# Patient Record
Sex: MALE | Race: WHITE | NOT HISPANIC OR LATINO | Employment: OTHER | URBAN - METROPOLITAN AREA
[De-identification: names, ages, dates, MRNs, and addresses within clinical notes are randomized per-mention and may not be internally consistent; named-entity substitution may affect disease eponyms.]

---

## 2018-07-31 DIAGNOSIS — I49.8 OTHER CARDIAC ARRHYTHMIA: Primary | ICD-10-CM

## 2018-07-31 RX ORDER — AMIODARONE HYDROCHLORIDE 200 MG/1
TABLET ORAL
Qty: 180 TABLET | Refills: 0 | Status: SHIPPED | OUTPATIENT
Start: 2018-07-31 | End: 2018-09-05

## 2018-08-14 DIAGNOSIS — I10 ESSENTIAL HYPERTENSION, BENIGN: Primary | ICD-10-CM

## 2018-08-14 RX ORDER — FUROSEMIDE 20 MG/1
20 TABLET ORAL EVERY 24 HOURS
COMMUNITY
Start: 2017-08-03 | End: 2018-08-14 | Stop reason: SDUPTHER

## 2018-08-14 RX ORDER — FUROSEMIDE 20 MG/1
20 TABLET ORAL EVERY 24 HOURS
Qty: 90 TABLET | Refills: 1 | Status: SHIPPED | OUTPATIENT
Start: 2018-08-14 | End: 2018-09-05 | Stop reason: SDUPTHER

## 2018-09-05 DIAGNOSIS — I48.20 CHRONIC ATRIAL FIBRILLATION (HCC): ICD-10-CM

## 2018-09-05 DIAGNOSIS — K21.9 GASTROESOPHAGEAL REFLUX DISEASE WITHOUT ESOPHAGITIS: ICD-10-CM

## 2018-09-05 DIAGNOSIS — I10 ESSENTIAL HYPERTENSION, BENIGN: ICD-10-CM

## 2018-09-05 DIAGNOSIS — E55.9 AVITAMINOSIS D: ICD-10-CM

## 2018-09-05 DIAGNOSIS — M1A.9XX0 CHRONIC GOUT WITHOUT TOPHUS, UNSPECIFIED CAUSE, UNSPECIFIED SITE: Primary | ICD-10-CM

## 2018-09-05 DIAGNOSIS — E78.49 OTHER HYPERLIPIDEMIA: ICD-10-CM

## 2018-09-05 RX ORDER — OMEPRAZOLE 20 MG/1
20 CAPSULE, DELAYED RELEASE ORAL 2 TIMES DAILY
COMMUNITY
Start: 2017-08-03 | End: 2018-09-05 | Stop reason: SDUPTHER

## 2018-09-05 RX ORDER — AMIODARONE HYDROCHLORIDE 200 MG/1
200 TABLET ORAL 2 TIMES DAILY
Qty: 180 TABLET | Refills: 1 | Status: SHIPPED | OUTPATIENT
Start: 2018-09-05 | End: 2019-01-17 | Stop reason: SDUPTHER

## 2018-09-05 RX ORDER — ATORVASTATIN CALCIUM 20 MG/1
20 TABLET, FILM COATED ORAL
Qty: 90 TABLET | Refills: 1 | Status: SHIPPED | OUTPATIENT
Start: 2018-09-05 | End: 2019-01-17 | Stop reason: SDUPTHER

## 2018-09-05 RX ORDER — WARFARIN SODIUM 5 MG/1
5 TABLET ORAL DAILY
Qty: 90 TABLET | Refills: 1 | Status: SHIPPED | OUTPATIENT
Start: 2018-09-05 | End: 2019-04-22 | Stop reason: SDUPTHER

## 2018-09-05 RX ORDER — METOPROLOL SUCCINATE 50 MG/1
50 TABLET, EXTENDED RELEASE ORAL DAILY
COMMUNITY
Start: 2017-08-03 | End: 2018-09-05 | Stop reason: SDUPTHER

## 2018-09-05 RX ORDER — ALLOPURINOL 100 MG/1
100 TABLET ORAL DAILY
COMMUNITY
Start: 2017-12-18 | End: 2018-09-05 | Stop reason: SDUPTHER

## 2018-09-05 RX ORDER — ATORVASTATIN CALCIUM 20 MG/1
20 TABLET, FILM COATED ORAL
COMMUNITY
Start: 2017-08-03 | End: 2018-09-05 | Stop reason: SDUPTHER

## 2018-09-05 RX ORDER — WARFARIN SODIUM 5 MG/1
5 TABLET ORAL DAILY
COMMUNITY
Start: 2017-08-03 | End: 2018-09-05 | Stop reason: SDUPTHER

## 2018-09-05 RX ORDER — ALLOPURINOL 100 MG/1
100 TABLET ORAL DAILY
Qty: 90 TABLET | Refills: 1 | Status: SHIPPED | OUTPATIENT
Start: 2018-09-05 | End: 2019-01-17 | Stop reason: SDUPTHER

## 2018-09-05 RX ORDER — OMEPRAZOLE 20 MG/1
20 CAPSULE, DELAYED RELEASE ORAL 2 TIMES DAILY
Qty: 180 CAPSULE | Refills: 1 | Status: SHIPPED | OUTPATIENT
Start: 2018-09-05 | End: 2018-09-10 | Stop reason: SDUPTHER

## 2018-09-05 RX ORDER — LOSARTAN POTASSIUM 50 MG/1
50 TABLET ORAL DAILY
COMMUNITY
Start: 2017-12-18 | End: 2018-09-05 | Stop reason: SDUPTHER

## 2018-09-05 RX ORDER — FUROSEMIDE 20 MG/1
20 TABLET ORAL EVERY 24 HOURS
Qty: 90 TABLET | Refills: 1 | Status: SHIPPED | OUTPATIENT
Start: 2018-09-05 | End: 2019-01-17 | Stop reason: SDUPTHER

## 2018-09-05 RX ORDER — AMIODARONE HYDROCHLORIDE 200 MG/1
200 TABLET ORAL 2 TIMES DAILY
COMMUNITY
Start: 2017-08-03 | End: 2018-09-05 | Stop reason: SDUPTHER

## 2018-09-05 RX ORDER — METOPROLOL SUCCINATE 50 MG/1
50 TABLET, EXTENDED RELEASE ORAL DAILY
Qty: 90 TABLET | Refills: 1 | Status: SHIPPED | OUTPATIENT
Start: 2018-09-05 | End: 2018-09-10 | Stop reason: SDUPTHER

## 2018-09-05 RX ORDER — ERGOCALCIFEROL 1.25 MG/1
1 CAPSULE ORAL WEEKLY
COMMUNITY
Start: 2018-03-27 | End: 2018-09-05 | Stop reason: SDUPTHER

## 2018-09-05 RX ORDER — ERGOCALCIFEROL 1.25 MG/1
50000 CAPSULE ORAL WEEKLY
Qty: 13 CAPSULE | Refills: 1 | Status: SHIPPED | OUTPATIENT
Start: 2018-09-05 | End: 2019-01-17 | Stop reason: SDUPTHER

## 2018-09-05 RX ORDER — LOSARTAN POTASSIUM 50 MG/1
50 TABLET ORAL DAILY
Qty: 90 TABLET | Refills: 1 | Status: SHIPPED | OUTPATIENT
Start: 2018-09-05 | End: 2019-01-17 | Stop reason: SDUPTHER

## 2018-09-10 DIAGNOSIS — K21.9 GASTROESOPHAGEAL REFLUX DISEASE WITHOUT ESOPHAGITIS: ICD-10-CM

## 2018-09-10 DIAGNOSIS — I10 ESSENTIAL HYPERTENSION, BENIGN: ICD-10-CM

## 2018-09-10 RX ORDER — METOPROLOL SUCCINATE 50 MG/1
TABLET, EXTENDED RELEASE ORAL
Qty: 90 TABLET | Refills: 0 | Status: SHIPPED | OUTPATIENT
Start: 2018-09-10 | End: 2019-01-17 | Stop reason: SDUPTHER

## 2018-09-10 RX ORDER — OMEPRAZOLE 20 MG/1
CAPSULE, DELAYED RELEASE ORAL
Qty: 180 CAPSULE | Refills: 0 | Status: SHIPPED | OUTPATIENT
Start: 2018-09-10 | End: 2019-01-17 | Stop reason: SDUPTHER

## 2018-09-19 ENCOUNTER — OFFICE VISIT (OUTPATIENT)
Dept: FAMILY MEDICINE CLINIC | Facility: CLINIC | Age: 68
End: 2018-09-19
Payer: MEDICARE

## 2018-09-19 VITALS
SYSTOLIC BLOOD PRESSURE: 140 MMHG | OXYGEN SATURATION: 97 % | HEIGHT: 69 IN | DIASTOLIC BLOOD PRESSURE: 82 MMHG | WEIGHT: 228.9 LBS | HEART RATE: 48 BPM | TEMPERATURE: 97.9 F | BODY MASS INDEX: 33.9 KG/M2 | RESPIRATION RATE: 14 BRPM

## 2018-09-19 DIAGNOSIS — K59.04 CHRONIC IDIOPATHIC CONSTIPATION: Primary | ICD-10-CM

## 2018-09-19 DIAGNOSIS — M54.50 ACUTE LOW BACK PAIN WITHOUT SCIATICA, UNSPECIFIED BACK PAIN LATERALITY: ICD-10-CM

## 2018-09-19 PROCEDURE — 96372 THER/PROPH/DIAG INJ SC/IM: CPT | Performed by: INTERNAL MEDICINE

## 2018-09-19 PROCEDURE — 99213 OFFICE O/P EST LOW 20 MIN: CPT | Performed by: INTERNAL MEDICINE

## 2018-09-19 RX ORDER — PRAVASTATIN SODIUM 20 MG
20 TABLET ORAL DAILY
COMMUNITY
End: 2019-01-17 | Stop reason: SDUPTHER

## 2018-09-19 RX ORDER — METHOCARBAMOL 500 MG/1
500 TABLET, FILM COATED ORAL 3 TIMES DAILY
Qty: 30 TABLET | Refills: 0 | Status: SHIPPED | OUTPATIENT
Start: 2018-09-19 | End: 2019-08-01

## 2018-09-19 RX ORDER — KETOROLAC TROMETHAMINE 30 MG/ML
30 INJECTION, SOLUTION INTRAMUSCULAR; INTRAVENOUS ONCE
Status: COMPLETED | OUTPATIENT
Start: 2018-09-19 | End: 2018-09-19

## 2018-09-19 RX ADMIN — KETOROLAC TROMETHAMINE 30 MG: 30 INJECTION, SOLUTION INTRAMUSCULAR; INTRAVENOUS at 14:11

## 2018-09-19 NOTE — PROGRESS NOTES
Assessment/Plan:         Diagnoses and all orders for this visit:    Chronic idiopathic constipation: Try  -     Plecanatide (TRULANCE) 3 MG TABS; Take 1 tablet by mouth daily    FU w gI  Acute low back pain without sciatica, unspecified back pain laterality: Moist Heat :  -     ketorolac (TORADOL) 60 mg/2 mL IM injection 30 mg; Inject 1 mL (30 mg total) into a muscle once   -     methocarbamol (ROBAXIN) 500 mg tablet; Take 1 tablet (500 mg total) by mouth 3 (three) times a day With food   Tylenol 650 mg Tid PRN  pennsaid lotion TID   samples Given  RTC in 2 weeks  Other orders  -     pravastatin (PRAVACHOL) 20 mg tablet; Take 20 mg by mouth daily        Subjective:      Patient ID: Doris Paz is a 80 y o  male  80 Y O man with H/O HTN,Obesity,A Fib  Shameka Amaluke Shameka Salcedo Is here for Regular check up and Increasing low back pain since 2 days ago    Mod to severe, No trauma, also Chronic Constipation    No recent blood work    Med list reviewed w pt in Detail    No other issues         Back Pain   Pertinent negatives include no abdominal pain, chest pain, fever, headaches or weakness  The following portions of the patient's history were reviewed and updated as appropriate: allergies, current medications, past family history, past medical history, past social history, past surgical history and problem list     Review of Systems   Constitutional: Negative for chills, fatigue and fever  HENT: Negative for congestion, facial swelling, sore throat, trouble swallowing and voice change  Eyes: Negative for pain, discharge and visual disturbance  Respiratory: Negative for cough, shortness of breath and wheezing  Cardiovascular: Negative for chest pain, palpitations and leg swelling  Gastrointestinal: Positive for constipation  Negative for abdominal pain, blood in stool, diarrhea and nausea  Endocrine: Negative for polydipsia, polyphagia and polyuria  Genitourinary: Negative for difficulty urinating, hematuria and urgency  Musculoskeletal: Positive for back pain  Negative for arthralgias and myalgias  Skin: Negative for rash  Neurological: Negative for dizziness, tremors, weakness and headaches  Hematological: Negative for adenopathy  Does not bruise/bleed easily  Psychiatric/Behavioral: Negative for dysphoric mood, sleep disturbance and suicidal ideas  Objective:      /82 (BP Location: Left arm, Patient Position: Sitting, Cuff Size: Standard)   Pulse (!) 48   Temp 97 9 °F (36 6 °C) (Oral)   Resp 14   Ht 5' 9" (1 753 m)   Wt 104 kg (228 lb 14 4 oz)   SpO2 97%   BMI 33 80 kg/m²          Physical Exam   Constitutional: He is oriented to person, place, and time  He appears well-nourished  No distress  HENT:   Head: Normocephalic  Mouth/Throat: Oropharynx is clear and moist  No oropharyngeal exudate  Eyes: Conjunctivae are normal  Pupils are equal, round, and reactive to light  No scleral icterus  Neck: Neck supple  No thyromegaly present  Cardiovascular: Normal rate, regular rhythm and normal heart sounds  No murmur heard  Pulmonary/Chest: Effort normal and breath sounds normal  No respiratory distress  He has no wheezes  He has no rales  Abdominal: Soft  Bowel sounds are normal  He exhibits no distension  There is no tenderness  There is no rebound and no guarding  Obese   Musculoskeletal: He exhibits tenderness  He exhibits no edema  Tenderness and Muscle spasm Lower back in the center   Lymphadenopathy:     He has no cervical adenopathy  Neurological: He is alert and oriented to person, place, and time  No cranial nerve deficit  Coordination normal    Skin: No rash noted  No erythema  No pallor  Psychiatric: He has a normal mood and affect

## 2018-11-06 ENCOUNTER — OFFICE VISIT (OUTPATIENT)
Dept: FAMILY MEDICINE CLINIC | Facility: CLINIC | Age: 68
End: 2018-11-06
Payer: MEDICARE

## 2018-11-06 VITALS
HEART RATE: 62 BPM | RESPIRATION RATE: 14 BRPM | WEIGHT: 227.6 LBS | OXYGEN SATURATION: 98 % | SYSTOLIC BLOOD PRESSURE: 134 MMHG | HEIGHT: 69 IN | BODY MASS INDEX: 33.71 KG/M2 | DIASTOLIC BLOOD PRESSURE: 76 MMHG | TEMPERATURE: 97.8 F

## 2018-11-06 DIAGNOSIS — I10 ESSENTIAL HYPERTENSION: ICD-10-CM

## 2018-11-06 DIAGNOSIS — E78.49 OTHER HYPERLIPIDEMIA: Primary | ICD-10-CM

## 2018-11-06 DIAGNOSIS — K59.04 CHRONIC IDIOPATHIC CONSTIPATION: ICD-10-CM

## 2018-11-06 PROCEDURE — 99214 OFFICE O/P EST MOD 30 MIN: CPT | Performed by: INTERNAL MEDICINE

## 2018-11-06 NOTE — PROGRESS NOTES
Assessment/Plan:         Diagnoses and all orders for this visit:    Other hyperlipidemia: life Style mod  Continue same Meds  RTC in 3mos w Blood work,   slip is given to Pt  Chronic idiopathic constipation: improving Nicely  Renew :  -     Plecanatide (TRULANCE) 3 MG TABS; Take 1 tablet by mouth daily    Essential hypertension: stable  Continue same meds  RTC in 3mos w Blood work        Subjective:      Patient ID: Lizeth Umana is a 80 y o  male  80 Y O man with h/O HTN,Constipation,obesity,  Is here for Regular check up,  No new Symptoms,  No new blood work,  Med List reviewed w pt in detail    The following portions of the patient's history were reviewed and updated as appropriate: allergies, current medications, past family history, past medical history, past social history, past surgical history and problem list     Review of Systems   Constitutional: Negative for chills, fatigue and fever  HENT: Negative for congestion, facial swelling, sore throat, trouble swallowing and voice change  Eyes: Negative for pain, discharge and visual disturbance  Respiratory: Negative for cough, shortness of breath and wheezing  Cardiovascular: Negative for chest pain, palpitations and leg swelling  Gastrointestinal: Negative for abdominal pain, blood in stool, constipation, diarrhea and nausea  Endocrine: Negative for polydipsia, polyphagia and polyuria  Genitourinary: Negative for difficulty urinating, hematuria and urgency  Musculoskeletal: Negative for arthralgias and myalgias  Skin: Negative for rash  Neurological: Negative for dizziness, tremors, weakness and headaches  Hematological: Negative for adenopathy  Does not bruise/bleed easily  Psychiatric/Behavioral: Negative for dysphoric mood, sleep disturbance and suicidal ideas           Objective:      /76 (BP Location: Left arm, Patient Position: Sitting, Cuff Size: Standard)   Pulse 62   Temp 97 8 °F (36 6 °C) (Oral) Resp 14    5' 9" (1 753 m)   Wt 103 kg (227 lb 9 6 oz)   SpO2 98%   BMI 33 61 kg/m²          Physical Exam   Constitutional: He is oriented to person, place, and time  He appears well-nourished  No distress  HENT:   Head: Normocephalic  Mouth/Throat: Oropharynx is clear and moist  No oropharyngeal exudate  Eyes: Pupils are equal, round, and reactive to light  Conjunctivae are normal  No scleral icterus  Neck: Neck supple  No thyromegaly present  Cardiovascular: Normal rate, regular rhythm and normal heart sounds  No murmur heard  Pulmonary/Chest: Effort normal and breath sounds normal  No respiratory distress  He has no wheezes  He has no rales  Abdominal: Soft  Bowel sounds are normal  He exhibits no distension  There is no tenderness  There is no rebound and no guarding  Obese   Musculoskeletal: He exhibits no edema or tenderness  Lymphadenopathy:     He has no cervical adenopathy  Neurological: He is alert and oriented to person, place, and time  No cranial nerve deficit  Coordination normal    Skin: No rash noted  No erythema  No pallor  Psychiatric: He has a normal mood and affect

## 2019-01-17 DIAGNOSIS — I10 ESSENTIAL HYPERTENSION, BENIGN: ICD-10-CM

## 2019-01-17 DIAGNOSIS — E78.49 OTHER HYPERLIPIDEMIA: ICD-10-CM

## 2019-01-17 DIAGNOSIS — E55.9 AVITAMINOSIS D: ICD-10-CM

## 2019-01-17 DIAGNOSIS — K21.9 GASTROESOPHAGEAL REFLUX DISEASE WITHOUT ESOPHAGITIS: ICD-10-CM

## 2019-01-17 DIAGNOSIS — M1A.9XX0 CHRONIC GOUT WITHOUT TOPHUS, UNSPECIFIED CAUSE, UNSPECIFIED SITE: ICD-10-CM

## 2019-01-17 RX ORDER — AMIODARONE HYDROCHLORIDE 200 MG/1
200 TABLET ORAL 2 TIMES DAILY
Qty: 180 TABLET | Refills: 1 | Status: SHIPPED | OUTPATIENT
Start: 2019-01-17 | End: 2019-04-22 | Stop reason: SDUPTHER

## 2019-01-17 RX ORDER — FUROSEMIDE 20 MG/1
20 TABLET ORAL DAILY
Qty: 90 TABLET | Refills: 1 | Status: SHIPPED | OUTPATIENT
Start: 2019-01-17 | End: 2019-04-22 | Stop reason: SDUPTHER

## 2019-01-17 RX ORDER — PRAVASTATIN SODIUM 20 MG
20 TABLET ORAL DAILY
Qty: 90 TABLET | Refills: 1 | Status: SHIPPED | OUTPATIENT
Start: 2019-01-17 | End: 2019-04-22 | Stop reason: SDUPTHER

## 2019-01-17 RX ORDER — ATORVASTATIN CALCIUM 20 MG/1
20 TABLET, FILM COATED ORAL
Qty: 90 TABLET | Refills: 1 | Status: SHIPPED | OUTPATIENT
Start: 2019-01-17 | End: 2020-01-20

## 2019-01-17 RX ORDER — METOPROLOL SUCCINATE 50 MG/1
50 TABLET, EXTENDED RELEASE ORAL DAILY
Qty: 90 TABLET | Refills: 1 | Status: SHIPPED | OUTPATIENT
Start: 2019-01-17 | End: 2019-04-22 | Stop reason: SDUPTHER

## 2019-01-17 RX ORDER — LOSARTAN POTASSIUM 50 MG/1
50 TABLET ORAL DAILY
Qty: 90 TABLET | Refills: 1 | Status: SHIPPED | OUTPATIENT
Start: 2019-01-17 | End: 2019-04-01 | Stop reason: SDUPTHER

## 2019-01-17 RX ORDER — OMEPRAZOLE 20 MG/1
20 CAPSULE, DELAYED RELEASE ORAL 2 TIMES DAILY
Qty: 180 CAPSULE | Refills: 1 | Status: SHIPPED | OUTPATIENT
Start: 2019-01-17 | End: 2019-04-22 | Stop reason: SDUPTHER

## 2019-01-17 RX ORDER — ERGOCALCIFEROL 1.25 MG/1
50000 CAPSULE ORAL WEEKLY
Qty: 13 CAPSULE | Refills: 1 | Status: SHIPPED | OUTPATIENT
Start: 2019-01-17 | End: 2021-03-29 | Stop reason: SDUPTHER

## 2019-01-17 RX ORDER — ALLOPURINOL 100 MG/1
100 TABLET ORAL DAILY
Qty: 90 TABLET | Refills: 1 | Status: SHIPPED | OUTPATIENT
Start: 2019-01-17 | End: 2019-04-22 | Stop reason: SDUPTHER

## 2019-01-18 ENCOUNTER — OFFICE VISIT (OUTPATIENT)
Dept: FAMILY MEDICINE CLINIC | Facility: CLINIC | Age: 69
End: 2019-01-18
Payer: MEDICARE

## 2019-01-18 VITALS
RESPIRATION RATE: 14 BRPM | HEIGHT: 69 IN | OXYGEN SATURATION: 97 % | HEART RATE: 56 BPM | TEMPERATURE: 98.3 F | BODY MASS INDEX: 34.51 KG/M2 | WEIGHT: 233 LBS | SYSTOLIC BLOOD PRESSURE: 134 MMHG | DIASTOLIC BLOOD PRESSURE: 90 MMHG

## 2019-01-18 DIAGNOSIS — E11.8 TYPE 2 DIABETES MELLITUS WITH COMPLICATION, WITHOUT LONG-TERM CURRENT USE OF INSULIN (HCC): ICD-10-CM

## 2019-01-18 DIAGNOSIS — K59.04 CHRONIC IDIOPATHIC CONSTIPATION: ICD-10-CM

## 2019-01-18 DIAGNOSIS — H60.312 ACUTE DIFFUSE OTITIS EXTERNA OF LEFT EAR: Primary | ICD-10-CM

## 2019-01-18 DIAGNOSIS — E66.9 OBESITY (BMI 30.0-34.9): ICD-10-CM

## 2019-01-18 DIAGNOSIS — I48.0 PAROXYSMAL ATRIAL FIBRILLATION (HCC): ICD-10-CM

## 2019-01-18 DIAGNOSIS — N18.1 CRF (CHRONIC RENAL FAILURE), STAGE 1: ICD-10-CM

## 2019-01-18 DIAGNOSIS — M25.562 ACUTE PAIN OF LEFT KNEE: ICD-10-CM

## 2019-01-18 DIAGNOSIS — E78.5 HYPERLIPIDEMIA ASSOCIATED WITH TYPE 2 DIABETES MELLITUS (HCC): ICD-10-CM

## 2019-01-18 DIAGNOSIS — E11.69 HYPERLIPIDEMIA ASSOCIATED WITH TYPE 2 DIABETES MELLITUS (HCC): ICD-10-CM

## 2019-01-18 DIAGNOSIS — E66.9 OBESITY (BMI 30-39.9): ICD-10-CM

## 2019-01-18 PROCEDURE — 99214 OFFICE O/P EST MOD 30 MIN: CPT | Performed by: INTERNAL MEDICINE

## 2019-01-18 RX ORDER — TOBRAMYCIN AND DEXAMETHASONE 3; 1 MG/ML; MG/ML
SUSPENSION/ DROPS OPHTHALMIC
Qty: 5 ML | Refills: 1 | Status: SHIPPED | OUTPATIENT
Start: 2019-01-18 | End: 2019-02-06 | Stop reason: ALTCHOICE

## 2019-01-18 NOTE — PATIENT INSTRUCTIONS
Low Fat Diet   AMBULATORY CARE:   A low-fat diet  is an eating plan that is low in total fat, unhealthy fat, and cholesterol  You may need to follow a low-fat diet if you have trouble digesting or absorbing fat  You may also need to follow this diet if you have high cholesterol  You can also lower your cholesterol by increasing the amount of fiber in your diet  Soluble fiber is a type of fiber that helps to decrease cholesterol levels  Different types of fat in food:   · Limit unhealthy fats  A diet that is high in cholesterol, saturated fat, and trans fat may cause unhealthy cholesterol levels  Unhealthy cholesterol levels increase your risk of heart disease  ¨ Cholesterol:  Limit intake of cholesterol to less than 200 mg per day  Cholesterol is found in meat, eggs, and dairy  ¨ Saturated fat:  Limit saturated fat to less than 7% of your total daily calories  Ask your dietitian how many calories you need each day  Saturated fat is found in butter, cheese, ice cream, whole milk, and palm oil  Saturated fat is also found in meat, such as beef, pork, chicken skin, and processed meats  Processed meats include sausage, hot dogs, and bologna  ¨ Trans fat:  Avoid trans fat as much as possible  Trans fat is used in fried and baked foods  Foods that say trans fat free on the label may still have up to 0 5 grams of trans fat per serving  · Include healthy fats  Replace foods that are high in saturated and trans fat with foods high in healthy fats  This may help to decrease high cholesterol levels  ¨ Monounsaturated fats: These are found in avocados, nuts, and vegetable oils, such as olive, canola, and sunflower oil  ¨ Polyunsaturated fats: These can be found in vegetable oils, such as soybean or corn oil  Omega-3 fats can help to decrease the risk of heart disease  Omega-3 fats are found in fish, such as salmon, herring, trout, and tuna   Omega-3 fats can also be found in plant foods, such as walnuts, flaxseed, soybeans, and canola oil    Foods to limit or avoid:   · Grains:      ¨ Snacks that are made with partially hydrogenated oils, such as chips, regular crackers, and butter-flavored popcorn    ¨ High-fat baked goods, such as biscuits, croissants, doughnuts, pies, cookies, and pastries    · Dairy:      ¨ Whole milk, 2% milk, and yogurt and ice cream made with whole milk    ¨ Half and half creamer, heavy cream, and whipping cream    ¨ Cheese, cream cheese, and sour cream    · Meats and proteins:      ¨ High-fat cuts of meat (T-bone steak, regular hamburger, and ribs)    ¨ Fried meat, poultry (turkey and chicken), and fish    ¨ Poultry (chicken and turkey) with skin    ¨ Cold cuts (salami or bologna), hot dogs, blount, and sausage    ¨ Whole eggs and egg yolks    · Vegetables and fruits with added fat:      ¨ Fried vegetables or vegetables in butter or high-fat sauces, such as cream or cheese sauces    ¨ Fried fruit or fruit served with butter or cream    · Fats:      ¨ Butter, stick margarine, and shortening    ¨ Coconut, palm oil, and palm kernel oil  Foods to include:   · Grains:      ¨ Whole-grain breads, cereals, pasta, and brown rice    ¨ Low-fat crackers and pretzels    · Vegetables and fruits:      ¨ Fresh, frozen, or canned vegetables (no salt or low-sodium)    ¨ Fresh, frozen, dried, or canned fruit (canned in light syrup or fruit juice)    ¨ Avocado    · Low-fat dairy products:      ¨ Nonfat (skim) or 1% milk    ¨ Nonfat or low-fat cheese, yogurt, and cottage cheese    · Meats and proteins:      ¨ Chicken or turkey with no skin    ¨ Baked or broiled fish    ¨ Lean beef and pork (loin, round, extra lean hamburger)    ¨ Beans and peas, unsalted nuts, soy products    ¨ Egg whites and substitutes    ¨ Seeds and nuts    · Fats:      ¨ Unsaturated oil, such as canola, olive, peanut, soybean, or sunflower oil    ¨ Soft or liquid margarine and vegetable oil spread    ¨ Low-fat salad dressing  Other ways to decrease fat:   · Read food labels before you buy foods  Choose foods that have less than 30% of calories from fat  Choose low-fat or fat-free dairy products  Remember that fat free does not mean calorie free  These foods still contain calories, and too many calories can lead to weight gain  · Trim fat from meat and avoid fried food  Trim all visible fat from meat before you cook it  Remove the skin from poultry  Do not colon meat, fish, or poultry  Bake, roast, boil, or broil these foods instead  Avoid fried foods  Eat a baked potato instead of [place] fries  Steam vegetables instead of sautéing them in butter  · Add less fat to foods  Use imitation blount bits on salads and baked potatoes instead of regular blount bits  Use fat-free or low-fat salad dressings instead of regular dressings  Use low-fat or nonfat butter-flavored topping instead of regular butter or margarine on popcorn and other foods  Ways to decrease fat in recipes:  Replace high-fat ingredients with low-fat or nonfat ones  This may cause baked goods to be drier than usual  You may need to use nonfat cooking spray on pans to prevent food from sticking  You also may need to change the amount of other ingredients, such as water, in the recipe  Try the following:  · Use low-fat or light margarine instead of regular margarine or shortening  · Use lean ground turkey breast or chicken, or lean ground beef (less than 5% fat) instead of hamburger  · Add 1 teaspoon of canola oil to 8 ounces of skim milk instead of using cream or half and half  · Use grated zucchini, carrots, or apples in breads instead of coconut  · Use blenderized, low-fat cottage cheese, plain tofu, or low-fat ricotta cheese instead of cream cheese  · Use 1 egg white and 1 teaspoon of canola oil, or use ¼ cup (2 ounces) of fat-free egg substitute instead of a whole egg       · Replace half of the oil that is called for in a recipe with applesauce when you bake  Use 3 tablespoons of cocoa powder and 1 tablespoon of canola oil instead of a square of baking chocolate  How to increase fiber:  Eat enough high-fiber foods to get 20 to 30 grams of fiber every day  Slowly increase your fiber intake to avoid stomach cramps, gas, and other problems  · Eat 3 ounces of whole-grain foods each day  An ounce is about 1 slice of bread  Eat whole-grain breads, such as whole-wheat bread  Whole wheat, whole-wheat flour, or other whole grains should be listed as the first ingredient on the food label  Replace white flour with whole-grain flour or use half of each in recipes  Whole-grain flour is heavier than white flour, so you may have to add more yeast or baking powder  · Eat a high-fiber cereal for breakfast   Oatmeal is a good source of soluble fiber  Look for cereals that have bran or fiber in the name  Choose whole-grain products, such as brown rice, barley, and whole-wheat pasta  · Eat more beans, peas, and lentils  For example, add beans to soups or salads  Eat at least 5 cups of fruits and vegetables each day  Eat fruits and vegetables with the peel because the peel is high in fiber  © 2017 [address] [employer] Information is for End User's use only and may not be sold, redistributed or otherwise used for commercial purposes  All illustrations and images included in CareNotes® are the copyrighted property of [employer]  or [employer]  The above information is an  only  It is not intended as medical advice for individual conditions or treatments  Talk to your doctor, nurse or pharmacist before following any medical regimen to see if it is safe and effective for you  Heart Healthy Diet   AMBULATORY CARE:   A heart healthy diet  is an eating plan low in total fat, unhealthy fats, and sodium (salt)  A heart healthy diet helps decrease your risk for heart disease and stroke   Limit the amount of fat you eat to 25% to 35% of your total daily calories  Limit sodium to less than 2,300 mg each day  Healthy fats:  Healthy fats can help improve cholesterol levels  The risk for heart disease is decreased when cholesterol levels are normal  Choose healthy fats, such as the following:  · Unsaturated fat  is found in foods such as soybean, canola, olive, corn, and safflower oils  It is also found in soft tub margarine that is made with liquid vegetable oil  · Omega-3 fat  is found in certain fish, such as salmon, tuna, and trout, and in walnuts and flaxseed  Unhealthy fats:  Unhealthy fats can cause unhealthy cholesterol levels in your blood and increase your risk of heart disease  Limit unhealthy fats, such as the following:  · Cholesterol  is found in animal foods, such as eggs and lobster, and in dairy products made from whole milk  Limit cholesterol to less than 300 milligrams (mg) each day  You may need to limit cholesterol to 200 mg each day if you have heart disease  · Saturated fat  is found in meats, such as blount and hamburger  It is also found in chicken or turkey skin, whole milk, and butter  Limit saturated fat to less than 7% of your total daily calories  Limit saturated fat to less than 6% if you have heart disease or are at increased risk for it  · Trans fat  is found in packaged foods, such as potato chips and cookies  It is also in hard margarine, some fried foods, and shortening  Avoid trans fats as much as possible    Heart healthy foods and drinks to include:  Ask your dietitian or healthcare provider how many servings to have from each of the following food groups:  · Grains:      ¨ Whole-wheat breads, cereals, and pastas, and brown rice    ¨ Low-fat, low-sodium crackers and chips    · Vegetables:      ¨ Broccoli, green beans, green peas, and spinach    ¨ Collards, kale, and lima beans    ¨ Carrots, sweet potatoes, tomatoes, and peppers    ¨ Canned vegetables with no salt added    · Fruits:      ¨ Bananas, peaches, pears, and pineapple    ¨ Grapes, raisins, and dates    ¨ Oranges, tangerines, grapefruit, orange juice, and grapefruit juice    ¨ Apricots, mangoes, melons, and papaya    ¨ Raspberries and strawberries    ¨ Canned fruit with no added sugar    · Low-fat dairy products:      ¨ Nonfat (skim) milk, 1% milk, and low-fat almond, cashew, or soy milks fortified with calcium    ¨ Low-fat cheese, regular or frozen yogurt, and cottage cheese    · Meats and proteins , such as lean cuts of beef and pork (loin, leg, round), skinless chicken and turkey, legumes, soy products, egg whites, and nuts  Foods and drinks to limit or avoid:  Ask your dietitian or healthcare provider about these and other foods that are high in unhealthy fat, sodium, and sugar:  · Snack or packaged foods , such as frozen dinners, cookies, macaroni and cheese, and cereals with more than 300 mg of sodium per serving    · Canned or dry mixes  for cakes, soups, sauces, or gravies    · Vegetables with added sodium , such as instant potatoes, vegetables with added sauces, or regular canned vegetables    · Other foods high in sodium , such as ketchup, barbecue sauce, salad dressing, pickles, olives, soy sauce, and miso    · High-fat dairy foods  such as whole or 2% milk, cream cheese, or sour cream, and cheeses     · High-fat protein foods  such as high-fat cuts of beef (T-bone steaks, ribs), chicken or turkey with skin, and organ meats, such as liver    · Cured or smoked meats , such as hot dogs, blount, and sausage    · Unhealthy fats and oils , such as butter, stick margarine, shortening, and cooking oils such as coconut or palm oil    · Food and drinks high in sugar , such as soft drinks (soda), sports drinks, sweetened tea, candy, cake, cookies, pies, and doughnuts  Other diet guidelines to follow:   · Eat more foods containing omega-3 fats  Eat fish high in omega-3 fats at least 2 times a week  · Limit alcohol    Too much alcohol can damage your heart and raise your blood pressure  Women should limit alcohol to 1 drink a day  Men should limit alcohol to 2 drinks a day  A drink of alcohol is 12 ounces of beer, 5 ounces of wine, or 1½ ounces of liquor  · Choose low-sodium foods  High-sodium foods can lead to high blood pressure  Add little or no salt to food you prepare  Use herbs and spices in place of salt  · Eat more fiber  to help lower cholesterol levels  Eat at least 5 servings of fruits and vegetables each day  Eat 3 ounces of whole-grain foods each day  Legumes (beans) are also a good source of fiber  Lifestyle guidelines:   · Do not smoke  Nicotine and other chemicals in cigarettes and cigars can cause lung and heart damage  Ask your healthcare provider for information if you currently smoke and need help to quit  E-cigarettes or smokeless tobacco still contain nicotine  Talk to your healthcare provider before you use these products  · Exercise regularly  to help you maintain a healthy weight and improve your blood pressure and cholesterol levels  Ask your healthcare provider about the best exercise plan for you  Do not start an exercise program without asking your healthcare provider  Follow up with your healthcare provider as directed:  Write down your questions so you remember to ask them during your visits  © 2017 [facility] [facility] Information is for End User's use only and may not be sold, redistributed or otherwise used for commercial purposes  All illustrations and images included in CareNotes® are the copyrighted property of [employer]  or [employer]  The above information is an  only  It is not intended as medical advice for individual conditions or treatments  Talk to your doctor, nurse or pharmacist before following any medical regimen to see if it is safe and effective for you  Calorie Counting Diet   WHAT YOU NEED TO KNOW:   What is a calorie counting diet?   It is a meal plan based on counting calories each day to reach a healthy body weight  You will need to eat fewer calories if you are trying to lose weight  Weight loss may decrease your risk for certain health problems or improve your health if you have health problems  Some of these health problems include heart disease, high blood pressure, and diabetes  What foods should I avoid? Your dietitian will tell you if you need to avoid certain foods based on your body weight and health condition  You may need to avoid high-fat foods if you are at risk for or have heart disease  You may need to eat fewer foods from the breads and starches food group if you have diabetes  How many calories are in foods? The following is a list of foods and drinks with the approximate number of calories in each  Check the food label to find the exact number of calories  A dietitian can tell you how many calories you should have from each food group each day    · Carbohydrate:      ¨ ½ of a 3-inch bagel, 1 slice of bread, or ½ of a hamburger bun or hot dog bun (80)    ¨ 1 (8-inch) flour tortilla or ½ cup of cooked rice (100)    ¨ 1 (6-inch) corn tortilla (80)    ¨ 1 (6-inch) pancake or 1 cup of bran flakes cereal (110)    ¨ ½ cup of cooked cereal (80)    ¨ ½ cup of cooked pasta (85)    ¨ 1 ounce of pretzels (100)    ¨ 3 cups of air-popped popcorn without butter or oil (80)    · Dairy:      ¨ 1 cup of skim or 1% milk (90)    ¨ 1 cup of 2% milk (120)    ¨ 1 cup of whole milk (160)    ¨ 1 cup of 2% chocolate milk (220)    ¨ 1 ounce of low-fat cheese with 3 grams of fat per ounce (70)    ¨ 1 ounce of cheddar cheese (114)    ¨ ½ cup of 1% fat cottage cheese (80)    ¨ 1 cup of plain or sugar-free, fat-free yogurt (90)    · Protein foods:      ¨ 3 ounces of fish (not breaded or fried) (95)    ¨ 3 ounces of breaded, fried fish (195)    ¨ ¾ cup of tuna canned in water (105)    ¨ 3 ounces of chicken breast without skin (105)    ¨ 1 fried chicken breast with skin (350)    ¨ ¼ cup of fat free egg substitute (40)    ¨ 1 large egg (75)    ¨ 3 ounces of lean beef or pork (165)    ¨ 3 ounces of fried pork chop or ham (185)    ¨ ½ cup of cooked dried beans, such as kidney, [place], lentils, or navy (115)    ¨ 3 ounces of bologna or lunch meat (225)    ¨ 2 links of breakfast sausage (140)    · Vegetables:      ¨ ½ cup of sliced mushrooms (10)    ¨ 1 cup of salad greens, such as lettuce, spinach, or scott (15)    ¨ ½ cup of steamed asparagus (20)    ¨ ½ cup of cooked summer squash, zucchini squash, or green or wax beans (25)    ¨ 1 cup of broccoli or cauliflower florets, or 1 medium tomato (25)    ¨ 1 large raw carrot or ½ cup of cooked carrots (40)    ¨ ? of a medium cucumber or 1 stalk of celery (5)    ¨ 1 small baked potato (160)    ¨ 1 cup of breaded, fried vegetables (230)    · Fruit:      ¨ 1 (6-inch) banana (55)     ¨ ½ of a 4-inch grapefruit (55)    ¨ 15 grapes (60)    ¨ 1 medium orange or apple (70)    ¨ 1 large peach (65)    ¨ 1 cup of fresh pineapple chunks (75)    ¨ 1 cup of melon cubes (50)    ¨ 1¼ cups of whole strawberries (45)    ¨ ½ cup of fruit canned in juice (55)    ¨ ½ cup of fruit canned in heavy syrup (110)    ¨ ?  cup of raisins (130)    ¨ ½ cup of unsweetened fruit juice (60)    ¨ ½ cup of grape, cranberry, or prune juice (90)    · Fat:      ¨ 10 peanuts or 2 teaspoons of peanut butter (55)    ¨ 2 tablespoons of avocado or 1 tablespoon of regular salad dressing (45)    ¨ 2 slices of blount (90)    ¨ 1 teaspoon of oil, such as safflower, canola, corn, or olive oil (45)    ¨ 2 teaspoons of low-fat margarine, or 1 tablespoon of low-fat mayonnaise (50)    ¨ 1 teaspoon of regular margarine (40)    ¨ 1 tablespoon of regular mayonnaise (135)    ¨ 1 tablespoon of cream cheese or 2 tablespoons of low-fat cream cheese (45)    ¨ 2 tablespoons of vegetable shortening (215)    · Dessert and sweets:      ¨ 8 animal crackers or 5 vanilla wafers (80)    ¨ 1 frozen fruit juice bar (80)    ¨ ½ cup of ice milk or low-fat frozen yogurt (90)    ¨ ½ cup of sherbet or sorbet (125)    ¨ ½ cup of sugar-free pudding or custard (60)    ¨ ½ cup of ice cream (140)    ¨ ½ cup of pudding or custard (175)    ¨ 1 (2-inch) square chocolate brownie (185)    · Combination foods:      ¨ Bean burrito made with an 8-inch tortilla, without cheese (275)    ¨ Chicken breast sandwich with lettuce and tomato (325)    ¨ 1 cup of chicken noodle soup (60)    ¨ 1 beef taco (175)    ¨ Regular hamburger with lettuce and tomato (310)    ¨ Regular cheeseburger with lettuce and tomato (410)     ¨ ¼ of a 12-inch cheese pizza (280)    ¨ Fried fish sandwich with lettuce and tomato (425)    ¨ Hot dog and bun (275)    ¨ 1½ cups of macaroni and cheese (310)    ¨ Taco salad with a fried tortilla shell (870)    · Low-calorie foods:      ¨ 1 tablespoon of ketchup or 1 tablespoon of fat free sour cream (15)    ¨ 1 teaspoon of mustard (5)    ¨ ¼ cup of salsa (20)    ¨ 1 large dill pickle (15)    ¨ 1 tablespoon of fat free salad dressing (10)    ¨ 2 teaspoons of low-sugar, light jam or jelly, or 1 tablespoon of sugar-free syrup (15)    ¨ 1 sugar-free popsicle (15)    ¨ 1 cup of club soda, seltzer water, or diet soda (0)  CARE AGREEMENT:   You have the right to help plan your care  Discuss treatment options with your caregivers to decide what care you want to receive  You always have the right to refuse treatment  The above information is an  only  It is not intended as medical advice for individual conditions or treatments  Talk to your doctor, nurse or pharmacist before following any medical regimen to see if it is safe and effective for you  © 2017 [address] [employer] Information is for End User's use only and may not be sold, redistributed or otherwise used for commercial purposes   All illustrations and images included in CareNotes® are the copyrighted property of [employer]  or [employer]

## 2019-01-18 NOTE — PROGRESS NOTES
Assessment/Plan:         Diagnoses and all orders for this visit:    Acute diffuse otitis externa of left ear:   -     tobramycin-dexamethasone (TOBRADEX) ophthalmic suspension; Apply 5 Drops in left Ear Twice Daily     RTC in 1-2 weeks    Chronic idiopathic constipation: stable on ;  -     Plecanatide (TRULANCE) 3 MG TABS; Take 1 tablet by mouth daily    Paroxysmal atrial fibrillation ([facility] [address] ): stable  Fuw  cardiology    Obesity (BMI 30-39  9): life style Mod    Type 2 diabetes mellitus with complication, without long-term current use of insulin ([facility] [address] ): life Style mod  Continue same meds  RTC in 3mos w Blood work  -     Ambulatory referral to Ophthalmology; Future    Hyperlipidemia associated with type 2 diabetes mellitus ([facility] [address] ): Continue same  RTC in 3mos w Blood work    CRF (chronic renal failure), stage 1: FU w nephrology    Acute pain of left knee  -     XR knee 3 vw left non injury; Future      Patient's shoes and socks removed  Right Foot/Ankle   Right Foot Inspection  Skin Exam: skin intact, dry skin, callus, erythema and callus no ulcer                          Toe Exam: swellingno tenderness  Sensory   Vibration: diminished  Proprioception: diminished   Monofilament testing: diminished  Vascular    The right DP pulse is 1+  The right PT pulse is 1+  Left Foot/Ankle  Left Foot Inspection  Skin Exam: skin intact, dry skin and callusno ulcer                         Toe Exam: swelling and erythemano tenderness                   Sensory   Vibration: diminished  Proprioception: diminished  Monofilament: diminished  Vascular    The left DP pulse is 1+  The left PT pulse is 1+  Assign Risk Category:  No deformity present; Loss of protective sensation; Weak pulses       Risk: 1    Subjective:      Patient ID: Mark Simms is a 80 y o  male  80 Y O man with H/O DM,CAD,  Is here for Regular check up and Pain with bleeding from left ear , he scratched his ear hard last night     Recent blood work and med List reviewed w pt in detail           The following portions of the patient's history were reviewed and updated as appropriate: allergies, current medications, past family history, past medical history, past social history, past surgical history and problem list     Review of Systems   Constitutional: Negative for chills, fatigue and fever  HENT: Positive for ear discharge and ear pain  Negative for congestion, facial swelling, sore throat, trouble swallowing and voice change  Eyes: Negative for pain, discharge and visual disturbance  Respiratory: Negative for cough, shortness of breath and wheezing  Cardiovascular: Negative for chest pain, palpitations and leg swelling  Gastrointestinal: Negative for abdominal pain, blood in stool, constipation, diarrhea and nausea  Endocrine: Negative for polydipsia, polyphagia and polyuria  Genitourinary: Negative for difficulty urinating, hematuria and urgency  Musculoskeletal: Negative for arthralgias and myalgias  Skin: Negative for rash  Neurological: Negative for dizziness, tremors, weakness and headaches  Hematological: Negative for adenopathy  Does not bruise/bleed easily  Psychiatric/Behavioral: Negative for dysphoric mood, sleep disturbance and suicidal ideas  Objective:      /90 (BP Location: Left arm, Patient Position: Sitting, Cuff Size: Standard)   Pulse 56   Temp 98 3 °F (36 8 °C) (Oral)   Resp 14   Ht 5' 9" (1 753 m)   Wt 106 kg (233 lb)   SpO2 97%   BMI 34 41 kg/m²          Physical Exam   Constitutional: He is oriented to person, place, and time  He appears well-nourished  No distress  HENT:   Head: Normocephalic  Mouth/Throat: Oropharynx is clear and moist  No oropharyngeal exudate  Eyes: Pupils are equal, round, and reactive to light  Conjunctivae are normal  No scleral icterus  Neck: Neck supple  No thyromegaly present  Cardiovascular: Normal rate and regular rhythm  Pulses are weak pulses  Murmur heard    Pulses: Dorsalis pedis pulses are 1+ on the right side, and 1+ on the left side  Posterior tibial pulses are 1+ on the right side, and 1+ on the left side  Pulmonary/Chest: Effort normal and breath sounds normal  No respiratory distress  He has no wheezes  He has no rales  Abdominal: Soft  Bowel sounds are normal  He exhibits no distension  There is no tenderness  There is no rebound and no guarding  obese   Musculoskeletal: He exhibits tenderness  He exhibits no edema  Feet:   Right Foot:   Skin Integrity: Positive for erythema, callus and dry skin  Negative for ulcer  Left Foot:   Skin Integrity: Positive for callus and dry skin  Negative for ulcer  Lymphadenopathy:     He has no cervical adenopathy  Neurological: He is alert and oriented to person, place, and time  No cranial nerve deficit  Skin: No rash noted  No erythema  Psychiatric: He has a normal mood and affect  BMI Counseling: Body mass index is 34 41 kg/m²  Discussed the patient's BMI with him  The BMI is above average  BMI counseling and education was provided to the patient  Nutrition recommendations include reducing portion sizes

## 2019-02-06 ENCOUNTER — OFFICE VISIT (OUTPATIENT)
Dept: FAMILY MEDICINE CLINIC | Facility: CLINIC | Age: 69
End: 2019-02-06
Payer: MEDICARE

## 2019-02-06 VITALS
TEMPERATURE: 98.2 F | RESPIRATION RATE: 12 BRPM | DIASTOLIC BLOOD PRESSURE: 86 MMHG | OXYGEN SATURATION: 98 % | WEIGHT: 230 LBS | HEIGHT: 69 IN | BODY MASS INDEX: 34.07 KG/M2 | SYSTOLIC BLOOD PRESSURE: 136 MMHG | HEART RATE: 62 BPM

## 2019-02-06 DIAGNOSIS — E11.69 HYPERLIPIDEMIA ASSOCIATED WITH TYPE 2 DIABETES MELLITUS (HCC): ICD-10-CM

## 2019-02-06 DIAGNOSIS — E11.8 TYPE 2 DIABETES MELLITUS WITH COMPLICATION, WITHOUT LONG-TERM CURRENT USE OF INSULIN (HCC): Primary | ICD-10-CM

## 2019-02-06 DIAGNOSIS — Z23 NEED FOR PNEUMOCOCCAL VACCINATION: ICD-10-CM

## 2019-02-06 DIAGNOSIS — N18.1 CRF (CHRONIC RENAL FAILURE), STAGE 1: ICD-10-CM

## 2019-02-06 DIAGNOSIS — E66.9 OBESITY (BMI 30.0-34.9): ICD-10-CM

## 2019-02-06 DIAGNOSIS — E66.9 OBESITY (BMI 30-39.9): ICD-10-CM

## 2019-02-06 DIAGNOSIS — E78.5 HYPERLIPIDEMIA ASSOCIATED WITH TYPE 2 DIABETES MELLITUS (HCC): ICD-10-CM

## 2019-02-06 PROCEDURE — G0009 ADMIN PNEUMOCOCCAL VACCINE: HCPCS

## 2019-02-06 PROCEDURE — 99214 OFFICE O/P EST MOD 30 MIN: CPT | Performed by: INTERNAL MEDICINE

## 2019-02-06 PROCEDURE — 96372 THER/PROPH/DIAG INJ SC/IM: CPT | Performed by: INTERNAL MEDICINE

## 2019-02-06 PROCEDURE — 90670 PCV13 VACCINE IM: CPT

## 2019-02-06 NOTE — PATIENT INSTRUCTIONS
Low Fat Diet   AMBULATORY CARE:   A low-fat diet  is an eating plan that is low in total fat, unhealthy fat, and cholesterol  You may need to follow a low-fat diet if you have trouble digesting or absorbing fat  You may also need to follow this diet if you have high cholesterol  You can also lower your cholesterol by increasing the amount of fiber in your diet  Soluble fiber is a type of fiber that helps to decrease cholesterol levels  Different types of fat in food:   · Limit unhealthy fats  A diet that is high in cholesterol, saturated fat, and trans fat may cause unhealthy cholesterol levels  Unhealthy cholesterol levels increase your risk of heart disease  ¨ Cholesterol:  Limit intake of cholesterol to less than 200 mg per day  Cholesterol is found in meat, eggs, and dairy  ¨ Saturated fat:  Limit saturated fat to less than 7% of your total daily calories  Ask your dietitian how many calories you need each day  Saturated fat is found in butter, cheese, ice cream, whole milk, and palm oil  Saturated fat is also found in meat, such as beef, pork, chicken skin, and processed meats  Processed meats include sausage, hot dogs, and bologna  ¨ Trans fat:  Avoid trans fat as much as possible  Trans fat is used in fried and baked foods  Foods that say trans fat free on the label may still have up to 0 5 grams of trans fat per serving  · Include healthy fats  Replace foods that are high in saturated and trans fat with foods high in healthy fats  This may help to decrease high cholesterol levels  ¨ Monounsaturated fats: These are found in avocados, nuts, and vegetable oils, such as olive, canola, and sunflower oil  ¨ Polyunsaturated fats: These can be found in vegetable oils, such as soybean or corn oil  Omega-3 fats can help to decrease the risk of heart disease  Omega-3 fats are found in fish, such as salmon, herring, trout, and tuna   Omega-3 fats can also be found in plant foods, such as walnuts, flaxseed, soybeans, and canola oil    Foods to limit or avoid:   · Grains:      ¨ Snacks that are made with partially hydrogenated oils, such as chips, regular crackers, and butter-flavored popcorn    ¨ High-fat baked goods, such as biscuits, croissants, doughnuts, pies, cookies, and pastries    · Dairy:      ¨ Whole milk, 2% milk, and yogurt and ice cream made with whole milk    ¨ Half and half creamer, heavy cream, and whipping cream    ¨ Cheese, cream cheese, and sour cream    · Meats and proteins:      ¨ High-fat cuts of meat (T-bone steak, regular hamburger, and ribs)    ¨ Fried meat, poultry (turkey and chicken), and fish    ¨ Poultry (chicken and turkey) with skin    ¨ Cold cuts (salami or bologna), hot dogs, blount, and sausage    ¨ Whole eggs and egg yolks    · Vegetables and fruits with added fat:      ¨ Fried vegetables or vegetables in butter or high-fat sauces, such as cream or cheese sauces    ¨ Fried fruit or fruit served with butter or cream    · Fats:      ¨ Butter, stick margarine, and shortening    ¨ Coconut, palm oil, and palm kernel oil  Foods to include:   · Grains:      ¨ Whole-grain breads, cereals, pasta, and brown rice    ¨ Low-fat crackers and pretzels    · Vegetables and fruits:      ¨ Fresh, frozen, or canned vegetables (no salt or low-sodium)    ¨ Fresh, frozen, dried, or canned fruit (canned in light syrup or fruit juice)    ¨ Avocado    · Low-fat dairy products:      ¨ Nonfat (skim) or 1% milk    ¨ Nonfat or low-fat cheese, yogurt, and cottage cheese    · Meats and proteins:      ¨ Chicken or turkey with no skin    ¨ Baked or broiled fish    ¨ Lean beef and pork (loin, round, extra lean hamburger)    ¨ Beans and peas, unsalted nuts, soy products    ¨ Egg whites and substitutes    ¨ Seeds and nuts    · Fats:      ¨ Unsaturated oil, such as canola, olive, peanut, soybean, or sunflower oil    ¨ Soft or liquid margarine and vegetable oil spread    ¨ Low-fat salad dressing  Other ways to decrease fat:   · Read food labels before you buy foods  Choose foods that have less than 30% of calories from fat  Choose low-fat or fat-free dairy products  Remember that fat free does not mean calorie free  These foods still contain calories, and too many calories can lead to weight gain  · Trim fat from meat and avoid fried food  Trim all visible fat from meat before you cook it  Remove the skin from poultry  Do not colon meat, fish, or poultry  Bake, roast, boil, or broil these foods instead  Avoid fried foods  Eat a baked potato instead of [place] fries  Steam vegetables instead of sautéing them in butter  · Add less fat to foods  Use imitation blount bits on salads and baked potatoes instead of regular blount bits  Use fat-free or low-fat salad dressings instead of regular dressings  Use low-fat or nonfat butter-flavored topping instead of regular butter or margarine on popcorn and other foods  Ways to decrease fat in recipes:  Replace high-fat ingredients with low-fat or nonfat ones  This may cause baked goods to be drier than usual  You may need to use nonfat cooking spray on pans to prevent food from sticking  You also may need to change the amount of other ingredients, such as water, in the recipe  Try the following:  · Use low-fat or light margarine instead of regular margarine or shortening  · Use lean ground turkey breast or chicken, or lean ground beef (less than 5% fat) instead of hamburger  · Add 1 teaspoon of canola oil to 8 ounces of skim milk instead of using cream or half and half  · Use grated zucchini, carrots, or apples in breads instead of coconut  · Use blenderized, low-fat cottage cheese, plain tofu, or low-fat ricotta cheese instead of cream cheese  · Use 1 egg white and 1 teaspoon of canola oil, or use ¼ cup (2 ounces) of fat-free egg substitute instead of a whole egg       · Replace half of the oil that is called for in a recipe with applesauce when you bake  Use 3 tablespoons of cocoa powder and 1 tablespoon of canola oil instead of a square of baking chocolate  How to increase fiber:  Eat enough high-fiber foods to get 20 to 30 grams of fiber every day  Slowly increase your fiber intake to avoid stomach cramps, gas, and other problems  · Eat 3 ounces of whole-grain foods each day  An ounce is about 1 slice of bread  Eat whole-grain breads, such as whole-wheat bread  Whole wheat, whole-wheat flour, or other whole grains should be listed as the first ingredient on the food label  Replace white flour with whole-grain flour or use half of each in recipes  Whole-grain flour is heavier than white flour, so you may have to add more yeast or baking powder  · Eat a high-fiber cereal for breakfast   Oatmeal is a good source of soluble fiber  Look for cereals that have bran or fiber in the name  Choose whole-grain products, such as brown rice, barley, and whole-wheat pasta  · Eat more beans, peas, and lentils  For example, add beans to soups or salads  Eat at least 5 cups of fruits and vegetables each day  Eat fruits and vegetables with the peel because the peel is high in fiber  © 2017 [address] [employer] Information is for End User's use only and may not be sold, redistributed or otherwise used for commercial purposes  All illustrations and images included in CareNotes® are the copyrighted property of A D A M [employer]  or Robe Roman  The above information is an  only  It is not intended as medical advice for individual conditions or treatments  Talk to your doctor, nurse or pharmacist before following any medical regimen to see if it is safe and effective for you  Heart Healthy Diet   AMBULATORY CARE:   A heart healthy diet  is an eating plan low in total fat, unhealthy fats, and sodium (salt)  A heart healthy diet helps decrease your risk for heart disease and stroke   Limit the amount of fat you eat to 25% to 35% of your total daily calories  Limit sodium to less than 2,300 mg each day  Healthy fats:  Healthy fats can help improve cholesterol levels  The risk for heart disease is decreased when cholesterol levels are normal  Choose healthy fats, such as the following:  · Unsaturated fat  is found in foods such as soybean, canola, olive, corn, and safflower oils  It is also found in soft tub margarine that is made with liquid vegetable oil  · Omega-3 fat  is found in certain fish, such as salmon, tuna, and trout, and in walnuts and flaxseed  Unhealthy fats:  Unhealthy fats can cause unhealthy cholesterol levels in your blood and increase your risk of heart disease  Limit unhealthy fats, such as the following:  · Cholesterol  is found in animal foods, such as eggs and lobster, and in dairy products made from whole milk  Limit cholesterol to less than 300 milligrams (mg) each day  You may need to limit cholesterol to 200 mg each day if you have heart disease  · Saturated fat  is found in meats, such as blount and hamburger  It is also found in chicken or turkey skin, whole milk, and butter  Limit saturated fat to less than 7% of your total daily calories  Limit saturated fat to less than 6% if you have heart disease or are at increased risk for it  · Trans fat  is found in packaged foods, such as potato chips and cookies  It is also in hard margarine, some fried foods, and shortening  Avoid trans fats as much as possible    Heart healthy foods and drinks to include:  Ask your dietitian or healthcare provider how many servings to have from each of the following food groups:  · Grains:      ¨ Whole-wheat breads, cereals, and pastas, and brown rice    ¨ Low-fat, low-sodium crackers and chips    · Vegetables:      ¨ Broccoli, green beans, green peas, and spinach    ¨ Collards, kale, and lima beans    ¨ Carrots, sweet potatoes, tomatoes, and peppers    ¨ Canned vegetables with no salt added    · Fruits:      ¨ Bananas, peaches, pears, and pineapple    ¨ Grapes, raisins, and dates    ¨ Oranges, tangerines, grapefruit, orange juice, and grapefruit juice    ¨ Apricots, mangoes, melons, and papaya    ¨ Raspberries and strawberries    ¨ Canned fruit with no added sugar    · Low-fat dairy products:      ¨ Nonfat (skim) milk, 1% milk, and low-fat almond, cashew, or soy milks fortified with calcium    ¨ Low-fat cheese, regular or frozen yogurt, and cottage cheese    · Meats and proteins , such as lean cuts of beef and pork (loin, leg, round), skinless chicken and turkey, legumes, soy products, egg whites, and nuts  Foods and drinks to limit or avoid:  Ask your dietitian or healthcare provider about these and other foods that are high in unhealthy fat, sodium, and sugar:  · Snack or packaged foods , such as frozen dinners, cookies, macaroni and cheese, and cereals with more than 300 mg of sodium per serving    · Canned or dry mixes  for cakes, soups, sauces, or gravies    · Vegetables with added sodium , such as instant potatoes, vegetables with added sauces, or regular canned vegetables    · Other foods high in sodium , such as ketchup, barbecue sauce, salad dressing, pickles, olives, soy sauce, and miso    · High-fat dairy foods  such as whole or 2% milk, cream cheese, or sour cream, and cheeses     · High-fat protein foods  such as high-fat cuts of beef (T-bone steaks, ribs), chicken or turkey with skin, and organ meats, such as liver    · Cured or smoked meats , such as hot dogs, blount, and sausage    · Unhealthy fats and oils , such as butter, stick margarine, shortening, and cooking oils such as coconut or palm oil    · Food and drinks high in sugar , such as soft drinks (soda), sports drinks, sweetened tea, candy, cake, cookies, pies, and doughnuts  Other diet guidelines to follow:   · Eat more foods containing omega-3 fats  Eat fish high in omega-3 fats at least 2 times a week  · Limit alcohol    Too much alcohol can damage your heart and raise your blood pressure  Women should limit alcohol to 1 drink a day  Men should limit alcohol to 2 drinks a day  A drink of alcohol is 12 ounces of beer, 5 ounces of wine, or 1½ ounces of liquor  · Choose low-sodium foods  High-sodium foods can lead to high blood pressure  Add little or no salt to food you prepare  Use herbs and spices in place of salt  · Eat more fiber  to help lower cholesterol levels  Eat at least 5 servings of fruits and vegetables each day  Eat 3 ounces of whole-grain foods each day  Legumes (beans) are also a good source of fiber  Lifestyle guidelines:   · Do not smoke  Nicotine and other chemicals in cigarettes and cigars can cause lung and heart damage  Ask your healthcare provider for information if you currently smoke and need help to quit  E-cigarettes or smokeless tobacco still contain nicotine  Talk to your healthcare provider before you use these products  · Exercise regularly  to help you maintain a healthy weight and improve your blood pressure and cholesterol levels  Ask your healthcare provider about the best exercise plan for you  Do not start an exercise program without asking your healthcare provider  Follow up with your healthcare provider as directed:  Write down your questions so you remember to ask them during your visits  © 2017 [facility] Information is for End User's use only and may not be sold, redistributed or otherwise used for commercial purposes  All illustrations and images included in CareNotes® are the copyrighted property of [employer]  or Robe Roman  The above information is an  only  It is not intended as medical advice for individual conditions or treatments  Talk to your doctor, nurse or pharmacist before following any medical regimen to see if it is safe and effective for you  Calorie Counting Diet   WHAT YOU NEED TO KNOW:   What is a calorie counting diet?   It is a meal plan based on counting calories each day to reach a healthy body weight  You will need to eat fewer calories if you are trying to lose weight  Weight loss may decrease your risk for certain health problems or improve your health if you have health problems  Some of these health problems include heart disease, high blood pressure, and diabetes  What foods should I avoid? Your dietitian will tell you if you need to avoid certain foods based on your body weight and health condition  You may need to avoid high-fat foods if you are at risk for or have heart disease  You may need to eat fewer foods from the breads and starches food group if you have diabetes  How many calories are in foods? The following is a list of foods and drinks with the approximate number of calories in each  Check the food label to find the exact number of calories  A dietitian can tell you how many calories you should have from each food group each day    · Carbohydrate:      ¨ ½ of a 3-inch bagel, 1 slice of bread, or ½ of a hamburger bun or hot dog bun (80)    ¨ 1 (8-inch) flour tortilla or ½ cup of cooked rice (100)    ¨ 1 (6-inch) corn tortilla (80)    ¨ 1 (6-inch) pancake or 1 cup of bran flakes cereal (110)    ¨ ½ cup of cooked cereal (80)    ¨ ½ cup of cooked pasta (85)    ¨ 1 ounce of pretzels (100)    ¨ 3 cups of air-popped popcorn without butter or oil (80)    · Dairy:      ¨ 1 cup of skim or 1% milk (90)    ¨ 1 cup of 2% milk (120)    ¨ 1 cup of whole milk (160)    ¨ 1 cup of 2% chocolate milk (220)    ¨ 1 ounce of low-fat cheese with 3 grams of fat per ounce (70)    ¨ 1 ounce of cheddar cheese (114)    ¨ ½ cup of 1% fat cottage cheese (80)    ¨ 1 cup of plain or sugar-free, fat-free yogurt (90)    · Protein foods:      ¨ 3 ounces of fish (not breaded or fried) (95)    ¨ 3 ounces of breaded, fried fish (195)    ¨ ¾ cup of tuna canned in water (105)    ¨ 3 ounces of chicken breast without skin (105)    ¨ 1 fried chicken breast with skin (350)    ¨ ¼ cup of fat free egg substitute (40)    ¨ 1 large egg (75)    ¨ 3 ounces of lean beef or pork (165)    ¨ 3 ounces of fried pork chop or ham (185)    ¨ ½ cup of cooked dried beans, such as kidney, [place], lentils, or navy (115)    ¨ 3 ounces of bologna or lunch meat (225)    ¨ 2 links of breakfast sausage (140)    · Vegetables:      ¨ ½ cup of sliced mushrooms (10)    ¨ 1 cup of salad greens, such as lettuce, spinach, or scott (15)    ¨ ½ cup of steamed asparagus (20)    ¨ ½ cup of cooked summer squash, zucchini squash, or green or wax beans (25)    ¨ 1 cup of broccoli or cauliflower florets, or 1 medium tomato (25)    ¨ 1 large raw carrot or ½ cup of cooked carrots (40)    ¨ ? of a medium cucumber or 1 stalk of celery (5)    ¨ 1 small baked potato (160)    ¨ 1 cup of breaded, fried vegetables (230)    · Fruit:      ¨ 1 (6-inch) banana (55)     ¨ ½ of a 4-inch grapefruit (55)    ¨ 15 grapes (60)    ¨ 1 medium orange or apple (70)    ¨ 1 large peach (65)    ¨ 1 cup of fresh pineapple chunks (75)    ¨ 1 cup of melon cubes (50)    ¨ 1¼ cups of whole strawberries (45)    ¨ ½ cup of fruit canned in juice (55)    ¨ ½ cup of fruit canned in heavy syrup (110)    ¨ ?  cup of raisins (130)    ¨ ½ cup of unsweetened fruit juice (60)    ¨ ½ cup of grape, cranberry, or prune juice (90)    · Fat:      ¨ 10 peanuts or 2 teaspoons of peanut butter (55)    ¨ 2 tablespoons of avocado or 1 tablespoon of regular salad dressing (45)    ¨ 2 slices of blount (90)    ¨ 1 teaspoon of oil, such as safflower, canola, corn, or olive oil (45)    ¨ 2 teaspoons of low-fat margarine, or 1 tablespoon of low-fat mayonnaise (50)    ¨ 1 teaspoon of regular margarine (40)    ¨ 1 tablespoon of regular mayonnaise (135)    ¨ 1 tablespoon of cream cheese or 2 tablespoons of low-fat cream cheese (45)    ¨ 2 tablespoons of vegetable shortening (215)    · Dessert and sweets:      ¨ 8 animal crackers or 5 vanilla wafers (80)    ¨ 1 frozen fruit juice bar (80)    ¨ ½ cup of ice milk or low-fat frozen yogurt (90)    ¨ ½ cup of sherbet or sorbet (125)    ¨ ½ cup of sugar-free pudding or custard (60)    ¨ ½ cup of ice cream (140)    ¨ ½ cup of pudding or custard (175)    ¨ 1 (2-inch) square chocolate brownie (185)    · Combination foods:      ¨ Bean burrito made with an 8-inch tortilla, without cheese (275)    ¨ Chicken breast sandwich with lettuce and tomato (325)    ¨ 1 cup of chicken noodle soup (60)    ¨ 1 beef taco (175)    ¨ Regular hamburger with lettuce and tomato (310)    ¨ Regular cheeseburger with lettuce and tomato (410)     ¨ ¼ of a 12-inch cheese pizza (280)    ¨ Fried fish sandwich with lettuce and tomato (425)    ¨ Hot dog and bun (275)    ¨ 1½ cups of macaroni and cheese (310)    ¨ Taco salad with a fried tortilla shell (870)    · Low-calorie foods:      ¨ 1 tablespoon of ketchup or 1 tablespoon of fat free sour cream (15)    ¨ 1 teaspoon of mustard (5)    ¨ ¼ cup of salsa (20)    ¨ 1 large dill pickle (15)    ¨ 1 tablespoon of fat free salad dressing (10)    ¨ 2 teaspoons of low-sugar, light jam or jelly, or 1 tablespoon of sugar-free syrup (15)    ¨ 1 sugar-free popsicle (15)    ¨ 1 cup of club soda, seltzer water, or diet soda (0)  CARE AGREEMENT:   You have the right to help plan your care  Discuss treatment options with your caregivers to decide what care you want to receive  You always have the right to refuse treatment  The above information is an  only  It is not intended as medical advice for individual conditions or treatments  Talk to your doctor, nurse or pharmacist before following any medical regimen to see if it is safe and effective for you  © 2017 [address] [employer] Information is for End User's use only and may not be sold, redistributed or otherwise used for commercial purposes   All illustrations and images included in CareNotes® are the copyrighted property of [employer]  or [employer]

## 2019-02-06 NOTE — PROGRESS NOTES
Assessment/Plan:         Diagnoses and all orders for this visit:    Type 2 diabetes mellitus with complication, without long-term current use of insulin ([facility] 75 ): life style mod  RTC in os w Davida Dentons 238     -     PNEUMOCOCCAL CONJUGATE VACCINE 13-VALENT GREATER THAN 6 MONTHS    CRF (chronic renal failure), stage 1; Stable  RTC in os wBloodw ork    Hyperlipidemia associated with type 2 diabetes mellitus [facility] ): life Style mod  Continue same  RTC in os wBlood work  -     PNEUMOCOCCAL CONJUGATE VACCINE 13-VALENT GREATER THAN 6 MONTHS    Obesity (BMI 30-39  9): life style Mod    Need for pneumococcal vaccination      Patient's shoes and socks removed  Right Foot/Ankle   Right Foot Inspection  Skin Exam: skin intact and dry skin no ulcer                          Toe Exam: no swelling, no tenderness and erythema  Sensory   Vibration: diminished  Proprioception: diminished   Monofilament testing: diminished  Vascular    The right DP pulse is 1+  The right PT pulse is 1+  Left Foot/Ankle  Left Foot Inspection  Skin Exam: skin intact and dry skinno ulcer                         Toe Exam: no swelling and no tenderness                   Sensory   Vibration: diminished  Proprioception: diminished  Monofilament: diminished  Vascular    The left DP pulse is 1+  The left PT pulse is 1+  Assign Risk Category:  No deformity present; No loss of protective sensation; Weak pulses       Risk: 1        80 Y O man is here for Regular check up, No recent Blood work, Med list reviewed w pt in detail  No new Symptoms    The following portions of the patient's history were reviewed and updated as appropriate: allergies, current medications, past family history, past medical history, past social history, past surgical history and problem list     Review of Systems   Constitutional: Negative for chills, fatigue and fever  HENT: Negative for congestion, facial swelling, sore throat, trouble swallowing and voice change      Eyes: Negative for pain, discharge and visual disturbance  Respiratory: Negative for cough, shortness of breath and wheezing  Cardiovascular: Negative for chest pain, palpitations and leg swelling  Gastrointestinal: Negative for abdominal pain, blood in stool, constipation, diarrhea and nausea  Endocrine: Negative for polydipsia, polyphagia and polyuria  Genitourinary: Negative for difficulty urinating, hematuria and urgency  Musculoskeletal: Negative for arthralgias and myalgias  Skin: Negative for rash  Neurological: Negative for dizziness, tremors, weakness and headaches  Hematological: Negative for adenopathy  Does not bruise/bleed easily  Psychiatric/Behavioral: Negative for dysphoric mood, sleep disturbance and suicidal ideas  Objective:      /86 (BP Location: Left arm, Patient Position: Sitting, Cuff Size: Standard)   Pulse 62   Temp 98 2 °F (36 8 °C) (Tympanic)   Resp 12   Ht 5' 9" (1 753 m)   Wt 104 kg (230 lb)   SpO2 98%   BMI 33 97 kg/m²          Physical Exam   Constitutional: He is oriented to person, place, and time  He appears well-nourished  No distress  HENT:   Head: Normocephalic  Mouth/Throat: Oropharynx is clear and moist  No oropharyngeal exudate  Eyes: Pupils are equal, round, and reactive to light  Conjunctivae are normal  No scleral icterus  Neck: Neck supple  No thyromegaly present  Cardiovascular: Normal rate and regular rhythm  Pulses are weak pulses  Murmur heard  Pulses:       Dorsalis pedis pulses are 1+ on the right side, and 1+ on the left side  Posterior tibial pulses are 1+ on the right side, and 1+ on the left side  Pulmonary/Chest: Effort normal and breath sounds normal  No respiratory distress  He has no wheezes  He has no rales  Abdominal: Soft  Bowel sounds are normal  He exhibits no distension  There is no tenderness  There is no rebound and no guarding  obese   Musculoskeletal: He exhibits no edema or tenderness  Feet:   Right Foot:   Skin Integrity: Positive for dry skin  Negative for ulcer  Left Foot:   Skin Integrity: Positive for dry skin  Negative for ulcer  Lymphadenopathy:     He has no cervical adenopathy  Neurological: He is alert and oriented to person, place, and time  No cranial nerve deficit  Skin: No rash noted  No erythema  Psychiatric: He has a normal mood and affect  BMI Counseling: Body mass index is 33 97 kg/m²  Discussed the patient's BMI with him  The BMI is above average  BMI counseling and education was provided to the patient  Nutrition recommendations include reducing portion sizes and decreasing overall calorie intake

## 2019-03-19 ENCOUNTER — CLINICAL SUPPORT (OUTPATIENT)
Dept: FAMILY MEDICINE CLINIC | Facility: CLINIC | Age: 69
End: 2019-03-19
Payer: MEDICARE

## 2019-03-19 DIAGNOSIS — L23.7 POISON IVY: Primary | ICD-10-CM

## 2019-03-19 PROCEDURE — 99212 OFFICE O/P EST SF 10 MIN: CPT

## 2019-03-19 PROCEDURE — 96372 THER/PROPH/DIAG INJ SC/IM: CPT

## 2019-03-19 RX ORDER — METHYLPREDNISOLONE SODIUM SUCCINATE 125 MG/2ML
125 INJECTION, POWDER, LYOPHILIZED, FOR SOLUTION INTRAMUSCULAR; INTRAVENOUS ONCE
Status: COMPLETED | OUTPATIENT
Start: 2019-03-19 | End: 2019-03-19

## 2019-03-19 RX ORDER — PREDNISONE 10 MG/1
TABLET ORAL
Qty: 20 TABLET | Refills: 0 | Status: SHIPPED | OUTPATIENT
Start: 2019-03-19 | End: 2019-04-09 | Stop reason: ALTCHOICE

## 2019-03-19 RX ORDER — LORATADINE 10 MG/1
10 TABLET ORAL DAILY
Qty: 30 TABLET | Refills: 1 | Status: SHIPPED | OUTPATIENT
Start: 2019-03-19 | End: 2020-03-20 | Stop reason: SDUPTHER

## 2019-03-19 RX ADMIN — METHYLPREDNISOLONE SODIUM SUCCINATE 125 MG: 125 INJECTION, POWDER, LYOPHILIZED, FOR SOLUTION INTRAMUSCULAR; INTRAVENOUS at 11:59

## 2019-03-31 DIAGNOSIS — M1A.9XX0 CHRONIC GOUT WITHOUT TOPHUS, UNSPECIFIED CAUSE, UNSPECIFIED SITE: ICD-10-CM

## 2019-04-01 DIAGNOSIS — I10 ESSENTIAL HYPERTENSION, BENIGN: ICD-10-CM

## 2019-04-01 RX ORDER — LOSARTAN POTASSIUM 50 MG/1
50 TABLET ORAL DAILY
Qty: 90 TABLET | Refills: 3 | Status: SHIPPED | OUTPATIENT
Start: 2019-04-01 | End: 2019-04-22 | Stop reason: SDUPTHER

## 2019-04-01 RX ORDER — ALLOPURINOL 100 MG/1
TABLET ORAL
Qty: 90 TABLET | Refills: 0 | Status: SHIPPED | OUTPATIENT
Start: 2019-04-01 | End: 2019-08-01

## 2019-04-09 ENCOUNTER — OFFICE VISIT (OUTPATIENT)
Dept: FAMILY MEDICINE CLINIC | Facility: CLINIC | Age: 69
End: 2019-04-09

## 2019-04-09 VITALS
SYSTOLIC BLOOD PRESSURE: 140 MMHG | DIASTOLIC BLOOD PRESSURE: 72 MMHG | BODY MASS INDEX: 34.07 KG/M2 | HEIGHT: 69 IN | WEIGHT: 230 LBS | TEMPERATURE: 97.8 F | OXYGEN SATURATION: 98 % | RESPIRATION RATE: 14 BRPM | HEART RATE: 82 BPM

## 2019-04-09 DIAGNOSIS — E66.9 OBESITY (BMI 30-39.9): ICD-10-CM

## 2019-04-09 DIAGNOSIS — R21 RASH: ICD-10-CM

## 2019-04-09 DIAGNOSIS — Z00.01 ENCOUNTER FOR GENERAL ADULT MEDICAL EXAMINATION WITH ABNORMAL FINDINGS: Primary | ICD-10-CM

## 2019-04-09 DIAGNOSIS — I10 ESSENTIAL HYPERTENSION: ICD-10-CM

## 2019-04-09 DIAGNOSIS — I70.213 ATHEROSCLEROSIS OF NATIVE ARTERY OF BOTH LOWER EXTREMITIES WITH INTERMITTENT CLAUDICATION (HCC): ICD-10-CM

## 2019-04-09 PROCEDURE — 99214 OFFICE O/P EST MOD 30 MIN: CPT | Performed by: INTERNAL MEDICINE

## 2019-04-09 PROCEDURE — G0439 PPPS, SUBSEQ VISIT: HCPCS | Performed by: INTERNAL MEDICINE

## 2019-04-09 PROCEDURE — 96372 THER/PROPH/DIAG INJ SC/IM: CPT | Performed by: INTERNAL MEDICINE

## 2019-04-09 RX ORDER — VALACYCLOVIR HYDROCHLORIDE 1 G/1
1000 TABLET, FILM COATED ORAL 2 TIMES DAILY
Qty: 20 TABLET | Refills: 0 | Status: SHIPPED | OUTPATIENT
Start: 2019-04-09 | End: 2019-04-22 | Stop reason: ALTCHOICE

## 2019-04-09 RX ORDER — DOCOSANOL 100 MG/G
CREAM TOPICAL
Qty: 10 G | Refills: 1 | Status: SHIPPED | OUTPATIENT
Start: 2019-04-09 | End: 2020-05-04

## 2019-04-09 RX ORDER — METHYLPREDNISOLONE SODIUM SUCCINATE 125 MG/2ML
125 INJECTION, POWDER, LYOPHILIZED, FOR SOLUTION INTRAMUSCULAR; INTRAVENOUS ONCE
Status: COMPLETED | OUTPATIENT
Start: 2019-04-09 | End: 2019-04-09

## 2019-04-09 RX ADMIN — METHYLPREDNISOLONE SODIUM SUCCINATE 125 MG: 125 INJECTION, POWDER, LYOPHILIZED, FOR SOLUTION INTRAMUSCULAR; INTRAVENOUS at 14:32

## 2019-04-22 ENCOUNTER — OFFICE VISIT (OUTPATIENT)
Dept: FAMILY MEDICINE CLINIC | Facility: CLINIC | Age: 69
End: 2019-04-22
Payer: MEDICARE

## 2019-04-22 VITALS
SYSTOLIC BLOOD PRESSURE: 140 MMHG | HEART RATE: 56 BPM | OXYGEN SATURATION: 98 % | RESPIRATION RATE: 12 BRPM | WEIGHT: 227 LBS | BODY MASS INDEX: 33.62 KG/M2 | DIASTOLIC BLOOD PRESSURE: 76 MMHG | TEMPERATURE: 97.5 F | HEIGHT: 69 IN

## 2019-04-22 DIAGNOSIS — Z12.11 ENCOUNTER FOR SCREENING COLONOSCOPY: ICD-10-CM

## 2019-04-22 DIAGNOSIS — M1A.9XX0 CHRONIC GOUT WITHOUT TOPHUS, UNSPECIFIED CAUSE, UNSPECIFIED SITE: ICD-10-CM

## 2019-04-22 DIAGNOSIS — E78.49 OTHER HYPERLIPIDEMIA: ICD-10-CM

## 2019-04-22 DIAGNOSIS — Z11.59 ENCOUNTER FOR HEPATITIS C SCREENING TEST FOR LOW RISK PATIENT: ICD-10-CM

## 2019-04-22 DIAGNOSIS — I10 ESSENTIAL HYPERTENSION, BENIGN: Primary | ICD-10-CM

## 2019-04-22 DIAGNOSIS — K21.9 GASTROESOPHAGEAL REFLUX DISEASE WITHOUT ESOPHAGITIS: ICD-10-CM

## 2019-04-22 DIAGNOSIS — I48.20 CHRONIC ATRIAL FIBRILLATION (HCC): ICD-10-CM

## 2019-04-22 PROCEDURE — 99214 OFFICE O/P EST MOD 30 MIN: CPT | Performed by: INTERNAL MEDICINE

## 2019-04-22 RX ORDER — FUROSEMIDE 20 MG/1
20 TABLET ORAL DAILY
Qty: 90 TABLET | Refills: 3 | Status: SHIPPED | OUTPATIENT
Start: 2019-04-22 | End: 2020-03-20 | Stop reason: SDUPTHER

## 2019-04-22 RX ORDER — AMIODARONE HYDROCHLORIDE 200 MG/1
200 TABLET ORAL 2 TIMES DAILY
Qty: 180 TABLET | Refills: 3 | Status: SHIPPED | OUTPATIENT
Start: 2019-04-22 | End: 2020-03-02 | Stop reason: SDUPTHER

## 2019-04-22 RX ORDER — ALLOPURINOL 100 MG/1
100 TABLET ORAL DAILY
Qty: 90 TABLET | Refills: 3 | Status: SHIPPED | OUTPATIENT
Start: 2019-04-22 | End: 2020-03-20 | Stop reason: SDUPTHER

## 2019-04-22 RX ORDER — PRAVASTATIN SODIUM 20 MG
20 TABLET ORAL DAILY
Qty: 90 TABLET | Refills: 3 | Status: SHIPPED | OUTPATIENT
Start: 2019-04-22 | End: 2020-03-20 | Stop reason: SDUPTHER

## 2019-04-22 RX ORDER — METOPROLOL SUCCINATE 50 MG/1
50 TABLET, EXTENDED RELEASE ORAL DAILY
Qty: 90 TABLET | Refills: 3 | Status: SHIPPED | OUTPATIENT
Start: 2019-04-22 | End: 2019-05-20 | Stop reason: SURG

## 2019-04-22 RX ORDER — LOSARTAN POTASSIUM 50 MG/1
50 TABLET ORAL DAILY
Qty: 90 TABLET | Refills: 3 | Status: SHIPPED | OUTPATIENT
Start: 2019-04-22 | End: 2020-03-20 | Stop reason: SDUPTHER

## 2019-04-22 RX ORDER — OMEPRAZOLE 20 MG/1
20 CAPSULE, DELAYED RELEASE ORAL 2 TIMES DAILY
Qty: 180 CAPSULE | Refills: 3 | Status: SHIPPED | OUTPATIENT
Start: 2019-04-22 | End: 2020-03-23 | Stop reason: SDUPTHER

## 2019-04-22 RX ORDER — WARFARIN SODIUM 5 MG/1
5 TABLET ORAL DAILY
Qty: 90 TABLET | Refills: 3 | Status: SHIPPED | OUTPATIENT
Start: 2019-04-22 | End: 2020-04-19

## 2019-05-20 ENCOUNTER — OFFICE VISIT (OUTPATIENT)
Dept: FAMILY MEDICINE CLINIC | Facility: CLINIC | Age: 69
End: 2019-05-20
Payer: MEDICARE

## 2019-05-20 VITALS
HEART RATE: 58 BPM | SYSTOLIC BLOOD PRESSURE: 110 MMHG | WEIGHT: 230 LBS | RESPIRATION RATE: 14 BRPM | HEIGHT: 69 IN | BODY MASS INDEX: 34.07 KG/M2 | TEMPERATURE: 98.2 F | DIASTOLIC BLOOD PRESSURE: 62 MMHG

## 2019-05-20 DIAGNOSIS — I10 ESSENTIAL HYPERTENSION: Primary | ICD-10-CM

## 2019-05-20 DIAGNOSIS — I48.0 PAROXYSMAL ATRIAL FIBRILLATION (HCC): ICD-10-CM

## 2019-05-20 DIAGNOSIS — K59.04 CHRONIC IDIOPATHIC CONSTIPATION: ICD-10-CM

## 2019-05-20 DIAGNOSIS — I10 ESSENTIAL HYPERTENSION, BENIGN: ICD-10-CM

## 2019-05-20 PROCEDURE — 99213 OFFICE O/P EST LOW 20 MIN: CPT | Performed by: INTERNAL MEDICINE

## 2019-05-20 PROCEDURE — 93000 ELECTROCARDIOGRAM COMPLETE: CPT | Performed by: INTERNAL MEDICINE

## 2019-05-31 DIAGNOSIS — I48.0 PAROXYSMAL ATRIAL FIBRILLATION (HCC): Primary | ICD-10-CM

## 2019-06-03 ENCOUNTER — TELEPHONE (OUTPATIENT)
Dept: FAMILY MEDICINE CLINIC | Facility: CLINIC | Age: 69
End: 2019-06-03

## 2019-06-03 DIAGNOSIS — E83.51 LOW CALCIUM LEVELS: Primary | ICD-10-CM

## 2019-06-17 ENCOUNTER — OFFICE VISIT (OUTPATIENT)
Dept: FAMILY MEDICINE CLINIC | Facility: CLINIC | Age: 69
End: 2019-06-17
Payer: COMMERCIAL

## 2019-06-17 VITALS
RESPIRATION RATE: 14 BRPM | OXYGEN SATURATION: 97 % | WEIGHT: 226 LBS | DIASTOLIC BLOOD PRESSURE: 82 MMHG | BODY MASS INDEX: 33.47 KG/M2 | SYSTOLIC BLOOD PRESSURE: 136 MMHG | HEIGHT: 69 IN | HEART RATE: 56 BPM | TEMPERATURE: 97.5 F

## 2019-06-17 DIAGNOSIS — I48.0 PAROXYSMAL ATRIAL FIBRILLATION (HCC): ICD-10-CM

## 2019-06-17 DIAGNOSIS — E66.9 OBESITY (BMI 30-39.9): Primary | ICD-10-CM

## 2019-06-17 DIAGNOSIS — R10.9 LEFT LATERAL ABDOMINAL PAIN: ICD-10-CM

## 2019-06-17 PROCEDURE — 99213 OFFICE O/P EST LOW 20 MIN: CPT | Performed by: INTERNAL MEDICINE

## 2019-08-01 ENCOUNTER — OFFICE VISIT (OUTPATIENT)
Dept: FAMILY MEDICINE CLINIC | Facility: CLINIC | Age: 69
End: 2019-08-01
Payer: COMMERCIAL

## 2019-08-01 VITALS
TEMPERATURE: 97.5 F | BODY MASS INDEX: 33.03 KG/M2 | RESPIRATION RATE: 14 BRPM | OXYGEN SATURATION: 98 % | DIASTOLIC BLOOD PRESSURE: 74 MMHG | HEART RATE: 56 BPM | SYSTOLIC BLOOD PRESSURE: 136 MMHG | WEIGHT: 223 LBS | HEIGHT: 69 IN

## 2019-08-01 DIAGNOSIS — K59.04 CHRONIC IDIOPATHIC CONSTIPATION: Primary | ICD-10-CM

## 2019-08-01 DIAGNOSIS — L03.90 WOUND CELLULITIS: ICD-10-CM

## 2019-08-01 DIAGNOSIS — I48.0 PAROXYSMAL ATRIAL FIBRILLATION (HCC): ICD-10-CM

## 2019-08-01 DIAGNOSIS — Z12.11 SCREENING FOR COLON CANCER: ICD-10-CM

## 2019-08-01 DIAGNOSIS — R21 RASH: ICD-10-CM

## 2019-08-01 PROCEDURE — 1036F TOBACCO NON-USER: CPT | Performed by: INTERNAL MEDICINE

## 2019-08-01 PROCEDURE — 99214 OFFICE O/P EST MOD 30 MIN: CPT | Performed by: INTERNAL MEDICINE

## 2019-08-01 PROCEDURE — 3008F BODY MASS INDEX DOCD: CPT | Performed by: INTERNAL MEDICINE

## 2019-08-01 PROCEDURE — 1160F RVW MEDS BY RX/DR IN RCRD: CPT | Performed by: INTERNAL MEDICINE

## 2019-08-01 RX ORDER — KETOCONAZOLE 20 MG/ML
1 SHAMPOO TOPICAL 3 TIMES WEEKLY
Qty: 120 ML | Refills: 2 | Status: SHIPPED | OUTPATIENT
Start: 2019-08-02

## 2019-08-01 NOTE — PROGRESS NOTES
Assessment/Plan:         Diagnoses and all orders for this visit:    Chronic idiopathic constipation; Improved On ;  -     Plecanatide (TRULANCE) 3 MG TABS; Take 1 tablet by mouth daily  FU w GI    Screening for colon cancer  -     Ambulatory referral to Gastroenterology; Future    Paroxysmal atrial fibrillation ([facility] [address] ); stable  Continue same  FU w Cardiology    Rash; Head : TRY ;  -     ketoconazole (NIZORAL) 2 % shampoo; Apply 1 application topically 3 (three) times a week  RTC in 2-3 Mos w Blood work  Wound cellulitis; Right Forearm : Mild : TRY :  -     mupirocin (BACTROBAN) 2 % ointment; Apply topically 2 (two) times a day Apply Right forearm    RTC in 10 Days    Subjective:      Patient ID: Esaw Hamman is a 77 y o  male  77 Y O man is here for Regular check up, and few issues as per R O S , He Had Td Booster 3 Years ago, Recent Blood work and med list Reviewed w pt in Detail    The following portions of the patient's history were reviewed and updated as appropriate: allergies, current medications, past family history, past medical history, past social history, past surgical history and problem list     Review of Systems   Constitutional: Negative for chills, fatigue and fever  HENT: Negative for congestion, facial swelling, sore throat, trouble swallowing and voice change  Eyes: Negative for pain, discharge and visual disturbance  Respiratory: Negative for cough, shortness of breath and wheezing  Cardiovascular: Negative for chest pain, palpitations and leg swelling  Gastrointestinal: Negative for abdominal pain, blood in stool, constipation, diarrhea and nausea  Endocrine: Negative for polydipsia, polyphagia and polyuria  Genitourinary: Negative for difficulty urinating, hematuria and urgency  Musculoskeletal: Negative for arthralgias and myalgias  Skin: Positive for rash and wound  Neurological: Negative for dizziness, tremors, weakness and headaches     Hematological: Negative for adenopathy  Does not bruise/bleed easily  Psychiatric/Behavioral: Negative for dysphoric mood, sleep disturbance and suicidal ideas  Objective:      /74 (BP Location: Left arm, Patient Position: Sitting, Cuff Size: Standard)   Pulse 56   Temp 97 5 °F (36 4 °C) (Tympanic)   Resp 14   Ht 5' 9" (1 753 m)   Wt 101 kg (223 lb)   SpO2 98%   BMI 32 93 kg/m²          Physical Exam   Constitutional: He is oriented to person, place, and time  He appears well-nourished  No distress  HENT:   Head: Normocephalic  Mouth/Throat: Oropharynx is clear and moist  No oropharyngeal exudate  Eyes: Pupils are equal, round, and reactive to light  Conjunctivae are normal  No scleral icterus  Neck: Neck supple  No thyromegaly present  Cardiovascular: Normal rate  Murmur heard  Irregular   Pulmonary/Chest: Effort normal and breath sounds normal  No respiratory distress  He has no wheezes  He has no rales  Abdominal: Soft  Bowel sounds are normal  He exhibits no distension  There is no tenderness  There is no rebound and no guarding  obese   Musculoskeletal: He exhibits no edema or tenderness  Lymphadenopathy:     He has no cervical adenopathy  Neurological: He is alert and oriented to person, place, and time  No cranial nerve deficit  Coordination normal    Skin: Rash noted  Itchy rash head  Right Forearm Laceration with Mild Cellulitis,    Psychiatric: He has a normal mood and affect

## 2019-09-06 DIAGNOSIS — K59.04 CHRONIC IDIOPATHIC CONSTIPATION: Primary | ICD-10-CM

## 2019-09-27 DIAGNOSIS — I10 ESSENTIAL HYPERTENSION, BENIGN: ICD-10-CM

## 2019-09-29 RX ORDER — LOSARTAN POTASSIUM 50 MG/1
TABLET ORAL
Qty: 90 TABLET | Refills: 1 | Status: SHIPPED | OUTPATIENT
Start: 2019-09-29 | End: 2020-03-02

## 2019-10-02 ENCOUNTER — TELEPHONE (OUTPATIENT)
Dept: FAMILY MEDICINE CLINIC | Facility: CLINIC | Age: 69
End: 2019-10-02

## 2019-10-02 NOTE — TELEPHONE ENCOUNTER
Called Dental office per patient request, [phone]  Left message stating that patient was asking to stop coumadin how many days before dental procedure? Per , he needs to stop 3 days prior to procedure  I left message at dental office for someone to call me back to see what they need, and also left our fax number if they need to fax any paperwork to us  Told patient when I hear back from them, we will send them whatever they need

## 2019-10-03 NOTE — TELEPHONE ENCOUNTER
Dental office called today, and they are going to fax over a form for Dr Aaron Schwartz to fill out

## 2019-11-05 ENCOUNTER — OFFICE VISIT (OUTPATIENT)
Dept: FAMILY MEDICINE CLINIC | Facility: CLINIC | Age: 69
End: 2019-11-05
Payer: COMMERCIAL

## 2019-11-05 VITALS
WEIGHT: 221.8 LBS | TEMPERATURE: 98.4 F | BODY MASS INDEX: 32.85 KG/M2 | SYSTOLIC BLOOD PRESSURE: 132 MMHG | OXYGEN SATURATION: 97 % | DIASTOLIC BLOOD PRESSURE: 74 MMHG | HEART RATE: 64 BPM | HEIGHT: 69 IN | RESPIRATION RATE: 14 BRPM

## 2019-11-05 DIAGNOSIS — I10 ESSENTIAL HYPERTENSION, BENIGN: Primary | ICD-10-CM

## 2019-11-05 DIAGNOSIS — E78.49 OTHER HYPERLIPIDEMIA: ICD-10-CM

## 2019-11-05 DIAGNOSIS — E11.8 TYPE 2 DIABETES MELLITUS WITH COMPLICATION, WITHOUT LONG-TERM CURRENT USE OF INSULIN (HCC): ICD-10-CM

## 2019-11-05 DIAGNOSIS — Z11.59 NEED FOR HEPATITIS C SCREENING TEST: ICD-10-CM

## 2019-11-05 DIAGNOSIS — N40.0 ENLARGED PROSTATE: ICD-10-CM

## 2019-11-05 DIAGNOSIS — Z12.11 SCREENING FOR COLON CANCER: ICD-10-CM

## 2019-11-05 DIAGNOSIS — I48.0 PAROXYSMAL ATRIAL FIBRILLATION (HCC): ICD-10-CM

## 2019-11-05 PROCEDURE — 3075F SYST BP GE 130 - 139MM HG: CPT | Performed by: INTERNAL MEDICINE

## 2019-11-05 PROCEDURE — 3008F BODY MASS INDEX DOCD: CPT | Performed by: INTERNAL MEDICINE

## 2019-11-05 PROCEDURE — 3078F DIAST BP <80 MM HG: CPT | Performed by: INTERNAL MEDICINE

## 2019-11-05 PROCEDURE — 1101F PT FALLS ASSESS-DOCD LE1/YR: CPT | Performed by: INTERNAL MEDICINE

## 2019-11-05 PROCEDURE — 99214 OFFICE O/P EST MOD 30 MIN: CPT | Performed by: INTERNAL MEDICINE

## 2019-11-05 RX ORDER — METOPROLOL SUCCINATE 50 MG/1
TABLET, EXTENDED RELEASE ORAL
Refills: 3 | COMMUNITY
Start: 2019-09-29 | End: 2020-06-18

## 2019-11-05 RX ORDER — PLECANATIDE 3 MG/1
TABLET ORAL
Refills: 3 | COMMUNITY
Start: 2019-10-24 | End: 2020-04-10 | Stop reason: SDUPTHER

## 2019-11-05 NOTE — PROGRESS NOTES
Assessment/Plan:         Diagnoses and all orders for this visit:    Essential hypertension, benign; stable  Continue same  Pt refused flu vaccine  rtc in 3 mos    Paroxysmal atrial fibrillation ([facility] [address] ); stable  Continue same  Fu w cardiology  rtc in 3 mos    Other hyperlipidemia; life style mod  Continue same  rtc in 3 mos w ;  -     Thyroid Antibodies Panel; Future  -     T4, free; Future  -     TSH, 3rd generation; Future  -     Lipid panel; Future    Type 2 diabetes mellitus with complication, without long-term current use of insulin [facility] ); life style mod  Continue same  rtc in 3 mos w ;  -     Comprehensive metabolic panel; Future  -     CBC and differential; Future  -     Urinalysis with reflex to microscopic    Need for hepatitis C screening test  -     Hepatitis C antibody; Future    Screening for colon cancer  -     Cologuard; Future    Enlarged prostate; fu w urology  rtc in 3 mos w ;  -     PSA Total, Diagnostic; Future    Other orders  -     metoprolol succinate (TOPROL-XL) 50 mg 24 hr tablet  -     TRULANCE 3 MG TABS; TK 1 T PO D        Subjective:      Patient ID: Rylan Ng is a 77 y o  male  77 y o man is here for regular check up, he feels ok, recent blood work and med list reviewed w pt, no new symptoms,  The following portions of the patient's history were reviewed and updated as appropriate: allergies, current medications, past family history, past medical history, past social history, past surgical history and problem list     Review of Systems   Constitutional: Negative for chills, fatigue and fever  HENT: Negative for congestion, facial swelling, sore throat, trouble swallowing and voice change  Eyes: Negative for pain, discharge and visual disturbance  Respiratory: Negative for cough, shortness of breath and wheezing  Cardiovascular: Negative for chest pain, palpitations and leg swelling     Gastrointestinal: Negative for abdominal pain, blood in stool, constipation, diarrhea and nausea  Endocrine: Negative for polydipsia, polyphagia and polyuria  Genitourinary: Negative for difficulty urinating, hematuria and urgency  Musculoskeletal: Negative for arthralgias and myalgias  Skin: Negative for rash  Neurological: Negative for dizziness, tremors, weakness and headaches  Hematological: Negative for adenopathy  Does not bruise/bleed easily  Psychiatric/Behavioral: Negative for dysphoric mood, sleep disturbance and suicidal ideas  Objective:      /74 (BP Location: Left arm, Patient Position: Sitting, Cuff Size: Standard)   Pulse 64   Temp 98 4 °F (36 9 °C) (Probe)   Resp 14   Ht 5' 9" (1 753 m)   Wt 101 kg (221 lb 12 8 oz)   SpO2 97%   BMI 32 75 kg/m²          Physical Exam   Constitutional: He is oriented to person, place, and time  He appears well-nourished  No distress  HENT:   Head: Normocephalic  Mouth/Throat: Oropharynx is clear and moist  No oropharyngeal exudate  Eyes: Pupils are equal, round, and reactive to light  Conjunctivae are normal  No scleral icterus  Neck: Neck supple  No thyromegaly present  Cardiovascular: Normal rate and regular rhythm  Murmur heard  Pulmonary/Chest: Effort normal and breath sounds normal  No respiratory distress  He has no wheezes  He has no rales  Abdominal: Soft  Bowel sounds are normal  He exhibits no distension  There is no tenderness  There is no rebound and no guarding  Musculoskeletal: He exhibits no edema or tenderness  Lymphadenopathy:     He has no cervical adenopathy  Neurological: He is alert and oriented to person, place, and time  No cranial nerve deficit  Coordination normal    Skin: No erythema  No pallor  Psychiatric: He has a normal mood and affect

## 2020-01-20 DIAGNOSIS — E78.49 OTHER HYPERLIPIDEMIA: ICD-10-CM

## 2020-01-20 RX ORDER — ATORVASTATIN CALCIUM 20 MG/1
TABLET, FILM COATED ORAL
Qty: 90 TABLET | Refills: 0 | Status: SHIPPED | OUTPATIENT
Start: 2020-01-20 | End: 2020-04-10 | Stop reason: SDUPTHER

## 2020-02-28 ENCOUNTER — APPOINTMENT (OUTPATIENT)
Dept: LAB | Age: 70
End: 2020-02-28
Payer: COMMERCIAL

## 2020-02-28 DIAGNOSIS — N40.0 ENLARGED PROSTATE: ICD-10-CM

## 2020-02-28 DIAGNOSIS — Z11.59 NEED FOR HEPATITIS C SCREENING TEST: ICD-10-CM

## 2020-02-28 DIAGNOSIS — E11.8 TYPE 2 DIABETES MELLITUS WITH COMPLICATION, WITHOUT LONG-TERM CURRENT USE OF INSULIN (HCC): ICD-10-CM

## 2020-02-28 DIAGNOSIS — E78.49 OTHER HYPERLIPIDEMIA: ICD-10-CM

## 2020-02-28 LAB
ALBUMIN SERPL BCP-MCNC: 3.4 G/DL (ref 3.5–5)
ALP SERPL-CCNC: 129 U/L (ref 46–116)
ALT SERPL W P-5'-P-CCNC: 96 U/L (ref 12–78)
ANION GAP SERPL CALCULATED.3IONS-SCNC: 4 MMOL/L (ref 4–13)
AST SERPL W P-5'-P-CCNC: 103 U/L (ref 5–45)
BACTERIA UR QL AUTO: ABNORMAL /HPF
BASOPHILS # BLD AUTO: 0.04 THOUSANDS/ΜL (ref 0–0.1)
BASOPHILS NFR BLD AUTO: 1 % (ref 0–1)
BILIRUB SERPL-MCNC: 0.67 MG/DL (ref 0.2–1)
BILIRUB UR QL STRIP: NEGATIVE
BUN SERPL-MCNC: 22 MG/DL (ref 5–25)
CALCIUM SERPL-MCNC: 8.4 MG/DL (ref 8.3–10.1)
CHLORIDE SERPL-SCNC: 112 MMOL/L (ref 100–108)
CHOLEST SERPL-MCNC: 102 MG/DL (ref 50–200)
CLARITY UR: ABNORMAL
CO2 SERPL-SCNC: 25 MMOL/L (ref 21–32)
COLOR UR: ABNORMAL
CREAT SERPL-MCNC: 1.34 MG/DL (ref 0.6–1.3)
EOSINOPHIL # BLD AUTO: 0.15 THOUSAND/ΜL (ref 0–0.61)
EOSINOPHIL NFR BLD AUTO: 3 % (ref 0–6)
ERYTHROCYTE [DISTWIDTH] IN BLOOD BY AUTOMATED COUNT: 18 % (ref 11.6–15.1)
GFR SERPL CREATININE-BSD FRML MDRD: 54 ML/MIN/1.73SQ M
GLUCOSE P FAST SERPL-MCNC: 94 MG/DL (ref 65–99)
GLUCOSE UR STRIP-MCNC: NEGATIVE MG/DL
HCT VFR BLD AUTO: 36 % (ref 36.5–49.3)
HCV AB SER QL: NORMAL
HDLC SERPL-MCNC: 35 MG/DL
HGB BLD-MCNC: 10.3 G/DL (ref 12–17)
HGB UR QL STRIP.AUTO: NEGATIVE
IMM GRANULOCYTES # BLD AUTO: 0.04 THOUSAND/UL (ref 0–0.2)
IMM GRANULOCYTES NFR BLD AUTO: 1 % (ref 0–2)
KETONES UR STRIP-MCNC: NEGATIVE MG/DL
LDLC SERPL CALC-MCNC: 43 MG/DL (ref 0–100)
LEUKOCYTE ESTERASE UR QL STRIP: ABNORMAL
LYMPHOCYTES # BLD AUTO: 1.21 THOUSANDS/ΜL (ref 0.6–4.47)
LYMPHOCYTES NFR BLD AUTO: 20 % (ref 14–44)
MCH RBC QN AUTO: 25.3 PG (ref 26.8–34.3)
MCHC RBC AUTO-ENTMCNC: 28.6 G/DL (ref 31.4–37.4)
MCV RBC AUTO: 89 FL (ref 82–98)
MONOCYTES # BLD AUTO: 0.77 THOUSAND/ΜL (ref 0.17–1.22)
MONOCYTES NFR BLD AUTO: 13 % (ref 4–12)
NEUTROPHILS # BLD AUTO: 3.88 THOUSANDS/ΜL (ref 1.85–7.62)
NEUTS SEG NFR BLD AUTO: 62 % (ref 43–75)
NITRITE UR QL STRIP: POSITIVE
NON-SQ EPI CELLS URNS QL MICRO: ABNORMAL /HPF
NONHDLC SERPL-MCNC: 67 MG/DL
NRBC BLD AUTO-RTO: 0 /100 WBCS
PH UR STRIP.AUTO: 6 [PH]
PLATELET # BLD AUTO: 193 THOUSANDS/UL (ref 149–390)
PMV BLD AUTO: 12.1 FL (ref 8.9–12.7)
POTASSIUM SERPL-SCNC: 3.9 MMOL/L (ref 3.5–5.3)
PROT SERPL-MCNC: 6.9 G/DL (ref 6.4–8.2)
PROT UR STRIP-MCNC: ABNORMAL MG/DL
PSA SERPL-MCNC: 1.8 NG/ML (ref 0–4)
RBC # BLD AUTO: 4.07 MILLION/UL (ref 3.88–5.62)
RBC #/AREA URNS AUTO: ABNORMAL /HPF
SODIUM SERPL-SCNC: 141 MMOL/L (ref 136–145)
SP GR UR STRIP.AUTO: 1.02 (ref 1–1.03)
T4 FREE SERPL-MCNC: 1.61 NG/DL (ref 0.76–1.46)
TRIGL SERPL-MCNC: 120 MG/DL
TSH SERPL DL<=0.05 MIU/L-ACNC: 2.48 UIU/ML (ref 0.36–3.74)
UROBILINOGEN UR QL STRIP.AUTO: 1 E.U./DL
WBC # BLD AUTO: 6.09 THOUSAND/UL (ref 4.31–10.16)
WBC #/AREA URNS AUTO: ABNORMAL /HPF

## 2020-02-28 PROCEDURE — 86376 MICROSOMAL ANTIBODY EACH: CPT

## 2020-02-28 PROCEDURE — 86800 THYROGLOBULIN ANTIBODY: CPT

## 2020-02-28 PROCEDURE — 36415 COLL VENOUS BLD VENIPUNCTURE: CPT

## 2020-02-28 PROCEDURE — 84443 ASSAY THYROID STIM HORMONE: CPT

## 2020-02-28 PROCEDURE — 85025 COMPLETE CBC W/AUTO DIFF WBC: CPT

## 2020-02-28 PROCEDURE — 80061 LIPID PANEL: CPT

## 2020-02-28 PROCEDURE — 86803 HEPATITIS C AB TEST: CPT

## 2020-02-28 PROCEDURE — 80053 COMPREHEN METABOLIC PANEL: CPT

## 2020-02-28 PROCEDURE — 84439 ASSAY OF FREE THYROXINE: CPT

## 2020-02-28 PROCEDURE — 81001 URINALYSIS AUTO W/SCOPE: CPT | Performed by: INTERNAL MEDICINE

## 2020-02-28 PROCEDURE — 84153 ASSAY OF PSA TOTAL: CPT

## 2020-02-29 LAB
THYROGLOB AB SERPL-ACNC: 2.3 IU/ML (ref 0–0.9)
THYROPEROXIDASE AB SERPL-ACNC: 45 IU/ML (ref 0–34)

## 2020-03-02 ENCOUNTER — OFFICE VISIT (OUTPATIENT)
Dept: FAMILY MEDICINE CLINIC | Facility: CLINIC | Age: 70
End: 2020-03-02
Payer: COMMERCIAL

## 2020-03-02 VITALS
DIASTOLIC BLOOD PRESSURE: 80 MMHG | HEIGHT: 69 IN | OXYGEN SATURATION: 97 % | HEART RATE: 80 BPM | RESPIRATION RATE: 16 BRPM | BODY MASS INDEX: 32.05 KG/M2 | TEMPERATURE: 97.4 F | SYSTOLIC BLOOD PRESSURE: 134 MMHG | WEIGHT: 216.4 LBS

## 2020-03-02 DIAGNOSIS — D50.9 IRON DEFICIENCY ANEMIA, UNSPECIFIED IRON DEFICIENCY ANEMIA TYPE: ICD-10-CM

## 2020-03-02 DIAGNOSIS — I10 ESSENTIAL HYPERTENSION, BENIGN: ICD-10-CM

## 2020-03-02 DIAGNOSIS — R31.9 HEMATURIA, UNSPECIFIED TYPE: ICD-10-CM

## 2020-03-02 DIAGNOSIS — I48.0 PAROXYSMAL ATRIAL FIBRILLATION (HCC): ICD-10-CM

## 2020-03-02 DIAGNOSIS — R94.5 LIVER FUNCTION ABNORMALITY: Primary | ICD-10-CM

## 2020-03-02 DIAGNOSIS — E06.9 THYROIDITIS: ICD-10-CM

## 2020-03-02 DIAGNOSIS — I42.9 FAMILIAL CARDIOMYOPATHY (HCC): ICD-10-CM

## 2020-03-02 DIAGNOSIS — I70.213 ATHEROSCLEROSIS OF NATIVE ARTERY OF BOTH LOWER EXTREMITIES WITH INTERMITTENT CLAUDICATION (HCC): ICD-10-CM

## 2020-03-02 LAB
SL AMB  POCT GLUCOSE, UA: ABNORMAL
SL AMB LEUKOCYTE ESTERASE,UA: ABNORMAL
SL AMB POCT BILIRUBIN,UA: ABNORMAL
SL AMB POCT BLOOD,UA: ABNORMAL
SL AMB POCT CLARITY,UA: ABNORMAL
SL AMB POCT COLOR,UA: ABNORMAL
SL AMB POCT KETONES,UA: ABNORMAL
SL AMB POCT NITRITE,UA: ABNORMAL
SL AMB POCT PH,UA: 5
SL AMB POCT SPECIFIC GRAVITY,UA: 1.01
SL AMB POCT URINE PROTEIN: ABNORMAL
SL AMB POCT UROBILINOGEN: ABNORMAL

## 2020-03-02 PROCEDURE — 81002 URINALYSIS NONAUTO W/O SCOPE: CPT | Performed by: INTERNAL MEDICINE

## 2020-03-02 PROCEDURE — 3079F DIAST BP 80-89 MM HG: CPT | Performed by: INTERNAL MEDICINE

## 2020-03-02 PROCEDURE — 4040F PNEUMOC VAC/ADMIN/RCVD: CPT | Performed by: INTERNAL MEDICINE

## 2020-03-02 PROCEDURE — 1036F TOBACCO NON-USER: CPT | Performed by: INTERNAL MEDICINE

## 2020-03-02 PROCEDURE — 99214 OFFICE O/P EST MOD 30 MIN: CPT | Performed by: INTERNAL MEDICINE

## 2020-03-02 PROCEDURE — 3008F BODY MASS INDEX DOCD: CPT | Performed by: INTERNAL MEDICINE

## 2020-03-02 PROCEDURE — 3061F NEG MICROALBUMINURIA REV: CPT | Performed by: INTERNAL MEDICINE

## 2020-03-02 PROCEDURE — 1160F RVW MEDS BY RX/DR IN RCRD: CPT | Performed by: INTERNAL MEDICINE

## 2020-03-02 PROCEDURE — 3075F SYST BP GE 130 - 139MM HG: CPT | Performed by: INTERNAL MEDICINE

## 2020-03-02 RX ORDER — AMIODARONE HYDROCHLORIDE 200 MG/1
200 TABLET ORAL DAILY
Qty: 90 TABLET | Refills: 3 | Status: SHIPPED | OUTPATIENT
Start: 2020-03-02 | End: 2020-03-20 | Stop reason: SDUPTHER

## 2020-03-02 NOTE — PROGRESS NOTES
Assessment/Plan:         Diagnoses and all orders for this visit:    Liver function abnormality; Most Likely is due to Amiodarone 200 mg Bid  Reduce it to 200 mg Daily  RTc in 1-2 mosw  :  -     US liver; Future    Essential hypertension, benign; as above    Continue Losartan and Toprol xl  RTC in 1-2 mos w Blood work  Reduce :  -     amiodarone 200 mg tablet;  TO Take 1 tablet (200 mg total) by mouth daily  -     Comprehensive metabolic panel; Future  -     CBC and differential; Future    [facility] Stable  Continue same  FU w cardiology  RTC in 2-3 mos w :  -     Comprehensive metabolic panel; Future  -     CBC and differential; Future    Atherosclerosis of native artery of both lower extremities with intermittent claudication (HCC); stable  Continue same  FU w Cardiology    Paroxysmal [facility] ); stable  In sinus Rhythm Now  Continue same  Fu w Cardiology    Iron deficiency anemia, unspecified iron deficiency anemia type; RTC in 1-2 mos w :  -     Ferritin; Future    Thyroiditis; cause ? Could be due to Amiodarone : Reduce dose to 200 mg daily  RTC in 2mos w :  -     Thyroid Antibodies Panel; Future  -     T4, free; Future  -     TSH, 3rd generation; Future  -     US thyroid; Future    Hematuria, unspecified type; FU w Urology  -     POCT urine dip        Subjective:      Patient ID: Taylor Clement is a 77 y o  male  77 Y O man is here for Regular Check up, He feels ok, No New symptoms, recent Blood work and med List reviewed w  Pt in detail    The following portions of the patient's history were reviewed and updated as appropriate: allergies, current medications, past family history, past medical history, past social history, past surgical history and problem list     Review of Systems   Constitutional: Negative for chills, fatigue and fever  HENT: Negative for congestion, facial swelling, sore throat, trouble swallowing and voice change      Eyes: Negative for pain, discharge and visual disturbance  Respiratory: Negative for cough, shortness of breath and wheezing  Cardiovascular: Negative for chest pain, palpitations and leg swelling  Gastrointestinal: Negative for abdominal pain, blood in stool, constipation, diarrhea and nausea  Endocrine: Negative for polydipsia, polyphagia and polyuria  Genitourinary: Negative for difficulty urinating, hematuria and urgency  Musculoskeletal: Negative for arthralgias and myalgias  Skin: Negative for rash  Neurological: Negative for dizziness, tremors, weakness and headaches  Hematological: Negative for adenopathy  Does not bruise/bleed easily  Psychiatric/Behavioral: Negative for dysphoric mood, sleep disturbance and suicidal ideas  Objective:      /80 (BP Location: Left arm, Patient Position: Sitting, Cuff Size: Standard)   Pulse 80   Temp (!) 97 4 °F (36 3 °C) (Probe)   Resp 16   Ht 5' 9" (1 753 m)   Wt 98 2 kg (216 lb 6 4 oz)   SpO2 97%   BMI 31 96 kg/m²          Physical Exam   Constitutional: He is oriented to person, place, and time  He appears well-nourished  No distress  HENT:   Head: Normocephalic  Mouth/Throat: Oropharynx is clear and moist  No oropharyngeal exudate  Eyes: Pupils are equal, round, and reactive to light  Conjunctivae are normal  No scleral icterus  Neck: Neck supple  Thyromegaly present  Cardiovascular: Normal rate and regular rhythm  Murmur heard  Pulmonary/Chest: Effort normal and breath sounds normal  No respiratory distress  He has no wheezes  He has no rales  Abdominal: Soft  Bowel sounds are normal  He exhibits no distension  There is no tenderness  There is no rebound and no guarding  Musculoskeletal: He exhibits no edema or tenderness  Lymphadenopathy:     He has no cervical adenopathy  Neurological: He is alert and oriented to person, place, and time  No cranial nerve deficit  Coordination normal    Skin: No erythema  No pallor     Psychiatric: He has a normal mood and affect

## 2020-03-20 DIAGNOSIS — I10 ESSENTIAL HYPERTENSION, BENIGN: ICD-10-CM

## 2020-03-20 DIAGNOSIS — M1A.9XX0 CHRONIC GOUT WITHOUT TOPHUS, UNSPECIFIED CAUSE, UNSPECIFIED SITE: ICD-10-CM

## 2020-03-20 DIAGNOSIS — E78.49 OTHER HYPERLIPIDEMIA: ICD-10-CM

## 2020-03-20 DIAGNOSIS — L23.7 POISON IVY: ICD-10-CM

## 2020-03-20 PROCEDURE — 4010F ACE/ARB THERAPY RXD/TAKEN: CPT | Performed by: INTERNAL MEDICINE

## 2020-03-20 RX ORDER — ALLOPURINOL 100 MG/1
100 TABLET ORAL DAILY
Qty: 90 TABLET | Refills: 3 | Status: SHIPPED | OUTPATIENT
Start: 2020-03-20 | End: 2021-03-29 | Stop reason: SDUPTHER

## 2020-03-20 RX ORDER — FUROSEMIDE 20 MG/1
20 TABLET ORAL DAILY
Qty: 90 TABLET | Refills: 3 | Status: SHIPPED | OUTPATIENT
Start: 2020-03-20 | End: 2021-03-29 | Stop reason: SDUPTHER

## 2020-03-20 RX ORDER — LORATADINE 10 MG/1
10 TABLET ORAL DAILY
Qty: 30 TABLET | Refills: 1 | Status: SHIPPED | OUTPATIENT
Start: 2020-03-20 | End: 2020-08-31 | Stop reason: SDUPTHER

## 2020-03-20 RX ORDER — PRAVASTATIN SODIUM 20 MG
20 TABLET ORAL DAILY
Qty: 90 TABLET | Refills: 3 | Status: SHIPPED | OUTPATIENT
Start: 2020-03-20 | End: 2020-04-10 | Stop reason: SDUPTHER

## 2020-03-20 RX ORDER — AMIODARONE HYDROCHLORIDE 200 MG/1
200 TABLET ORAL DAILY
Qty: 90 TABLET | Refills: 3 | Status: SHIPPED | OUTPATIENT
Start: 2020-03-20 | End: 2021-03-29 | Stop reason: SDUPTHER

## 2020-03-20 RX ORDER — LOSARTAN POTASSIUM 50 MG/1
50 TABLET ORAL DAILY
Qty: 90 TABLET | Refills: 3 | Status: SHIPPED | OUTPATIENT
Start: 2020-03-20 | End: 2021-03-29 | Stop reason: SDUPTHER

## 2020-03-23 DIAGNOSIS — K21.9 GASTROESOPHAGEAL REFLUX DISEASE WITHOUT ESOPHAGITIS: ICD-10-CM

## 2020-03-23 RX ORDER — OMEPRAZOLE 20 MG/1
20 CAPSULE, DELAYED RELEASE ORAL 2 TIMES DAILY
Qty: 180 CAPSULE | Refills: 3 | Status: CANCELLED | OUTPATIENT
Start: 2020-03-23

## 2020-03-23 RX ORDER — OMEPRAZOLE 20 MG/1
20 CAPSULE, DELAYED RELEASE ORAL 2 TIMES DAILY
Qty: 180 CAPSULE | Refills: 3 | Status: SHIPPED | OUTPATIENT
Start: 2020-03-23 | End: 2020-03-30

## 2020-03-30 ENCOUNTER — TELEPHONE (OUTPATIENT)
Dept: FAMILY MEDICINE CLINIC | Facility: CLINIC | Age: 70
End: 2020-03-30

## 2020-03-30 DIAGNOSIS — K21.9 GASTROESOPHAGEAL REFLUX DISEASE WITHOUT ESOPHAGITIS: Primary | ICD-10-CM

## 2020-03-30 RX ORDER — PANTOPRAZOLE SODIUM 20 MG/1
20 TABLET, DELAYED RELEASE ORAL DAILY
Qty: 90 TABLET | Refills: 1 | Status: SHIPPED | OUTPATIENT
Start: 2020-03-30 | End: 2020-04-10 | Stop reason: SDUPTHER

## 2020-04-10 DIAGNOSIS — E78.49 OTHER HYPERLIPIDEMIA: ICD-10-CM

## 2020-04-10 DIAGNOSIS — K21.9 GASTROESOPHAGEAL REFLUX DISEASE WITHOUT ESOPHAGITIS: ICD-10-CM

## 2020-04-10 DIAGNOSIS — K59.04 CHRONIC IDIOPATHIC CONSTIPATION: Primary | ICD-10-CM

## 2020-04-10 RX ORDER — PLECANATIDE 3 MG/1
3 TABLET ORAL DAILY
Qty: 90 TABLET | Refills: 3 | Status: SHIPPED | OUTPATIENT
Start: 2020-04-10 | End: 2020-07-15

## 2020-04-10 RX ORDER — PANTOPRAZOLE SODIUM 20 MG/1
20 TABLET, DELAYED RELEASE ORAL DAILY
Qty: 90 TABLET | Refills: 1 | Status: SHIPPED | OUTPATIENT
Start: 2020-04-10 | End: 2020-10-23 | Stop reason: SDUPTHER

## 2020-04-10 RX ORDER — ATORVASTATIN CALCIUM 20 MG/1
20 TABLET, FILM COATED ORAL DAILY
Qty: 90 TABLET | Refills: 1 | Status: SHIPPED | OUTPATIENT
Start: 2020-04-10 | End: 2020-04-29

## 2020-04-10 RX ORDER — PRAVASTATIN SODIUM 20 MG
20 TABLET ORAL DAILY
Qty: 90 TABLET | Refills: 3 | Status: SHIPPED | OUTPATIENT
Start: 2020-04-10 | End: 2020-05-04

## 2020-04-19 DIAGNOSIS — I48.20 CHRONIC ATRIAL FIBRILLATION (HCC): ICD-10-CM

## 2020-04-19 RX ORDER — WARFARIN SODIUM 5 MG/1
TABLET ORAL
Qty: 90 TABLET | Refills: 3 | Status: SHIPPED | OUTPATIENT
Start: 2020-04-19 | End: 2021-03-29 | Stop reason: SDUPTHER

## 2020-04-29 DIAGNOSIS — E78.49 OTHER HYPERLIPIDEMIA: ICD-10-CM

## 2020-04-29 RX ORDER — ATORVASTATIN CALCIUM 20 MG/1
TABLET, FILM COATED ORAL
Qty: 90 TABLET | Refills: 1 | Status: SHIPPED | OUTPATIENT
Start: 2020-04-29 | End: 2020-05-04 | Stop reason: SDUPTHER

## 2020-05-04 ENCOUNTER — OFFICE VISIT (OUTPATIENT)
Dept: FAMILY MEDICINE CLINIC | Facility: CLINIC | Age: 70
End: 2020-05-04
Payer: COMMERCIAL

## 2020-05-04 VITALS
HEIGHT: 69 IN | TEMPERATURE: 97.8 F | BODY MASS INDEX: 31.84 KG/M2 | DIASTOLIC BLOOD PRESSURE: 84 MMHG | SYSTOLIC BLOOD PRESSURE: 136 MMHG | WEIGHT: 215 LBS | RESPIRATION RATE: 18 BRPM | HEART RATE: 68 BPM

## 2020-05-04 DIAGNOSIS — N18.2 CHRONIC RENAL FAILURE, STAGE 2 (MILD): ICD-10-CM

## 2020-05-04 DIAGNOSIS — R94.5 LIVER FUNCTION ABNORMALITY: ICD-10-CM

## 2020-05-04 DIAGNOSIS — Z00.01 ENCOUNTER FOR GENERAL ADULT MEDICAL EXAMINATION WITH ABNORMAL FINDINGS: Primary | ICD-10-CM

## 2020-05-04 DIAGNOSIS — E78.49 OTHER HYPERLIPIDEMIA: ICD-10-CM

## 2020-05-04 DIAGNOSIS — D64.9 ANEMIA, UNSPECIFIED TYPE: ICD-10-CM

## 2020-05-04 DIAGNOSIS — E06.9 THYROIDITIS: ICD-10-CM

## 2020-05-04 PROCEDURE — 3075F SYST BP GE 130 - 139MM HG: CPT | Performed by: INTERNAL MEDICINE

## 2020-05-04 PROCEDURE — G0439 PPPS, SUBSEQ VISIT: HCPCS | Performed by: INTERNAL MEDICINE

## 2020-05-04 PROCEDURE — 3008F BODY MASS INDEX DOCD: CPT | Performed by: INTERNAL MEDICINE

## 2020-05-04 PROCEDURE — 1036F TOBACCO NON-USER: CPT | Performed by: INTERNAL MEDICINE

## 2020-05-04 PROCEDURE — 3066F NEPHROPATHY DOC TX: CPT | Performed by: INTERNAL MEDICINE

## 2020-05-04 PROCEDURE — 1170F FXNL STATUS ASSESSED: CPT | Performed by: INTERNAL MEDICINE

## 2020-05-04 PROCEDURE — 4040F PNEUMOC VAC/ADMIN/RCVD: CPT | Performed by: INTERNAL MEDICINE

## 2020-05-04 PROCEDURE — 1125F AMNT PAIN NOTED PAIN PRSNT: CPT | Performed by: INTERNAL MEDICINE

## 2020-05-04 PROCEDURE — 1160F RVW MEDS BY RX/DR IN RCRD: CPT | Performed by: INTERNAL MEDICINE

## 2020-05-04 PROCEDURE — 3079F DIAST BP 80-89 MM HG: CPT | Performed by: INTERNAL MEDICINE

## 2020-05-04 PROCEDURE — 99214 OFFICE O/P EST MOD 30 MIN: CPT | Performed by: INTERNAL MEDICINE

## 2020-05-04 RX ORDER — ATORVASTATIN CALCIUM 20 MG/1
20 TABLET, FILM COATED ORAL DAILY
Qty: 90 TABLET | Refills: 3 | Status: SHIPPED | OUTPATIENT
Start: 2020-05-04 | End: 2020-06-25

## 2020-06-16 ENCOUNTER — OFFICE VISIT (OUTPATIENT)
Dept: FAMILY MEDICINE CLINIC | Facility: CLINIC | Age: 70
End: 2020-06-16
Payer: COMMERCIAL

## 2020-06-16 VITALS
RESPIRATION RATE: 14 BRPM | OXYGEN SATURATION: 97 % | DIASTOLIC BLOOD PRESSURE: 82 MMHG | HEIGHT: 69 IN | TEMPERATURE: 97.9 F | SYSTOLIC BLOOD PRESSURE: 134 MMHG | BODY MASS INDEX: 31.04 KG/M2 | HEART RATE: 74 BPM | WEIGHT: 209.6 LBS

## 2020-06-16 DIAGNOSIS — D64.9 ANEMIA, UNSPECIFIED TYPE: ICD-10-CM

## 2020-06-16 DIAGNOSIS — I48.20 CHRONIC ATRIAL FIBRILLATION (HCC): ICD-10-CM

## 2020-06-16 DIAGNOSIS — E11.8 TYPE II DIABETES MELLITUS WITH MANIFESTATIONS (HCC): Primary | ICD-10-CM

## 2020-06-16 DIAGNOSIS — Z12.11 SPECIAL SCREENING FOR MALIGNANT NEOPLASMS, COLON: ICD-10-CM

## 2020-06-16 DIAGNOSIS — E78.49 OTHER HYPERLIPIDEMIA: ICD-10-CM

## 2020-06-16 PROCEDURE — 3075F SYST BP GE 130 - 139MM HG: CPT | Performed by: INTERNAL MEDICINE

## 2020-06-16 PROCEDURE — 4040F PNEUMOC VAC/ADMIN/RCVD: CPT | Performed by: INTERNAL MEDICINE

## 2020-06-16 PROCEDURE — 1160F RVW MEDS BY RX/DR IN RCRD: CPT | Performed by: INTERNAL MEDICINE

## 2020-06-16 PROCEDURE — 3008F BODY MASS INDEX DOCD: CPT | Performed by: INTERNAL MEDICINE

## 2020-06-16 PROCEDURE — 1036F TOBACCO NON-USER: CPT | Performed by: INTERNAL MEDICINE

## 2020-06-16 PROCEDURE — 3066F NEPHROPATHY DOC TX: CPT | Performed by: INTERNAL MEDICINE

## 2020-06-16 PROCEDURE — 99214 OFFICE O/P EST MOD 30 MIN: CPT | Performed by: INTERNAL MEDICINE

## 2020-06-16 PROCEDURE — 3079F DIAST BP 80-89 MM HG: CPT | Performed by: INTERNAL MEDICINE

## 2020-06-18 DIAGNOSIS — I48.20 CHRONIC ATRIAL FIBRILLATION (HCC): Primary | ICD-10-CM

## 2020-06-18 RX ORDER — METOPROLOL SUCCINATE 50 MG/1
TABLET, EXTENDED RELEASE ORAL
Qty: 90 TABLET | Refills: 3 | Status: SHIPPED | OUTPATIENT
Start: 2020-06-18 | End: 2021-05-04

## 2020-06-25 DIAGNOSIS — E78.49 OTHER HYPERLIPIDEMIA: Primary | ICD-10-CM

## 2020-06-25 RX ORDER — PRAVASTATIN SODIUM 20 MG
20 TABLET ORAL DAILY
Qty: 90 TABLET | Refills: 3 | Status: SHIPPED | OUTPATIENT
Start: 2020-06-25 | End: 2020-09-28 | Stop reason: SDUPTHER

## 2020-07-06 ENCOUNTER — APPOINTMENT (OUTPATIENT)
Dept: LAB | Age: 70
End: 2020-07-06
Payer: COMMERCIAL

## 2020-07-06 DIAGNOSIS — E11.8 TYPE II DIABETES MELLITUS WITH MANIFESTATIONS (HCC): ICD-10-CM

## 2020-07-06 DIAGNOSIS — I48.20 CHRONIC ATRIAL FIBRILLATION (HCC): ICD-10-CM

## 2020-07-06 DIAGNOSIS — D64.9 ANEMIA, UNSPECIFIED TYPE: ICD-10-CM

## 2020-07-06 DIAGNOSIS — E78.49 OTHER HYPERLIPIDEMIA: ICD-10-CM

## 2020-07-06 LAB
ALBUMIN SERPL BCP-MCNC: 3.5 G/DL (ref 3.5–5)
ALP SERPL-CCNC: 116 U/L (ref 46–116)
ALT SERPL W P-5'-P-CCNC: 72 U/L (ref 12–78)
ANION GAP SERPL CALCULATED.3IONS-SCNC: 4 MMOL/L (ref 4–13)
AST SERPL W P-5'-P-CCNC: 67 U/L (ref 5–45)
BACTERIA UR QL AUTO: ABNORMAL /HPF
BASOPHILS # BLD AUTO: 0.04 THOUSANDS/ΜL (ref 0–0.1)
BASOPHILS NFR BLD AUTO: 1 % (ref 0–1)
BILIRUB SERPL-MCNC: 0.78 MG/DL (ref 0.2–1)
BILIRUB UR QL STRIP: NEGATIVE
BUN SERPL-MCNC: 28 MG/DL (ref 5–25)
CALCIUM SERPL-MCNC: 8.7 MG/DL (ref 8.3–10.1)
CHLORIDE SERPL-SCNC: 114 MMOL/L (ref 100–108)
CHOLEST SERPL-MCNC: 122 MG/DL (ref 50–200)
CLARITY UR: ABNORMAL
CO2 SERPL-SCNC: 24 MMOL/L (ref 21–32)
COLOR UR: YELLOW
CREAT SERPL-MCNC: 1.37 MG/DL (ref 0.6–1.3)
EOSINOPHIL # BLD AUTO: 0.18 THOUSAND/ΜL (ref 0–0.61)
EOSINOPHIL NFR BLD AUTO: 3 % (ref 0–6)
ERYTHROCYTE [DISTWIDTH] IN BLOOD BY AUTOMATED COUNT: 15.7 % (ref 11.6–15.1)
EST. AVERAGE GLUCOSE BLD GHB EST-MCNC: 117 MG/DL
FERRITIN SERPL-MCNC: 57 NG/ML (ref 8–388)
GFR SERPL CREATININE-BSD FRML MDRD: 53 ML/MIN/1.73SQ M
GLUCOSE P FAST SERPL-MCNC: 86 MG/DL (ref 65–99)
GLUCOSE UR STRIP-MCNC: NEGATIVE MG/DL
HBA1C MFR BLD: 5.7 %
HCT VFR BLD AUTO: 39 % (ref 36.5–49.3)
HDLC SERPL-MCNC: 43 MG/DL
HGB BLD-MCNC: 11.9 G/DL (ref 12–17)
HGB UR QL STRIP.AUTO: ABNORMAL
HYALINE CASTS #/AREA URNS LPF: ABNORMAL /LPF
IMM GRANULOCYTES # BLD AUTO: 0.03 THOUSAND/UL (ref 0–0.2)
IMM GRANULOCYTES NFR BLD AUTO: 1 % (ref 0–2)
KETONES UR STRIP-MCNC: NEGATIVE MG/DL
LDLC SERPL CALC-MCNC: 56 MG/DL (ref 0–100)
LEUKOCYTE ESTERASE UR QL STRIP: ABNORMAL
LYMPHOCYTES # BLD AUTO: 1.25 THOUSANDS/ΜL (ref 0.6–4.47)
LYMPHOCYTES NFR BLD AUTO: 22 % (ref 14–44)
MCH RBC QN AUTO: 28.4 PG (ref 26.8–34.3)
MCHC RBC AUTO-ENTMCNC: 30.5 G/DL (ref 31.4–37.4)
MCV RBC AUTO: 93 FL (ref 82–98)
MONOCYTES # BLD AUTO: 0.46 THOUSAND/ΜL (ref 0.17–1.22)
MONOCYTES NFR BLD AUTO: 8 % (ref 4–12)
NEUTROPHILS # BLD AUTO: 3.66 THOUSANDS/ΜL (ref 1.85–7.62)
NEUTS SEG NFR BLD AUTO: 65 % (ref 43–75)
NITRITE UR QL STRIP: POSITIVE
NON-SQ EPI CELLS URNS QL MICRO: ABNORMAL /HPF
NONHDLC SERPL-MCNC: 79 MG/DL
NRBC BLD AUTO-RTO: 0 /100 WBCS
PH UR STRIP.AUTO: 6 [PH]
PLATELET # BLD AUTO: 163 THOUSANDS/UL (ref 149–390)
PMV BLD AUTO: 12.8 FL (ref 8.9–12.7)
POTASSIUM SERPL-SCNC: 4.4 MMOL/L (ref 3.5–5.3)
PROT SERPL-MCNC: 7.2 G/DL (ref 6.4–8.2)
PROT UR STRIP-MCNC: ABNORMAL MG/DL
RBC # BLD AUTO: 4.19 MILLION/UL (ref 3.88–5.62)
RBC #/AREA URNS AUTO: ABNORMAL /HPF
SODIUM SERPL-SCNC: 142 MMOL/L (ref 136–145)
SP GR UR STRIP.AUTO: 1.02 (ref 1–1.03)
T4 FREE SERPL-MCNC: 1.5 NG/DL (ref 0.76–1.46)
TRIGL SERPL-MCNC: 117 MG/DL
TSH SERPL DL<=0.05 MIU/L-ACNC: 2.02 UIU/ML (ref 0.36–3.74)
UROBILINOGEN UR QL STRIP.AUTO: 0.2 E.U./DL
WBC # BLD AUTO: 5.62 THOUSAND/UL (ref 4.31–10.16)
WBC #/AREA URNS AUTO: ABNORMAL /HPF

## 2020-07-06 PROCEDURE — 86376 MICROSOMAL ANTIBODY EACH: CPT

## 2020-07-06 PROCEDURE — 84439 ASSAY OF FREE THYROXINE: CPT

## 2020-07-06 PROCEDURE — 86800 THYROGLOBULIN ANTIBODY: CPT

## 2020-07-06 PROCEDURE — 80053 COMPREHEN METABOLIC PANEL: CPT

## 2020-07-06 PROCEDURE — 85025 COMPLETE CBC W/AUTO DIFF WBC: CPT

## 2020-07-06 PROCEDURE — 3044F HG A1C LEVEL LT 7.0%: CPT | Performed by: INTERNAL MEDICINE

## 2020-07-06 PROCEDURE — 82728 ASSAY OF FERRITIN: CPT

## 2020-07-06 PROCEDURE — 83036 HEMOGLOBIN GLYCOSYLATED A1C: CPT

## 2020-07-06 PROCEDURE — 80061 LIPID PANEL: CPT

## 2020-07-06 PROCEDURE — 81001 URINALYSIS AUTO W/SCOPE: CPT | Performed by: INTERNAL MEDICINE

## 2020-07-06 PROCEDURE — 36415 COLL VENOUS BLD VENIPUNCTURE: CPT

## 2020-07-06 PROCEDURE — 84443 ASSAY THYROID STIM HORMONE: CPT

## 2020-07-07 LAB — THYROPEROXIDASE AB SERPL-ACNC: 57 IU/ML (ref 0–34)

## 2020-07-08 ENCOUNTER — TELEPHONE (OUTPATIENT)
Dept: FAMILY MEDICINE CLINIC | Facility: CLINIC | Age: 70
End: 2020-07-08

## 2020-07-08 DIAGNOSIS — R31.1 BENIGN ESSENTIAL MICROSCOPIC HEMATURIA: Primary | ICD-10-CM

## 2020-07-08 LAB — THYROGLOB AB SERPL-ACNC: <1 IU/ML (ref 0–0.9)

## 2020-07-15 DIAGNOSIS — K59.04 CHRONIC IDIOPATHIC CONSTIPATION: Primary | ICD-10-CM

## 2020-08-31 ENCOUNTER — OFFICE VISIT (OUTPATIENT)
Dept: FAMILY MEDICINE CLINIC | Facility: CLINIC | Age: 70
End: 2020-08-31
Payer: COMMERCIAL

## 2020-08-31 VITALS
WEIGHT: 202.3 LBS | DIASTOLIC BLOOD PRESSURE: 82 MMHG | HEIGHT: 69 IN | BODY MASS INDEX: 29.96 KG/M2 | OXYGEN SATURATION: 98 % | TEMPERATURE: 98 F | RESPIRATION RATE: 14 BRPM | HEART RATE: 80 BPM | SYSTOLIC BLOOD PRESSURE: 136 MMHG

## 2020-08-31 DIAGNOSIS — R21 ACUTE MACULOPAPULAR RASH: Primary | ICD-10-CM

## 2020-08-31 DIAGNOSIS — L23.7 POISON IVY: ICD-10-CM

## 2020-08-31 DIAGNOSIS — H10.13 ALLERGIC CONJUNCTIVITIS OF BOTH EYES: ICD-10-CM

## 2020-08-31 PROCEDURE — 3066F NEPHROPATHY DOC TX: CPT | Performed by: INTERNAL MEDICINE

## 2020-08-31 PROCEDURE — 4040F PNEUMOC VAC/ADMIN/RCVD: CPT | Performed by: INTERNAL MEDICINE

## 2020-08-31 PROCEDURE — 1036F TOBACCO NON-USER: CPT | Performed by: INTERNAL MEDICINE

## 2020-08-31 PROCEDURE — 3008F BODY MASS INDEX DOCD: CPT | Performed by: INTERNAL MEDICINE

## 2020-08-31 PROCEDURE — 99213 OFFICE O/P EST LOW 20 MIN: CPT | Performed by: INTERNAL MEDICINE

## 2020-08-31 PROCEDURE — 3075F SYST BP GE 130 - 139MM HG: CPT | Performed by: INTERNAL MEDICINE

## 2020-08-31 PROCEDURE — 96372 THER/PROPH/DIAG INJ SC/IM: CPT | Performed by: INTERNAL MEDICINE

## 2020-08-31 PROCEDURE — 3044F HG A1C LEVEL LT 7.0%: CPT | Performed by: INTERNAL MEDICINE

## 2020-08-31 PROCEDURE — 3079F DIAST BP 80-89 MM HG: CPT | Performed by: INTERNAL MEDICINE

## 2020-08-31 PROCEDURE — 1160F RVW MEDS BY RX/DR IN RCRD: CPT | Performed by: INTERNAL MEDICINE

## 2020-08-31 RX ORDER — PREDNISONE 10 MG/1
TABLET ORAL
Qty: 20 TABLET | Refills: 0 | Status: SHIPPED | OUTPATIENT
Start: 2020-08-31 | End: 2020-09-23

## 2020-08-31 RX ORDER — METHYLPREDNISOLONE SODIUM SUCCINATE 125 MG/2ML
125 INJECTION, POWDER, LYOPHILIZED, FOR SOLUTION INTRAMUSCULAR; INTRAVENOUS ONCE
Status: COMPLETED | OUTPATIENT
Start: 2020-08-31 | End: 2020-08-31

## 2020-08-31 RX ORDER — LORATADINE 10 MG/1
10 TABLET ORAL DAILY
Qty: 30 TABLET | Refills: 1 | Status: SHIPPED | OUTPATIENT
Start: 2020-08-31

## 2020-08-31 RX ORDER — TOBRAMYCIN AND DEXAMETHASONE 3; 1 MG/ML; MG/ML
1 SUSPENSION/ DROPS OPHTHALMIC 3 TIMES DAILY
Qty: 5 ML | Refills: 1 | Status: SHIPPED | OUTPATIENT
Start: 2020-08-31 | End: 2020-09-23

## 2020-08-31 RX ADMIN — METHYLPREDNISOLONE SODIUM SUCCINATE 125 MG: 125 INJECTION, POWDER, LYOPHILIZED, FOR SOLUTION INTRAMUSCULAR; INTRAVENOUS at 08:31

## 2020-08-31 NOTE — PROGRESS NOTES
Assessment/Plan:         Diagnoses and all orders for this visit:    Acute maculopapular rash;   -     methylPREDNISolone sodium succinate (Solu-MEDROL) injection 125 mg, IM Now  -     predniSONE 10 mg tablet; Take 3 tabs daily with breakfast for 3 days then Take  Two tabs daily for 3 Days then take one tab daily for 3 days then stop     -     tobramycin-dexamethasone (TOBRADEX) ophthalmic suspension; Apply 1 drop to eye 3 (three) times a day  RTC in 1-2 weeks    Poison ivy; as above  -     loratadine (Claritin) 10 mg tablet; Take 1 tablet (10 mg total) by mouth daily In the evening  -     tobramycin-dexamethasone (TOBRADEX) ophthalmic suspension; Apply 1 drop to eye 3 (three) times a day    Allergic conjunctivitis of both eyes;  -     tobramycin-dexamethasone (TOBRADEX) ophthalmic suspension; Apply 1 drop to eye 3 (three) times a day        Subjective:      Patient ID: Blossom cShaeffer is a 72 y o  male  72 Y O Man is here for Increasing itchy rash on upper Exts, face,neck, for last few days, after working in backyard  Med list reviewed  w pt in detail    The following portions of the patient's history were reviewed and updated as appropriate: allergies, current medications, past family history, past social history, past surgical history and problem list     Review of Systems   Constitutional: Negative for chills, fatigue and fever  HENT: Negative for congestion, facial swelling, sore throat, trouble swallowing and voice change  Eyes: Negative for pain, discharge and visual disturbance  Respiratory: Negative for cough, shortness of breath and wheezing  Cardiovascular: Negative for chest pain, palpitations and leg swelling  Gastrointestinal: Negative for abdominal pain, blood in stool, constipation, diarrhea and nausea  Endocrine: Negative for polydipsia, polyphagia and polyuria  Genitourinary: Negative for difficulty urinating, hematuria and urgency     Musculoskeletal: Negative for arthralgias and myalgias  Skin: Positive for color change and rash  Neurological: Negative for dizziness, tremors, weakness and headaches  Hematological: Negative for adenopathy  Does not bruise/bleed easily  Psychiatric/Behavioral: Negative for dysphoric mood, sleep disturbance and suicidal ideas  Objective:      /82 (BP Location: Left arm, Patient Position: Sitting, Cuff Size: Standard)   Pulse 80   Temp 98 °F (36 7 °C) (Probe)   Resp 14   Ht 5' 9" (1 753 m)   Wt 91 8 kg (202 lb 4 8 oz)   SpO2 98%   BMI 29 87 kg/m²          Physical Exam  Constitutional:       General: He is not in acute distress  HENT:      Head: Normocephalic  Mouth/Throat:      Pharynx: No oropharyngeal exudate  Eyes:      General: No scleral icterus  Conjunctiva/sclera: Conjunctivae normal       Pupils: Pupils are equal, round, and reactive to light  Neck:      Musculoskeletal: Neck supple  Thyroid: No thyromegaly  Cardiovascular:      Rate and Rhythm: Normal rate and regular rhythm  Heart sounds: Normal heart sounds  No murmur  Pulmonary:      Effort: Pulmonary effort is normal  No respiratory distress  Breath sounds: Normal breath sounds  No wheezing or rales  Abdominal:      General: Bowel sounds are normal  There is no distension  Palpations: Abdomen is soft  Tenderness: There is no abdominal tenderness  There is no guarding or rebound  Musculoskeletal:         General: No tenderness or deformity  Lymphadenopathy:      Cervical: No cervical adenopathy  Skin:     Coloration: Skin is not pale  Findings: Rash present  Neurological:      Mental Status: He is alert and oriented to person, place, and time  Sensory: No sensory deficit  Motor: No weakness

## 2020-09-23 ENCOUNTER — OFFICE VISIT (OUTPATIENT)
Dept: FAMILY MEDICINE CLINIC | Facility: CLINIC | Age: 70
End: 2020-09-23
Payer: COMMERCIAL

## 2020-09-23 VITALS
BODY MASS INDEX: 30.9 KG/M2 | RESPIRATION RATE: 16 BRPM | HEIGHT: 69 IN | TEMPERATURE: 98.7 F | HEART RATE: 78 BPM | OXYGEN SATURATION: 98 % | DIASTOLIC BLOOD PRESSURE: 80 MMHG | WEIGHT: 208.6 LBS | SYSTOLIC BLOOD PRESSURE: 138 MMHG

## 2020-09-23 DIAGNOSIS — R94.6 THYROID FUNCTION STUDY ABNORMALITY: ICD-10-CM

## 2020-09-23 DIAGNOSIS — E11.69 HYPERLIPIDEMIA ASSOCIATED WITH TYPE 2 DIABETES MELLITUS (HCC): ICD-10-CM

## 2020-09-23 DIAGNOSIS — E78.5 HYPERLIPIDEMIA ASSOCIATED WITH TYPE 2 DIABETES MELLITUS (HCC): ICD-10-CM

## 2020-09-23 DIAGNOSIS — R73.09 ELEVATED HEMOGLOBIN A1C: Primary | ICD-10-CM

## 2020-09-23 DIAGNOSIS — I10 ESSENTIAL HYPERTENSION: ICD-10-CM

## 2020-09-23 PROCEDURE — 99214 OFFICE O/P EST MOD 30 MIN: CPT | Performed by: INTERNAL MEDICINE

## 2020-09-23 PROCEDURE — 3075F SYST BP GE 130 - 139MM HG: CPT | Performed by: INTERNAL MEDICINE

## 2020-09-23 PROCEDURE — 1036F TOBACCO NON-USER: CPT | Performed by: INTERNAL MEDICINE

## 2020-09-23 PROCEDURE — 1160F RVW MEDS BY RX/DR IN RCRD: CPT | Performed by: INTERNAL MEDICINE

## 2020-09-23 PROCEDURE — 3079F DIAST BP 80-89 MM HG: CPT | Performed by: INTERNAL MEDICINE

## 2020-09-23 NOTE — PROGRESS NOTES
Assessment/Plan:         Diagnoses and all orders for this visit:    Elevated hemoglobin A1c; RTC in 3mos  w:  -     Hemoglobin A1C; Future    Thyroid function study abnormality; cause ? RTC in 3mos  w:  -     TSH, 3rd generation; Future  -     T4, free; Future  -     Thyroid Antibodies Panel; Future    Hyperlipidemia associated with type 2 diabetes mellitus ([facility] [address] ); continue Pravastatin 20 mg Daily  RTC in 3mos w :  -     Comprehensive metabolic panel; Future  -     Lipid panel; Future    Essential hypertension; continue Toprol xl and losartan   RTc in 3mos w :  -     Comprehensive metabolic panel; Future  -     CBC and differential; Future  -     UA (URINE) with reflex to Scope; Future        Subjective:      Patient ID: Serenity Rosen is a 72 y o  male  72 Y O Man is here for Regular Check up, He feels ok, Recent Blood work and med List reviewed w pt in Detail, No New symptoms,  The following portions of the patient's history were reviewed and updated as appropriate: allergies, current medications, past family history, past social history, past surgical history and problem list     Review of Systems   Constitutional: Negative for chills, fatigue and fever  HENT: Negative for congestion, facial swelling, sore throat, trouble swallowing and voice change  Eyes: Negative for pain, discharge and visual disturbance  Respiratory: Negative for cough, shortness of breath and wheezing  Cardiovascular: Negative for chest pain, palpitations and leg swelling  Gastrointestinal: Negative for abdominal pain, blood in stool, constipation, diarrhea and nausea  Endocrine: Negative for polydipsia, polyphagia and polyuria  Genitourinary: Negative for difficulty urinating, hematuria and urgency  Musculoskeletal: Negative for arthralgias and myalgias  Skin: Negative for rash  Neurological: Negative for dizziness, tremors, weakness and headaches  Hematological: Negative for adenopathy   Does not bruise/bleed easily  Psychiatric/Behavioral: Negative for dysphoric mood, sleep disturbance and suicidal ideas  Objective:      /80 (BP Location: Left arm, Patient Position: Sitting, Cuff Size: Standard)   Pulse 78   Temp 98 7 °F (37 1 °C) (Probe)   Resp 16   Ht 5' 9" (1 753 m)   Wt 94 6 kg (208 lb 9 6 oz)   SpO2 98%   BMI 30 80 kg/m²          Physical Exam  Constitutional:       General: He is not in acute distress  HENT:      Head: Normocephalic  Mouth/Throat:      Pharynx: No oropharyngeal exudate  Eyes:      General: No scleral icterus  Conjunctiva/sclera: Conjunctivae normal       Pupils: Pupils are equal, round, and reactive to light  Neck:      Musculoskeletal: Neck supple  Thyroid: No thyromegaly  Cardiovascular:      Rate and Rhythm: Normal rate and regular rhythm  Heart sounds: Normal heart sounds  No murmur  Pulmonary:      Effort: Pulmonary effort is normal  No respiratory distress  Breath sounds: Normal breath sounds  No wheezing or rales  Abdominal:      General: Bowel sounds are normal  There is no distension  Palpations: Abdomen is soft  Tenderness: There is no abdominal tenderness  There is no guarding or rebound  Musculoskeletal:         General: No tenderness  Lymphadenopathy:      Cervical: No cervical adenopathy  Skin:     Coloration: Skin is not pale  Findings: No rash  Neurological:      Mental Status: He is alert and oriented to person, place, and time  Sensory: No sensory deficit  Motor: No weakness

## 2020-09-28 DIAGNOSIS — E78.49 OTHER HYPERLIPIDEMIA: ICD-10-CM

## 2020-09-28 RX ORDER — PRAVASTATIN SODIUM 20 MG
20 TABLET ORAL DAILY
Qty: 90 TABLET | Refills: 3 | Status: SHIPPED | OUTPATIENT
Start: 2020-09-28 | End: 2020-10-24 | Stop reason: SDUPTHER

## 2020-09-30 ENCOUNTER — APPOINTMENT (OUTPATIENT)
Dept: LAB | Age: 70
End: 2020-09-30
Payer: COMMERCIAL

## 2020-09-30 DIAGNOSIS — R94.6 THYROID FUNCTION STUDY ABNORMALITY: ICD-10-CM

## 2020-09-30 DIAGNOSIS — E78.5 HYPERLIPIDEMIA ASSOCIATED WITH TYPE 2 DIABETES MELLITUS (HCC): ICD-10-CM

## 2020-09-30 DIAGNOSIS — E11.69 HYPERLIPIDEMIA ASSOCIATED WITH TYPE 2 DIABETES MELLITUS (HCC): ICD-10-CM

## 2020-09-30 DIAGNOSIS — R73.09 ELEVATED HEMOGLOBIN A1C: ICD-10-CM

## 2020-09-30 DIAGNOSIS — I10 ESSENTIAL HYPERTENSION: ICD-10-CM

## 2020-09-30 LAB
ALBUMIN SERPL BCP-MCNC: 3.7 G/DL (ref 3.5–5)
ALP SERPL-CCNC: 115 U/L (ref 46–116)
ALT SERPL W P-5'-P-CCNC: 61 U/L (ref 12–78)
ANION GAP SERPL CALCULATED.3IONS-SCNC: 4 MMOL/L (ref 4–13)
AST SERPL W P-5'-P-CCNC: 58 U/L (ref 5–45)
BACTERIA UR QL AUTO: ABNORMAL /HPF
BASOPHILS # BLD AUTO: 0.05 THOUSANDS/ΜL (ref 0–0.1)
BASOPHILS NFR BLD AUTO: 1 % (ref 0–1)
BILIRUB SERPL-MCNC: 0.69 MG/DL (ref 0.2–1)
BILIRUB UR QL STRIP: NEGATIVE
BUN SERPL-MCNC: 22 MG/DL (ref 5–25)
CALCIUM SERPL-MCNC: 9.5 MG/DL (ref 8.3–10.1)
CHLORIDE SERPL-SCNC: 112 MMOL/L (ref 100–108)
CHOLEST SERPL-MCNC: 149 MG/DL (ref 50–200)
CLARITY UR: CLEAR
CO2 SERPL-SCNC: 25 MMOL/L (ref 21–32)
COLOR UR: YELLOW
CREAT SERPL-MCNC: 1.39 MG/DL (ref 0.6–1.3)
EOSINOPHIL # BLD AUTO: 0.25 THOUSAND/ΜL (ref 0–0.61)
EOSINOPHIL NFR BLD AUTO: 4 % (ref 0–6)
ERYTHROCYTE [DISTWIDTH] IN BLOOD BY AUTOMATED COUNT: 15.1 % (ref 11.6–15.1)
EST. AVERAGE GLUCOSE BLD GHB EST-MCNC: 117 MG/DL
GFR SERPL CREATININE-BSD FRML MDRD: 51 ML/MIN/1.73SQ M
GLUCOSE P FAST SERPL-MCNC: 91 MG/DL (ref 65–99)
GLUCOSE UR STRIP-MCNC: NEGATIVE MG/DL
HBA1C MFR BLD: 5.7 %
HCT VFR BLD AUTO: 40.4 % (ref 36.5–49.3)
HDLC SERPL-MCNC: 45 MG/DL
HGB BLD-MCNC: 12.2 G/DL (ref 12–17)
HGB UR QL STRIP.AUTO: NEGATIVE
HYALINE CASTS #/AREA URNS LPF: ABNORMAL /LPF
IMM GRANULOCYTES # BLD AUTO: 0.04 THOUSAND/UL (ref 0–0.2)
IMM GRANULOCYTES NFR BLD AUTO: 1 % (ref 0–2)
KETONES UR STRIP-MCNC: NEGATIVE MG/DL
LDLC SERPL CALC-MCNC: 76 MG/DL (ref 0–100)
LEUKOCYTE ESTERASE UR QL STRIP: ABNORMAL
LYMPHOCYTES # BLD AUTO: 1.75 THOUSANDS/ΜL (ref 0.6–4.47)
LYMPHOCYTES NFR BLD AUTO: 27 % (ref 14–44)
MCH RBC QN AUTO: 29.3 PG (ref 26.8–34.3)
MCHC RBC AUTO-ENTMCNC: 30.2 G/DL (ref 31.4–37.4)
MCV RBC AUTO: 97 FL (ref 82–98)
MONOCYTES # BLD AUTO: 0.56 THOUSAND/ΜL (ref 0.17–1.22)
MONOCYTES NFR BLD AUTO: 9 % (ref 4–12)
NEUTROPHILS # BLD AUTO: 3.84 THOUSANDS/ΜL (ref 1.85–7.62)
NEUTS SEG NFR BLD AUTO: 58 % (ref 43–75)
NITRITE UR QL STRIP: POSITIVE
NON-SQ EPI CELLS URNS QL MICRO: ABNORMAL /HPF
NONHDLC SERPL-MCNC: 104 MG/DL
NRBC BLD AUTO-RTO: 0 /100 WBCS
PH UR STRIP.AUTO: 6 [PH]
PLATELET # BLD AUTO: 174 THOUSANDS/UL (ref 149–390)
PMV BLD AUTO: 12 FL (ref 8.9–12.7)
POTASSIUM SERPL-SCNC: 4 MMOL/L (ref 3.5–5.3)
PROT SERPL-MCNC: 7.2 G/DL (ref 6.4–8.2)
PROT UR STRIP-MCNC: NEGATIVE MG/DL
RBC # BLD AUTO: 4.17 MILLION/UL (ref 3.88–5.62)
RBC #/AREA URNS AUTO: ABNORMAL /HPF
SODIUM SERPL-SCNC: 141 MMOL/L (ref 136–145)
SP GR UR STRIP.AUTO: 1.02 (ref 1–1.03)
T4 FREE SERPL-MCNC: 1.4 NG/DL (ref 0.76–1.46)
TRIGL SERPL-MCNC: 141 MG/DL
TSH SERPL DL<=0.05 MIU/L-ACNC: 2.19 UIU/ML (ref 0.36–3.74)
UROBILINOGEN UR QL STRIP.AUTO: 0.2 E.U./DL
WBC # BLD AUTO: 6.49 THOUSAND/UL (ref 4.31–10.16)
WBC #/AREA URNS AUTO: ABNORMAL /HPF

## 2020-09-30 PROCEDURE — 36415 COLL VENOUS BLD VENIPUNCTURE: CPT

## 2020-09-30 PROCEDURE — 80061 LIPID PANEL: CPT

## 2020-09-30 PROCEDURE — 86800 THYROGLOBULIN ANTIBODY: CPT

## 2020-09-30 PROCEDURE — 85025 COMPLETE CBC W/AUTO DIFF WBC: CPT

## 2020-09-30 PROCEDURE — 84439 ASSAY OF FREE THYROXINE: CPT

## 2020-09-30 PROCEDURE — 86376 MICROSOMAL ANTIBODY EACH: CPT

## 2020-09-30 PROCEDURE — 83036 HEMOGLOBIN GLYCOSYLATED A1C: CPT

## 2020-09-30 PROCEDURE — 80053 COMPREHEN METABOLIC PANEL: CPT

## 2020-09-30 PROCEDURE — 81001 URINALYSIS AUTO W/SCOPE: CPT

## 2020-09-30 PROCEDURE — 3044F HG A1C LEVEL LT 7.0%: CPT | Performed by: INTERNAL MEDICINE

## 2020-09-30 PROCEDURE — 84443 ASSAY THYROID STIM HORMONE: CPT

## 2020-10-01 DIAGNOSIS — R31.1 BENIGN ESSENTIAL MICROSCOPIC HEMATURIA: Primary | ICD-10-CM

## 2020-10-01 LAB
THYROGLOB AB SERPL-ACNC: <1 IU/ML (ref 0–0.9)
THYROPEROXIDASE AB SERPL-ACNC: 47 IU/ML (ref 0–34)

## 2020-10-05 ENCOUNTER — APPOINTMENT (OUTPATIENT)
Dept: LAB | Age: 70
End: 2020-10-05
Payer: COMMERCIAL

## 2020-10-05 LAB
BACTERIA UR QL AUTO: ABNORMAL /HPF
BILIRUB UR QL STRIP: NEGATIVE
CLARITY UR: CLEAR
COLOR UR: YELLOW
GLUCOSE UR STRIP-MCNC: NEGATIVE MG/DL
HGB UR QL STRIP.AUTO: NEGATIVE
HYALINE CASTS #/AREA URNS LPF: ABNORMAL /LPF
KETONES UR STRIP-MCNC: NEGATIVE MG/DL
LEUKOCYTE ESTERASE UR QL STRIP: ABNORMAL
NITRITE UR QL STRIP: POSITIVE
NON-SQ EPI CELLS URNS QL MICRO: ABNORMAL /HPF
PH UR STRIP.AUTO: 6 [PH]
PROT UR STRIP-MCNC: NEGATIVE MG/DL
RBC #/AREA URNS AUTO: ABNORMAL /HPF
SP GR UR STRIP.AUTO: 1.01 (ref 1–1.03)
UROBILINOGEN UR QL STRIP.AUTO: 0.2 E.U./DL
WBC #/AREA URNS AUTO: ABNORMAL /HPF

## 2020-10-05 PROCEDURE — 81001 URINALYSIS AUTO W/SCOPE: CPT

## 2020-10-07 DIAGNOSIS — N39.0 ACUTE UTI: Primary | ICD-10-CM

## 2020-10-07 RX ORDER — CIPROFLOXACIN 250 MG/1
250 TABLET, FILM COATED ORAL EVERY 12 HOURS SCHEDULED
Qty: 10 TABLET | Refills: 0 | Status: SHIPPED | OUTPATIENT
Start: 2020-10-07 | End: 2020-10-12

## 2020-10-23 DIAGNOSIS — K21.9 GASTROESOPHAGEAL REFLUX DISEASE WITHOUT ESOPHAGITIS: ICD-10-CM

## 2020-10-23 RX ORDER — PANTOPRAZOLE SODIUM 20 MG/1
20 TABLET, DELAYED RELEASE ORAL DAILY
Qty: 90 TABLET | Refills: 1 | Status: SHIPPED | OUTPATIENT
Start: 2020-10-23 | End: 2021-03-29 | Stop reason: SDUPTHER

## 2020-10-24 DIAGNOSIS — E78.49 OTHER HYPERLIPIDEMIA: ICD-10-CM

## 2020-10-24 RX ORDER — ATORVASTATIN CALCIUM 20 MG/1
TABLET, FILM COATED ORAL
Qty: 90 TABLET | Refills: 1 | OUTPATIENT
Start: 2020-10-24

## 2020-10-24 RX ORDER — PRAVASTATIN SODIUM 20 MG
20 TABLET ORAL DAILY
Qty: 90 TABLET | Refills: 3 | Status: SHIPPED | OUTPATIENT
Start: 2020-10-24 | End: 2020-11-02 | Stop reason: SDUPTHER

## 2020-11-02 DIAGNOSIS — E78.49 OTHER HYPERLIPIDEMIA: ICD-10-CM

## 2020-11-02 RX ORDER — PRAVASTATIN SODIUM 20 MG
20 TABLET ORAL DAILY
Qty: 90 TABLET | Refills: 3 | Status: SHIPPED | OUTPATIENT
Start: 2020-11-02 | End: 2020-11-03

## 2020-11-03 DIAGNOSIS — E78.49 OTHER HYPERLIPIDEMIA: ICD-10-CM

## 2020-11-03 RX ORDER — ATORVASTATIN CALCIUM 20 MG/1
TABLET, FILM COATED ORAL
Qty: 90 TABLET | Refills: 1 | Status: SHIPPED | OUTPATIENT
Start: 2020-11-03 | End: 2020-11-03 | Stop reason: SDUPTHER

## 2020-11-03 RX ORDER — ATORVASTATIN CALCIUM 20 MG/1
20 TABLET, FILM COATED ORAL DAILY
Qty: 90 TABLET | Refills: 3 | Status: SHIPPED | OUTPATIENT
Start: 2020-11-03 | End: 2021-03-29 | Stop reason: SDUPTHER

## 2021-03-03 ENCOUNTER — LAB (OUTPATIENT)
Dept: LAB | Age: 71
End: 2021-03-03
Payer: COMMERCIAL

## 2021-03-03 ENCOUNTER — TRANSCRIBE ORDERS (OUTPATIENT)
Dept: URGENT CARE | Age: 71
End: 2021-03-03

## 2021-03-03 DIAGNOSIS — I10 ESSENTIAL HYPERTENSION, MALIGNANT: ICD-10-CM

## 2021-03-03 DIAGNOSIS — R06.00 DYSPNEA, UNSPECIFIED TYPE: ICD-10-CM

## 2021-03-03 DIAGNOSIS — Z95.2 HEART VALVE REPLACED BY TRANSPLANT: ICD-10-CM

## 2021-03-03 DIAGNOSIS — Z95.2 HEART VALVE REPLACED BY TRANSPLANT: Primary | ICD-10-CM

## 2021-03-03 LAB
ANION GAP SERPL CALCULATED.3IONS-SCNC: 5 MMOL/L (ref 4–13)
BASOPHILS # BLD AUTO: 0.03 THOUSANDS/ΜL (ref 0–0.1)
BASOPHILS NFR BLD AUTO: 0 % (ref 0–1)
BUN SERPL-MCNC: 21 MG/DL (ref 5–25)
CALCIUM SERPL-MCNC: 8.9 MG/DL (ref 8.3–10.1)
CHLORIDE SERPL-SCNC: 111 MMOL/L (ref 100–108)
CHOLEST SERPL-MCNC: 132 MG/DL (ref 50–200)
CO2 SERPL-SCNC: 25 MMOL/L (ref 21–32)
CREAT SERPL-MCNC: 1.36 MG/DL (ref 0.6–1.3)
EOSINOPHIL # BLD AUTO: 0.15 THOUSAND/ΜL (ref 0–0.61)
EOSINOPHIL NFR BLD AUTO: 2 % (ref 0–6)
ERYTHROCYTE [DISTWIDTH] IN BLOOD BY AUTOMATED COUNT: 14.4 % (ref 11.6–15.1)
GFR SERPL CREATININE-BSD FRML MDRD: 53 ML/MIN/1.73SQ M
GLUCOSE P FAST SERPL-MCNC: 92 MG/DL (ref 65–99)
HCT VFR BLD AUTO: 40.4 % (ref 36.5–49.3)
HDLC SERPL-MCNC: 41 MG/DL
HGB BLD-MCNC: 12 G/DL (ref 12–17)
IMM GRANULOCYTES # BLD AUTO: 0.03 THOUSAND/UL (ref 0–0.2)
IMM GRANULOCYTES NFR BLD AUTO: 0 % (ref 0–2)
LDLC SERPL CALC-MCNC: 62 MG/DL (ref 0–100)
LYMPHOCYTES # BLD AUTO: 1.82 THOUSANDS/ΜL (ref 0.6–4.47)
LYMPHOCYTES NFR BLD AUTO: 24 % (ref 14–44)
MCH RBC QN AUTO: 28.4 PG (ref 26.8–34.3)
MCHC RBC AUTO-ENTMCNC: 29.7 G/DL (ref 31.4–37.4)
MCV RBC AUTO: 96 FL (ref 82–98)
MONOCYTES # BLD AUTO: 0.55 THOUSAND/ΜL (ref 0.17–1.22)
MONOCYTES NFR BLD AUTO: 7 % (ref 4–12)
NEUTROPHILS # BLD AUTO: 4.92 THOUSANDS/ΜL (ref 1.85–7.62)
NEUTS SEG NFR BLD AUTO: 67 % (ref 43–75)
NONHDLC SERPL-MCNC: 91 MG/DL
NRBC BLD AUTO-RTO: 0 /100 WBCS
PLATELET # BLD AUTO: 179 THOUSANDS/UL (ref 149–390)
PMV BLD AUTO: 11.8 FL (ref 8.9–12.7)
POTASSIUM SERPL-SCNC: 4 MMOL/L (ref 3.5–5.3)
RBC # BLD AUTO: 4.22 MILLION/UL (ref 3.88–5.62)
SODIUM SERPL-SCNC: 141 MMOL/L (ref 136–145)
TRIGL SERPL-MCNC: 146 MG/DL
WBC # BLD AUTO: 7.5 THOUSAND/UL (ref 4.31–10.16)

## 2021-03-03 PROCEDURE — 36415 COLL VENOUS BLD VENIPUNCTURE: CPT

## 2021-03-03 PROCEDURE — 85025 COMPLETE CBC W/AUTO DIFF WBC: CPT

## 2021-03-03 PROCEDURE — 80048 BASIC METABOLIC PNL TOTAL CA: CPT

## 2021-03-03 PROCEDURE — 80061 LIPID PANEL: CPT

## 2021-03-29 ENCOUNTER — CLINICAL SUPPORT (OUTPATIENT)
Dept: FAMILY MEDICINE CLINIC | Facility: CLINIC | Age: 71
End: 2021-03-29
Payer: COMMERCIAL

## 2021-03-29 DIAGNOSIS — M1A.9XX0 CHRONIC GOUT WITHOUT TOPHUS, UNSPECIFIED CAUSE, UNSPECIFIED SITE: ICD-10-CM

## 2021-03-29 DIAGNOSIS — E78.49 OTHER HYPERLIPIDEMIA: ICD-10-CM

## 2021-03-29 DIAGNOSIS — E55.9 AVITAMINOSIS D: ICD-10-CM

## 2021-03-29 DIAGNOSIS — E16.2 HYPOGLYCEMIA: ICD-10-CM

## 2021-03-29 DIAGNOSIS — K21.9 GASTROESOPHAGEAL REFLUX DISEASE WITHOUT ESOPHAGITIS: ICD-10-CM

## 2021-03-29 DIAGNOSIS — I48.20 CHRONIC ATRIAL FIBRILLATION (HCC): ICD-10-CM

## 2021-03-29 DIAGNOSIS — R10.84 GENERALIZED ABDOMINAL PAIN: ICD-10-CM

## 2021-03-29 DIAGNOSIS — K59.04 CHRONIC IDIOPATHIC CONSTIPATION: ICD-10-CM

## 2021-03-29 DIAGNOSIS — I10 ESSENTIAL HYPERTENSION, BENIGN: ICD-10-CM

## 2021-03-29 DIAGNOSIS — R73.09 ELEVATED HEMOGLOBIN A1C: Primary | ICD-10-CM

## 2021-03-29 LAB
SL AMB POCT GLUCOSE BLD: 134
SL AMB POCT HEMOGLOBIN AIC: 6.3 (ref ?–6.5)

## 2021-03-29 PROCEDURE — 82948 REAGENT STRIP/BLOOD GLUCOSE: CPT

## 2021-03-29 PROCEDURE — 83036 HEMOGLOBIN GLYCOSYLATED A1C: CPT

## 2021-03-29 RX ORDER — ERGOCALCIFEROL 1.25 MG/1
50000 CAPSULE ORAL WEEKLY
Qty: 13 CAPSULE | Refills: 1 | Status: SHIPPED | OUTPATIENT
Start: 2021-03-29 | End: 2022-03-21 | Stop reason: SDUPTHER

## 2021-03-29 RX ORDER — ATORVASTATIN CALCIUM 20 MG/1
20 TABLET, FILM COATED ORAL DAILY
Qty: 90 TABLET | Refills: 3 | Status: SHIPPED | OUTPATIENT
Start: 2021-03-29 | End: 2021-05-04 | Stop reason: SDUPTHER

## 2021-03-29 RX ORDER — ALLOPURINOL 100 MG/1
100 TABLET ORAL DAILY
Qty: 90 TABLET | Refills: 3 | Status: SHIPPED | OUTPATIENT
Start: 2021-03-29 | End: 2021-05-04 | Stop reason: SDUPTHER

## 2021-03-29 RX ORDER — PANTOPRAZOLE SODIUM 20 MG/1
20 TABLET, DELAYED RELEASE ORAL DAILY
Qty: 90 TABLET | Refills: 1 | Status: SHIPPED | OUTPATIENT
Start: 2021-03-29 | End: 2021-05-04 | Stop reason: SDUPTHER

## 2021-03-29 RX ORDER — AMIODARONE HYDROCHLORIDE 200 MG/1
200 TABLET ORAL DAILY
Qty: 90 TABLET | Refills: 3 | Status: SHIPPED | OUTPATIENT
Start: 2021-03-29 | End: 2021-05-04 | Stop reason: SDUPTHER

## 2021-03-29 RX ORDER — WARFARIN SODIUM 5 MG/1
5 TABLET ORAL DAILY
Qty: 90 TABLET | Refills: 3 | Status: SHIPPED | OUTPATIENT
Start: 2021-03-29 | End: 2021-05-04 | Stop reason: SDUPTHER

## 2021-03-29 RX ORDER — FUROSEMIDE 20 MG/1
20 TABLET ORAL DAILY
Qty: 90 TABLET | Refills: 3 | Status: SHIPPED | OUTPATIENT
Start: 2021-03-29 | End: 2021-05-04 | Stop reason: SDUPTHER

## 2021-03-29 RX ORDER — LOSARTAN POTASSIUM 50 MG/1
50 TABLET ORAL DAILY
Qty: 90 TABLET | Refills: 3 | Status: SHIPPED | OUTPATIENT
Start: 2021-03-29 | End: 2021-05-04 | Stop reason: SDUPTHER

## 2021-04-28 ENCOUNTER — RA CDI HCC (OUTPATIENT)
Dept: OTHER | Facility: HOSPITAL | Age: 71
End: 2021-04-28

## 2021-04-28 NOTE — PROGRESS NOTES
[facility]  coding [facility]             Chart reviewed, (number of) suggestions sent to provider: 2           Patients insurance company: [employer] (Medicare Advantage and [employer])     Visit status: Patient arrived for their scheduled appointment     Provider never responded to [facility]  coding request     [facility]  coding opportunities        DX used     Chart reviewed, (number of) suggestions sent to provider: 2           Patients insurance company: [address]  (Medicare Advantage and [employer])   [id number] not supported        [facility] [address]  coding opportunities             Chart reviewed, (number of) suggestions sent to provider: 2   DX: I48 [id number] Unspecified atrial fibrillation  DX: I42 [phone] Cardiomyopathy, unspecified       Patients insurance company: [address]  (Medicare Advantage and [employer])

## 2021-05-01 ENCOUNTER — LAB (OUTPATIENT)
Dept: LAB | Age: 71
End: 2021-05-01
Payer: COMMERCIAL

## 2021-05-01 DIAGNOSIS — E16.2 HYPOGLYCEMIA: ICD-10-CM

## 2021-05-01 DIAGNOSIS — R79.89 ELEVATED SERUM CREATININE: Primary | ICD-10-CM

## 2021-05-01 LAB
ANION GAP SERPL CALCULATED.3IONS-SCNC: 5 MMOL/L (ref 4–13)
BUN SERPL-MCNC: 23 MG/DL (ref 5–25)
CALCIUM SERPL-MCNC: 8.9 MG/DL (ref 8.3–10.1)
CHLORIDE SERPL-SCNC: 112 MMOL/L (ref 100–108)
CO2 SERPL-SCNC: 27 MMOL/L (ref 21–32)
CREAT SERPL-MCNC: 1.57 MG/DL (ref 0.6–1.3)
GFR SERPL CREATININE-BSD FRML MDRD: 44 ML/MIN/1.73SQ M
GLUCOSE P FAST SERPL-MCNC: 105 MG/DL (ref 65–99)
INSULIN SERPL-ACNC: 10.2 MU/L (ref 3–25)
POTASSIUM SERPL-SCNC: 4.7 MMOL/L (ref 3.5–5.3)
SODIUM SERPL-SCNC: 144 MMOL/L (ref 136–145)

## 2021-05-01 PROCEDURE — 86337 INSULIN ANTIBODIES: CPT

## 2021-05-01 PROCEDURE — 84681 ASSAY OF C-PEPTIDE: CPT

## 2021-05-01 PROCEDURE — 84206 ASSAY OF PROINSULIN: CPT

## 2021-05-01 PROCEDURE — 80048 BASIC METABOLIC PNL TOTAL CA: CPT

## 2021-05-01 PROCEDURE — 83525 ASSAY OF INSULIN: CPT

## 2021-05-01 PROCEDURE — 36415 COLL VENOUS BLD VENIPUNCTURE: CPT

## 2021-05-03 LAB — C PEPTIDE SERPL-MCNC: 3.9 NG/ML (ref 1.1–4.4)

## 2021-05-04 ENCOUNTER — OFFICE VISIT (OUTPATIENT)
Dept: FAMILY MEDICINE CLINIC | Facility: CLINIC | Age: 71
End: 2021-05-04
Payer: COMMERCIAL

## 2021-05-04 VITALS
DIASTOLIC BLOOD PRESSURE: 78 MMHG | SYSTOLIC BLOOD PRESSURE: 132 MMHG | BODY MASS INDEX: 30.81 KG/M2 | HEART RATE: 94 BPM | HEIGHT: 69 IN | RESPIRATION RATE: 16 BRPM | WEIGHT: 208 LBS | TEMPERATURE: 94.2 F | OXYGEN SATURATION: 98 %

## 2021-05-04 DIAGNOSIS — I42.9 FAMILIAL CARDIOMYOPATHY (HCC): ICD-10-CM

## 2021-05-04 DIAGNOSIS — K58.1 IRRITABLE BOWEL SYNDROME WITH CONSTIPATION: ICD-10-CM

## 2021-05-04 DIAGNOSIS — E78.49 OTHER HYPERLIPIDEMIA: ICD-10-CM

## 2021-05-04 DIAGNOSIS — I48.20 CHRONIC ATRIAL FIBRILLATION (HCC): ICD-10-CM

## 2021-05-04 DIAGNOSIS — E78.5 HYPERLIPIDEMIA ASSOCIATED WITH TYPE 2 DIABETES MELLITUS (HCC): ICD-10-CM

## 2021-05-04 DIAGNOSIS — K21.9 GASTROESOPHAGEAL REFLUX DISEASE WITHOUT ESOPHAGITIS: ICD-10-CM

## 2021-05-04 DIAGNOSIS — M1A.9XX0 CHRONIC GOUT WITHOUT TOPHUS, UNSPECIFIED CAUSE, UNSPECIFIED SITE: ICD-10-CM

## 2021-05-04 DIAGNOSIS — I10 ESSENTIAL HYPERTENSION: Primary | ICD-10-CM

## 2021-05-04 DIAGNOSIS — I10 ESSENTIAL HYPERTENSION, BENIGN: ICD-10-CM

## 2021-05-04 DIAGNOSIS — I70.213 ATHEROSCLEROSIS OF NATIVE ARTERY OF BOTH LOWER EXTREMITIES WITH INTERMITTENT CLAUDICATION (HCC): ICD-10-CM

## 2021-05-04 DIAGNOSIS — E11.69 HYPERLIPIDEMIA ASSOCIATED WITH TYPE 2 DIABETES MELLITUS (HCC): ICD-10-CM

## 2021-05-04 PROCEDURE — 3078F DIAST BP <80 MM HG: CPT | Performed by: INTERNAL MEDICINE

## 2021-05-04 PROCEDURE — 1160F RVW MEDS BY RX/DR IN RCRD: CPT | Performed by: INTERNAL MEDICINE

## 2021-05-04 PROCEDURE — 3008F BODY MASS INDEX DOCD: CPT | Performed by: INTERNAL MEDICINE

## 2021-05-04 PROCEDURE — 1101F PT FALLS ASSESS-DOCD LE1/YR: CPT | Performed by: INTERNAL MEDICINE

## 2021-05-04 PROCEDURE — 93000 ELECTROCARDIOGRAM COMPLETE: CPT | Performed by: INTERNAL MEDICINE

## 2021-05-04 PROCEDURE — 99214 OFFICE O/P EST MOD 30 MIN: CPT | Performed by: INTERNAL MEDICINE

## 2021-05-04 PROCEDURE — 3288F FALL RISK ASSESSMENT DOCD: CPT | Performed by: INTERNAL MEDICINE

## 2021-05-04 PROCEDURE — 1036F TOBACCO NON-USER: CPT | Performed by: INTERNAL MEDICINE

## 2021-05-04 PROCEDURE — 3075F SYST BP GE 130 - 139MM HG: CPT | Performed by: INTERNAL MEDICINE

## 2021-05-04 PROCEDURE — 4010F ACE/ARB THERAPY RXD/TAKEN: CPT | Performed by: INTERNAL MEDICINE

## 2021-05-04 RX ORDER — ALLOPURINOL 100 MG/1
100 TABLET ORAL DAILY
Qty: 90 TABLET | Refills: 3 | Status: SHIPPED | OUTPATIENT
Start: 2021-05-04 | End: 2022-03-17 | Stop reason: SDUPTHER

## 2021-05-04 RX ORDER — PANTOPRAZOLE SODIUM 20 MG/1
20 TABLET, DELAYED RELEASE ORAL DAILY
Qty: 90 TABLET | Refills: 1 | Status: SHIPPED | OUTPATIENT
Start: 2021-05-04 | End: 2021-09-15 | Stop reason: SDUPTHER

## 2021-05-04 RX ORDER — LOSARTAN POTASSIUM 50 MG/1
50 TABLET ORAL DAILY
Qty: 90 TABLET | Refills: 3 | Status: SHIPPED | OUTPATIENT
Start: 2021-05-04 | End: 2022-03-17 | Stop reason: SDUPTHER

## 2021-05-04 RX ORDER — ATORVASTATIN CALCIUM 20 MG/1
20 TABLET, FILM COATED ORAL DAILY
Qty: 90 TABLET | Refills: 3 | Status: SHIPPED | OUTPATIENT
Start: 2021-05-04 | End: 2021-09-15 | Stop reason: SDUPTHER

## 2021-05-04 RX ORDER — FUROSEMIDE 20 MG/1
20 TABLET ORAL DAILY
Qty: 90 TABLET | Refills: 3 | Status: SHIPPED | OUTPATIENT
Start: 2021-05-04 | End: 2022-03-17 | Stop reason: SDUPTHER

## 2021-05-04 RX ORDER — AMIODARONE HYDROCHLORIDE 200 MG/1
200 TABLET ORAL DAILY
Qty: 90 TABLET | Refills: 3 | Status: SHIPPED | OUTPATIENT
Start: 2021-05-04 | End: 2022-03-17 | Stop reason: SDUPTHER

## 2021-05-04 RX ORDER — WARFARIN SODIUM 5 MG/1
5 TABLET ORAL DAILY
Qty: 90 TABLET | Refills: 3 | Status: SHIPPED | OUTPATIENT
Start: 2021-05-04

## 2021-05-04 RX ORDER — METOPROLOL SUCCINATE 25 MG/1
25 TABLET, EXTENDED RELEASE ORAL DAILY
Qty: 90 TABLET | Refills: 3 | Status: SHIPPED | OUTPATIENT
Start: 2021-05-04 | End: 2022-03-17 | Stop reason: SDUPTHER

## 2021-05-04 NOTE — PROGRESS NOTES
Assessment/Plan:         Diagnoses and all orders for this visit:    Essential hypertension; Reduce :  -     metoprolol succinate (Toprol XL)  To 25 mg 24 hr tablet; Take 1 tablet (25 mg total) by mouth daily  Continue Losartan 50 mg daily  RTC in 3mos w :  -     Comprehensive metabolic panel; Future  -     CBC and differential; Future  -     Ferritin; Future  -     Hemoglobin A1C; Future  -     Magnesium; Future  -     TSH, 3rd generation; Future  -     UA (URINE) with reflex to Scope; Future  -     POCT ECG    Chronic atrial fibrillation ([facility] ); stable  Continue same  FU w cardiology  Renew :  -     warfarin (COUMADIN) 5 mg tablet; Take 1 tablet (5 mg total) by mouth daily  -     Amiodarone level; Future    Gastroesophageal reflux disease without esophagitis  -     pantoprazole (PROTONIX) 20 mg tablet; Take 1 tablet (20 mg total) by mouth daily  -     TSH, 3rd generation; Future    Renew :  -     losartan (COZAAR) 50 mg tablet; Take 1 tablet (50 mg total) by mouth daily  -     furosemide (Lasix) 20 mg tablet; Take 1 tablet (20 mg total) by mouth daily  -     amiodarone 200 mg tablet; Take 1 tablet (200 mg total) by mouth daily    Renew :  -     atorvastatin (LIPITOR) 20 mg tablet; Take 1 tablet (20 mg total) by mouth daily Please STOP Pravastatin       Chronic gout without tophus, unspecified cause, unspecified site  -     allopurinol (ZYLOPRIM) 100 mg tablet; Take 1 tablet (100 mg total) by mouth daily      Hyperlipidemia associated with type 2 diabetes mellitus ([facility] ); continue atorvastatin  RTC in 3mos w :  -     Lipid panel; Future  -     Hemoglobin A1C; Future  -     TSH, 3rd generation; Future    Atherosclerosis of native artery of both lower extremities with intermittent claudication (HCC)  -     Amiodarone level; Future    Irritable bowel syndrome with constipation; Increase :  -     linaCLOtide  MCG CAPS; Take 1 capsule by mouth daily        Subjective:      Patient ID: Lizeth Umana is a 72 y o  male     72 Y O Man is here for Regular check up, He feels [id number] [address], recent Blood work and med list reviewed,    The following portions of the patient's history were reviewed and updated as appropriate: allergies, current medications, past family history, past medical history, past social history, past surgical history and problem list     Review of Systems   Constitutional: Negative for chills, fatigue and fever  HENT: Negative for congestion, facial swelling, sore throat, trouble swallowing and voice change  Eyes: Negative for pain, discharge and visual disturbance  Respiratory: Negative for cough, shortness of breath and wheezing  Cardiovascular: Negative for chest pain, palpitations and leg swelling  Gastrointestinal: Positive for constipation  Negative for abdominal pain, blood in stool, diarrhea and nausea  Endocrine: Negative for polydipsia, polyphagia and polyuria  Genitourinary: Negative for difficulty urinating, hematuria and urgency  Musculoskeletal: Negative for arthralgias and myalgias  Skin: Negative for rash  Neurological: Negative for dizziness, tremors, weakness and headaches  Hematological: Negative for adenopathy  Does not bruise/bleed easily  Psychiatric/Behavioral: Negative for dysphoric mood, sleep disturbance and suicidal ideas  Objective:      /78 (BP Location: Left arm, Patient Position: Sitting, Cuff Size: Standard)   Pulse 94   Temp (!) 94 2 °F (34 6 °C) (Tympanic)   Resp 16   Ht 5' 9" (1 753 m)   Wt 94 3 kg (208 lb)   SpO2 98%   BMI 30 72 kg/m²          Physical Exam  Constitutional:       General: He is not in acute distress  HENT:      Head: Normocephalic  Mouth/Throat:      Pharynx: No oropharyngeal exudate  Eyes:      General: No scleral icterus  Conjunctiva/sclera: Conjunctivae normal       Pupils: Pupils are equal, round, and reactive to light  Neck:      Musculoskeletal: Neck supple  Thyroid: No thyromegaly  Cardiovascular:      Rate and Rhythm: Normal rate and regular rhythm  Heart sounds: Murmur present  Pulmonary:      Effort: Pulmonary effort is normal  No respiratory distress  Breath sounds: Normal breath sounds  No wheezing or rales  Abdominal:      General: Bowel sounds are normal  There is no distension  Palpations: Abdomen is soft  Tenderness: There is no abdominal tenderness  There is no guarding or rebound  Musculoskeletal:         General: No tenderness  Lymphadenopathy:      Cervical: No cervical adenopathy  Skin:     Coloration: Skin is not pale  Findings: No rash  Neurological:      Mental Status: He is alert and oriented to person, place, and time  Sensory: No sensory deficit  Motor: No weakness  BMI Counseling: Body mass index is 30 72 kg/m²  The BMI is above normal  Nutrition recommendations include reducing portion sizes and decreasing overall calorie intake

## 2021-05-04 NOTE — PATIENT INSTRUCTIONS
Low Fat Diet   AMBULATORY CARE:   A low-fat diet  is an eating plan that is low in total fat, unhealthy fat, and cholesterol  You may need to follow a low-fat diet if you have trouble digesting or absorbing fat  You may also need to follow this diet if you have high cholesterol  You can also lower your cholesterol by increasing the amount of fiber in your diet  Soluble fiber is a type of fiber that helps to decrease cholesterol levels  Different types of fat in food:   · Limit unhealthy fats  A diet that is high in cholesterol, saturated fat, and trans fat may cause unhealthy cholesterol levels  Unhealthy cholesterol levels increase your risk of heart disease  ? Cholesterol:  Limit intake of cholesterol to less than 200 mg per day  Cholesterol is found in meat, eggs, and dairy  ? Saturated fat:  Limit saturated fat to less than 7% of your total daily calories  Ask your dietitian how many calories you need each day  Saturated fat is found in butter, cheese, ice cream, whole milk, and palm oil  Saturated fat is also found in meat, such as beef, pork, chicken skin, and processed meats  Processed meats include sausage, hot dogs, and bologna  ? Trans fat:  Avoid trans fat as much as possible  Trans fat is used in fried and baked foods  Foods that say trans fat free on the label may still have up to 0 5 grams of trans fat per serving  · Include healthy fats  Replace foods that are high in saturated and trans fat with foods high in healthy fats  This may help to decrease high cholesterol levels  ? Monounsaturated fats: These are found in avocados, nuts, and vegetable oils, such as olive, canola, and sunflower oil  ? Polyunsaturated fats: These can be found in vegetable oils, such as soybean or corn oil  Omega-3 fats can help to decrease the risk of heart disease  Omega-3 fats are found in fish, such as salmon, herring, trout, and tuna   Omega-3 fats can also be found in plant foods, such as walnuts, flaxseed, soybeans, and canola oil  Foods to limit or avoid:   · Grains:      ? Snacks that are made with partially hydrogenated oils, such as chips, regular crackers, and butter-flavored popcorn    ? High-fat baked goods, such as biscuits, croissants, doughnuts, pies, cookies, and pastries    · Dairy:      ? Whole milk, 2% milk, and yogurt and ice cream made with whole milk    ? Half and half creamer, heavy cream, and whipping cream    ? Cheese, cream cheese, and sour cream    · Meats and proteins:      ? High-fat cuts of meat (T-bone steak, regular hamburger, and ribs)    ? Fried meat, poultry (turkey and chicken), and fish    ? Poultry (chicken and turkey) with skin    ? Cold cuts (salami or bologna), hot dogs, blount, and sausage    ? Whole eggs and egg yolks    · Vegetables and fruits with added fat:      ? Fried vegetables or vegetables in butter or high-fat sauces, such as cream or cheese sauces    ? Fried fruit or fruit served with butter or cream    · Fats:      ? Butter, stick margarine, and shortening    ? Coconut, palm oil, and palm kernel oil    Foods to include:   · Grains:      ? Whole-grain breads, cereals, pasta, and brown rice    ? Low-fat crackers and pretzels    · Vegetables and fruits:      ? Fresh, frozen, or canned vegetables (no salt or low-sodium)    ? Fresh, frozen, dried, or canned fruit (canned in light syrup or fruit juice)    ? Avocado    · Low-fat dairy products:      ? Nonfat (skim) or 1% milk    ? Nonfat or low-fat cheese, yogurt, and cottage cheese    · Meats and proteins:      ? Chicken or turkey with no skin    ? Baked or broiled fish    ? Lean beef and pork (loin, round, extra lean hamburger)    ? Beans and peas, unsalted nuts, soy products    ? Egg whites and substitutes    ? Seeds and nuts    · Fats:      ? Unsaturated oil, such as canola, olive, peanut, soybean, or sunflower oil    ? Soft or liquid margarine and vegetable oil spread    ?  Low-fat salad dressing    Other ways to decrease fat:   · Read food labels before you buy foods  Choose foods that have less than 30% of calories from fat  Choose low-fat or fat-free dairy products  Remember that fat free does not mean calorie free  These foods still contain calories, and too many calories can lead to weight gain  · Trim fat from meat and avoid fried food  Trim all visible fat from meat before you cook it  Remove the skin from poultry  Do not colon meat, fish, or poultry  Bake, roast, boil, or broil these foods instead  Avoid fried foods  Eat a baked potato instead of [place] fries  Steam vegetables instead of sautéing them in butter  · Add less fat to foods  Use imitation blount bits on salads and baked potatoes instead of regular blount bits  Use fat-free or low-fat salad dressings instead of regular dressings  Use low-fat or nonfat butter-flavored topping instead of regular butter or margarine on popcorn and other foods  Ways to decrease fat in recipes:  Replace high-fat ingredients with low-fat or nonfat ones  This may cause baked goods to be drier than usual  You may need to use nonfat cooking spray on pans to prevent food from sticking  You also may need to change the amount of other ingredients, such as water, in the recipe  Try the following:  · Use low-fat or light margarine instead of regular margarine or shortening  · Use lean ground turkey breast or chicken, or lean ground beef (less than 5% fat) instead of hamburger  · Add 1 teaspoon of canola oil to 8 ounces of skim milk instead of using cream or half and half  · Use grated zucchini, carrots, or apples in breads instead of coconut  · Use blenderized, low-fat cottage cheese, plain tofu, or low-fat ricotta cheese instead of cream cheese  · Use 1 egg white and 1 teaspoon of canola oil, or use ¼ cup (2 ounces) of fat-free egg substitute instead of a whole egg       · Replace half of the oil that is called for in a recipe with applesauce when you bake  Use 3 tablespoons of cocoa powder and 1 tablespoon of canola oil instead of a square of baking chocolate  How to increase fiber:  Eat enough high-fiber foods to get 20 to 30 grams of fiber every day  Slowly increase your fiber intake to avoid stomach cramps, gas, and other problems  · Eat 3 ounces of whole-grain foods each day  An ounce is about 1 slice of bread  Eat whole-grain breads, such as whole-wheat bread  Whole wheat, whole-wheat flour, or other whole grains should be listed as the first ingredient on the food label  Replace white flour with whole-grain flour or use half of each in recipes  Whole-grain flour is heavier than white flour, so you may have to add more yeast or baking powder  · Eat a high-fiber cereal for breakfast   Oatmeal is a good source of soluble fiber  Look for cereals that have bran or fiber in the name  Choose whole-grain products, such as brown rice, barley, and whole-wheat pasta  · Eat more beans, peas, and lentils  For example, add beans to soups or salads  Eat at least 5 cups of fruits and vegetables each day  Eat fruits and vegetables with the peel because the peel is high in fiber  © Copyright [address] Information is for End User's use only and may not be sold, redistributed or otherwise used for commercial purposes  All illustrations and images included in CareNotes® are the copyrighted property of A D A M , Inc  or [address] [place] [address]  The above information is an  only  It is not intended as medical advice for individual conditions or treatments  Talk to your doctor, nurse or pharmacist before following any medical regimen to see if it is safe and effective for you  Heart Healthy Diet   AMBULATORY CARE:   A heart healthy diet  is an eating plan low in unhealthy fats and sodium (salt)  The plan is high in healthy fats and fiber   A heart healthy diet helps improve your cholesterol levels and lowers your risk for heart disease and stroke  A dietitian will teach you how to read and understand food labels  Heart healthy diet guidelines to follow:   · Choose foods that contain healthy fats  ? Unsaturated fats  include monounsaturated and polyunsaturated fats  Unsaturated fat is found in foods such as soybean, canola, olive, corn, and safflower oils  It is also found in soft tub margarine that is made with liquid vegetable oil  ? Omega-3 fat  is found in certain fish, such as salmon, tuna, and trout, and in walnuts and flaxseed  Eat fish high in omega-3 fats at least 2 times a week  · Get 20 to 30 grams of fiber each day  Fruits, vegetables, whole-grain foods, and legumes (cooked beans) are good sources of fiber  · Limit or do not have unhealthy fats  ? Cholesterol  is found in animal foods, such as eggs and lobster, and in dairy products made from whole milk  Limit cholesterol to less than 200 mg each day  ? Saturated fat  is found in meats, such as blount and hamburger  It is also found in chicken or turkey skin, whole milk, and butter  Limit saturated fat to less than 7% of your total daily calories  ? Trans fat  is found in packaged foods, such as potato chips and cookies  It is also in hard margarine, some fried foods, and shortening  Do not eat foods that contain trans fats  · Limit sodium as directed  You may be told to limit sodium to 2,000 to 2,300 mg each day  Choose low-sodium or no-salt-added foods  Add little or no salt to food you prepare  Use herbs and spices in place of salt  Include the following in your heart healthy plan:  Ask your dietitian or healthcare provider how many servings to have from each of the following food groups:  · Grains:      ? Whole-wheat breads, cereals, and pastas, and brown rice    ? Low-fat, low-sodium crackers and chips    · Vegetables:      ? Broccoli, green beans, green peas, and spinach    ? Collards, kale, and lima beans    ?  Carrots, sweet potatoes, tomatoes, and peppers    ? Canned vegetables with no salt added    · Fruits:      ? Bananas, peaches, pears, and pineapple    ? Grapes, raisins, and dates    ? Oranges, tangerines, grapefruit, orange juice, and grapefruit juice    ? Apricots, mangoes, melons, and papaya    ? Raspberries and strawberries    ? Canned fruit with no added sugar    · Low-fat dairy:      ? Nonfat (skim) milk, 1% milk, and low-fat almond, cashew, or soy milks fortified with calcium    ? Low-fat cheese, regular or frozen yogurt, and cottage cheese    · Meats and proteins:      ? Lean cuts of beef and pork (loin, leg, round), skinless chicken and turkey    ? Legumes, soy products, egg whites, or nuts    Limit or do not include the following in your heart healthy plan:   · Unhealthy fats and oils:      ? Whole or 2% milk, cream cheese, sour cream, or cheese    ? High-fat cuts of beef (T-bone steaks, ribs), chicken or turkey with skin, and organ meats such as liver    ? Butter, stick margarine, shortening, and cooking oils such as coconut or palm oil    · Foods and liquids high in sodium:      ? Packaged foods, such as frozen dinners, cookies, macaroni and cheese, and cereals with more than 300 mg of sodium per serving    ? Vegetables with added sodium, such as instant potatoes, vegetables with added sauces, or regular canned vegetables    ? Cured or smoked meats, such as hot dogs, blount, and sausage    ? High-sodium ketchup, barbecue sauce, salad dressing, pickles, olives, soy sauce, or miso    · Foods and liquids high in sugar:      ? Candy, cake, cookies, pies, or doughnuts    ? Soft drinks (soda), sports drinks, or sweetened tea    ? Canned or dry mixes for cakes, soups, sauces, or gravies    Other healthy heart guidelines:   · Do not smoke  Nicotine and other chemicals in cigarettes and cigars can cause lung and heart damage  Ask your healthcare provider for information if you currently smoke and need help to quit  E-cigarettes or smokeless tobacco still contain nicotine  Talk to your healthcare provider before you use these products  · Limit or do not drink alcohol as directed  Alcohol can damage your heart and raise your blood pressure  Your healthcare provider may give you specific daily and weekly limits  The general recommended limit is 1 drink a day for women 21 or older and for men 72 or older  Do not have more than 3 drinks in a day or 7 in a week  The recommended limit is 2 drinks a day for men 24to 59years of age  Do not have more than 4 drinks in a day or 14 in a week  A drink of alcohol is 12 ounces of beer, 5 ounces of wine, or 1½ ounces of liquor  · Exercise regularly  Exercise can help you maintain a healthy weight and improve your blood pressure and cholesterol levels  Regular exercise can also decrease your risk for heart problems  Ask your healthcare provider about the best exercise plan for you  Do not start an exercise program without asking your healthcare provider  Follow up with your doctor or cardiologist as directed:  Write down your questions so you remember to ask them during your visits  © Copyright [address] [place] [address] Drive Information is for End User's use only and may not be sold, redistributed or otherwise used for commercial purposes  All illustrations and images included in CareNotes® are the copyrighted property of A D A M , Inc  or [address] [place] [address]  The above information is an  only  It is not intended as medical advice for individual conditions or treatments  Talk to your doctor, nurse or pharmacist before following any medical regimen to see if it is safe and effective for you  Calorie Counting Diet   WHAT YOU NEED TO KNOW:   What is a calorie counting diet? It is a meal plan based on counting calories each day to reach a healthy body weight  You will need to eat fewer calories if you are trying to lose weight   Weight loss may decrease your risk for certain health problems or improve your health if you have health problems  Some of these health problems include heart disease, high blood pressure, and diabetes  What foods should I avoid? Your dietitian will tell you if you need to avoid certain foods based on your body weight and health condition  You may need to avoid high-fat foods if you are at risk for or have heart disease  You may need to eat fewer foods from the breads and starches food group if you have diabetes  How many calories are in foods? The following is a list of foods and drinks with the approximate number of calories in each  Check the food label to find the exact number of calories  A dietitian can tell you how many calories you should have from each food group each day  · Carbohydrate:      ? ½ of a 3-inch bagel, 1 slice of bread, or ½ of a hamburger bun or hot dog bun (80)    ? 1 (8-inch) flour tortilla or ½ cup of cooked rice (100)    ? 1 (6-inch) corn tortilla (80)    ? 1 (6-inch) pancake or 1 cup of bran flakes cereal (110)    ? ½ cup of cooked cereal (80)    ? ½ cup of cooked pasta (85)    ? 1 ounce of pretzels (100)    ? 3 cups of air-popped popcorn without butter or oil (80)    · Dairy:      ? 1 cup of skim or 1% milk (90)    ? 1 cup of 2% milk (120)    ? 1 cup of whole milk (160)    ? 1 cup of 2% chocolate milk (220)    ? 1 ounce of low-fat cheese with 3 grams of fat per ounce (70)    ? 1 ounce of cheddar cheese (114)    ? ½ cup of 1% fat cottage cheese (80)    ? 1 cup of plain or sugar-free, fat-free yogurt (90)    · Protein foods:      ? 3 ounces of fish (not breaded or fried) (95)    ? 3 ounces of breaded, fried fish (195)    ? ¾ cup of tuna canned in water (105)    ? 3 ounces of chicken breast without skin (105)    ? 1 fried chicken breast with skin (350)    ? ¼ cup of fat free egg substitute (40)    ? 1 large egg (75)    ? 3 ounces of lean beef or pork (165)    ? 3 ounces of fried pork chop or ham (185)    ?  ½ cup of cooked dried beans, such as kidney, [place], lentils, or navy (115)    ? 3 ounces of bologna or lunch meat (225)    ? 2 links of breakfast sausage (140)    · Vegetables:      ? ½ cup of sliced mushrooms (10)    ? 1 cup of salad greens, such as lettuce, spinach, or scott (15)    ? ½ cup of steamed asparagus (20)    ? ½ cup of cooked summer squash, zucchini squash, or green or wax beans (25)    ? 1 cup of broccoli or cauliflower florets, or 1 medium tomato (25)    ? 1 large raw carrot or ½ cup of cooked carrots (40)    ? ? of a medium cucumber or 1 stalk of celery (5)    ? 1 small baked potato (160)    ? 1 cup of breaded, fried vegetables (230)    · Fruit:      ? 1 (6-inch) banana (55)     ? ½ of a 4-inch grapefruit (55)    ? 15 grapes (60)    ? 1 medium orange or apple (70)    ? 1 large peach (65)    ? 1 cup of fresh pineapple chunks (75)    ? 1 cup of melon cubes (50)    ? 1¼ cups of whole strawberries (45)    ? ½ cup of fruit canned in juice (55)    ? ½ cup of fruit canned in heavy syrup (110)    ? ? cup of raisins (130)    ? ½ cup of unsweetened fruit juice (60)    ? ½ cup of grape, cranberry, or prune juice (90)    · Fat:      ? 10 peanuts or 2 teaspoons of peanut butter (55)    ? 2 tablespoons of avocado or 1 tablespoon of regular salad dressing (45)    ? 2 slices of blount (90)    ? 1 teaspoon of oil, such as safflower, canola, corn, or olive oil (45)    ? 2 teaspoons of low-fat margarine, or 1 tablespoon of low-fat mayonnaise (50)    ? 1 teaspoon of regular margarine (40)    ? 1 tablespoon of regular mayonnaise (135)    ? 1 tablespoon of cream cheese or 2 tablespoons of low-fat cream cheese (45)    ? 2 tablespoons of vegetable shortening (215)    · Dessert and sweets:      ? 8 animal crackers or 5 vanilla wafers (80)    ? 1 frozen fruit juice bar (80)    ? ½ cup of ice milk or low-fat frozen yogurt (90)    ? ½ cup of sherbet or sorbet (125)    ? ½ cup of sugar-free pudding or custard (60)    ?  ½ cup of ice cream (140)    ? ½ cup of pudding or custard (175)    ? 1 (2-inch) square chocolate brownie (185)    · Combination foods:      ? Bean burrito made with an 8-inch tortilla, without cheese (275)    ? Chicken breast sandwich with lettuce and tomato (325)    ? 1 cup of chicken noodle soup (60)    ? 1 beef taco (175)    ? Regular hamburger with lettuce and tomato (310)    ? Regular cheeseburger with lettuce and tomato (410)     ? ¼ of a 12-inch cheese pizza (280)    ? Fried fish sandwich with lettuce and tomato (425)    ? Hot dog and bun (275)    ? 1½ cups of macaroni and cheese (310)    ? Taco salad with a fried tortilla shell (870)    · Low-calorie foods:      ? 1 tablespoon of ketchup or 1 tablespoon of fat free sour cream (15)    ? 1 teaspoon of mustard (5)    ? ¼ cup of salsa (20)    ? 1 large dill pickle (15)    ? 1 tablespoon of fat free salad dressing (10)    ? 2 teaspoons of low-sugar, light jam or jelly, or 1 tablespoon of sugar-free syrup (15)    ? 1 sugar-free popsicle (15)    ? 1 cup of club soda, seltzer water, or diet soda (0)    CARE AGREEMENT:   You have the right to help plan your care  Discuss treatment options with your healthcare provider to decide what care you want to receive  You always have the right to refuse treatment  The above information is an  only  It is not intended as medical advice for individual conditions or treatments  Talk to your doctor, nurse or pharmacist before following any medical regimen to see if it is safe and effective for you  © Copyright [address] Information is for End User's use only and may not be sold, redistributed or otherwise used for commercial purposes   All illustrations and images included in CareNotes® are the copyrighted property of A D A M , Inc  or [facility] [employer]

## 2021-05-05 LAB — PROINSULIN SERPL-SCNC: 9.9 PMOL/L (ref 0–10)

## 2021-05-12 LAB — INSULIN AB SER-ACNC: <5 UU/ML

## 2021-05-17 ENCOUNTER — VBI (OUTPATIENT)
Dept: ADMINISTRATIVE | Facility: OTHER | Age: 71
End: 2021-05-17

## 2021-05-18 ENCOUNTER — VBI (OUTPATIENT)
Dept: ADMINISTRATIVE | Facility: OTHER | Age: 71
End: 2021-05-18

## 2021-06-29 ENCOUNTER — APPOINTMENT (OUTPATIENT)
Dept: LAB | Age: 71
End: 2021-06-29
Payer: COMMERCIAL

## 2021-06-29 DIAGNOSIS — I70.213 ATHEROSCLEROSIS OF NATIVE ARTERY OF BOTH LOWER EXTREMITIES WITH INTERMITTENT CLAUDICATION (HCC): ICD-10-CM

## 2021-06-29 DIAGNOSIS — K21.9 GASTROESOPHAGEAL REFLUX DISEASE WITHOUT ESOPHAGITIS: ICD-10-CM

## 2021-06-29 DIAGNOSIS — I10 ESSENTIAL HYPERTENSION: ICD-10-CM

## 2021-06-29 DIAGNOSIS — E11.69 HYPERLIPIDEMIA ASSOCIATED WITH TYPE 2 DIABETES MELLITUS (HCC): ICD-10-CM

## 2021-06-29 DIAGNOSIS — I48.20 CHRONIC ATRIAL FIBRILLATION (HCC): ICD-10-CM

## 2021-06-29 DIAGNOSIS — E78.5 HYPERLIPIDEMIA ASSOCIATED WITH TYPE 2 DIABETES MELLITUS (HCC): ICD-10-CM

## 2021-06-29 LAB
ALBUMIN SERPL BCP-MCNC: 3.8 G/DL (ref 3.5–5)
ALP SERPL-CCNC: 99 U/L (ref 46–116)
ALT SERPL W P-5'-P-CCNC: 58 U/L (ref 12–78)
ANION GAP SERPL CALCULATED.3IONS-SCNC: 8 MMOL/L (ref 4–13)
AST SERPL W P-5'-P-CCNC: 42 U/L (ref 5–45)
BASOPHILS # BLD AUTO: 0.04 THOUSANDS/ΜL (ref 0–0.1)
BASOPHILS NFR BLD AUTO: 1 % (ref 0–1)
BILIRUB SERPL-MCNC: 0.64 MG/DL (ref 0.2–1)
BUN SERPL-MCNC: 34 MG/DL (ref 5–25)
CALCIUM SERPL-MCNC: 9.1 MG/DL (ref 8.3–10.1)
CHLORIDE SERPL-SCNC: 115 MMOL/L (ref 100–108)
CHOLEST SERPL-MCNC: 138 MG/DL (ref 50–200)
CO2 SERPL-SCNC: 21 MMOL/L (ref 21–32)
CREAT SERPL-MCNC: 1.66 MG/DL (ref 0.6–1.3)
EOSINOPHIL # BLD AUTO: 0.12 THOUSAND/ΜL (ref 0–0.61)
EOSINOPHIL NFR BLD AUTO: 2 % (ref 0–6)
ERYTHROCYTE [DISTWIDTH] IN BLOOD BY AUTOMATED COUNT: 14.6 % (ref 11.6–15.1)
FERRITIN SERPL-MCNC: 58 NG/ML (ref 8–388)
GFR SERPL CREATININE-BSD FRML MDRD: 41 ML/MIN/1.73SQ M
GLUCOSE P FAST SERPL-MCNC: 120 MG/DL (ref 65–99)
HCT VFR BLD AUTO: 39.6 % (ref 36.5–49.3)
HDLC SERPL-MCNC: 40 MG/DL
HGB BLD-MCNC: 12.1 G/DL (ref 12–17)
IMM GRANULOCYTES # BLD AUTO: 0.02 THOUSAND/UL (ref 0–0.2)
IMM GRANULOCYTES NFR BLD AUTO: 0 % (ref 0–2)
LDLC SERPL CALC-MCNC: 57 MG/DL (ref 0–100)
LYMPHOCYTES # BLD AUTO: 1.3 THOUSANDS/ΜL (ref 0.6–4.47)
LYMPHOCYTES NFR BLD AUTO: 19 % (ref 14–44)
MAGNESIUM SERPL-MCNC: 2.4 MG/DL (ref 1.6–2.6)
MCH RBC QN AUTO: 28.9 PG (ref 26.8–34.3)
MCHC RBC AUTO-ENTMCNC: 30.6 G/DL (ref 31.4–37.4)
MCV RBC AUTO: 95 FL (ref 82–98)
MONOCYTES # BLD AUTO: 0.49 THOUSAND/ΜL (ref 0.17–1.22)
MONOCYTES NFR BLD AUTO: 7 % (ref 4–12)
NEUTROPHILS # BLD AUTO: 4.88 THOUSANDS/ΜL (ref 1.85–7.62)
NEUTS SEG NFR BLD AUTO: 71 % (ref 43–75)
NONHDLC SERPL-MCNC: 98 MG/DL
NRBC BLD AUTO-RTO: 0 /100 WBCS
PLATELET # BLD AUTO: 176 THOUSANDS/UL (ref 149–390)
PMV BLD AUTO: 12 FL (ref 8.9–12.7)
POTASSIUM SERPL-SCNC: 4.2 MMOL/L (ref 3.5–5.3)
PROT SERPL-MCNC: 7.4 G/DL (ref 6.4–8.2)
RBC # BLD AUTO: 4.18 MILLION/UL (ref 3.88–5.62)
SODIUM SERPL-SCNC: 144 MMOL/L (ref 136–145)
TRIGL SERPL-MCNC: 206 MG/DL
TSH SERPL DL<=0.05 MIU/L-ACNC: 2.79 UIU/ML (ref 0.36–3.74)
WBC # BLD AUTO: 6.85 THOUSAND/UL (ref 4.31–10.16)

## 2021-06-29 PROCEDURE — 80053 COMPREHEN METABOLIC PANEL: CPT

## 2021-06-29 PROCEDURE — 80061 LIPID PANEL: CPT

## 2021-06-29 PROCEDURE — 36415 COLL VENOUS BLD VENIPUNCTURE: CPT

## 2021-06-29 PROCEDURE — 82728 ASSAY OF FERRITIN: CPT

## 2021-06-29 PROCEDURE — 85025 COMPLETE CBC W/AUTO DIFF WBC: CPT

## 2021-06-29 PROCEDURE — 84443 ASSAY THYROID STIM HORMONE: CPT

## 2021-06-29 PROCEDURE — 83735 ASSAY OF MAGNESIUM: CPT

## 2021-06-29 PROCEDURE — 80151 DRUG ASSAY AMIODARONE: CPT

## 2021-07-01 ENCOUNTER — RA CDI HCC (OUTPATIENT)
Dept: OTHER | Facility: HOSPITAL | Age: 71
End: 2021-07-01

## 2021-07-01 NOTE — PROGRESS NOTES
[facility]  coding opportunities          Chart reviewed, no opportunity found: CHART REVIEWED, NO OPPORTUNITY FOUND      No additional opportunities found               Patients insurance company: [address]  (Medicare Advantage and Commercial)

## 2021-07-04 LAB
AMIODARONE SERPL-MCNC: 1456 NG/ML (ref 1000–2500)
DESETHYLAMIODARONE SERPL-MCNC: 1129 NG/ML

## 2021-07-07 ENCOUNTER — OFFICE VISIT (OUTPATIENT)
Dept: FAMILY MEDICINE CLINIC | Facility: CLINIC | Age: 71
End: 2021-07-07
Payer: COMMERCIAL

## 2021-07-07 VITALS
BODY MASS INDEX: 31.7 KG/M2 | WEIGHT: 214 LBS | SYSTOLIC BLOOD PRESSURE: 120 MMHG | DIASTOLIC BLOOD PRESSURE: 70 MMHG | HEART RATE: 87 BPM | TEMPERATURE: 96.7 F | OXYGEN SATURATION: 98 % | HEIGHT: 69 IN | RESPIRATION RATE: 16 BRPM

## 2021-07-07 DIAGNOSIS — R79.89 ELEVATED SERUM CREATININE: ICD-10-CM

## 2021-07-07 DIAGNOSIS — R73.09 ELEVATED HEMOGLOBIN A1C: ICD-10-CM

## 2021-07-07 DIAGNOSIS — E11.69 HYPERLIPIDEMIA ASSOCIATED WITH TYPE 2 DIABETES MELLITUS (HCC): ICD-10-CM

## 2021-07-07 DIAGNOSIS — Z00.01 ENCOUNTER FOR GENERAL ADULT MEDICAL EXAMINATION WITH ABNORMAL FINDINGS: Primary | ICD-10-CM

## 2021-07-07 DIAGNOSIS — M1A.9XX0 CHRONIC GOUT WITHOUT TOPHUS, UNSPECIFIED CAUSE, UNSPECIFIED SITE: ICD-10-CM

## 2021-07-07 DIAGNOSIS — Z23 NEED FOR VACCINATION AGAINST STREPTOCOCCUS PNEUMONIAE USING PNEUMOCOCCAL CONJUGATE VACCINE 7: ICD-10-CM

## 2021-07-07 DIAGNOSIS — Z12.11 SCREEN FOR COLON CANCER: ICD-10-CM

## 2021-07-07 DIAGNOSIS — I48.20 CHRONIC ATRIAL FIBRILLATION (HCC): ICD-10-CM

## 2021-07-07 DIAGNOSIS — E78.5 HYPERLIPIDEMIA ASSOCIATED WITH TYPE 2 DIABETES MELLITUS (HCC): ICD-10-CM

## 2021-07-07 DIAGNOSIS — Z23 NEED FOR PNEUMOCOCCAL VACCINATION: ICD-10-CM

## 2021-07-07 LAB — SL AMB POCT HEMOGLOBIN AIC: 5.6 (ref ?–6.5)

## 2021-07-07 PROCEDURE — 3074F SYST BP LT 130 MM HG: CPT | Performed by: INTERNAL MEDICINE

## 2021-07-07 PROCEDURE — 1125F AMNT PAIN NOTED PAIN PRSNT: CPT | Performed by: INTERNAL MEDICINE

## 2021-07-07 PROCEDURE — 3078F DIAST BP <80 MM HG: CPT | Performed by: INTERNAL MEDICINE

## 2021-07-07 PROCEDURE — 3008F BODY MASS INDEX DOCD: CPT | Performed by: INTERNAL MEDICINE

## 2021-07-07 PROCEDURE — 3288F FALL RISK ASSESSMENT DOCD: CPT | Performed by: INTERNAL MEDICINE

## 2021-07-07 PROCEDURE — G0439 PPPS, SUBSEQ VISIT: HCPCS | Performed by: INTERNAL MEDICINE

## 2021-07-07 PROCEDURE — 83036 HEMOGLOBIN GLYCOSYLATED A1C: CPT | Performed by: INTERNAL MEDICINE

## 2021-07-07 PROCEDURE — 1160F RVW MEDS BY RX/DR IN RCRD: CPT | Performed by: INTERNAL MEDICINE

## 2021-07-07 PROCEDURE — 1036F TOBACCO NON-USER: CPT | Performed by: INTERNAL MEDICINE

## 2021-07-07 PROCEDURE — 3725F SCREEN DEPRESSION PERFORMED: CPT | Performed by: INTERNAL MEDICINE

## 2021-07-07 PROCEDURE — 99214 OFFICE O/P EST MOD 30 MIN: CPT | Performed by: INTERNAL MEDICINE

## 2021-07-07 PROCEDURE — 1170F FXNL STATUS ASSESSED: CPT | Performed by: INTERNAL MEDICINE

## 2021-07-07 PROCEDURE — 3044F HG A1C LEVEL LT 7.0%: CPT | Performed by: INTERNAL MEDICINE

## 2021-07-07 NOTE — PROGRESS NOTES
Assessment/Plan:         Diagnoses and all orders for this visit:    Encounter for general adult medical examination with abnormal findings; done in detail  rtc in 3 mos w ;  -     Lipid panel; Future    Screen for colon cancer  -     Ambulatory referral for colonoscopy; Future    Need for pneumococcal vaccination; pt refused    Hyperlipidemia associated with type 2 diabetes mellitus ([facility] ); Continue Atorvstatin 20 mg  rtc in 3 mos w ;  -     Ambulatory referral to Ophthalmology; Future  -     Lipid panel; Future    Elevated hemoglobin A1c; life style mod  rtc in 3 mos w ;  -     POCT hemoglobin A1c  -     Comprehensive metabolic panel; Future  -     UA (URINE) with reflex to Scope; Future  -     Magnesium; Future  -     CBC and differential; Future    Elevated serum creatinine  -     Ambulatory referral to Nephrology; Future    Need for vaccination against Streptococcus pneumoniae using pneumococcal conjugate vaccine 7  -     Cancel: PNEUMOCOCCAL POLYSACCHARIDE VACCINE 23-VALENT =>3YO SQ IM    Chronic gout without tophus, unspecified cause, unspecified site  -     Uric acid; Future    Chronic atrial fibrillation (HCC)  -     Amiodarone level; Future    Other orders  -     Cancel: PNEUMOCOCCAL CONJUGATE VACCINE 13-VALENT GREATER THAN 6 MONTHS        Subjective:      Patient ID: Riley Blanco is a 72 y o  male  72 Y O man is here for AWV and Regular check up, He feels Ok, No New  Symptoms, recent Blood  Work  And Med List reviewed,  The following portions of the patient's history were reviewed and updated as appropriate: allergies, current medications, past family history, past medical history, past social history, past surgical history and problem list     Review of Systems   Constitutional: Negative for chills, fatigue and fever  HENT: Negative for congestion, facial swelling, sore throat, trouble swallowing and voice change  Eyes: Negative for pain, discharge and visual disturbance     Respiratory: Negative for cough, shortness of breath and wheezing  Cardiovascular: Negative for chest pain, palpitations and leg swelling  Gastrointestinal: Negative for abdominal pain, blood in stool, constipation, diarrhea and nausea  Endocrine: Negative for polydipsia, polyphagia and polyuria  Genitourinary: Negative for difficulty urinating, hematuria and urgency  Musculoskeletal: Negative for arthralgias and myalgias  Skin: Negative for rash  Neurological: Negative for dizziness, tremors, weakness and headaches  Hematological: Negative for adenopathy  Does not bruise/bleed easily  Psychiatric/Behavioral: Negative for dysphoric mood, sleep disturbance and suicidal ideas  Objective:      /70 (BP Location: Right arm, Patient Position: Sitting, Cuff Size: Standard)   Pulse 87   Temp (!) 96 7 °F (35 9 °C) (Tympanic)   Resp 16   Ht 5' 9" (1 753 m)   Wt 97 1 kg (214 lb)   SpO2 98%   BMI 31 60 kg/m²          Physical Exam  Constitutional:       General: He is not in acute distress  HENT:      Head: Normocephalic  Mouth/Throat:      Pharynx: No oropharyngeal exudate  Eyes:      General: No scleral icterus  Conjunctiva/sclera: Conjunctivae normal       Pupils: Pupils are equal, round, and reactive to light  Neck:      Thyroid: No thyromegaly  Cardiovascular:      Rate and Rhythm: Normal rate and regular rhythm  Heart sounds: Normal heart sounds  No murmur heard  Pulmonary:      Effort: Pulmonary effort is normal  No respiratory distress  Breath sounds: Normal breath sounds  No wheezing or rales  Abdominal:      General: Bowel sounds are normal  There is no distension  Palpations: Abdomen is soft  Tenderness: There is no abdominal tenderness  There is no guarding or rebound  Musculoskeletal:         General: No tenderness  Cervical back: Neck supple  Lymphadenopathy:      Cervical: No cervical adenopathy     Skin:     Coloration: Skin is not pale       Findings: No rash  Neurological:      Mental Status: He is alert and oriented to person, place, and time  Sensory: No sensory deficit  Motor: No weakness

## 2021-07-07 NOTE — PROGRESS NOTES
Assessment and Plan:     Problem List Items Addressed This Visit        Endocrine    Hyperlipidemia associated with type 2 diabetes mellitus ([facility] )      Other Visit Diagnoses     Screen for colon cancer    -  Primary    Need for pneumococcal vaccination               Preventive health issues were discussed with patient, and age appropriate screening tests were ordered as noted in patient's After Visit Summary  Personalized health advice and appropriate referrals for health education or preventive services given if needed, as noted in patient's After Visit Summary  History of Present Illness:     Patient presents for Medicare Annual Wellness visit    Patient Care Team:  Clinton Tay MD as PCP - General (Internal Medicine)     Problem List:     Patient Active Problem List   Diagnosis    Hyperlipidemia    Hypertension    Atrial fibrillation ([facility] )    Anemia    Atherosclerosis of native artery of both lower extremities with intermittent claudication ([facility] )    Familial cardiomyopathy ([facility] )    Hyperlipidemia associated with type 2 diabetes mellitus ([facility] )      Past Medical and Surgical History:     Past Medical History:   Diagnosis Date    Anemia 3/12/2013    Atrial fibrillation ([facility] )     Coronary artery disease     GERD (gastroesophageal reflux disease)     Hyperlipidemia associated with type 2 diabetes mellitus ([facility] ) 5/4/2021    Hypertension     Obesity      History reviewed  No pertinent surgical history     Family History:     Family History   Problem Relation Age of Onset    Hypertension Mother     Diabetes Mother     No Known Problems Father       Social History:     Social History     Socioeconomic History    Marital status: Single     Spouse name: None    Number of children: None    Years of education: None    Highest education level: None   Occupational History    Occupation: retired   Tobacco Use    Smoking status: Never Smoker    Smokeless tobacco: Never Used    Tobacco comment: No passive smoke exposure   Vaping Use    Vaping Use: Never used   Substance and Sexual Activity    Alcohol use: No    Drug use: No    Sexual activity: Not Currently   Other Topics Concern    None   Social History Narrative    None     Social Determinants of Health     Financial Resource Strain: Low Risk     Difficulty of Paying Living Expenses: Not hard at all   Food Insecurity: No Food Insecurity    Worried About Running Out of Food in the Last Year: Never true    Francesca of Food in the Last Year: Never true   Transportation Needs: No Transportation Needs    Lack of Transportation (Medical): No    Lack of Transportation (Non-Medical): No   Physical Activity: Sufficiently Active    Days of Exercise per Week: 5 days    Minutes of Exercise per Session: 30 min   Stress: No Stress Concern Present    Feeling of Stress : Not at all   Social Connections: Unknown    Frequency of Communication with Friends and Family: Patient refused    Frequency of Social Gatherings with Friends and Family: Patient refused    Attends [employer]ist Services: Patient refused    Active Member of Clubs or Organizations: Patient refused    Attends Club or Organization Meetings: Patient refused    Marital Status: Patient refused   Intimate Partner Violence: Not At Risk    Fear of Current or Ex-Partner: No    Emotionally Abused: No    Physically Abused: No    Sexually Abused: No      Medications and Allergies:     Current Outpatient Medications   Medication Sig Dispense Refill    allopurinol (ZYLOPRIM) 100 mg tablet Take 1 tablet (100 mg total) by mouth daily 90 tablet 3    amiodarone 200 mg tablet Take 1 tablet (200 mg total) by mouth daily 90 tablet 3    atorvastatin (LIPITOR) 20 mg tablet Take 1 tablet (20 mg total) by mouth daily Please STOP Pravastatin    90 tablet 3    ergocalciferol (VITAMIN D2) 50,000 units Take 1 capsule (50,000 Units total) by mouth once a week 13 capsule 1    furosemide (Lasix) 20 mg tablet Take 1 tablet (20 mg total) by mouth daily 90 tablet 3    ketoconazole (NIZORAL) 2 % shampoo Apply 1 application topically 3 (three) times a week 120 mL 2    linaCLOtide 290 MCG CAPS Take 1 capsule by mouth daily 90 capsule 3    loratadine (Claritin) 10 mg tablet Take 1 tablet (10 mg total) by mouth daily In the evening 30 tablet 1    losartan (COZAAR) 50 mg tablet Take 1 tablet (50 mg total) by mouth daily 90 tablet 3    metoprolol succinate (Toprol XL) 25 mg 24 hr tablet Take 1 tablet (25 mg total) by mouth daily 90 tablet 3    pantoprazole (PROTONIX) 20 mg tablet Take 1 tablet (20 mg total) by mouth daily 90 tablet 1    warfarin (COUMADIN) 5 mg tablet Take 1 tablet (5 mg total) by mouth daily 90 tablet 3     No current facility-administered medications for this visit  Allergies   Allergen Reactions    No Active Allergies       Immunizations:     Immunization History   Administered Date(s) Administered    Influenza Split 09/24/2012    Pneumococcal Conjugate 13-Valent 02/06/2019    SARS-CoV-2 / COVID-19 mRNA IM (Juliane Pazjack) 04/14/2021, 05/12/2021    TD (adult) Preservative Free 08/23/2016    Td (adult), adsorbed 08/23/2016      Health Maintenance:         Topic Date Due    Colorectal Cancer Screening  Never done    Hepatitis C Screening  Completed         Topic Date Due    Pneumococcal Vaccine: 65+ Years (1 of 2 - PPSV23) 04/03/2019    Influenza Vaccine (1) 09/01/2021      Medicare Health Risk Assessment:     There were no vitals taken for this visit  Silvio is here for his Subsequent Wellness visit  Last Medicare Wellness visit information reviewed, patient interviewed and updates made to the record today  Health Risk Assessment:   Patient rates overall health as good  Patient feels that their physical health rating is same  Patient is very satisfied with their life  Eyesight was rated as same  Hearing was rated as same  Patient feels that their emotional and mental health rating is same  Patients states they are never, rarely angry  Patient states they are never, rarely unusually tired/fatigued  Pain experienced in the last 7 days has been none  Patient states that he has experienced no weight loss or gain in last 6 months  Depression Screening:   PHQ-2 Score: 0      Fall Risk Screening: In the past year, patient has experienced: no history of falling in past year      Home Safety:  Patient does not have trouble with stairs inside or outside of their home  Patient has working smoke alarms and has working carbon monoxide detector  Home safety hazards include: none  Medications:   Patient is currently taking over-the-counter supplements  OTC medications include: see medication list  Patient is able to manage medications  Activities of Daily Living (ADLs)/Instrumental Activities of Daily Living (IADLs):   Walk and transfer into and out of bed and chair?: Yes  Dress and groom yourself?: Yes    Bathe or shower yourself?: Yes    Feed yourself?  Yes  Do your laundry/housekeeping?: Yes  Manage your money, pay your bills and track your expenses?: Yes  Make your own meals?: Yes    Do your own shopping?: Yes    Previous Hospitalizations:   Any hospitalizations or ED visits within the last 12 months?: No      PREVENTIVE SCREENINGS      Cardiovascular Screening:    General: Screening Not Indicated, History Lipid Disorder and Risks and Benefits Discussed      Diabetes Screening:     General: Screening Not Indicated, History Diabetes, Risks and Benefits Discussed and Screening Current      Colorectal Cancer Screening:     General: Risks and Benefits Discussed      Prostate Cancer Screening:    General: Risks and Benefits Discussed      Osteoporosis Screening:    General: Risks and Benefits Discussed      Abdominal Aortic Aneurysm (AAA) Screening:    Risk factors include: age between 73-67 yo        General: Risks and Benefits Discussed      Lung Cancer Screening:     General: Screening Not Indicated and Risks and Benefits Discussed      Hepatitis C Screening:    General: Screening Current and Risks and Benefits Discussed    Screening, Brief Intervention, and Referral to Treatment (SBIRT)    Screening      AUDIT-C Screenin) How often did you have a drink containing alcohol in the past year? never  2) How many drinks did you have on a typical day when you were drinking in the past year? 0  3) How often did you have 6 or more drinks on one occasion in the past year? never    AUDIT-C Score: 0  Interpretation: Score 0-3 (male): Negative screen for alcohol misuse    Single Item Drug Screening:  How often have you used an illegal drug (including marijuana) or a prescription medication for non-medical reasons in the past year? never    Single Item Drug Screen Score: 0  Interpretation: Negative screen for possible drug use disorder    Other Counseling Topics:   Car/seat belt/driving safety, skin self-exam, sunscreen and calcium and vitamin D intake and regular weightbearing exercise         Barbara Gibbons MD

## 2021-07-14 ENCOUNTER — VBI (OUTPATIENT)
Dept: ADMINISTRATIVE | Facility: OTHER | Age: 71
End: 2021-07-14

## 2021-08-09 ENCOUNTER — VBI (OUTPATIENT)
Dept: ADMINISTRATIVE | Facility: OTHER | Age: 71
End: 2021-08-09

## 2021-08-18 ENCOUNTER — VBI (OUTPATIENT)
Dept: ADMINISTRATIVE | Facility: OTHER | Age: 71
End: 2021-08-18

## 2021-09-09 ENCOUNTER — VBI (OUTPATIENT)
Dept: ADMINISTRATIVE | Facility: OTHER | Age: 71
End: 2021-09-09

## 2021-09-15 DIAGNOSIS — K21.9 GASTROESOPHAGEAL REFLUX DISEASE WITHOUT ESOPHAGITIS: ICD-10-CM

## 2021-09-15 DIAGNOSIS — E78.49 OTHER HYPERLIPIDEMIA: ICD-10-CM

## 2021-09-15 RX ORDER — ATORVASTATIN CALCIUM 20 MG/1
20 TABLET, FILM COATED ORAL DAILY
Qty: 90 TABLET | Refills: 3 | Status: SHIPPED | OUTPATIENT
Start: 2021-09-15 | End: 2022-03-17 | Stop reason: SDUPTHER

## 2021-09-15 RX ORDER — PANTOPRAZOLE SODIUM 20 MG/1
20 TABLET, DELAYED RELEASE ORAL DAILY
Qty: 90 TABLET | Refills: 1 | Status: SHIPPED | OUTPATIENT
Start: 2021-09-15 | End: 2022-03-17 | Stop reason: SDUPTHER

## 2021-09-28 ENCOUNTER — VBI (OUTPATIENT)
Dept: ADMINISTRATIVE | Facility: OTHER | Age: 71
End: 2021-09-28

## 2021-09-29 ENCOUNTER — APPOINTMENT (OUTPATIENT)
Dept: LAB | Age: 71
End: 2021-09-29
Payer: COMMERCIAL

## 2021-09-29 DIAGNOSIS — M1A.9XX0 CHRONIC GOUT WITHOUT TOPHUS, UNSPECIFIED CAUSE, UNSPECIFIED SITE: ICD-10-CM

## 2021-09-29 DIAGNOSIS — I48.20 CHRONIC ATRIAL FIBRILLATION (HCC): ICD-10-CM

## 2021-09-29 DIAGNOSIS — R73.09 ELEVATED HEMOGLOBIN A1C: ICD-10-CM

## 2021-09-29 DIAGNOSIS — E11.69 HYPERLIPIDEMIA ASSOCIATED WITH TYPE 2 DIABETES MELLITUS (HCC): ICD-10-CM

## 2021-09-29 DIAGNOSIS — Z00.01 ENCOUNTER FOR GENERAL ADULT MEDICAL EXAMINATION WITH ABNORMAL FINDINGS: ICD-10-CM

## 2021-09-29 DIAGNOSIS — E78.5 HYPERLIPIDEMIA ASSOCIATED WITH TYPE 2 DIABETES MELLITUS (HCC): ICD-10-CM

## 2021-09-29 LAB
ALBUMIN SERPL BCP-MCNC: 3.8 G/DL (ref 3.5–5)
ALP SERPL-CCNC: 89 U/L (ref 46–116)
ALT SERPL W P-5'-P-CCNC: 40 U/L (ref 12–78)
ANION GAP SERPL CALCULATED.3IONS-SCNC: 1 MMOL/L (ref 4–13)
AST SERPL W P-5'-P-CCNC: 30 U/L (ref 5–45)
BASOPHILS # BLD AUTO: 0.06 THOUSANDS/ΜL (ref 0–0.1)
BASOPHILS NFR BLD AUTO: 1 % (ref 0–1)
BILIRUB SERPL-MCNC: 0.81 MG/DL (ref 0.2–1)
BUN SERPL-MCNC: 23 MG/DL (ref 5–25)
CALCIUM SERPL-MCNC: 8.9 MG/DL (ref 8.3–10.1)
CHLORIDE SERPL-SCNC: 112 MMOL/L (ref 100–108)
CHOLEST SERPL-MCNC: 142 MG/DL (ref 50–200)
CO2 SERPL-SCNC: 25 MMOL/L (ref 21–32)
CREAT SERPL-MCNC: 1.53 MG/DL (ref 0.6–1.3)
EOSINOPHIL # BLD AUTO: 0.16 THOUSAND/ΜL (ref 0–0.61)
EOSINOPHIL NFR BLD AUTO: 2 % (ref 0–6)
ERYTHROCYTE [DISTWIDTH] IN BLOOD BY AUTOMATED COUNT: 14.8 % (ref 11.6–15.1)
EST. AVERAGE GLUCOSE BLD GHB EST-MCNC: 120 MG/DL
GFR SERPL CREATININE-BSD FRML MDRD: 45 ML/MIN/1.73SQ M
GLUCOSE P FAST SERPL-MCNC: 104 MG/DL (ref 65–99)
HBA1C MFR BLD: 5.8 %
HCT VFR BLD AUTO: 42.4 % (ref 36.5–49.3)
HDLC SERPL-MCNC: 42 MG/DL
HGB BLD-MCNC: 13.2 G/DL (ref 12–17)
IMM GRANULOCYTES # BLD AUTO: 0.03 THOUSAND/UL (ref 0–0.2)
IMM GRANULOCYTES NFR BLD AUTO: 0 % (ref 0–2)
LDLC SERPL CALC-MCNC: 68 MG/DL (ref 0–100)
LYMPHOCYTES # BLD AUTO: 2.04 THOUSANDS/ΜL (ref 0.6–4.47)
LYMPHOCYTES NFR BLD AUTO: 26 % (ref 14–44)
MAGNESIUM SERPL-MCNC: 2.3 MG/DL (ref 1.6–2.6)
MCH RBC QN AUTO: 28.8 PG (ref 26.8–34.3)
MCHC RBC AUTO-ENTMCNC: 31.1 G/DL (ref 31.4–37.4)
MCV RBC AUTO: 92 FL (ref 82–98)
MONOCYTES # BLD AUTO: 0.6 THOUSAND/ΜL (ref 0.17–1.22)
MONOCYTES NFR BLD AUTO: 8 % (ref 4–12)
NEUTROPHILS # BLD AUTO: 4.91 THOUSANDS/ΜL (ref 1.85–7.62)
NEUTS SEG NFR BLD AUTO: 63 % (ref 43–75)
NONHDLC SERPL-MCNC: 100 MG/DL
NRBC BLD AUTO-RTO: 0 /100 WBCS
PLATELET # BLD AUTO: 170 THOUSANDS/UL (ref 149–390)
PMV BLD AUTO: 11.8 FL (ref 8.9–12.7)
POTASSIUM SERPL-SCNC: 4 MMOL/L (ref 3.5–5.3)
PROT SERPL-MCNC: 7.7 G/DL (ref 6.4–8.2)
RBC # BLD AUTO: 4.59 MILLION/UL (ref 3.88–5.62)
SODIUM SERPL-SCNC: 138 MMOL/L (ref 136–145)
TRIGL SERPL-MCNC: 162 MG/DL
URATE SERPL-MCNC: 5.9 MG/DL (ref 4.2–8)
WBC # BLD AUTO: 7.8 THOUSAND/UL (ref 4.31–10.16)

## 2021-09-29 PROCEDURE — 85025 COMPLETE CBC W/AUTO DIFF WBC: CPT

## 2021-09-29 PROCEDURE — 83735 ASSAY OF MAGNESIUM: CPT

## 2021-09-29 PROCEDURE — 80151 DRUG ASSAY AMIODARONE: CPT

## 2021-09-29 PROCEDURE — 84550 ASSAY OF BLOOD/URIC ACID: CPT

## 2021-09-29 PROCEDURE — 36415 COLL VENOUS BLD VENIPUNCTURE: CPT

## 2021-09-29 PROCEDURE — 80061 LIPID PANEL: CPT

## 2021-09-29 PROCEDURE — 83036 HEMOGLOBIN GLYCOSYLATED A1C: CPT

## 2021-09-29 PROCEDURE — 80053 COMPREHEN METABOLIC PANEL: CPT

## 2021-10-03 LAB
AMIODARONE SERPL-MCNC: 1247 NG/ML (ref 1000–2500)
DESETHYLAMIODARONE SERPL-MCNC: 1015 NG/ML

## 2021-10-07 ENCOUNTER — RA CDI HCC (OUTPATIENT)
Dept: OTHER | Facility: HOSPITAL | Age: 71
End: 2021-10-07

## 2021-10-13 ENCOUNTER — OFFICE VISIT (OUTPATIENT)
Dept: FAMILY MEDICINE CLINIC | Facility: CLINIC | Age: 71
End: 2021-10-13
Payer: COMMERCIAL

## 2021-10-13 VITALS
RESPIRATION RATE: 16 BRPM | WEIGHT: 215 LBS | TEMPERATURE: 97.3 F | OXYGEN SATURATION: 99 % | SYSTOLIC BLOOD PRESSURE: 130 MMHG | BODY MASS INDEX: 31.84 KG/M2 | DIASTOLIC BLOOD PRESSURE: 84 MMHG | HEIGHT: 69 IN | HEART RATE: 91 BPM

## 2021-10-13 DIAGNOSIS — E11.69 HYPERLIPIDEMIA ASSOCIATED WITH TYPE 2 DIABETES MELLITUS (HCC): Primary | ICD-10-CM

## 2021-10-13 DIAGNOSIS — R73.09 ELEVATED HEMOGLOBIN A1C: ICD-10-CM

## 2021-10-13 DIAGNOSIS — E01.0 THYROMEGALY: ICD-10-CM

## 2021-10-13 DIAGNOSIS — Z23 NEED FOR INFLUENZA VACCINATION: ICD-10-CM

## 2021-10-13 DIAGNOSIS — H53.8 BLURRED VISION, LEFT EYE: ICD-10-CM

## 2021-10-13 DIAGNOSIS — E53.8 LOW VITAMIN B12 LEVEL: ICD-10-CM

## 2021-10-13 DIAGNOSIS — E78.5 HYPERLIPIDEMIA ASSOCIATED WITH TYPE 2 DIABETES MELLITUS (HCC): Primary | ICD-10-CM

## 2021-10-13 DIAGNOSIS — N40.0 ENLARGED PROSTATE: ICD-10-CM

## 2021-10-13 DIAGNOSIS — E78.49 OTHER HYPERLIPIDEMIA: ICD-10-CM

## 2021-10-13 DIAGNOSIS — Z23 NEED FOR PNEUMOCOCCAL VACCINATION: ICD-10-CM

## 2021-10-13 DIAGNOSIS — IMO0002 TYPE II DIABETES MELLITUS WITH MANIFESTATIONS, UNCONTROLLED: ICD-10-CM

## 2021-10-13 DIAGNOSIS — M81.8 IDIOPATHIC OSTEOPOROSIS: ICD-10-CM

## 2021-10-13 PROCEDURE — G0009 ADMIN PNEUMOCOCCAL VACCINE: HCPCS

## 2021-10-13 PROCEDURE — 4040F PNEUMOC VAC/ADMIN/RCVD: CPT | Performed by: INTERNAL MEDICINE

## 2021-10-13 PROCEDURE — 90732 PPSV23 VACC 2 YRS+ SUBQ/IM: CPT

## 2021-10-13 PROCEDURE — 1036F TOBACCO NON-USER: CPT | Performed by: INTERNAL MEDICINE

## 2021-10-13 PROCEDURE — 3008F BODY MASS INDEX DOCD: CPT | Performed by: INTERNAL MEDICINE

## 2021-10-13 PROCEDURE — 99214 OFFICE O/P EST MOD 30 MIN: CPT | Performed by: INTERNAL MEDICINE

## 2021-10-13 PROCEDURE — 1160F RVW MEDS BY RX/DR IN RCRD: CPT | Performed by: INTERNAL MEDICINE

## 2021-10-21 DIAGNOSIS — M25.511 ACUTE PAIN OF RIGHT SHOULDER: Primary | ICD-10-CM

## 2021-10-21 RX ORDER — KETOROLAC TROMETHAMINE 10 MG/1
10 TABLET, FILM COATED ORAL 2 TIMES DAILY WITH MEALS
Qty: 10 TABLET | Refills: 0 | Status: SHIPPED | OUTPATIENT
Start: 2021-10-21 | End: 2022-01-17

## 2021-11-08 ENCOUNTER — VBI (OUTPATIENT)
Dept: ADMINISTRATIVE | Facility: OTHER | Age: 71
End: 2021-11-08

## 2021-11-08 ENCOUNTER — OFFICE VISIT (OUTPATIENT)
Dept: GASTROENTEROLOGY | Facility: MEDICAL CENTER | Age: 71
End: 2021-11-08
Payer: COMMERCIAL

## 2021-11-08 ENCOUNTER — TELEPHONE (OUTPATIENT)
Dept: GASTROENTEROLOGY | Facility: MEDICAL CENTER | Age: 71
End: 2021-11-08

## 2021-11-08 VITALS
DIASTOLIC BLOOD PRESSURE: 72 MMHG | BODY MASS INDEX: 32.55 KG/M2 | HEART RATE: 72 BPM | WEIGHT: 220.4 LBS | SYSTOLIC BLOOD PRESSURE: 120 MMHG | TEMPERATURE: 95.9 F

## 2021-11-08 DIAGNOSIS — Z12.11 SCREEN FOR COLON CANCER: Primary | ICD-10-CM

## 2021-11-08 PROCEDURE — 99203 OFFICE O/P NEW LOW 30 MIN: CPT | Performed by: INTERNAL MEDICINE

## 2021-11-08 PROCEDURE — 1160F RVW MEDS BY RX/DR IN RCRD: CPT | Performed by: INTERNAL MEDICINE

## 2021-11-08 PROCEDURE — 1036F TOBACCO NON-USER: CPT | Performed by: INTERNAL MEDICINE

## 2021-11-08 RX ORDER — UBIDECARENONE 75 MG
CAPSULE ORAL DAILY
COMMUNITY
End: 2022-01-17

## 2021-12-01 ENCOUNTER — OFFICE VISIT (OUTPATIENT)
Dept: FAMILY MEDICINE CLINIC | Facility: CLINIC | Age: 71
End: 2021-12-01
Payer: COMMERCIAL

## 2021-12-01 VITALS
WEIGHT: 219 LBS | HEIGHT: 69 IN | BODY MASS INDEX: 32.44 KG/M2 | DIASTOLIC BLOOD PRESSURE: 86 MMHG | SYSTOLIC BLOOD PRESSURE: 136 MMHG | RESPIRATION RATE: 14 BRPM | TEMPERATURE: 97.8 F | HEART RATE: 92 BPM | OXYGEN SATURATION: 99 %

## 2021-12-01 DIAGNOSIS — R07.81 RIB PAIN ON RIGHT SIDE: Primary | ICD-10-CM

## 2021-12-01 PROCEDURE — 1160F RVW MEDS BY RX/DR IN RCRD: CPT | Performed by: INTERNAL MEDICINE

## 2021-12-01 PROCEDURE — 3008F BODY MASS INDEX DOCD: CPT | Performed by: INTERNAL MEDICINE

## 2021-12-01 PROCEDURE — 1036F TOBACCO NON-USER: CPT | Performed by: INTERNAL MEDICINE

## 2021-12-01 PROCEDURE — 99213 OFFICE O/P EST LOW 20 MIN: CPT | Performed by: INTERNAL MEDICINE

## 2021-12-10 ENCOUNTER — VBI (OUTPATIENT)
Dept: ADMINISTRATIVE | Facility: OTHER | Age: 71
End: 2021-12-10

## 2021-12-16 ENCOUNTER — VBI (OUTPATIENT)
Dept: ADMINISTRATIVE | Facility: OTHER | Age: 71
End: 2021-12-16

## 2022-01-06 DIAGNOSIS — J06.9 ACUTE URI: Primary | ICD-10-CM

## 2022-01-06 RX ORDER — CLINDAMYCIN HYDROCHLORIDE 300 MG/1
300 CAPSULE ORAL 2 TIMES DAILY WITH MEALS
Qty: 20 CAPSULE | Refills: 0 | Status: SHIPPED | OUTPATIENT
Start: 2022-01-06 | End: 2022-01-17

## 2022-01-06 RX ORDER — GUAIFENESIN/DEXTROMETHORPHAN 100-10MG/5
5 SYRUP ORAL 3 TIMES DAILY PRN
Qty: 118 ML | Refills: 1 | Status: SHIPPED | OUTPATIENT
Start: 2022-01-06 | End: 2022-01-17

## 2022-01-06 RX ORDER — POLYETHYLENE GLYCOL 3350 17 G/17G
17 POWDER, FOR SOLUTION ORAL DAILY
Qty: 17 G | Refills: 0 | Status: SHIPPED | OUTPATIENT
Start: 2022-01-06

## 2022-01-11 ENCOUNTER — RA CDI HCC (OUTPATIENT)
Dept: OTHER | Facility: HOSPITAL | Age: 72
End: 2022-01-11

## 2022-01-11 NOTE — PROGRESS NOTES
[facility]  coding opportunities          Number of diagnosis code(s) already on the problem list added to FYI fla     Chart Reviewed * (Number of) Inbasket suggestions sent to Provider: 1            Number of suggestions used: 2      Number of suggestions NOT actually used: 3     Patients insurance company: [address]  (Medicare Advantage and Commercial)     Visit status: Patient arrived for their scheduled appointment        [place] [id number]  coding opportunities     DX; N18 31 Chronic kidney disease, stage 3a--GFR 53-41    Please review/recertify the BPA diagnoses for      Number of diagnosis code(s) already on the problem list added to FYI fla     Chart Reviewed * (Number of) Inbasket suggestions sent to Provider: 1                  Patients insurance company: [address]  (Medicare Advantage and [employer]

## 2022-01-14 ENCOUNTER — TELEPHONE (OUTPATIENT)
Dept: GASTROENTEROLOGY | Facility: MEDICAL CENTER | Age: 72
End: 2022-01-14

## 2022-01-14 NOTE — TELEPHONE ENCOUNTER
per anesthesia patient needs to have procedure done at [facility] due to his ICD, spoke to patients brother and rescheduled for 3/17 with Dr Gomez Mariee at [facility]

## 2022-01-17 ENCOUNTER — TELEMEDICINE (OUTPATIENT)
Dept: FAMILY MEDICINE CLINIC | Facility: CLINIC | Age: 72
End: 2022-01-17
Payer: COMMERCIAL

## 2022-01-17 DIAGNOSIS — I48.0 PAROXYSMAL ATRIAL FIBRILLATION (HCC): Primary | ICD-10-CM

## 2022-01-17 DIAGNOSIS — E11.69 HYPERLIPIDEMIA ASSOCIATED WITH TYPE 2 DIABETES MELLITUS (HCC): ICD-10-CM

## 2022-01-17 DIAGNOSIS — E78.5 HYPERLIPIDEMIA ASSOCIATED WITH TYPE 2 DIABETES MELLITUS (HCC): ICD-10-CM

## 2022-01-17 DIAGNOSIS — D64.9 ANEMIA, UNSPECIFIED TYPE: ICD-10-CM

## 2022-01-17 PROCEDURE — 99214 OFFICE O/P EST MOD 30 MIN: CPT | Performed by: INTERNAL MEDICINE

## 2022-01-17 PROCEDURE — 1160F RVW MEDS BY RX/DR IN RCRD: CPT | Performed by: INTERNAL MEDICINE

## 2022-01-17 PROCEDURE — 1036F TOBACCO NON-USER: CPT | Performed by: INTERNAL MEDICINE

## 2022-01-17 NOTE — ASSESSMENT & PLAN NOTE
continue Atorvastatin  Life style Mod  RTC in 2-3 mos w Blood  work    Lab Results   Component Value Date    HGBA1C 5 8 (H) 09/29/2021

## 2022-01-17 NOTE — PROGRESS NOTES
Virtual Regular Visit    Verification of patient location:    Patient is located in the following state in which I hold an active license [place]      Assessment/Plan:    Problem List Items Addressed This Visit        Endocrine    Hyperlipidemia associated with type 2 diabetes mellitus ([facility] )     continue Atorvastatin  Life style Mod  RTC in 2-3 mos w Blood  work    Lab Results   Component Value Date    HGBA1C 5 8 (H) 09/29/2021               Cardiovascular and Mediastinum    Atrial fibrillation ([address] ) - Primary     Stable  Continue same  RTC in 2 mos w blood work  FU w Cradiology              Other    Anemia     FU With GI  Continue same  RTC in 2 mos w Blood work                    Reason for visit is   Chief Complaint   Patient presents with    Virtual Regular Visit    Virtual Regular Visit        Encounter provider Daina Essex, MD    Provider located at [place] [address]  [address] [address] [phone]      Recent Visits  No visits were found meeting these conditions  Showing recent visits within past 7 days and meeting all other requirements  Today's Visits  Date Type Provider Dept   01/17/22 Kayla Vera MD Pg  [facility] [place]   Showing today's visits and meeting all other requirements  Future Appointments  No visits were found meeting these conditions  Showing future appointments within next 150 days and meeting all other requirements       The patient was identified by name and date of birth  Yasmin Yen was informed that this is a telemedicine visit and that the visit is being conducted through [address] [facility] and patient was informed that this is a secure, HIPAA-compliant platform  He agrees to proceed     My office door was closed  No one else was in the room  He acknowledged consent and understanding of privacy and security of the video platform   The patient has agreed to participate and understands they can discontinue the visit at any time  Patient is aware this is a billable service  Libby iDamond is a 80 y o  male is here for Virtual Visit as Below :        80 Y O Man is here for Virtual visit, He feels ok, No recent Blood work, Med list reviewed w pt, No New symptoms,  Past Medical History:   Diagnosis Date    Anemia 3/12/2013    Atrial fibrillation (HCC)     Coronary artery disease     GERD (gastroesophageal reflux disease)     Hyperlipidemia associated with type 2 diabetes mellitus ([facility] ) 5/4/2021    Hypertension     Obesity        History reviewed  No pertinent surgical history  Current Outpatient Medications   Medication Sig Dispense Refill    allopurinol (ZYLOPRIM) 100 mg tablet Take 1 tablet (100 mg total) by mouth daily 90 tablet 3    amiodarone 200 mg tablet Take 1 tablet (200 mg total) by mouth daily 90 tablet 3    atorvastatin (LIPITOR) 20 mg tablet Take 1 tablet (20 mg total) by mouth daily Please STOP Pravastatin    90 tablet 3    bisacodyl (DULCOLAX) 5 mg EC tablet Take 1 tablet (5 mg total) by mouth daily as needed for constipation 2 tablet 0    Diclofenac Sodium (VOLTAREN) 1 % Apply 2 g topically 4 (four) times a day 200 g 1    ergocalciferol (VITAMIN D2) 50,000 units Take 1 capsule (50,000 Units total) by mouth once a week 13 capsule 1    furosemide (Lasix) 20 mg tablet Take 1 tablet (20 mg total) by mouth daily 90 tablet 3    ketoconazole (NIZORAL) 2 % shampoo Apply 1 application topically 3 (three) times a week 120 mL 2    linaCLOtide 290 MCG CAPS Take 1 capsule by mouth daily 90 capsule 3    loratadine (Claritin) 10 mg tablet Take 1 tablet (10 mg total) by mouth daily In the evening 30 tablet 1    losartan (COZAAR) 50 mg tablet Take 1 tablet (50 mg total) by mouth daily 90 tablet 3    metoprolol succinate (Toprol XL) 25 mg 24 hr tablet Take 1 tablet (25 mg total) by mouth daily 90 tablet 3    pantoprazole (PROTONIX) 20 mg tablet Take 1 tablet (20 mg total) by mouth daily 90 tablet 1    polyethylene glycol (GLYCOLAX) 17 GM/SCOOP powder Take 17 g by mouth daily 17 g 0    warfarin (COUMADIN) 5 mg tablet Take 1 tablet (5 mg total) by mouth daily 90 tablet 3     No current facility-administered medications for this visit  Allergies   Allergen Reactions    No Active Allergies        Review of Systems   Constitutional: Negative for chills, fatigue and fever  HENT: Negative for congestion, facial swelling, sore throat, trouble swallowing and voice change  Eyes: Negative for pain, discharge and visual disturbance  Respiratory: Negative for cough, shortness of breath and wheezing  Cardiovascular: Negative for chest pain, palpitations and leg swelling  Gastrointestinal: Negative for abdominal pain, blood in stool, constipation, diarrhea and nausea  Endocrine: Negative for polydipsia, polyphagia and polyuria  Genitourinary: Negative for difficulty urinating, hematuria and urgency  Musculoskeletal: Negative for arthralgias and myalgias  Skin: Negative for rash  Neurological: Negative for dizziness, tremors, weakness and headaches  Hematological: Negative for adenopathy  Does not bruise/bleed easily  Psychiatric/Behavioral: Negative for dysphoric mood, sleep disturbance and suicidal ideas  Video Exam    There were no vitals filed for this visit  Physical Exam  Constitutional:       General: He is not in acute distress  HENT:      Head: Normocephalic  Mouth/Throat:      Pharynx: No oropharyngeal exudate  Eyes:      General: No scleral icterus  Conjunctiva/sclera: Conjunctivae normal       Pupils: Pupils are equal, round, and reactive to light  Neck:      Thyroid: No thyromegaly  Cardiovascular:      Rate and Rhythm: Normal rate and regular rhythm  Heart sounds: Normal heart sounds  No murmur heard  Pulmonary:      Effort: Pulmonary effort is normal  No respiratory distress        Breath sounds: Normal breath sounds  No wheezing or rales  Abdominal:      General: Bowel sounds are normal  There is no distension  Palpations: Abdomen is soft  Tenderness: There is no abdominal tenderness  There is no guarding or rebound  Musculoskeletal:         General: No tenderness  Cervical back: Neck supple  Lymphadenopathy:      Cervical: No cervical adenopathy  Skin:     Coloration: Skin is not pale  Findings: No rash  Neurological:      Mental Status: He is alert and oriented to person, place, and time  Sensory: No sensory deficit  Motor: No weakness  I spent 20 minutes directly with the patient during this visit    [address] [facility] verbally agrees to participate in [employer]  Pt is aware that [employer] could be limited without vital signs or the ability to perform a full hands-on physical Vervandana Unger understands he or the provider may request at any time to terminate the video visit and request the patient to seek care or treatment in person

## 2022-02-07 DIAGNOSIS — K59.04 CHRONIC IDIOPATHIC CONSTIPATION: Primary | ICD-10-CM

## 2022-02-26 ENCOUNTER — APPOINTMENT (OUTPATIENT)
Dept: LAB | Age: 72
End: 2022-02-26
Payer: COMMERCIAL

## 2022-02-26 DIAGNOSIS — E53.8 LOW VITAMIN B12 LEVEL: ICD-10-CM

## 2022-02-26 DIAGNOSIS — M81.8 IDIOPATHIC OSTEOPOROSIS: ICD-10-CM

## 2022-02-26 DIAGNOSIS — R73.09 ELEVATED HEMOGLOBIN A1C: ICD-10-CM

## 2022-02-26 DIAGNOSIS — E01.0 THYROMEGALY: ICD-10-CM

## 2022-02-26 DIAGNOSIS — N40.0 ENLARGED PROSTATE: ICD-10-CM

## 2022-02-26 LAB
25(OH)D3 SERPL-MCNC: 70.7 NG/ML (ref 30–100)
ALBUMIN SERPL BCP-MCNC: 3.8 G/DL (ref 3.5–5)
ALP SERPL-CCNC: 95 U/L (ref 46–116)
ALT SERPL W P-5'-P-CCNC: 51 U/L (ref 12–78)
ANION GAP SERPL CALCULATED.3IONS-SCNC: 2 MMOL/L (ref 4–13)
AST SERPL W P-5'-P-CCNC: 40 U/L (ref 5–45)
BASOPHILS # BLD AUTO: 0.05 THOUSANDS/ΜL (ref 0–0.1)
BASOPHILS NFR BLD AUTO: 1 % (ref 0–1)
BILIRUB SERPL-MCNC: 0.7 MG/DL (ref 0.2–1)
BUN SERPL-MCNC: 25 MG/DL (ref 5–25)
CALCIUM SERPL-MCNC: 9.4 MG/DL (ref 8.3–10.1)
CHLORIDE SERPL-SCNC: 111 MMOL/L (ref 100–108)
CO2 SERPL-SCNC: 27 MMOL/L (ref 21–32)
CREAT SERPL-MCNC: 1.49 MG/DL (ref 0.6–1.3)
EOSINOPHIL # BLD AUTO: 0.17 THOUSAND/ΜL (ref 0–0.61)
EOSINOPHIL NFR BLD AUTO: 3 % (ref 0–6)
ERYTHROCYTE [DISTWIDTH] IN BLOOD BY AUTOMATED COUNT: 14.5 % (ref 11.6–15.1)
EST. AVERAGE GLUCOSE BLD GHB EST-MCNC: 117 MG/DL
GFR SERPL CREATININE-BSD FRML MDRD: 46 ML/MIN/1.73SQ M
GLUCOSE P FAST SERPL-MCNC: 110 MG/DL (ref 65–99)
HBA1C MFR BLD: 5.7 %
HCT VFR BLD AUTO: 42.2 % (ref 36.5–49.3)
HGB BLD-MCNC: 12.7 G/DL (ref 12–17)
IMM GRANULOCYTES # BLD AUTO: 0.03 THOUSAND/UL (ref 0–0.2)
IMM GRANULOCYTES NFR BLD AUTO: 0 % (ref 0–2)
LYMPHOCYTES # BLD AUTO: 1.82 THOUSANDS/ΜL (ref 0.6–4.47)
LYMPHOCYTES NFR BLD AUTO: 27 % (ref 14–44)
MCH RBC QN AUTO: 28.4 PG (ref 26.8–34.3)
MCHC RBC AUTO-ENTMCNC: 30.1 G/DL (ref 31.4–37.4)
MCV RBC AUTO: 94 FL (ref 82–98)
MONOCYTES # BLD AUTO: 0.53 THOUSAND/ΜL (ref 0.17–1.22)
MONOCYTES NFR BLD AUTO: 8 % (ref 4–12)
NEUTROPHILS # BLD AUTO: 4.09 THOUSANDS/ΜL (ref 1.85–7.62)
NEUTS SEG NFR BLD AUTO: 61 % (ref 43–75)
NRBC BLD AUTO-RTO: 0 /100 WBCS
PLATELET # BLD AUTO: 186 THOUSANDS/UL (ref 149–390)
PMV BLD AUTO: 11.9 FL (ref 8.9–12.7)
POTASSIUM SERPL-SCNC: 4.7 MMOL/L (ref 3.5–5.3)
PROT SERPL-MCNC: 8 G/DL (ref 6.4–8.2)
PSA SERPL-MCNC: 1.8 NG/ML (ref 0–4)
RBC # BLD AUTO: 4.47 MILLION/UL (ref 3.88–5.62)
SODIUM SERPL-SCNC: 140 MMOL/L (ref 136–145)
T4 FREE SERPL-MCNC: 1.18 NG/DL (ref 0.76–1.46)
TSH SERPL DL<=0.05 MIU/L-ACNC: 2.45 UIU/ML (ref 0.36–3.74)
VIT B12 SERPL-MCNC: 1248 PG/ML (ref 100–900)
WBC # BLD AUTO: 6.69 THOUSAND/UL (ref 4.31–10.16)

## 2022-02-26 PROCEDURE — 84153 ASSAY OF PSA TOTAL: CPT

## 2022-02-26 PROCEDURE — 3044F HG A1C LEVEL LT 7.0%: CPT | Performed by: INTERNAL MEDICINE

## 2022-02-26 PROCEDURE — 84439 ASSAY OF FREE THYROXINE: CPT

## 2022-02-26 PROCEDURE — 84443 ASSAY THYROID STIM HORMONE: CPT

## 2022-02-26 PROCEDURE — 83036 HEMOGLOBIN GLYCOSYLATED A1C: CPT

## 2022-02-26 PROCEDURE — 80053 COMPREHEN METABOLIC PANEL: CPT

## 2022-02-26 PROCEDURE — 82306 VITAMIN D 25 HYDROXY: CPT

## 2022-02-26 PROCEDURE — 36415 COLL VENOUS BLD VENIPUNCTURE: CPT

## 2022-02-26 PROCEDURE — 82607 VITAMIN B-12: CPT

## 2022-02-26 PROCEDURE — 85025 COMPLETE CBC W/AUTO DIFF WBC: CPT

## 2022-03-07 DIAGNOSIS — M35.3 PMR (POLYMYALGIA RHEUMATICA) (HCC): Primary | ICD-10-CM

## 2022-03-07 RX ORDER — PREDNISONE 1 MG/1
5 TABLET ORAL DAILY
Qty: 30 TABLET | Refills: 0 | Status: SHIPPED | OUTPATIENT
Start: 2022-03-07 | End: 2022-03-17

## 2022-03-15 ENCOUNTER — RA CDI HCC (OUTPATIENT)
Dept: OTHER | Facility: HOSPITAL | Age: 72
End: 2022-03-15

## 2022-03-15 NOTE — PROGRESS NOTES
[facility] [address]  coding opportunities     E11 22     Chart Reviewed number of suggestions sent to Provider: 1     Patients Insurance     Medicare Insurance: [address] [place] [address]

## 2022-03-16 DIAGNOSIS — M35.3 PMR (POLYMYALGIA RHEUMATICA) (HCC): ICD-10-CM

## 2022-03-16 RX ORDER — PREDNISONE 1 MG/1
5 TABLET ORAL DAILY
Qty: 30 TABLET | Refills: 0 | OUTPATIENT
Start: 2022-03-16

## 2022-03-17 DIAGNOSIS — K21.9 GASTROESOPHAGEAL REFLUX DISEASE WITHOUT ESOPHAGITIS: ICD-10-CM

## 2022-03-17 DIAGNOSIS — I10 ESSENTIAL HYPERTENSION: ICD-10-CM

## 2022-03-17 DIAGNOSIS — E78.49 OTHER HYPERLIPIDEMIA: ICD-10-CM

## 2022-03-17 DIAGNOSIS — M1A.9XX0 CHRONIC GOUT WITHOUT TOPHUS, UNSPECIFIED CAUSE, UNSPECIFIED SITE: ICD-10-CM

## 2022-03-17 DIAGNOSIS — K58.1 IRRITABLE BOWEL SYNDROME WITH CONSTIPATION: Primary | ICD-10-CM

## 2022-03-17 DIAGNOSIS — I10 ESSENTIAL HYPERTENSION, BENIGN: ICD-10-CM

## 2022-03-17 RX ORDER — ALLOPURINOL 100 MG/1
100 TABLET ORAL DAILY
Qty: 90 TABLET | Refills: 3 | Status: SHIPPED | OUTPATIENT
Start: 2022-03-17 | End: 2022-03-21 | Stop reason: SDUPTHER

## 2022-03-17 RX ORDER — METOPROLOL SUCCINATE 25 MG/1
25 TABLET, EXTENDED RELEASE ORAL DAILY
Qty: 90 TABLET | Refills: 3 | Status: SHIPPED | OUTPATIENT
Start: 2022-03-17 | End: 2022-03-21 | Stop reason: SDUPTHER

## 2022-03-17 RX ORDER — AMIODARONE HYDROCHLORIDE 200 MG/1
200 TABLET ORAL DAILY
Qty: 90 TABLET | Refills: 3 | Status: SHIPPED | OUTPATIENT
Start: 2022-03-17 | End: 2022-03-21 | Stop reason: SDUPTHER

## 2022-03-17 RX ORDER — FUROSEMIDE 20 MG/1
20 TABLET ORAL DAILY
Qty: 90 TABLET | Refills: 3 | Status: SHIPPED | OUTPATIENT
Start: 2022-03-17 | End: 2022-03-21 | Stop reason: SDUPTHER

## 2022-03-17 RX ORDER — LOSARTAN POTASSIUM 50 MG/1
50 TABLET ORAL DAILY
Qty: 90 TABLET | Refills: 3 | Status: SHIPPED | OUTPATIENT
Start: 2022-03-17 | End: 2022-03-21 | Stop reason: SDUPTHER

## 2022-03-17 RX ORDER — ATORVASTATIN CALCIUM 20 MG/1
20 TABLET, FILM COATED ORAL DAILY
Qty: 90 TABLET | Refills: 3 | Status: SHIPPED | OUTPATIENT
Start: 2022-03-17 | End: 2022-03-21 | Stop reason: SDUPTHER

## 2022-03-17 RX ORDER — PANTOPRAZOLE SODIUM 20 MG/1
20 TABLET, DELAYED RELEASE ORAL DAILY
Qty: 90 TABLET | Refills: 1 | Status: SHIPPED | OUTPATIENT
Start: 2022-03-17 | End: 2022-03-21 | Stop reason: SDUPTHER

## 2022-03-21 ENCOUNTER — OFFICE VISIT (OUTPATIENT)
Dept: FAMILY MEDICINE CLINIC | Facility: CLINIC | Age: 72
End: 2022-03-21
Payer: COMMERCIAL

## 2022-03-21 VITALS
WEIGHT: 218 LBS | TEMPERATURE: 97.4 F | BODY MASS INDEX: 32.29 KG/M2 | SYSTOLIC BLOOD PRESSURE: 130 MMHG | DIASTOLIC BLOOD PRESSURE: 82 MMHG | RESPIRATION RATE: 14 BRPM | OXYGEN SATURATION: 99 % | HEART RATE: 74 BPM | HEIGHT: 69 IN

## 2022-03-21 DIAGNOSIS — IMO0002 TYPE II DIABETES MELLITUS WITH MANIFESTATIONS, UNCONTROLLED: ICD-10-CM

## 2022-03-21 DIAGNOSIS — K21.9 GASTROESOPHAGEAL REFLUX DISEASE WITHOUT ESOPHAGITIS: ICD-10-CM

## 2022-03-21 DIAGNOSIS — K58.1 IRRITABLE BOWEL SYNDROME WITH CONSTIPATION: ICD-10-CM

## 2022-03-21 DIAGNOSIS — E55.9 AVITAMINOSIS D: ICD-10-CM

## 2022-03-21 DIAGNOSIS — M1A.9XX0 CHRONIC GOUT WITHOUT TOPHUS, UNSPECIFIED CAUSE, UNSPECIFIED SITE: ICD-10-CM

## 2022-03-21 DIAGNOSIS — I10 ESSENTIAL HYPERTENSION, BENIGN: ICD-10-CM

## 2022-03-21 DIAGNOSIS — I42.9 FAMILIAL CARDIOMYOPATHY (HCC): ICD-10-CM

## 2022-03-21 DIAGNOSIS — E78.49 OTHER HYPERLIPIDEMIA: ICD-10-CM

## 2022-03-21 DIAGNOSIS — I70.213 ATHEROSCLEROSIS OF NATIVE ARTERY OF BOTH LOWER EXTREMITIES WITH INTERMITTENT CLAUDICATION (HCC): ICD-10-CM

## 2022-03-21 DIAGNOSIS — H61.22 CERUMEN DEBRIS ON TYMPANIC MEMBRANE OF LEFT EAR: ICD-10-CM

## 2022-03-21 DIAGNOSIS — N18.2 CHRONIC RENAL FAILURE, STAGE 2 (MILD): Primary | ICD-10-CM

## 2022-03-21 DIAGNOSIS — H53.8 BLURRED VISION: ICD-10-CM

## 2022-03-21 DIAGNOSIS — I48.20 CHRONIC ATRIAL FIBRILLATION (HCC): ICD-10-CM

## 2022-03-21 DIAGNOSIS — I10 ESSENTIAL HYPERTENSION: ICD-10-CM

## 2022-03-21 PROCEDURE — 3075F SYST BP GE 130 - 139MM HG: CPT | Performed by: INTERNAL MEDICINE

## 2022-03-21 PROCEDURE — 1036F TOBACCO NON-USER: CPT | Performed by: INTERNAL MEDICINE

## 2022-03-21 PROCEDURE — 99214 OFFICE O/P EST MOD 30 MIN: CPT | Performed by: INTERNAL MEDICINE

## 2022-03-21 PROCEDURE — 4010F ACE/ARB THERAPY RXD/TAKEN: CPT | Performed by: INTERNAL MEDICINE

## 2022-03-21 PROCEDURE — 3079F DIAST BP 80-89 MM HG: CPT | Performed by: INTERNAL MEDICINE

## 2022-03-21 PROCEDURE — 3066F NEPHROPATHY DOC TX: CPT | Performed by: INTERNAL MEDICINE

## 2022-03-21 PROCEDURE — 3008F BODY MASS INDEX DOCD: CPT | Performed by: INTERNAL MEDICINE

## 2022-03-21 PROCEDURE — 1160F RVW MEDS BY RX/DR IN RCRD: CPT | Performed by: INTERNAL MEDICINE

## 2022-03-21 RX ORDER — AMIODARONE HYDROCHLORIDE 200 MG/1
200 TABLET ORAL DAILY
Qty: 90 TABLET | Refills: 3 | Status: SHIPPED | OUTPATIENT
Start: 2022-03-21

## 2022-03-21 RX ORDER — FUROSEMIDE 20 MG/1
20 TABLET ORAL DAILY
Qty: 90 TABLET | Refills: 3 | Status: SHIPPED | OUTPATIENT
Start: 2022-03-21 | End: 2022-06-13 | Stop reason: SDUPTHER

## 2022-03-21 RX ORDER — LOSARTAN POTASSIUM 50 MG/1
50 TABLET ORAL DAILY
Qty: 90 TABLET | Refills: 3 | Status: SHIPPED | OUTPATIENT
Start: 2022-03-21 | End: 2022-06-13 | Stop reason: SDUPTHER

## 2022-03-21 RX ORDER — ERGOCALCIFEROL 1.25 MG/1
50000 CAPSULE ORAL WEEKLY
Qty: 13 CAPSULE | Refills: 1 | Status: SHIPPED | OUTPATIENT
Start: 2022-03-21 | End: 2022-06-27 | Stop reason: SDUPTHER

## 2022-03-21 RX ORDER — ATORVASTATIN CALCIUM 20 MG/1
20 TABLET, FILM COATED ORAL DAILY
Qty: 90 TABLET | Refills: 3 | Status: SHIPPED | OUTPATIENT
Start: 2022-03-21 | End: 2022-06-27 | Stop reason: SDUPTHER

## 2022-03-21 RX ORDER — PANTOPRAZOLE SODIUM 20 MG/1
20 TABLET, DELAYED RELEASE ORAL DAILY
Qty: 90 TABLET | Refills: 3 | Status: SHIPPED | OUTPATIENT
Start: 2022-03-21 | End: 2022-06-27 | Stop reason: SDUPTHER

## 2022-03-21 RX ORDER — METOPROLOL SUCCINATE 25 MG/1
25 TABLET, EXTENDED RELEASE ORAL DAILY
Qty: 90 TABLET | Refills: 3 | Status: SHIPPED | OUTPATIENT
Start: 2022-03-21 | End: 2022-06-27 | Stop reason: SDUPTHER

## 2022-03-21 RX ORDER — ALLOPURINOL 100 MG/1
100 TABLET ORAL DAILY
Qty: 90 TABLET | Refills: 3 | Status: SHIPPED | OUTPATIENT
Start: 2022-03-21 | End: 2022-06-14 | Stop reason: SDUPTHER

## 2022-03-21 NOTE — PROGRESS NOTES
Assessment/Plan:         Diagnoses and all orders for this visit:    Chronic renal failure, stage 2 (mild); stable   Continue same  rtc in 3 mos w ;  -     Comprehensive metabolic panel; Future    Chronic gout without tophus, unspecified cause, unspecified site  -     allopurinol (ZYLOPRIM) 100 mg tablet; Take 1 tablet (100 mg total) by mouth daily    Renew ;  -     amiodarone 200 mg tablet; Take 1 tablet (200 mg total) by mouth daily  -     furosemide (Lasix) 20 mg tablet; Take 1 tablet (20 mg total) by mouth daily  -     losartan (COZAAR) 50 mg tablet; Take 1 tablet (50 mg total) by mouth daily    Other hyperlipidemia  -     atorvastatin (LIPITOR) 20 mg tablet; Take 1 tablet (20 mg total) by mouth daily Please STOP Pravastatin       Avitaminosis D  -     ergocalciferol (VITAMIN D2) 50,000 units; Take 1 capsule (50,000 Units total) by mouth once a week    Irritable bowel syndrome with constipation; stable on ;  -     linaCLOtide 290 MCG CAPS; Take 1 capsule by mouth in the morning    Essential hypertension  -     metoprolol succinate (Toprol XL) 25 mg 24 hr tablet; Take 1 tablet (25 mg total) by mouth daily    Gastroesophageal reflux disease without esophagitis  -     pantoprazole (PROTONIX) 20 mg tablet; Take 1 tablet (20 mg total) by mouth daily    Familial cardiomyopathy ([address] ); continue same  Fu w cardiology  rtc in 3 mos w blood work    Type II diabetes mellitus with manifestations, uncontrolled ([address] )  -     UA (URINE) with reflex to Scope; Future  -     Comprehensive metabolic panel; Future  -     Ambulatory Referral to Ophthalmology; Future    Atherosclerosis of native artery of both lower extremities with intermittent claudication (HCC)    Chronic atrial fibrillation ([address] ); stable  Continue same  rtc in 3 mos w blood work    Cerumen debris on tympanic membrane of left ear  -     Ear cerumen removal, done in office without using instruments        Blurred vision  -     Ambulatory Referral to Ophthalmology; Future        Subjective:      Patient ID: Angelina Cleary is a 80 y o  male  80 y o man is here for regular check up, and has few symptoms, recent blood work and med list reviewed w pt,  The following portions of the patient's history were reviewed and updated as appropriate: allergies, current medications, past family history, past medical history, past social history, past surgical history and problem list     Review of Systems   Constitutional: Negative for chills, fatigue and fever  HENT: Positive for hearing loss  Negative for congestion, facial swelling, sore throat, trouble swallowing and voice change  Eyes: Positive for visual disturbance  Negative for pain and discharge  Respiratory: Negative for cough, shortness of breath and wheezing  Cardiovascular: Negative for chest pain, palpitations and leg swelling  Gastrointestinal: Negative for abdominal pain, blood in stool, constipation, diarrhea and nausea  Endocrine: Negative for polydipsia, polyphagia and polyuria  Genitourinary: Negative for difficulty urinating, hematuria and urgency  Musculoskeletal: Negative for arthralgias and myalgias  Skin: Negative for rash  Neurological: Negative for dizziness, tremors, weakness and headaches  Hematological: Negative for adenopathy  Does not bruise/bleed easily  Psychiatric/Behavioral: Negative for dysphoric mood, sleep disturbance and suicidal ideas  Objective:      /82 (BP Location: Left arm, Patient Position: Sitting, Cuff Size: Standard)   Pulse 74   Temp (!) 97 4 °F (36 3 °C) (Tympanic)   Resp 14   Ht 5' 9" (1 753 m)   Wt 98 9 kg (218 lb)   SpO2 99%   BMI 32 19 kg/m²          Physical Exam  Constitutional:       General: He is not in acute distress  HENT:      Head: Normocephalic  Mouth/Throat:      Pharynx: No oropharyngeal exudate  Eyes:      General: No scleral icterus       Conjunctiva/sclera: Conjunctivae normal       Pupils: Pupils are equal, round, and reactive to light  Neck:      Thyroid: No thyromegaly  Cardiovascular:      Rate and Rhythm: Normal rate and regular rhythm  Heart sounds: Normal heart sounds  No murmur heard  Pulmonary:      Effort: Pulmonary effort is normal  No respiratory distress  Breath sounds: Normal breath sounds  No wheezing or rales  Abdominal:      General: Bowel sounds are normal  There is no distension  Palpations: Abdomen is soft  Tenderness: There is no abdominal tenderness  There is no guarding or rebound  Musculoskeletal:         General: No tenderness  Cervical back: Neck supple  Lymphadenopathy:      Cervical: No cervical adenopathy  Skin:     Coloration: Skin is not pale  Findings: No rash  Neurological:      Mental Status: He is alert and oriented to person, place, and time  Sensory: No sensory deficit  Motor: No weakness

## 2022-05-13 DIAGNOSIS — K58.1 IRRITABLE BOWEL SYNDROME WITH CONSTIPATION: ICD-10-CM

## 2022-06-13 DIAGNOSIS — I10 ESSENTIAL HYPERTENSION, BENIGN: ICD-10-CM

## 2022-06-13 RX ORDER — LOSARTAN POTASSIUM 50 MG/1
50 TABLET ORAL DAILY
Qty: 90 TABLET | Refills: 3 | Status: SHIPPED | OUTPATIENT
Start: 2022-06-13 | End: 2022-06-27 | Stop reason: SDUPTHER

## 2022-06-13 RX ORDER — FUROSEMIDE 20 MG/1
20 TABLET ORAL DAILY
Qty: 90 TABLET | Refills: 3 | Status: SHIPPED | OUTPATIENT
Start: 2022-06-13 | End: 2022-06-27 | Stop reason: SDUPTHER

## 2022-06-14 DIAGNOSIS — M1A.9XX0 CHRONIC GOUT WITHOUT TOPHUS, UNSPECIFIED CAUSE, UNSPECIFIED SITE: ICD-10-CM

## 2022-06-14 RX ORDER — ALLOPURINOL 100 MG/1
100 TABLET ORAL DAILY
Qty: 90 TABLET | Refills: 3 | Status: SHIPPED | OUTPATIENT
Start: 2022-06-14 | End: 2022-06-27 | Stop reason: SDUPTHER

## 2022-06-27 ENCOUNTER — OFFICE VISIT (OUTPATIENT)
Dept: FAMILY MEDICINE CLINIC | Facility: CLINIC | Age: 72
End: 2022-06-27
Payer: COMMERCIAL

## 2022-06-27 VITALS
RESPIRATION RATE: 14 BRPM | HEART RATE: 92 BPM | OXYGEN SATURATION: 96 % | DIASTOLIC BLOOD PRESSURE: 82 MMHG | SYSTOLIC BLOOD PRESSURE: 130 MMHG | WEIGHT: 219 LBS | BODY MASS INDEX: 32.44 KG/M2 | HEIGHT: 69 IN | TEMPERATURE: 96.1 F

## 2022-06-27 DIAGNOSIS — E78.49 OTHER HYPERLIPIDEMIA: ICD-10-CM

## 2022-06-27 DIAGNOSIS — E55.9 AVITAMINOSIS D: ICD-10-CM

## 2022-06-27 DIAGNOSIS — K21.9 GASTROESOPHAGEAL REFLUX DISEASE WITHOUT ESOPHAGITIS: ICD-10-CM

## 2022-06-27 DIAGNOSIS — I10 ESSENTIAL HYPERTENSION: ICD-10-CM

## 2022-06-27 DIAGNOSIS — M1A.9XX0 CHRONIC GOUT WITHOUT TOPHUS, UNSPECIFIED CAUSE, UNSPECIFIED SITE: ICD-10-CM

## 2022-06-27 DIAGNOSIS — M79.601 RIGHT ARM PAIN: Primary | ICD-10-CM

## 2022-06-27 DIAGNOSIS — I48.20 CHRONIC ATRIAL FIBRILLATION (HCC): ICD-10-CM

## 2022-06-27 DIAGNOSIS — R73.09 ELEVATED HEMOGLOBIN A1C: ICD-10-CM

## 2022-06-27 DIAGNOSIS — I10 ESSENTIAL HYPERTENSION, BENIGN: ICD-10-CM

## 2022-06-27 LAB — SL AMB POCT HEMOGLOBIN AIC: 5.9 (ref ?–6.5)

## 2022-06-27 PROCEDURE — 3044F HG A1C LEVEL LT 7.0%: CPT | Performed by: INTERNAL MEDICINE

## 2022-06-27 PROCEDURE — 3079F DIAST BP 80-89 MM HG: CPT | Performed by: INTERNAL MEDICINE

## 2022-06-27 PROCEDURE — 3075F SYST BP GE 130 - 139MM HG: CPT | Performed by: INTERNAL MEDICINE

## 2022-06-27 PROCEDURE — 99214 OFFICE O/P EST MOD 30 MIN: CPT | Performed by: INTERNAL MEDICINE

## 2022-06-27 PROCEDURE — 4010F ACE/ARB THERAPY RXD/TAKEN: CPT | Performed by: INTERNAL MEDICINE

## 2022-06-27 PROCEDURE — 83036 HEMOGLOBIN GLYCOSYLATED A1C: CPT | Performed by: INTERNAL MEDICINE

## 2022-06-27 PROCEDURE — 3008F BODY MASS INDEX DOCD: CPT | Performed by: INTERNAL MEDICINE

## 2022-06-27 PROCEDURE — 1160F RVW MEDS BY RX/DR IN RCRD: CPT | Performed by: INTERNAL MEDICINE

## 2022-06-27 PROCEDURE — 1036F TOBACCO NON-USER: CPT | Performed by: INTERNAL MEDICINE

## 2022-06-27 RX ORDER — METOPROLOL SUCCINATE 25 MG/1
25 TABLET, EXTENDED RELEASE ORAL DAILY
Qty: 90 TABLET | Refills: 3 | Status: SHIPPED | OUTPATIENT
Start: 2022-06-27

## 2022-06-27 RX ORDER — LOSARTAN POTASSIUM 50 MG/1
50 TABLET ORAL DAILY
Qty: 90 TABLET | Refills: 3 | Status: SHIPPED | OUTPATIENT
Start: 2022-06-27

## 2022-06-27 RX ORDER — PANTOPRAZOLE SODIUM 20 MG/1
20 TABLET, DELAYED RELEASE ORAL DAILY
Qty: 90 TABLET | Refills: 3 | Status: SHIPPED | OUTPATIENT
Start: 2022-06-27

## 2022-06-27 RX ORDER — ERGOCALCIFEROL 1.25 MG/1
50000 CAPSULE ORAL WEEKLY
Qty: 13 CAPSULE | Refills: 1 | Status: SHIPPED | OUTPATIENT
Start: 2022-06-27

## 2022-06-27 RX ORDER — ATORVASTATIN CALCIUM 20 MG/1
20 TABLET, FILM COATED ORAL DAILY
Qty: 90 TABLET | Refills: 3 | Status: SHIPPED | OUTPATIENT
Start: 2022-06-27

## 2022-06-27 RX ORDER — FUROSEMIDE 20 MG/1
20 TABLET ORAL DAILY
Qty: 90 TABLET | Refills: 3 | Status: SHIPPED | OUTPATIENT
Start: 2022-06-27

## 2022-06-27 RX ORDER — DICLOFENAC SODIUM 16.05 MG/ML
SOLUTION TOPICAL
COMMUNITY
Start: 2022-03-31

## 2022-06-27 RX ORDER — ALLOPURINOL 100 MG/1
100 TABLET ORAL DAILY
Qty: 90 TABLET | Refills: 3 | Status: SHIPPED | OUTPATIENT
Start: 2022-06-27

## 2022-06-27 NOTE — PROGRESS NOTES
Assessment/Plan:         Diagnoses and all orders for this visit:    Right arm pain; Moist Heat  pennsaid Lotion TID  RTc in 1 mo w :  -     XR humerus right; Future  Consider MRI       Gastroesophageal reflux disease without esophagitis; stable on ;  -     pantoprazole (PROTONIX) 20 mg tablet; Take 1 tablet (20 mg total) by mouth daily    Essential hypertension; stable on ;  -     metoprolol succinate (Toprol XL) 25 mg 24 hr tablet; Take 1 tablet (25 mg total) by mouth daily  RTC in 1 mo w :  -     UA (URINE) with reflex to Scope; Future  -     Comprehensive metabolic panel; Future  -     CBC and differential; Future  -     Magnesium; Future  -     Lipid panel; Future    Essential hypertension, benign  -     losartan (COZAAR) 50 mg tablet; Take 1 tablet (50 mg total) by mouth daily  -     furosemide (Lasix) 20 mg tablet; Take 1 tablet (20 mg total) by mouth daily    Avitaminosis D  -     ergocalciferol (VITAMIN D2) 50,000 units; Take 1 capsule (50,000 Units total) by mouth once a week    Other hyperlipidemia  -     atorvastatin (LIPITOR) 20 mg tablet; Take 1 tablet (20 mg total) by mouth daily Please STOP Pravastatin     -     Lipid panel; Future    Chronic gout without tophus, unspecified cause, unspecified site  -     Uric acid; Future  -     allopurinol (ZYLOPRIM) 100 mg tablet; Take 1 tablet (100 mg total) by mouth daily    Elevated hemoglobin A1c  -     POCT hemoglobin A1c    Chronic atrial fibrillation (HCC)  -     Amiodarone level; Future  -     Lipid panel; Future    Other orders  -     Diclofenac Sodium 1 5 % SOLN        Subjective:      Patient ID: Bishnu Price is a 80 y o  male  80 Y O man is here for Regular check Up, since few mos ago after cleaning [place], has been having on/off right arm pain, Tenderness, No recent Blood work, Med list reviewed w Pt,         The following portions of the patient's history were reviewed and updated as appropriate: allergies, current medications, past family history, past medical history, past social history, past surgical history and problem list     Review of Systems   Constitutional: Negative for chills, fatigue and fever  HENT: Negative for congestion, facial swelling, sore throat, trouble swallowing and voice change  Eyes: Negative for pain, discharge and visual disturbance  Respiratory: Negative for cough, shortness of breath and wheezing  Cardiovascular: Negative for chest pain, palpitations and leg swelling  Gastrointestinal: Negative for abdominal pain, blood in stool, constipation, diarrhea and nausea  Endocrine: Negative for polydipsia, polyphagia and polyuria  Genitourinary: Negative for difficulty urinating, hematuria and urgency  Musculoskeletal: Positive for arthralgias and myalgias  Skin: Negative for rash  Neurological: Negative for dizziness, tremors, weakness and headaches  Hematological: Negative for adenopathy  Does not bruise/bleed easily  Psychiatric/Behavioral: Negative for dysphoric mood, sleep disturbance and suicidal ideas  Objective:      /82 (BP Location: Left arm, Patient Position: Sitting, Cuff Size: Large)   Pulse 92   Temp (!) 96 1 °F (35 6 °C)   Resp 14   Ht 5' 9" (1 753 m)   Wt 99 3 kg (219 lb)   SpO2 96%   BMI 32 34 kg/m²          Physical Exam  Constitutional:       General: He is not in acute distress  HENT:      Head: Normocephalic  Mouth/Throat:      Pharynx: No oropharyngeal exudate  Eyes:      General: No scleral icterus  Conjunctiva/sclera: Conjunctivae normal       Pupils: Pupils are equal, round, and reactive to light  Neck:      Thyroid: No thyromegaly  Cardiovascular:      Rate and Rhythm: Normal rate and regular rhythm  Heart sounds: Normal heart sounds  No murmur heard  Pulmonary:      Effort: Pulmonary effort is normal  No respiratory distress  Breath sounds: Normal breath sounds  No wheezing or rales     Abdominal:      General: Bowel sounds are normal  There is no distension  Palpations: Abdomen is soft  Tenderness: There is no abdominal tenderness  There is no guarding or rebound  Musculoskeletal:         General: Tenderness present  Cervical back: Neck supple  Lymphadenopathy:      Cervical: No cervical adenopathy  Skin:     Coloration: Skin is not pale  Findings: No rash  Neurological:      Mental Status: He is alert and oriented to person, place, and time  Sensory: No sensory deficit  Motor: No weakness

## 2022-06-28 ENCOUNTER — APPOINTMENT (OUTPATIENT)
Dept: RADIOLOGY | Age: 72
End: 2022-06-28
Payer: COMMERCIAL

## 2022-06-28 ENCOUNTER — APPOINTMENT (OUTPATIENT)
Dept: LAB | Age: 72
End: 2022-06-28
Payer: COMMERCIAL

## 2022-06-28 DIAGNOSIS — I10 ESSENTIAL HYPERTENSION: ICD-10-CM

## 2022-06-28 DIAGNOSIS — E78.49 OTHER HYPERLIPIDEMIA: ICD-10-CM

## 2022-06-28 DIAGNOSIS — N39.0 CHRONIC UTI: Primary | ICD-10-CM

## 2022-06-28 DIAGNOSIS — M1A.9XX0 CHRONIC GOUT WITHOUT TOPHUS, UNSPECIFIED CAUSE, UNSPECIFIED SITE: ICD-10-CM

## 2022-06-28 DIAGNOSIS — M79.601 RIGHT ARM PAIN: ICD-10-CM

## 2022-06-28 DIAGNOSIS — I48.20 CHRONIC ATRIAL FIBRILLATION (HCC): ICD-10-CM

## 2022-06-28 DIAGNOSIS — R79.89 ABNORMAL CBC: ICD-10-CM

## 2022-06-28 LAB
ALBUMIN SERPL BCP-MCNC: 3.6 G/DL (ref 3.5–5)
ALP SERPL-CCNC: 80 U/L (ref 46–116)
ALT SERPL W P-5'-P-CCNC: 45 U/L (ref 12–78)
ANION GAP SERPL CALCULATED.3IONS-SCNC: 6 MMOL/L (ref 4–13)
AST SERPL W P-5'-P-CCNC: 30 U/L (ref 5–45)
BACTERIA UR QL AUTO: ABNORMAL /HPF
BASOPHILS # BLD AUTO: 0.04 THOUSANDS/ΜL (ref 0–0.1)
BASOPHILS NFR BLD AUTO: 1 % (ref 0–1)
BILIRUB SERPL-MCNC: 1.1 MG/DL (ref 0.2–1)
BILIRUB UR QL STRIP: NEGATIVE
BUN SERPL-MCNC: 27 MG/DL (ref 5–25)
CALCIUM SERPL-MCNC: 9.1 MG/DL (ref 8.3–10.1)
CHLORIDE SERPL-SCNC: 110 MMOL/L (ref 100–108)
CHOLEST SERPL-MCNC: 146 MG/DL
CLARITY UR: CLEAR
CO2 SERPL-SCNC: 24 MMOL/L (ref 21–32)
COLOR UR: ABNORMAL
CREAT SERPL-MCNC: 1.61 MG/DL (ref 0.6–1.3)
EOSINOPHIL # BLD AUTO: 0.19 THOUSAND/ΜL (ref 0–0.61)
EOSINOPHIL NFR BLD AUTO: 3 % (ref 0–6)
ERYTHROCYTE [DISTWIDTH] IN BLOOD BY AUTOMATED COUNT: 15.2 % (ref 11.6–15.1)
GFR SERPL CREATININE-BSD FRML MDRD: 42 ML/MIN/1.73SQ M
GLUCOSE P FAST SERPL-MCNC: 108 MG/DL (ref 65–99)
GLUCOSE UR STRIP-MCNC: NEGATIVE MG/DL
HCT VFR BLD AUTO: 37.8 % (ref 36.5–49.3)
HDLC SERPL-MCNC: 40 MG/DL
HGB BLD-MCNC: 11.4 G/DL (ref 12–17)
HGB UR QL STRIP.AUTO: NEGATIVE
IMM GRANULOCYTES # BLD AUTO: 0.02 THOUSAND/UL (ref 0–0.2)
IMM GRANULOCYTES NFR BLD AUTO: 0 % (ref 0–2)
KETONES UR STRIP-MCNC: NEGATIVE MG/DL
LDLC SERPL CALC-MCNC: 67 MG/DL (ref 0–100)
LEUKOCYTE ESTERASE UR QL STRIP: ABNORMAL
LYMPHOCYTES # BLD AUTO: 1.68 THOUSANDS/ΜL (ref 0.6–4.47)
LYMPHOCYTES NFR BLD AUTO: 26 % (ref 14–44)
MAGNESIUM SERPL-MCNC: 2.6 MG/DL (ref 1.6–2.6)
MCH RBC QN AUTO: 27.1 PG (ref 26.8–34.3)
MCHC RBC AUTO-ENTMCNC: 30.2 G/DL (ref 31.4–37.4)
MCV RBC AUTO: 90 FL (ref 82–98)
MONOCYTES # BLD AUTO: 0.53 THOUSAND/ΜL (ref 0.17–1.22)
MONOCYTES NFR BLD AUTO: 8 % (ref 4–12)
NEUTROPHILS # BLD AUTO: 4.02 THOUSANDS/ΜL (ref 1.85–7.62)
NEUTS SEG NFR BLD AUTO: 62 % (ref 43–75)
NITRITE UR QL STRIP: POSITIVE
NON-SQ EPI CELLS URNS QL MICRO: ABNORMAL /HPF
NONHDLC SERPL-MCNC: 106 MG/DL
NRBC BLD AUTO-RTO: 0 /100 WBCS
PH UR STRIP.AUTO: 6 [PH]
PLATELET # BLD AUTO: 146 THOUSANDS/UL (ref 149–390)
PMV BLD AUTO: 12.3 FL (ref 8.9–12.7)
POTASSIUM SERPL-SCNC: 4.6 MMOL/L (ref 3.5–5.3)
PROT SERPL-MCNC: 7.7 G/DL (ref 6.4–8.2)
PROT UR STRIP-MCNC: NEGATIVE MG/DL
RBC # BLD AUTO: 4.21 MILLION/UL (ref 3.88–5.62)
RBC #/AREA URNS AUTO: ABNORMAL /HPF
SODIUM SERPL-SCNC: 140 MMOL/L (ref 136–145)
SP GR UR STRIP.AUTO: 1.01 (ref 1–1.03)
TRIGL SERPL-MCNC: 193 MG/DL
URATE SERPL-MCNC: 6 MG/DL (ref 4.2–8)
UROBILINOGEN UR STRIP-ACNC: <2 MG/DL
WBC # BLD AUTO: 6.48 THOUSAND/UL (ref 4.31–10.16)
WBC #/AREA URNS AUTO: ABNORMAL /HPF

## 2022-06-28 PROCEDURE — 80151 DRUG ASSAY AMIODARONE: CPT

## 2022-06-28 PROCEDURE — 80061 LIPID PANEL: CPT

## 2022-06-28 PROCEDURE — 73060 X-RAY EXAM OF HUMERUS: CPT

## 2022-06-28 PROCEDURE — 81001 URINALYSIS AUTO W/SCOPE: CPT

## 2022-06-28 PROCEDURE — 80053 COMPREHEN METABOLIC PANEL: CPT

## 2022-06-28 PROCEDURE — 85025 COMPLETE CBC W/AUTO DIFF WBC: CPT

## 2022-06-28 PROCEDURE — 36415 COLL VENOUS BLD VENIPUNCTURE: CPT

## 2022-06-28 PROCEDURE — 83735 ASSAY OF MAGNESIUM: CPT

## 2022-06-28 PROCEDURE — 84550 ASSAY OF BLOOD/URIC ACID: CPT

## 2022-06-28 RX ORDER — NITROFURANTOIN 25; 75 MG/1; MG/1
100 CAPSULE ORAL DAILY
Qty: 30 CAPSULE | Refills: 0 | Status: SHIPPED | OUTPATIENT
Start: 2022-06-28

## 2022-07-06 LAB
AMIODARONE SERPL-MCNC: 1613 NG/ML (ref 1000–2500)
DESETHYLAMIODARONE SERPL-MCNC: 1045 NG/ML

## 2022-07-18 ENCOUNTER — TELEPHONE (OUTPATIENT)
Dept: HEMATOLOGY ONCOLOGY | Facility: CLINIC | Age: 72
End: 2022-07-18

## 2022-07-18 NOTE — TELEPHONE ENCOUNTER
Called and left a voice message stating that we have an opening at 1:40 this afternoon instead of his normal scheduled appointment at 3:20  Stated that if patient can come in sooner that is great just give us a call back to let us know  If not we will see patient at 3:20

## 2022-07-27 ENCOUNTER — VBI (OUTPATIENT)
Dept: ADMINISTRATIVE | Facility: OTHER | Age: 72
End: 2022-07-27

## 2022-07-27 ENCOUNTER — RA CDI HCC (OUTPATIENT)
Dept: OTHER | Facility: HOSPITAL | Age: 72
End: 2022-07-27

## 2022-07-27 NOTE — TELEPHONE ENCOUNTER
07/27/22 2:04 PM     See documentation in the [employer] SmartForm       [facility]

## 2022-07-27 NOTE — PROGRESS NOTES
[address]  coding opportunities     E11 [id number]     Chart Reviewed number of suggestions sent to Provider: 1     Patients Insurance     Medicare Insurance: [address] [place] [address]

## 2022-08-02 ENCOUNTER — OFFICE VISIT (OUTPATIENT)
Dept: FAMILY MEDICINE CLINIC | Facility: CLINIC | Age: 72
End: 2022-08-02
Payer: COMMERCIAL

## 2022-08-02 VITALS
HEIGHT: 69 IN | RESPIRATION RATE: 97 BRPM | SYSTOLIC BLOOD PRESSURE: 130 MMHG | TEMPERATURE: 96 F | BODY MASS INDEX: 32.56 KG/M2 | HEART RATE: 89 BPM | DIASTOLIC BLOOD PRESSURE: 88 MMHG | WEIGHT: 219.8 LBS

## 2022-08-02 DIAGNOSIS — R31.9 HEMATURIA, UNSPECIFIED TYPE: ICD-10-CM

## 2022-08-02 DIAGNOSIS — M77.8 TENDINITIS OF RIGHT SHOULDER: ICD-10-CM

## 2022-08-02 DIAGNOSIS — D69.6 PLATELETS DECREASED (HCC): ICD-10-CM

## 2022-08-02 DIAGNOSIS — Z00.01 ENCOUNTER FOR GENERAL ADULT MEDICAL EXAMINATION WITH ABNORMAL FINDINGS: Primary | ICD-10-CM

## 2022-08-02 DIAGNOSIS — E11.69 HYPERLIPIDEMIA ASSOCIATED WITH TYPE 2 DIABETES MELLITUS (HCC): ICD-10-CM

## 2022-08-02 DIAGNOSIS — R73.09 ELEVATED HEMOGLOBIN A1C: ICD-10-CM

## 2022-08-02 DIAGNOSIS — M17.0 OSTEOARTHRITIS OF BOTH KNEES, UNSPECIFIED OSTEOARTHRITIS TYPE: ICD-10-CM

## 2022-08-02 DIAGNOSIS — E78.5 HYPERLIPIDEMIA ASSOCIATED WITH TYPE 2 DIABETES MELLITUS (HCC): ICD-10-CM

## 2022-08-02 LAB
SL AMB  POCT GLUCOSE, UA: ABNORMAL
SL AMB LEUKOCYTE ESTERASE,UA: 15
SL AMB POCT BILIRUBIN,UA: ABNORMAL
SL AMB POCT BLOOD,UA: ABNORMAL
SL AMB POCT CLARITY,UA: CLEAR
SL AMB POCT COLOR,UA: YELLOW
SL AMB POCT KETONES,UA: 5
SL AMB POCT NITRITE,UA: ABNORMAL
SL AMB POCT PH,UA: 5
SL AMB POCT SPECIFIC GRAVITY,UA: 1.02
SL AMB POCT URINE PROTEIN: 15
SL AMB POCT UROBILINOGEN: 0.2

## 2022-08-02 PROCEDURE — 3075F SYST BP GE 130 - 139MM HG: CPT | Performed by: INTERNAL MEDICINE

## 2022-08-02 PROCEDURE — 3288F FALL RISK ASSESSMENT DOCD: CPT | Performed by: INTERNAL MEDICINE

## 2022-08-02 PROCEDURE — 3061F NEG MICROALBUMINURIA REV: CPT | Performed by: INTERNAL MEDICINE

## 2022-08-02 PROCEDURE — 1170F FXNL STATUS ASSESSED: CPT | Performed by: INTERNAL MEDICINE

## 2022-08-02 PROCEDURE — 1003F LEVEL OF ACTIVITY ASSESS: CPT | Performed by: INTERNAL MEDICINE

## 2022-08-02 PROCEDURE — 3079F DIAST BP 80-89 MM HG: CPT | Performed by: INTERNAL MEDICINE

## 2022-08-02 PROCEDURE — 99214 OFFICE O/P EST MOD 30 MIN: CPT | Performed by: INTERNAL MEDICINE

## 2022-08-02 PROCEDURE — 3725F SCREEN DEPRESSION PERFORMED: CPT | Performed by: INTERNAL MEDICINE

## 2022-08-02 PROCEDURE — G0439 PPPS, SUBSEQ VISIT: HCPCS | Performed by: INTERNAL MEDICINE

## 2022-08-02 PROCEDURE — 81002 URINALYSIS NONAUTO W/O SCOPE: CPT | Performed by: INTERNAL MEDICINE

## 2022-08-02 PROCEDURE — 1125F AMNT PAIN NOTED PAIN PRSNT: CPT | Performed by: INTERNAL MEDICINE

## 2022-08-02 PROCEDURE — 1160F RVW MEDS BY RX/DR IN RCRD: CPT | Performed by: INTERNAL MEDICINE

## 2022-08-02 NOTE — PROGRESS NOTES
Patient's shoes and socks removed  Right Foot/Ankle   Right Foot Inspection  Skin Exam: skin normal and skin intact  No dry skin, no warmth, no callus, no erythema, no maceration, no abnormal color, no pre-ulcer, no ulcer and no callus  Toe Exam: ROM and strength within normal limits  Sensory   Vibration: diminished  Proprioception: diminished  Monofilament testing: diminished    Vascular  Capillary refills: < 3 seconds  The right DP pulse is 1+  The right PT pulse is 0  Left Foot/Ankle  Left Foot Inspection  Skin Exam: skin normal and skin intact  No dry skin, no warmth, no erythema, no maceration, normal color, no pre-ulcer, no ulcer and no callus  Toe Exam: ROM and strength within normal limits  Sensory   Vibration: diminished  Proprioception: diminished  Monofilament testing: diminished    Vascular  Capillary refills: < 3 seconds  The left DP pulse is 1+  The left PT pulse is 0       Assign Risk Category  No deformity present  Loss of protective sensation  Weak pulses  Risk: 2

## 2022-08-02 NOTE — PROGRESS NOTES
Assessment/Plan:         Diagnoses and all orders for this visit:    Encounter for general adult medical examination with abnormal findings ; done in detail    Tendinitis of right shoulder; moist heat  Home P T   pennsaid Lotion TID  -     Ambulatory Referral to Physical Therapy; Future    Osteoarthritis of both knees, unspecified osteoarthritis type  -     Ambulatory Referral to Physical Therapy; Future    Hematuria, unspecified type  -     POCT urine dip    Platelets decreased (HCC)    Elevated hemoglobin A1c; life style Mod  RTC in 1-2 mos w :  -     UA (URINE) with reflex to Scope; Future  -     Comprehensive metabolic panel; Future  -     CBC and differential; Future  -     Lipid panel; Future    Hyperlipidemia associated with type 2 diabetes mellitus ([facility] [place] [address] )  -     Lipid panel; Future        Subjective:      Patient ID: Angelina Cleary is a 71 y o  male  71 Y O Man is here for AWV and Regular check Up, he has few symptoms, right arm pain since shoveling snow few weeks ago, recent Blood work and med list reviewed w Pt in detail    The following portions of the patient's history were reviewed and updated as appropriate: allergies, current medications, past family history, past medical history, past social history, past surgical history and problem list     Review of Systems   Constitutional: Negative for chills, fatigue and fever  HENT: Negative for congestion, facial swelling, sore throat, trouble swallowing and voice change  Eyes: Negative for pain, discharge and visual disturbance  Respiratory: Negative for cough, shortness of breath and wheezing  Cardiovascular: Negative for chest pain, palpitations and leg swelling  Gastrointestinal: Negative for abdominal pain, blood in stool, constipation, diarrhea and nausea  Endocrine: Negative for polydipsia, polyphagia and polyuria  Genitourinary: Negative for difficulty urinating, hematuria and urgency     Musculoskeletal: Positive for arthralgias and neck pain  Negative for myalgias  Skin: Negative for rash  Neurological: Negative for dizziness, tremors, weakness and headaches  Hematological: Negative for adenopathy  Does not bruise/bleed easily  Psychiatric/Behavioral: Negative for dysphoric mood, sleep disturbance and suicidal ideas  Objective:      /88 (BP Location: Left arm, Patient Position: Sitting, Cuff Size: Standard)   Pulse 89   Temp (!) 96 °F (35 6 °C) (Tympanic)   Resp (!) 97   Ht 5' 9" (1 753 m)   Wt 99 7 kg (219 lb 12 8 oz)   BMI 32 46 kg/m²          Physical Exam  Constitutional:       General: He is not in acute distress  HENT:      Head: Normocephalic  Mouth/Throat:      Pharynx: No oropharyngeal exudate  Eyes:      General: No scleral icterus  Conjunctiva/sclera: Conjunctivae normal       Pupils: Pupils are equal, round, and reactive to light  Neck:      Thyroid: No thyromegaly  Cardiovascular:      Rate and Rhythm: Normal rate and regular rhythm  Heart sounds: Normal heart sounds  No murmur heard  Pulmonary:      Effort: Pulmonary effort is normal  No respiratory distress  Breath sounds: Normal breath sounds  No wheezing or rales  Abdominal:      General: Bowel sounds are normal  There is no distension  Palpations: Abdomen is soft  Tenderness: There is no abdominal tenderness  There is no guarding or rebound  Musculoskeletal:         General: Tenderness present  Cervical back: Neck supple  Lymphadenopathy:      Cervical: No cervical adenopathy  Skin:     Coloration: Skin is not pale  Findings: No rash  Neurological:      Mental Status: He is alert and oriented to person, place, and time  Sensory: No sensory deficit  Motor: No weakness

## 2022-08-02 NOTE — PATIENT INSTRUCTIONS

## 2022-08-02 NOTE — PROGRESS NOTES
Assessment and Plan:     Problem List Items Addressed This Visit    None          Preventive health issues were discussed with patient, and age appropriate screening tests were ordered as noted in patient's After Visit Summary  Personalized health advice and appropriate referrals for health education or preventive services given if needed, as noted in patient's After Visit Summary  History of Present Illness:     Patient presents for a Medicare Wellness Visit    HPI   Patient Care Team:  Carlos Aguilar MD as PCP - General (Internal Medicine)     Review of Systems:     Review of Systems     Problem List:     Patient Active Problem List   Diagnosis    Hyperlipidemia    Hypertension    Atrial fibrillation ([facility] [address] )    Anemia    Atherosclerosis of native artery of both lower extremities with intermittent claudication ([facility] [address] )    Familial cardiomyopathy ([facility] [address] )    Hyperlipidemia associated with type 2 diabetes mellitus ([facility] [address] )      Past Medical and Surgical History:     Past Medical History:   Diagnosis Date    Anemia 3/12/2013    Atrial fibrillation ([facility] [address] )     Coronary artery disease     GERD (gastroesophageal reflux disease)     Hyperlipidemia associated with type 2 diabetes mellitus ([facility] [address] ) 5/4/2021    Hypertension     Obesity      No past surgical history on file     Family History:     Family History   Problem Relation Age of Onset   Inoc[place] Hypertension Mother     Diabetes Mother     No Known Problems Father       Social History:     Social History     Socioeconomic History    Marital status: Single     Spouse name: Not on file    Number of children: Not on file    Years of education: Not on file    Highest education level: Not on file   Occupational History    Occupation: retired   Tobacco Use    Smoking status: Never Smoker    Smokeless tobacco: Never Used    Tobacco comment: No passive smoke exposure   Vaping Use    Vaping Use: Never used   Substance and Sexual Activity    Alcohol use: No    Drug use: No    Sexual activity: Not Currently   Other Topics Concern    Not on file   Social History Narrative    Not on file     Social Determinants of Health     Financial Resource Strain: Not on file   Food Insecurity: Not on file   Transportation Needs: Not on file   Physical Activity: Not on file   Stress: Not on file   Social Connections: Not on file   Intimate Partner Violence: Not on file   Housing Stability: Not on file      Medications and Allergies:     Current Outpatient Medications   Medication Sig Dispense Refill    nitrofurantoin (MACROBID) 100 mg capsule Take 1 capsule (100 mg total) by mouth in the morning With food/lunch 30 capsule 0    allopurinol (ZYLOPRIM) 100 mg tablet Take 1 tablet (100 mg total) by mouth daily 90 tablet 3    amiodarone 200 mg tablet Take 1 tablet (200 mg total) by mouth daily 90 tablet 3    atorvastatin (LIPITOR) 20 mg tablet Take 1 tablet (20 mg total) by mouth daily Please STOP Pravastatin    90 tablet 3    bisacodyl (DULCOLAX) 5 mg EC tablet Take 1 tablet (5 mg total) by mouth daily as needed for constipation 2 tablet 0    Diclofenac Sodium 1 5 % SOLN       ergocalciferol (VITAMIN D2) 50,000 units Take 1 capsule (50,000 Units total) by mouth once a week 13 capsule 1    furosemide (Lasix) 20 mg tablet Take 1 tablet (20 mg total) by mouth daily 90 tablet 3    ketoconazole (NIZORAL) 2 % shampoo Apply 1 application topically 3 (three) times a week 120 mL 2    linaCLOtide 290 MCG CAPS Take 1 capsule by mouth in the morning 90 capsule 3    loratadine (Claritin) 10 mg tablet Take 1 tablet (10 mg total) by mouth daily In the evening 30 tablet 1    losartan (COZAAR) 50 mg tablet Take 1 tablet (50 mg total) by mouth daily 90 tablet 3    metoprolol succinate (Toprol XL) 25 mg 24 hr tablet Take 1 tablet (25 mg total) by mouth daily 90 tablet 3    pantoprazole (PROTONIX) 20 mg tablet Take 1 tablet (20 mg total) by mouth daily 90 tablet 3    polyethylene glycol (GLYCOLAX) 17 GM/SCOOP powder Take 17 g by mouth daily 17 g 0    warfarin (COUMADIN) 5 mg tablet Take 1 tablet (5 mg total) by mouth daily 90 tablet 3     No current facility-administered medications for this visit  Allergies   Allergen Reactions    No Active Allergies       Immunizations:     Immunization History   Administered Date(s) Administered    COVID-19 MODERNA VACC 0 5 ML IM 04/14/2021, 05/12/2021    Influenza Split 09/24/2012    Pneumococcal Conjugate 13-Valent 02/06/2019    Pneumococcal Polysaccharide PPV23 10/13/2021    TD (adult) Preservative Free 08/23/2016    Td (adult), adsorbed 08/23/2016      Health Maintenance:         Topic Date Due    Colorectal Cancer Screening  Never done    Hepatitis C Screening  Completed         Topic Date Due    COVID-19 Vaccine (3 - Booster for Moderna series) 10/12/2021    Influenza Vaccine (1) 09/01/2022      Medicare Screening Tests and Risk Assessments:     Silvio is here for his Subsequent Wellness visit  Last Medicare Wellness visit information reviewed, patient interviewed and updates made to the record today  Health Risk Assessment:   Patient rates overall health as good  Patient feels that their physical health rating is same  Patient is very satisfied with their life  Eyesight was rated as same  Hearing was rated as same  Patient feels that their emotional and mental health rating is same  Patients states they are never, rarely angry  Patient states they are never, rarely unusually tired/fatigued  Pain experienced in the last 7 days has been none  Patient states that he has experienced no weight loss or gain in last 6 months  Depression Screening:   PHQ-2 Score: 0      Fall Risk Screening: In the past year, patient has experienced: no history of falling in past year      Home Safety:  Patient does not have trouble with stairs inside or outside of their home  Patient has working smoke alarms and has working carbon monoxide detector   Home safety hazards include: none  Medications:   Patient is currently taking over-the-counter supplements  OTC medications include: see medication list  Patient is able to manage medications  Activities of Daily Living (ADLs)/Instrumental Activities of Daily Living (IADLs):   Walk and transfer into and out of bed and chair?: Yes  Dress and groom yourself?: Yes    Bathe or shower yourself?: Yes    Feed yourself?  Yes  Do your laundry/housekeeping?: Yes  Manage your money, pay your bills and track your expenses?: Yes  Make your own meals?: Yes    Do your own shopping?: Yes    Previous Hospitalizations:   Any hospitalizations or ED visits within the last 12 months?: No      PREVENTIVE SCREENINGS      Cardiovascular Screening:    General: Screening Not Indicated, History Lipid Disorder and Risks and Benefits Discussed      Diabetes Screening:     General: Screening Not Indicated, History Diabetes, Risks and Benefits Discussed and Screening Current      Colorectal Cancer Screening:     General: Risks and Benefits Discussed      Prostate Cancer Screening:    General: Screening Current and Risks and Benefits Discussed      Osteoporosis Screening:    General: Screening Not Indicated, History Osteoporosis and Risks and Benefits Discussed      Abdominal Aortic Aneurysm (AAA) Screening:    Risk factors include: age between 73-69 yo        General: Risks and Benefits Discussed      Lung Cancer Screening:     General: Screening Not Indicated and Risks and Benefits Discussed      Hepatitis C Screening:    General: Screening Current and Risks and Benefits Discussed    Screening, Brief Intervention, and Referral to Treatment (SBIRT)    Screening      Single Item Drug Screening:  How often have you used an illegal drug (including marijuana) or a prescription medication for non-medical reasons in the past year? never    Single Item Drug Screen Score: 0  Interpretation: Negative screen for possible drug use disorder    Other Counseling Topics:   Car/seat belt/driving safety, skin self-exam, sunscreen and calcium and vitamin D intake and regular weightbearing exercise  No exam data present     Physical Exam:     There were no vitals taken for this visit      Physical Exam     Yamilet Berrios MD

## 2022-09-26 DIAGNOSIS — R73.09 ELEVATED HEMOGLOBIN A1C: ICD-10-CM

## 2022-09-26 DIAGNOSIS — E78.5 HYPERLIPIDEMIA ASSOCIATED WITH TYPE 2 DIABETES MELLITUS (HCC): Primary | ICD-10-CM

## 2022-09-26 DIAGNOSIS — E11.69 HYPERLIPIDEMIA ASSOCIATED WITH TYPE 2 DIABETES MELLITUS (HCC): Primary | ICD-10-CM

## 2022-10-05 ENCOUNTER — TELEPHONE (OUTPATIENT)
Dept: HEMATOLOGY ONCOLOGY | Facility: CLINIC | Age: 72
End: 2022-10-05

## 2022-10-05 NOTE — TELEPHONE ENCOUNTER
Reached out to patient to schedule new patient appointment, patient indicates that he will call in to reschedule   Referral has been closed

## 2022-11-14 ENCOUNTER — APPOINTMENT (OUTPATIENT)
Dept: LAB | Age: 72
End: 2022-11-14

## 2022-11-14 DIAGNOSIS — E11.69 HYPERLIPIDEMIA ASSOCIATED WITH TYPE 2 DIABETES MELLITUS (HCC): ICD-10-CM

## 2022-11-14 DIAGNOSIS — R73.09 ELEVATED HEMOGLOBIN A1C: ICD-10-CM

## 2022-11-14 DIAGNOSIS — E78.5 HYPERLIPIDEMIA ASSOCIATED WITH TYPE 2 DIABETES MELLITUS (HCC): ICD-10-CM

## 2022-11-14 LAB
ALBUMIN SERPL BCP-MCNC: 3.8 G/DL (ref 3.5–5)
ALP SERPL-CCNC: 90 U/L (ref 46–116)
ALT SERPL W P-5'-P-CCNC: 39 U/L (ref 12–78)
ANION GAP SERPL CALCULATED.3IONS-SCNC: 4 MMOL/L (ref 4–13)
AST SERPL W P-5'-P-CCNC: 30 U/L (ref 5–45)
BASOPHILS # BLD AUTO: 0.06 THOUSANDS/ÂΜL (ref 0–0.1)
BASOPHILS NFR BLD AUTO: 1 % (ref 0–1)
BILIRUB SERPL-MCNC: 0.68 MG/DL (ref 0.2–1)
BUN SERPL-MCNC: 25 MG/DL (ref 5–25)
CALCIUM SERPL-MCNC: 9.1 MG/DL (ref 8.3–10.1)
CHLORIDE SERPL-SCNC: 112 MMOL/L (ref 96–108)
CHOLEST SERPL-MCNC: 126 MG/DL
CO2 SERPL-SCNC: 25 MMOL/L (ref 21–32)
CREAT SERPL-MCNC: 1.52 MG/DL (ref 0.6–1.3)
EOSINOPHIL # BLD AUTO: 0.19 THOUSAND/ÂΜL (ref 0–0.61)
EOSINOPHIL NFR BLD AUTO: 3 % (ref 0–6)
ERYTHROCYTE [DISTWIDTH] IN BLOOD BY AUTOMATED COUNT: 15.2 % (ref 11.6–15.1)
GFR SERPL CREATININE-BSD FRML MDRD: 45 ML/MIN/1.73SQ M
GLUCOSE P FAST SERPL-MCNC: 94 MG/DL (ref 65–99)
HCT VFR BLD AUTO: 40.4 % (ref 36.5–49.3)
HDLC SERPL-MCNC: 41 MG/DL
HGB BLD-MCNC: 12.2 G/DL (ref 12–17)
IMM GRANULOCYTES # BLD AUTO: 0.04 THOUSAND/UL (ref 0–0.2)
IMM GRANULOCYTES NFR BLD AUTO: 1 % (ref 0–2)
LDLC SERPL CALC-MCNC: 54 MG/DL (ref 0–100)
LYMPHOCYTES # BLD AUTO: 2.12 THOUSANDS/ÂΜL (ref 0.6–4.47)
LYMPHOCYTES NFR BLD AUTO: 28 % (ref 14–44)
MCH RBC QN AUTO: 27.5 PG (ref 26.8–34.3)
MCHC RBC AUTO-ENTMCNC: 30.2 G/DL (ref 31.4–37.4)
MCV RBC AUTO: 91 FL (ref 82–98)
MONOCYTES # BLD AUTO: 0.55 THOUSAND/ÂΜL (ref 0.17–1.22)
MONOCYTES NFR BLD AUTO: 7 % (ref 4–12)
NEUTROPHILS # BLD AUTO: 4.5 THOUSANDS/ÂΜL (ref 1.85–7.62)
NEUTS SEG NFR BLD AUTO: 60 % (ref 43–75)
NONHDLC SERPL-MCNC: 85 MG/DL
NRBC BLD AUTO-RTO: 0 /100 WBCS
PLATELET # BLD AUTO: 178 THOUSANDS/UL (ref 149–390)
PMV BLD AUTO: 11.7 FL (ref 8.9–12.7)
POTASSIUM SERPL-SCNC: 4.3 MMOL/L (ref 3.5–5.3)
PROT SERPL-MCNC: 7.6 G/DL (ref 6.4–8.4)
RBC # BLD AUTO: 4.44 MILLION/UL (ref 3.88–5.62)
SODIUM SERPL-SCNC: 141 MMOL/L (ref 135–147)
TRIGL SERPL-MCNC: 154 MG/DL
WBC # BLD AUTO: 7.46 THOUSAND/UL (ref 4.31–10.16)

## 2022-11-17 ENCOUNTER — OFFICE VISIT (OUTPATIENT)
Dept: FAMILY MEDICINE CLINIC | Facility: CLINIC | Age: 72
End: 2022-11-17

## 2022-11-17 VITALS
TEMPERATURE: 98.4 F | WEIGHT: 219 LBS | BODY MASS INDEX: 32.44 KG/M2 | RESPIRATION RATE: 14 BRPM | HEIGHT: 69 IN | SYSTOLIC BLOOD PRESSURE: 118 MMHG | HEART RATE: 62 BPM | DIASTOLIC BLOOD PRESSURE: 82 MMHG | OXYGEN SATURATION: 97 %

## 2022-11-17 DIAGNOSIS — M1A.9XX0 CHRONIC GOUT WITHOUT TOPHUS, UNSPECIFIED CAUSE, UNSPECIFIED SITE: ICD-10-CM

## 2022-11-17 DIAGNOSIS — M81.8 IDIOPATHIC OSTEOPOROSIS: ICD-10-CM

## 2022-11-17 DIAGNOSIS — E55.9 AVITAMINOSIS D: ICD-10-CM

## 2022-11-17 DIAGNOSIS — I48.20 CHRONIC ATRIAL FIBRILLATION (HCC): ICD-10-CM

## 2022-11-17 DIAGNOSIS — Z12.11 SCREEN FOR COLON CANCER: Primary | ICD-10-CM

## 2022-11-17 DIAGNOSIS — R73.09 ELEVATED HEMOGLOBIN A1C: ICD-10-CM

## 2022-11-17 DIAGNOSIS — N40.0 ENLARGED PROSTATE: ICD-10-CM

## 2022-11-17 DIAGNOSIS — E78.5 HYPERLIPIDEMIA ASSOCIATED WITH TYPE 2 DIABETES MELLITUS (HCC): ICD-10-CM

## 2022-11-17 DIAGNOSIS — E11.69 HYPERLIPIDEMIA ASSOCIATED WITH TYPE 2 DIABETES MELLITUS (HCC): ICD-10-CM

## 2022-11-17 DIAGNOSIS — I10 ESSENTIAL HYPERTENSION: ICD-10-CM

## 2022-11-17 DIAGNOSIS — N39.0 CHRONIC UTI: ICD-10-CM

## 2022-11-17 DIAGNOSIS — I10 ESSENTIAL HYPERTENSION, BENIGN: ICD-10-CM

## 2022-11-17 DIAGNOSIS — E78.49 OTHER HYPERLIPIDEMIA: ICD-10-CM

## 2022-11-17 LAB — SL AMB POCT HEMOGLOBIN AIC: 5.9 (ref ?–6.5)

## 2022-11-17 RX ORDER — LOSARTAN POTASSIUM 50 MG/1
50 TABLET ORAL DAILY
Qty: 90 TABLET | Refills: 3 | Status: SHIPPED | OUTPATIENT
Start: 2022-11-17

## 2022-11-17 RX ORDER — ALLOPURINOL 100 MG/1
100 TABLET ORAL DAILY
Qty: 90 TABLET | Refills: 3 | Status: SHIPPED | OUTPATIENT
Start: 2022-11-17

## 2022-11-17 RX ORDER — FUROSEMIDE 20 MG/1
20 TABLET ORAL DAILY
Qty: 90 TABLET | Refills: 3 | Status: SHIPPED | OUTPATIENT
Start: 2022-11-17

## 2022-11-17 RX ORDER — METOPROLOL SUCCINATE 25 MG/1
25 TABLET, EXTENDED RELEASE ORAL DAILY
Qty: 90 TABLET | Refills: 3 | Status: SHIPPED | OUTPATIENT
Start: 2022-11-17

## 2022-11-17 RX ORDER — ATORVASTATIN CALCIUM 20 MG/1
20 TABLET, FILM COATED ORAL DAILY
Qty: 90 TABLET | Refills: 3 | Status: SHIPPED | OUTPATIENT
Start: 2022-11-17

## 2022-11-17 RX ORDER — ERGOCALCIFEROL 1.25 MG/1
50000 CAPSULE ORAL WEEKLY
Qty: 13 CAPSULE | Refills: 1 | Status: SHIPPED | OUTPATIENT
Start: 2022-11-17

## 2022-11-17 RX ORDER — NITROFURANTOIN 25; 75 MG/1; MG/1
100 CAPSULE ORAL DAILY
Qty: 30 CAPSULE | Refills: 0 | Status: SHIPPED | OUTPATIENT
Start: 2022-11-17

## 2022-11-17 RX ORDER — AMIODARONE HYDROCHLORIDE 200 MG/1
200 TABLET ORAL DAILY
Qty: 90 TABLET | Refills: 3 | Status: SHIPPED | OUTPATIENT
Start: 2022-11-17

## 2022-11-17 NOTE — PROGRESS NOTES
Name: Tristan Dumont      : 1950      MRN: [id number]  Encounter Provider: James Max MD  Encounter Date: 2022   Encounter department: [address] [place] [address]     1  Screen for colon cancer  -     Ambulatory referral for colonoscopy; Future  -     Occult Blood, Fecal Immunochemical; Future    2  Chronic UTI  -     nitrofurantoin (MACROBID) 100 mg capsule; Take 1 capsule (100 mg total) by mouth in the morning With food/lunch    3  Essential hypertension  -     metoprolol succinate (Toprol XL) 25 mg 24 hr tablet; Take 1 tablet (25 mg total) by mouth daily    4  Essential hypertension, benign  -     losartan (COZAAR) 50 mg tablet; Take 1 tablet (50 mg total) by mouth daily  -     furosemide (Lasix) 20 mg tablet; Take 1 tablet (20 mg total) by mouth daily  -     amiodarone 200 mg tablet; Take 1 tablet (200 mg total) by mouth daily    5  Avitaminosis D  -     ergocalciferol (VITAMIN D2) 50,000 units; Take 1 capsule (50,000 Units total) by mouth once a week    6  Other hyperlipidemia  -     atorvastatin (LIPITOR) 20 mg tablet; Take 1 tablet (20 mg total) by mouth daily Please STOP Pravastatin       7  Chronic gout without tophus, unspecified cause, unspecified site  -     allopurinol (ZYLOPRIM) 100 mg tablet; Take 1 tablet (100 mg total) by mouth daily    8  Elevated hemoglobin A1c  -     POCT hemoglobin A1c  -     Vitamin B12; Future    9  Chronic atrial fibrillation (HCC)  -     Amiodarone level; Future  -     UA (URINE) with reflex to Scope; Future; Expected date: 2022  -     TSH, 3rd generation; Future; Expected date: 2022  -     T4, free; Future; Expected date: 2022  -     Vitamin B12; Future    10  Hyperlipidemia associated with type 2 diabetes mellitus (HCC)  -     UA (URINE) with reflex to Scope; Future; Expected date: 2022  -     TSH, 3rd generation; Future; Expected date: 2022  -     T4, free;  Future; Expected date: 11/17/2022  -     Vitamin B12; Future    11  Enlarged prostate  -     PSA Total, Diagnostic; Future    12  Idiopathic osteoporosis  -     Vitamin D 25 hydroxy; Future; Expected date: 11/17/2022    Continue same  FU w cardiology  FU w Nephrology  FU w Urology  Life style mod  RTC in 3 mos w Blood  work       Subjective      71 Y O Man is here for Regular check up, he feels ok, recent blood work and med list reviewed w pt in detail    Review of Systems   Constitutional: Negative for chills, fatigue and fever  HENT: Negative for congestion, facial swelling, sore throat, trouble swallowing and voice change  Eyes: Negative for pain, discharge and visual disturbance  Respiratory: Negative for cough, shortness of breath and wheezing  Cardiovascular: Negative for chest pain, palpitations and leg swelling  Gastrointestinal: Negative for abdominal pain, blood in stool, constipation, diarrhea and nausea  Endocrine: Negative for polydipsia, polyphagia and polyuria  Genitourinary: Negative for difficulty urinating, hematuria and urgency  Musculoskeletal: Negative for arthralgias and myalgias  Skin: Negative for rash  Neurological: Negative for dizziness, tremors, weakness and headaches  Hematological: Negative for adenopathy  Does not bruise/bleed easily  Psychiatric/Behavioral: Negative for dysphoric mood, sleep disturbance and suicidal ideas         Current Outpatient Medications on File Prior to Visit   Medication Sig   • Diclofenac Sodium 1 5 % SOLN    • ketoconazole (NIZORAL) 2 % shampoo Apply 1 application topically 3 (three) times a week   • linaCLOtide 290 MCG CAPS Take 1 capsule by mouth in the morning   • loratadine (Claritin) 10 mg tablet Take 1 tablet (10 mg total) by mouth daily In the evening   • pantoprazole (PROTONIX) 20 mg tablet Take 1 tablet (20 mg total) by mouth daily   • warfarin (COUMADIN) 5 mg tablet Take 1 tablet (5 mg total) by mouth daily   • [DISCONTINUED] allopurinol (ZYLOPRIM) 100 mg tablet Take 1 tablet (100 mg total) by mouth daily   • [DISCONTINUED] amiodarone 200 mg tablet Take 1 tablet (200 mg total) by mouth daily   • [DISCONTINUED] atorvastatin (LIPITOR) 20 mg tablet Take 1 tablet (20 mg total) by mouth daily Please STOP Pravastatin      • [DISCONTINUED] bisacodyl (DULCOLAX) 5 mg EC tablet Take 1 tablet (5 mg total) by mouth daily as needed for constipation   • [DISCONTINUED] ergocalciferol (VITAMIN D2) 50,000 units Take 1 capsule (50,000 Units total) by mouth once a week   • [DISCONTINUED] furosemide (Lasix) 20 mg tablet Take 1 tablet (20 mg total) by mouth daily   • [DISCONTINUED] losartan (COZAAR) 50 mg tablet Take 1 tablet (50 mg total) by mouth daily   • [DISCONTINUED] metoprolol succinate (Toprol XL) 25 mg 24 hr tablet Take 1 tablet (25 mg total) by mouth daily   • [DISCONTINUED] nitrofurantoin (MACROBID) 100 mg capsule Take 1 capsule (100 mg total) by mouth in the morning With food/lunch   • [DISCONTINUED] polyethylene glycol (GLYCOLAX) 17 GM/SCOOP powder Take 17 g by mouth daily       Objective     /82 (BP Location: Left arm, Patient Position: Sitting, Cuff Size: Standard)   Pulse 62   Temp 98 4 °F (36 9 °C)   Resp 14   Ht 5' 9" (1 753 m)   Wt 99 3 kg (219 lb)   SpO2 97%   BMI 32 34 kg/m²     Physical Exam  Constitutional:       General: He is not in acute distress  Appearance: He is well-nourished  HENT:      Head: Normocephalic  Mouth/Throat:      Mouth: Oropharynx is clear and moist       Pharynx: No oropharyngeal exudate  Eyes:      General: No scleral icterus  Conjunctiva/sclera: Conjunctivae normal       Pupils: Pupils are equal, round, and reactive to light  Neck:      Thyroid: No thyromegaly  Cardiovascular:      Rate and Rhythm: Normal rate and regular rhythm  Heart sounds: Murmur heard  Pulmonary:      Effort: Pulmonary effort is normal  No respiratory distress  Breath sounds: Normal breath sounds   No wheezing or rales  Abdominal:      General: Bowel sounds are normal  There is no distension  Palpations: Abdomen is soft  Tenderness: There is no abdominal tenderness  There is no guarding or rebound  Musculoskeletal:         General: No edema  Cervical back: Neck supple  Lymphadenopathy:      Cervical: No cervical adenopathy  Skin:     Coloration: Skin is not pale  Findings: No rash  Neurological:      Mental Status: He is alert and oriented to person, place, and time  Sensory: No sensory deficit  Motor: No weakness     Psychiatric:         Mood and Affect: Mood and affect normal        Brooke Montiel MD

## 2022-11-17 NOTE — PROGRESS NOTES
BMI Counseling: There is no height or weight on file to calculate BMI  The BMI is above normal  Nutrition recommendations include 3-5 servings of fruits/vegetables daily  Adequate: hears normal conversation without difficulty

## 2022-11-17 NOTE — PATIENT INSTRUCTIONS
Osteoporosis Education   Osteoporosis  is a long-term medical condition that causes your bones to become weak, brittle, and more likely to fracture  Osteoporosis occurs when your body absorbs more bone than it makes  It is also caused by a lack of calcium and estrogen (female hormone)  Common symptoms include the following: You may not have any signs or symptoms  You may break a bone after a muscle strain, bump, or fall  A break usually occurs in the hip, spine, or wrist  A collapsed vertebra (bone in your spine) may cause severe back pain or loss of height from bent posture  Call your doctor if:   ·  You have severe pain  ·  You have increasing pain after a fall  ·  You have pain when you do your daily activities  ·  You have questions or concerns about your condition or care  Diagnosis of osteoporosis:   · Blood and urine tests  measure your calcium, vitamin D, and estrogen levels  · An x-ray or CT may show thinned bones or a fracture  You may be given contrast liquid to help the bones show up better in the pictures  Tell the healthcare provider if you have ever had an allergic reaction to contrast liquid  Do not enter the MRI room with anything metal  Metal can cause serious injury  Tell the healthcare provider if you have any metal in or on your body  · A bone density test  compares your bone thickness with what is expected for someone of your age, gender, and ethnicity  Treatment for osteoporosis may include medicines to prevent bone loss, build new bone, and increase estrogen  These medicines help prevent fractures and may be given as a pill or injection  Ask your healthcare provider for more information on these medicines  Prevent bone loss:  · Eat healthy foods that are high in calcium  This helps keep your bones strong  Good sources of calcium are milk, cheese, broccoli, tofu, almonds, and canned salmon and sardines  Recommended to get at least 1200mg daily of calcium    · Increase your vitamin D intake  Vitamin D is in fish oils, some vegetables, and fortified milk, cereal, and bread  Vitamin D is also formed in the skin when it is exposed to the sun  Ask your healthcare provider how much sunlight is safe for you  You will require at least 800 units of vitamin D daily taken as a supplement  · Drink liquids as directed  Ask your healthcare provider how much liquid to drink each day and which liquids are best for you  Do not have alcohol or caffeine  They decrease bone mineral density, which can weaken your bones  · Exercise regularly  Ask your healthcare provider about the best exercise plan for you  Weight bearing exercise for 30 minutes, 3 times a week can help build and strengthen bone  · Do not smoke  Nicotine and other chemicals in cigarettes and cigars can cause lung damage  Ask your healthcare provider for information if you currently smoke and need help to quit  E-cigarettes or smokeless tobacco still contain nicotine  Talk to your healthcare provider before you use these products  · Go to physical therapy as directed  A physical therapist teaches you exercises to help improve movement and muscle strength  · Alcohol  It is recommended to avoid heavy alcohol use as increased consumption of alcohol is known to cause bone loss  © Copyright [employer] 2021 Information is for End User's use only and may not be sold, redistributed or otherwise used for commercial purposes  All illustrations and images included in CareNotes® are the copyrighted property of [employer]  or [facility] [place] [facility]     Calcium and vitamin D for bone health (Beyond the Basics)    CALCIUM AND VITAMIN D OVERVIEW  Osteoporosis is a common bone disorder that causes a progressive loss in bone density and mass  As a result, bones become thin, weakened, and easily fractured   It is estimated that more than 1 3 million osteoporosis-associated (or "osteoporotic") fractures occur every year in the [place], primarily of bone within the spine (the vertebrae), the hip, and the forearm near the wrist  =    A number of treatments can help to prevent loss of bone and treat low bone mass  However, the first step in preventing or treating osteoporosis is to consume foods and drinks that provide calcium, a mineral essential for bone strength, and vitamin D, which aids in calcium breakdown and absorption  =    CALCIUM AND VITAMIN D BENEFITS  Good nutrition is important at all ages to keep the bones healthy  · Taking calcium reduces bone loss and decreases the risk of fracturing the vertebrae (the bones that surround the spinal cord)  · Consuming calcium during childhood (eg, in milk) can lead to higher bone mass in adulthood  This increase in bone density can reduce the risk of fractures later in life  · Calcium may also have benefits in other body systems by reducing blood pressure and cholesterol levels  · Calcium and vitamin D supplements may help prevent tooth loss in older adults  RECOMMENDATIONS FOR CALCIUM    General recommendations -- Premenopausal women and men should consume at least 1000 mg of calcium, while postmenopausal women should consume 1200 mg (total diet plus supplement)  You should not consume more than 2000 mg of calcium per day (total diet plus supplement) due to the risk of side effects  Calcium in the diet -- The primary sources of calcium in the diet include milk and other dairy products, such as hard cheese, cottage cheese, or yogurt, as well as green vegetables, such as kale and broccoli (table 1)  Some cereals, soy products, and fruit juices are fortified with calcium  Calcium supplements -- The body is able to absorb calcium contained in supplements as well as from dietary sources  If it is not possible to get enough calcium from dietary sources, consult a health care provider to determine the best type, dose, and timing of calcium supplements       · Calcium carbonate is effective and is the least expensive form of calcium  It is best absorbed with a low-iron meal (such as breakfast)  Calcium carbonate may not be absorbed well in people who also take a specific medication for gastroesophageal reflux (called a proton pump inhibitor or H2 blocker), which blocks stomach acid  · Many natural calcium carbonate preparations such as oyster shells or bone meal contain some lead  · Calcium citrate is well absorbed in the fasting state as well as with a meal   · Calcium doses above 500 mg are not absorbed as well as smaller doses, so large doses of supplements should be taken in divided doses (eg, in the morning and evening)  · Calcium supplements do not replace other osteoporosis treatments such as hormone replacement, bisphosphonates (eg, risedronate [sample brand name: Actonel] and alendronate [brand name: Fosamax]), and raloxifene (brand name: Evista)  Calcium and vitamin D supplements alone are usually insufficient to prevent age-related bone loss, although they may be beneficial in some subgroups (older adults, those with very low intake)  In most patients with or at risk for osteoporosis, the addition of medication or hormonal therapy is necessary in order to slow the breakdown and removal of bone (ie, resorption)  Underlying gastrointestinal diseases -- Patients who do not adequately absorb nutrients from the gastrointestinal tract (due to malabsorption) may require more than 1000 to 1200 mg of calcium per day  In such cases, a health care provider can help to determine the optimal dose of calcium  Medications -- All medications should be discussed with a health care provider to ensure that possible interactions with calcium are identified  Certain medications change the amount of calcium that is absorbed and/or excreted  As an example, loop diuretics (eg, furosemide [sample brand name: Lasix]) increase the amount of calcium excreted in the urine      On the other hand, thiazide diuretics (eg, hydrochlorothiazide) can reduce levels of calcium in the urine, potentially reducing the risk of bone loss and kidney stones  Side effects of calcium -- Calcium is usually easily tolerated when it is taken in divided doses several times per day  Some people experience side effects related to calcium, including constipation and indigestion  Calcium supplements interfere with the absorption of iron and thyroid hormone, and therefore, these medications should be taken at different times  Kidney stones -- There is no evidence that consuming large amounts of calcium (from foods and drinks) increases the risk of kidney stones, or that avoiding dietary calcium decreases the risk  In fact, avoiding dairy products is likely to increase the risk of kidney stones  However, use of calcium supplements may increase the risk of kidney stones in susceptible individuals by raising the level of calcium in the urine  This is particularly true if the supplement is taken between meals or at bedtime, and this is one of the reasons we prefer for patients to get as much of their calcium requirement through dietary means  IMPORTANCE OF VITAMIN D  Vitamin D decreases bone loss and lowers the risk of fracture, especially in older men and women  Along with calcium, vitamin D also helps to prevent and treat osteoporosis  To absorb calcium efficiently, an adequate amount of vitamin D must be present  Vitamin D is normally made in the skin after exposure to sunlight  Recommendations for vitamin D -- The current recommendation is that men over 70 years and postmenopausal women consume at least 800 international units (20 micrograms) of vitamin D per day  Lower levels of vitamin D are not as effective, while high doses can be toxic, especially if taken for long periods of time   Although the optimal intake has not been clearly established in premenopausal women or in younger men with osteoporosis, 600 international units (15 micrograms) of vitamin D daily is generally suggested  Vitamin D is available as an individual supplement and is included in most multivitamins and some calcium supplements (table 2)  Milk is a relatively good dietary source of vitamin D, with approximately 100 international units (2 5 micrograms) per cup (240 mL), and salmon has 800 to 1000 international units (20 to 25 micrograms) of vitamin D per serving  Most healthy people don't need to check with their health care provider before taking standard doses of vitamin D and don't need monitoring of vitamin D levels if they are not being treated for vitamin D deficiency  However, recommendations for vitamin D supplementation may be different in people with certain underlying medical conditions, such as sarcoidosis or lymphoma  In these situations, a provider will determine if supplements are needed; if so, the person's vitamin D and calcium levels should be monitored with regular tests  ©9831 [employer], [address] rights reserved

## 2023-01-18 ENCOUNTER — OFFICE VISIT (OUTPATIENT)
Dept: FAMILY MEDICINE CLINIC | Facility: CLINIC | Age: 73
End: 2023-01-18

## 2023-01-18 ENCOUNTER — APPOINTMENT (OUTPATIENT)
Dept: LAB | Facility: HOSPITAL | Age: 73
End: 2023-01-18

## 2023-01-18 VITALS
DIASTOLIC BLOOD PRESSURE: 62 MMHG | WEIGHT: 220 LBS | HEART RATE: 80 BPM | RESPIRATION RATE: 16 BRPM | SYSTOLIC BLOOD PRESSURE: 118 MMHG | OXYGEN SATURATION: 99 % | HEIGHT: 69 IN | BODY MASS INDEX: 32.58 KG/M2 | TEMPERATURE: 97.1 F

## 2023-01-18 DIAGNOSIS — R73.09 ELEVATED HEMOGLOBIN A1C: ICD-10-CM

## 2023-01-18 DIAGNOSIS — M81.8 IDIOPATHIC OSTEOPOROSIS: ICD-10-CM

## 2023-01-18 DIAGNOSIS — I48.20 CHRONIC ATRIAL FIBRILLATION (HCC): ICD-10-CM

## 2023-01-18 DIAGNOSIS — E78.5 HYPERLIPIDEMIA ASSOCIATED WITH TYPE 2 DIABETES MELLITUS (HCC): ICD-10-CM

## 2023-01-18 DIAGNOSIS — E11.69 HYPERLIPIDEMIA ASSOCIATED WITH TYPE 2 DIABETES MELLITUS (HCC): ICD-10-CM

## 2023-01-18 DIAGNOSIS — D69.6 PLATELETS DECREASED (HCC): ICD-10-CM

## 2023-01-18 DIAGNOSIS — E78.5 HYPERLIPIDEMIA ASSOCIATED WITH TYPE 2 DIABETES MELLITUS (HCC): Primary | ICD-10-CM

## 2023-01-18 DIAGNOSIS — I42.9 FAMILIAL CARDIOMYOPATHY (HCC): ICD-10-CM

## 2023-01-18 DIAGNOSIS — E11.69 HYPERLIPIDEMIA ASSOCIATED WITH TYPE 2 DIABETES MELLITUS (HCC): Primary | ICD-10-CM

## 2023-01-18 DIAGNOSIS — R07.89 OTHER CHEST PAIN: ICD-10-CM

## 2023-01-18 DIAGNOSIS — N40.0 ENLARGED PROSTATE: ICD-10-CM

## 2023-01-18 DIAGNOSIS — I70.213 ATHEROSCLEROSIS OF NATIVE ARTERY OF BOTH LOWER EXTREMITIES WITH INTERMITTENT CLAUDICATION (HCC): ICD-10-CM

## 2023-01-18 LAB
25(OH)D3 SERPL-MCNC: 55.5 NG/ML (ref 30–100)
CREAT UR-MCNC: 243 MG/DL
MICROALBUMIN UR-MCNC: 64.6 MG/L (ref 0–20)
MICROALBUMIN/CREAT 24H UR: 27 MG/G CREATININE (ref 0–30)
PSA SERPL-MCNC: 2.7 NG/ML (ref 0–4)
T4 FREE SERPL-MCNC: 1.28 NG/DL (ref 0.76–1.46)
TSH SERPL DL<=0.05 MIU/L-ACNC: 2.34 UIU/ML (ref 0.45–4.5)
VIT B12 SERPL-MCNC: 1838 PG/ML (ref 100–900)

## 2023-01-18 NOTE — PROGRESS NOTES
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 Electric shocks in his left chest wall, where the pacer ? ? Recent blood work and med list reviewed w pt     Review of Systems   Constitutional: Negative for chills, fatigue and fever  HENT: Negative for congestion, facial swelling, sore throat, trouble swallowing and voice change  Eyes: Negative for pain, discharge and visual disturbance  Respiratory: Negative for cough, shortness of breath and wheezing  Cardiovascular: Positive for chest pain  Negative for palpitations and leg swelling  Gastrointestinal: Negative for abdominal pain, blood in stool, constipation, diarrhea and nausea  Endocrine: Negative for polydipsia, polyphagia and polyuria  Genitourinary: Negative for difficulty urinating, hematuria and urgency  Musculoskeletal: Negative for arthralgias and myalgias  Skin: Negative for rash  Neurological: Negative for dizziness, tremors, weakness and headaches  Hematological: Negative for adenopathy  Does not bruise/bleed easily     Psychiatric/Behavioral: Negative for dysphoric mood, sleep disturbance and suicidal ideas        Current Outpatient Medications on File Prior to Visit   Medication Sig   • allopurinol (ZYLOPRIM) 100 mg tablet Take 1 tablet (100 mg total) by mouth daily   • amiodarone 200 mg tablet Take 1 tablet (200 mg total) by mouth daily   • atorvastatin (LIPITOR) 20 mg tablet Take 1 tablet (20 mg total) by mouth daily Please STOP Pravastatin      • Diclofenac Sodium 1 5 % SOLN    • ergocalciferol (VITAMIN D2) 50,000 units Take 1 capsule (50,000 Units total) by mouth once a week   • furosemide (Lasix) 20 mg tablet Take 1 tablet (20 mg total) by mouth daily   • ketoconazole (NIZORAL) 2 % shampoo Apply 1 application topically 3 (three) times a week   • linaCLOtide 290 MCG CAPS Take 1 capsule by mouth in the morning   • loratadine (Claritin) 10 mg tablet Take 1 tablet (10 mg total) by mouth daily In the evening   • losartan (COZAAR) 50 mg tablet Take 1 tablet (50 mg total) by mouth daily   • metoprolol succinate (Toprol XL) 25 mg 24 hr tablet Take 1 tablet (25 mg total) by mouth daily   • nitrofurantoin (MACROBID) 100 mg capsule Take 1 capsule (100 mg total) by mouth in the morning With food/lunch   • pantoprazole (PROTONIX) 20 mg tablet Take 1 tablet (20 mg total) by mouth daily   • warfarin (COUMADIN) 5 mg tablet Take 1 tablet (5 mg total) by mouth daily       Objective     /62 (BP Location: Right arm, Patient Position: Standing, Cuff Size: Standard)   Pulse 80   Temp (!) 97 1 °F (36 2 °C) (Tympanic)   Resp 16   Ht 5' 9" (1 753 m)   Wt 99 8 kg (220 lb)   SpO2 99%   BMI 32 49 kg/m²     Physical Exam  Constitutional:       General: He is not in acute distress  HENT:      Head: Normocephalic  Mouth/Throat:      Pharynx: No oropharyngeal exudate  Eyes:      General: No scleral icterus  Conjunctiva/sclera: Conjunctivae normal       Pupils: Pupils are equal, round, and reactive to light  Neck:      Thyroid: No thyromegaly  Cardiovascular:      Rate and Rhythm: Normal rate and regular rhythm  Heart sounds: Normal heart sounds  No murmur heard  Pulmonary:      Effort: Pulmonary effort is normal  No respiratory distress  Breath sounds: Normal breath sounds  No wheezing or rales  Comments: ? ? Electric shocks ? In the area of Pacer ? ? Chest:      Chest wall: Tenderness present  Abdominal:      General: Bowel sounds are normal  There is no distension  Palpations: Abdomen is soft  Tenderness: There is no abdominal tenderness  There is no guarding or rebound  Musculoskeletal:         General: No tenderness  Cervical back: Neck supple  Lymphadenopathy:      Cervical: No cervical adenopathy  Skin:     Coloration: Skin is not pale  Findings: No rash  Neurological:      Mental Status: He is alert and oriented to person, place, and time  Sensory: No sensory deficit  Motor: No weakness         Virginie Gilliam MD

## 2023-01-31 LAB
AMIODARONE SERPL-MCNC: 1854 NG/ML (ref 1000–2500)
DESETHYLAMIODARONE SERPL-MCNC: 1293 NG/ML

## 2023-03-02 ENCOUNTER — RA CDI HCC (OUTPATIENT)
Dept: OTHER | Facility: HOSPITAL | Age: 73
End: 2023-03-02

## 2023-03-02 ENCOUNTER — TELEPHONE (OUTPATIENT)
Dept: FAMILY MEDICINE CLINIC | Facility: CLINIC | Age: 73
End: 2023-03-02

## 2023-03-02 DIAGNOSIS — H61.20 CERUMEN DEBRIS ON TYMPANIC MEMBRANE, UNSPECIFIED LATERALITY: Primary | ICD-10-CM

## 2023-03-02 NOTE — TELEPHONE ENCOUNTER
Patient is asking if you can call [facility] in for his ear  It is clogged, and he has an appointment next Wednesday    Please advise

## 2023-03-02 NOTE — PROGRESS NOTES
[address]  coding opportunities     E11 22     Chart Reviewed number of suggestions sent to Provider: 1     Patients Insurance     Medicare Insurance: [address] [place] [address]

## 2023-03-06 DIAGNOSIS — I48.20 CHRONIC ATRIAL FIBRILLATION (HCC): ICD-10-CM

## 2023-03-06 RX ORDER — WARFARIN SODIUM 5 MG/1
5 TABLET ORAL DAILY
Qty: 90 TABLET | Refills: 3 | Status: SHIPPED | OUTPATIENT
Start: 2023-03-06

## 2023-03-08 ENCOUNTER — OFFICE VISIT (OUTPATIENT)
Dept: FAMILY MEDICINE CLINIC | Facility: CLINIC | Age: 73
End: 2023-03-08

## 2023-03-08 VITALS
BODY MASS INDEX: 32.73 KG/M2 | HEART RATE: 94 BPM | HEIGHT: 69 IN | SYSTOLIC BLOOD PRESSURE: 124 MMHG | WEIGHT: 221 LBS | TEMPERATURE: 97.3 F | RESPIRATION RATE: 15 BRPM | DIASTOLIC BLOOD PRESSURE: 82 MMHG | OXYGEN SATURATION: 98 %

## 2023-03-08 DIAGNOSIS — R97.20 ELEVATED PSA: ICD-10-CM

## 2023-03-08 DIAGNOSIS — E78.49 OTHER HYPERLIPIDEMIA: ICD-10-CM

## 2023-03-08 DIAGNOSIS — M1A.9XX0 CHRONIC GOUT WITHOUT TOPHUS, UNSPECIFIED CAUSE, UNSPECIFIED SITE: ICD-10-CM

## 2023-03-08 DIAGNOSIS — K58.1 IRRITABLE BOWEL SYNDROME WITH CONSTIPATION: ICD-10-CM

## 2023-03-08 DIAGNOSIS — I10 ESSENTIAL HYPERTENSION, BENIGN: ICD-10-CM

## 2023-03-08 DIAGNOSIS — E55.9 AVITAMINOSIS D: ICD-10-CM

## 2023-03-08 DIAGNOSIS — K21.9 GASTROESOPHAGEAL REFLUX DISEASE WITHOUT ESOPHAGITIS: ICD-10-CM

## 2023-03-08 DIAGNOSIS — H61.22 CERUMEN DEBRIS ON TYMPANIC MEMBRANE OF LEFT EAR: Primary | ICD-10-CM

## 2023-03-08 DIAGNOSIS — R73.09 ELEVATED HEMOGLOBIN A1C: ICD-10-CM

## 2023-03-08 LAB
SL AMB  POCT GLUCOSE, UA: NORMAL
SL AMB LEUKOCYTE ESTERASE,UA: NORMAL
SL AMB POCT BILIRUBIN,UA: NORMAL
SL AMB POCT BLOOD,UA: NORMAL
SL AMB POCT CLARITY,UA: CLEAR
SL AMB POCT COLOR,UA: YELLOW
SL AMB POCT HEMOGLOBIN AIC: 5.9 (ref ?–6.5)
SL AMB POCT KETONES,UA: NORMAL
SL AMB POCT NITRITE,UA: NORMAL
SL AMB POCT PH,UA: 5
SL AMB POCT SPECIFIC GRAVITY,UA: 1.02
SL AMB POCT URINE PROTEIN: NORMAL
SL AMB POCT UROBILINOGEN: 0.2

## 2023-03-08 RX ORDER — AMIODARONE HYDROCHLORIDE 200 MG/1
200 TABLET ORAL DAILY
Qty: 90 TABLET | Refills: 3 | Status: SHIPPED | OUTPATIENT
Start: 2023-03-08

## 2023-03-08 RX ORDER — METOPROLOL SUCCINATE 25 MG/1
25 TABLET, EXTENDED RELEASE ORAL DAILY
Qty: 90 TABLET | Refills: 3 | Status: SHIPPED | OUTPATIENT
Start: 2023-03-08

## 2023-03-08 RX ORDER — ALLOPURINOL 100 MG/1
100 TABLET ORAL DAILY
Qty: 90 TABLET | Refills: 3 | Status: SHIPPED | OUTPATIENT
Start: 2023-03-08

## 2023-03-08 RX ORDER — ERGOCALCIFEROL 1.25 MG/1
50000 CAPSULE ORAL WEEKLY
Qty: 13 CAPSULE | Refills: 1 | Status: SHIPPED | OUTPATIENT
Start: 2023-03-08

## 2023-03-08 RX ORDER — ATORVASTATIN CALCIUM 20 MG/1
20 TABLET, FILM COATED ORAL DAILY
Qty: 90 TABLET | Refills: 3 | Status: SHIPPED | OUTPATIENT
Start: 2023-03-08

## 2023-03-08 RX ORDER — LOSARTAN POTASSIUM 50 MG/1
50 TABLET ORAL DAILY
Qty: 90 TABLET | Refills: 3 | Status: SHIPPED | OUTPATIENT
Start: 2023-03-08

## 2023-03-08 RX ORDER — FUROSEMIDE 20 MG/1
20 TABLET ORAL DAILY
Qty: 90 TABLET | Refills: 3 | Status: SHIPPED | OUTPATIENT
Start: 2023-03-08

## 2023-03-08 RX ORDER — PANTOPRAZOLE SODIUM 20 MG/1
20 TABLET, DELAYED RELEASE ORAL DAILY
Qty: 90 TABLET | Refills: 3 | Status: SHIPPED | OUTPATIENT
Start: 2023-03-08

## 2023-03-08 NOTE — PROGRESS NOTES
Name: Cherelle Pena      : 1950      MRN: [id number]  Encounter Provider: Deann Gilliam MD  Encounter Date: 3/8/2023   Encounter department: [address] [place] [address]     1  Cerumen debris on tympanic membrane of left ear  -     Ear cerumen removal    2  Essential hypertension  -     metoprolol succinate (Toprol XL) 25 mg 24 hr tablet; Take 1 tablet (25 mg total) by mouth daily  -     UA (URINE) with reflex to Scope; Future; Expected date: 03/15/2023  -     Comprehensive metabolic panel; Future  -     CBC and differential; Future  -     Magnesium; Future    3  Essential hypertension, benign  -     losartan (COZAAR) 50 mg tablet; Take 1 tablet (50 mg total) by mouth daily  -     furosemide (Lasix) 20 mg tablet; Take 1 tablet (20 mg total) by mouth daily  -     amiodarone 200 mg tablet; Take 1 tablet (200 mg total) by mouth daily    4  Avitaminosis D  -     ergocalciferol (VITAMIN D2) 50,000 units; Take 1 capsule (50,000 Units total) by mouth once a week    5  Other hyperlipidemia  -     atorvastatin (LIPITOR) 20 mg tablet; Take 1 tablet (20 mg total) by mouth daily Please STOP Pravastatin     -     Lipid panel; Future    6  Chronic gout without tophus, unspecified cause, unspecified site  -     allopurinol (ZYLOPRIM) 100 mg tablet; Take 1 tablet (100 mg total) by mouth daily  -     Ferritin; Future  -     Uric acid; Future    7  Irritable bowel syndrome with constipation  -     linaCLOtide 290 MCG CAPS; Take 1 capsule by mouth in the morning    8  Gastroesophageal reflux disease without esophagitis  -     pantoprazole (PROTONIX) 20 mg tablet; Take 1 tablet (20 mg total) by mouth daily    9  Chronic UTI    10  Elevated hemoglobin A1c  -     POCT urine dip  -     POCT hemoglobin A1c    11  Elevated PSA  -     PSA, total and free; Future     Life style Mod  Continue and Renew Meds  FU w cardiology  Ear Irrigation : Done in Office without using any instruments      RTC in 3 mos  w Blood  Work    Subjective      71 Y O Man is here for Regular check Up, He feels OK, recent Blood work, and med list reviewed w pt in detail    Review of Systems   Constitutional: Negative for chills, fatigue and fever  HENT: Negative for congestion, facial swelling, sore throat, trouble swallowing and voice change  Eyes: Negative for pain, discharge and visual disturbance  Respiratory: Negative for cough, shortness of breath and wheezing  Cardiovascular: Negative for chest pain, palpitations and leg swelling  Gastrointestinal: Negative for abdominal pain, blood in stool, constipation, diarrhea and nausea  Endocrine: Negative for polydipsia, polyphagia and polyuria  Genitourinary: Negative for difficulty urinating, hematuria and urgency  Musculoskeletal: Negative for arthralgias and myalgias  Skin: Negative for rash  Neurological: Negative for dizziness, tremors, weakness and headaches  Hematological: Negative for adenopathy  Does not bruise/bleed easily  Psychiatric/Behavioral: Negative for dysphoric mood, sleep disturbance and suicidal ideas  Current Outpatient Medications on File Prior to Visit   Medication Sig   • ketoconazole (NIZORAL) 2 % shampoo Apply 1 application topically 3 (three) times a week   • loratadine (Claritin) 10 mg tablet Take 1 tablet (10 mg total) by mouth daily In the evening   • nitrofurantoin (MACROBID) 100 mg capsule Take 1 capsule (100 mg total) by mouth in the morning With food/lunch   • warfarin (COUMADIN) 5 mg tablet Take 1 tablet (5 mg total) by mouth daily   • [DISCONTINUED] allopurinol (ZYLOPRIM) 100 mg tablet Take 1 tablet (100 mg total) by mouth daily   • [DISCONTINUED] amiodarone 200 mg tablet Take 1 tablet (200 mg total) by mouth daily   • [DISCONTINUED] atorvastatin (LIPITOR) 20 mg tablet Take 1 tablet (20 mg total) by mouth daily Please STOP Pravastatin      • [DISCONTINUED] carbamide peroxide (DEBROX) 6 5 % otic solution Use in affected ear   • [DISCONTINUED] Diclofenac Sodium 1 5 % SOLN    • [DISCONTINUED] ergocalciferol (VITAMIN D2) 50,000 units Take 1 capsule (50,000 Units total) by mouth once a week   • [DISCONTINUED] furosemide (Lasix) 20 mg tablet Take 1 tablet (20 mg total) by mouth daily   • [DISCONTINUED] linaCLOtide 290 MCG CAPS Take 1 capsule by mouth in the morning   • [DISCONTINUED] losartan (COZAAR) 50 mg tablet Take 1 tablet (50 mg total) by mouth daily   • [DISCONTINUED] metoprolol succinate (Toprol XL) 25 mg 24 hr tablet Take 1 tablet (25 mg total) by mouth daily   • [DISCONTINUED] pantoprazole (PROTONIX) 20 mg tablet Take 1 tablet (20 mg total) by mouth daily       Objective     /82 (BP Location: Left arm, Patient Position: Sitting, Cuff Size: Standard)   Pulse 94   Temp (!) 97 3 °F (36 3 °C) (Tympanic)   Resp 15   Ht 5' 9" (1 753 m)   Wt 100 kg (221 lb)   SpO2 98%   BMI 32 64 kg/m²     Physical Exam  Constitutional:       General: He is not in acute distress  HENT:      Head: Normocephalic  Mouth/Throat:      Pharynx: No oropharyngeal exudate  Eyes:      General: No scleral icterus  Conjunctiva/sclera: Conjunctivae normal       Pupils: Pupils are equal, round, and reactive to light  Neck:      Thyroid: No thyromegaly  Cardiovascular:      Rate and Rhythm: Normal rate and regular rhythm  Heart sounds: Murmur heard  Pulmonary:      Effort: Pulmonary effort is normal  No respiratory distress  Breath sounds: Normal breath sounds  No wheezing or rales  Abdominal:      General: Bowel sounds are normal  There is no distension  Palpations: Abdomen is soft  Tenderness: There is no abdominal tenderness  There is no guarding or rebound  Musculoskeletal:         General: No tenderness  Cervical back: Neck supple  Lymphadenopathy:      Cervical: No cervical adenopathy  Skin:     Coloration: Skin is not pale  Findings: No rash     Neurological:      Mental Status: He is alert and oriented to person, place, and time  Sensory: No sensory deficit  Motor: No weakness         Lily Gonzalez MD

## 2023-03-08 NOTE — PROGRESS NOTES
BMI Counseling: Body mass index is 32 64 kg/m²  The BMI is above normal  Nutrition recommendations include reducing portion sizes

## 2023-05-09 ENCOUNTER — APPOINTMENT (OUTPATIENT)
Dept: LAB | Age: 73
End: 2023-05-09

## 2023-05-09 DIAGNOSIS — R97.20 ELEVATED PSA: ICD-10-CM

## 2023-05-09 DIAGNOSIS — M1A.9XX0 CHRONIC GOUT WITHOUT TOPHUS, UNSPECIFIED CAUSE, UNSPECIFIED SITE: ICD-10-CM

## 2023-05-09 DIAGNOSIS — E78.49 OTHER HYPERLIPIDEMIA: ICD-10-CM

## 2023-05-09 LAB
ALBUMIN SERPL BCP-MCNC: 3.7 G/DL (ref 3.5–5)
ALP SERPL-CCNC: 74 U/L (ref 46–116)
ALT SERPL W P-5'-P-CCNC: 44 U/L (ref 12–78)
ANION GAP SERPL CALCULATED.3IONS-SCNC: 3 MMOL/L (ref 4–13)
AST SERPL W P-5'-P-CCNC: 39 U/L (ref 5–45)
BASOPHILS # BLD AUTO: 0.06 THOUSANDS/ÂΜL (ref 0–0.1)
BASOPHILS NFR BLD AUTO: 1 % (ref 0–1)
BILIRUB SERPL-MCNC: 0.59 MG/DL (ref 0.2–1)
BUN SERPL-MCNC: 29 MG/DL (ref 5–25)
CALCIUM SERPL-MCNC: 8.9 MG/DL (ref 8.3–10.1)
CHLORIDE SERPL-SCNC: 111 MMOL/L (ref 96–108)
CHOLEST SERPL-MCNC: 150 MG/DL
CO2 SERPL-SCNC: 24 MMOL/L (ref 21–32)
CREAT SERPL-MCNC: 1.69 MG/DL (ref 0.6–1.3)
EOSINOPHIL # BLD AUTO: 0.2 THOUSAND/ÂΜL (ref 0–0.61)
EOSINOPHIL NFR BLD AUTO: 3 % (ref 0–6)
ERYTHROCYTE [DISTWIDTH] IN BLOOD BY AUTOMATED COUNT: 15.9 % (ref 11.6–15.1)
FERRITIN SERPL-MCNC: 24 NG/ML (ref 8–388)
GFR SERPL CREATININE-BSD FRML MDRD: 39 ML/MIN/1.73SQ M
GLUCOSE P FAST SERPL-MCNC: 106 MG/DL (ref 65–99)
HCT VFR BLD AUTO: 36.9 % (ref 36.5–49.3)
HDLC SERPL-MCNC: 40 MG/DL
HGB BLD-MCNC: 11.4 G/DL (ref 12–17)
IMM GRANULOCYTES # BLD AUTO: 0.04 THOUSAND/UL (ref 0–0.2)
IMM GRANULOCYTES NFR BLD AUTO: 1 % (ref 0–2)
LDLC SERPL CALC-MCNC: 64 MG/DL (ref 0–100)
LYMPHOCYTES # BLD AUTO: 1.72 THOUSANDS/ÂΜL (ref 0.6–4.47)
LYMPHOCYTES NFR BLD AUTO: 25 % (ref 14–44)
MAGNESIUM SERPL-MCNC: 2.7 MG/DL (ref 1.6–2.6)
MCH RBC QN AUTO: 27.3 PG (ref 26.8–34.3)
MCHC RBC AUTO-ENTMCNC: 30.9 G/DL (ref 31.4–37.4)
MCV RBC AUTO: 89 FL (ref 82–98)
MONOCYTES # BLD AUTO: 0.54 THOUSAND/ÂΜL (ref 0.17–1.22)
MONOCYTES NFR BLD AUTO: 8 % (ref 4–12)
NEUTROPHILS # BLD AUTO: 4.2 THOUSANDS/ÂΜL (ref 1.85–7.62)
NEUTS SEG NFR BLD AUTO: 62 % (ref 43–75)
NONHDLC SERPL-MCNC: 110 MG/DL
NRBC BLD AUTO-RTO: 0 /100 WBCS
PLATELET # BLD AUTO: 181 THOUSANDS/UL (ref 149–390)
PMV BLD AUTO: 12.1 FL (ref 8.9–12.7)
POTASSIUM SERPL-SCNC: 4.1 MMOL/L (ref 3.5–5.3)
PROT SERPL-MCNC: 7.4 G/DL (ref 6.4–8.4)
RBC # BLD AUTO: 4.17 MILLION/UL (ref 3.88–5.62)
SODIUM SERPL-SCNC: 138 MMOL/L (ref 135–147)
TRIGL SERPL-MCNC: 230 MG/DL
URATE SERPL-MCNC: 6.1 MG/DL (ref 3.5–8.5)
WBC # BLD AUTO: 6.76 THOUSAND/UL (ref 4.31–10.16)

## 2023-05-10 LAB
PSA FREE MFR SERPL: 23.8 %
PSA FREE SERPL-MCNC: 0.38 NG/ML
PSA SERPL-MCNC: 1.6 NG/ML (ref 0–4)

## 2023-06-12 ENCOUNTER — OFFICE VISIT (OUTPATIENT)
Dept: FAMILY MEDICINE CLINIC | Facility: CLINIC | Age: 73
End: 2023-06-12
Payer: COMMERCIAL

## 2023-06-12 VITALS
BODY MASS INDEX: 32.58 KG/M2 | OXYGEN SATURATION: 98 % | RESPIRATION RATE: 15 BRPM | SYSTOLIC BLOOD PRESSURE: 136 MMHG | HEART RATE: 80 BPM | TEMPERATURE: 97.4 F | DIASTOLIC BLOOD PRESSURE: 82 MMHG | HEIGHT: 69 IN | WEIGHT: 220 LBS

## 2023-06-12 DIAGNOSIS — I48.0 PAROXYSMAL ATRIAL FIBRILLATION (HCC): ICD-10-CM

## 2023-06-12 DIAGNOSIS — E11.69 HYPERLIPIDEMIA ASSOCIATED WITH TYPE 2 DIABETES MELLITUS (HCC): Primary | ICD-10-CM

## 2023-06-12 DIAGNOSIS — E78.5 HYPERLIPIDEMIA ASSOCIATED WITH TYPE 2 DIABETES MELLITUS (HCC): Primary | ICD-10-CM

## 2023-06-12 DIAGNOSIS — I42.9 FAMILIAL CARDIOMYOPATHY (HCC): ICD-10-CM

## 2023-06-12 DIAGNOSIS — R79.89 ELEVATED SERUM CREATININE: ICD-10-CM

## 2023-06-12 DIAGNOSIS — M54.50 ACUTE BILATERAL LOW BACK PAIN WITHOUT SCIATICA: ICD-10-CM

## 2023-06-12 DIAGNOSIS — I70.213 ATHEROSCLEROSIS OF NATIVE ARTERY OF BOTH LOWER EXTREMITIES WITH INTERMITTENT CLAUDICATION (HCC): ICD-10-CM

## 2023-06-12 PROBLEM — D69.6 PLATELETS DECREASED (HCC): Status: RESOLVED | Noted: 2022-08-02 | Resolved: 2023-06-12

## 2023-06-12 LAB — SL AMB POCT HEMOGLOBIN AIC: 5.7 (ref ?–6.5)

## 2023-06-12 PROCEDURE — 83036 HEMOGLOBIN GLYCOSYLATED A1C: CPT | Performed by: INTERNAL MEDICINE

## 2023-06-12 PROCEDURE — 99214 OFFICE O/P EST MOD 30 MIN: CPT | Performed by: INTERNAL MEDICINE

## 2023-06-12 NOTE — PROGRESS NOTES
BMI Counseling: Body mass index is 32 49 kg/m²  The BMI is above normal  Nutrition recommendations include reducing portion sizes

## 2023-06-12 NOTE — PROGRESS NOTES
Name: Josette Taylor      : 1950      MRN: [id number]  Encounter Provider: King Sangeeta MD  Encounter Date: 2023   Encounter department: [address] [place] [address]     1  Hyperlipidemia associated with type 2 diabetes mellitus (HCC)  -     POCT hemoglobin A1c  -     Comprehensive metabolic panel; Future; Expected date: 2023  -     CBC and differential; Future; Expected date: 2023  -     Lipid Panel with Direct LDL reflex; Future; Expected date: 2023  -     TSH, 3rd generation with Free T4 reflex; Future; Expected date: 2023  -     Microalbumin, Random Urine (W/Creatinine) ([facility] ONLY); Future; Expected date: 2023    2  Paroxysmal atrial fibrillation (HCC)  -     POCT hemoglobin A1c  -     Comprehensive metabolic panel; Future; Expected date: 2023  -     CBC and differential; Future; Expected date: 2023  -     Lipid Panel with Direct LDL reflex; Future; Expected date: 2023  -     TSH, 3rd generation with Free T4 reflex; Future; Expected date: 2023  -     Microalbumin, Random Urine (W/Creatinine) ([facility] ONLY); Future; Expected date: 2023    3  Atherosclerosis of native artery of both lower extremities with intermittent claudication ([address] )    4  Familial cardiomyopathy ([address] )    5  Acute bilateral low back pain without sciatica  -     Diclofenac Sodium (VOLTAREN) 1 %; Apply 2 g topically 4 (four) times a day    6  Elevated serum creatinine  -     Ambulatory Referral to Nephrology; Future    moist Heat  Home P T  Constinue and Renew meds  FU w Nephrology  FU w cardiology  Life style mod  RTC in 3 mos w Blood work       Subjective      71 Y O Man is here for Regular check up, His right low back pain since last week, recent blood work and med list reviewed w  Pt    Review of Systems   Constitutional: Negative for chills, fatigue and fever     HENT: Negative for congestion, facial swelling, sore throat, trouble swallowing and voice change  Eyes: Negative for pain, discharge and visual disturbance  Respiratory: Negative for cough, shortness of breath and wheezing  Cardiovascular: Negative for chest pain, palpitations and leg swelling  Gastrointestinal: Negative for abdominal pain, blood in stool, constipation, diarrhea and nausea  Endocrine: Negative for polydipsia, polyphagia and polyuria  Genitourinary: Negative for difficulty urinating, hematuria and urgency  Musculoskeletal: Positive for back pain  Negative for arthralgias and myalgias  Skin: Negative for rash  Neurological: Negative for dizziness, tremors, weakness and headaches  Hematological: Negative for adenopathy  Does not bruise/bleed easily  Psychiatric/Behavioral: Negative for dysphoric mood, sleep disturbance and suicidal ideas  Current Outpatient Medications on File Prior to Visit   Medication Sig   • allopurinol (ZYLOPRIM) 100 mg tablet Take 1 tablet (100 mg total) by mouth daily   • amiodarone 200 mg tablet Take 1 tablet (200 mg total) by mouth daily   • atorvastatin (LIPITOR) 20 mg tablet Take 1 tablet (20 mg total) by mouth daily Please STOP Pravastatin      • ergocalciferol (VITAMIN D2) 50,000 units Take 1 capsule (50,000 Units total) by mouth once a week   • furosemide (Lasix) 20 mg tablet Take 1 tablet (20 mg total) by mouth daily   • ketoconazole (NIZORAL) 2 % shampoo Apply 1 application topically 3 (three) times a week   • linaCLOtide 290 MCG CAPS Take 1 capsule by mouth in the morning   • loratadine (Claritin) 10 mg tablet Take 1 tablet (10 mg total) by mouth daily In the evening   • losartan (COZAAR) 50 mg tablet Take 1 tablet (50 mg total) by mouth daily   • metoprolol succinate (Toprol XL) 25 mg 24 hr tablet Take 1 tablet (25 mg total) by mouth daily   • nitrofurantoin (MACROBID) 100 mg capsule Take 1 capsule (100 mg total) by mouth in the morning With food/lunch   • pantoprazole (PROTONIX) 20 mg tablet Take 1 tablet "(20 mg total) by mouth daily   • warfarin (COUMADIN) 5 mg tablet Take 1 tablet (5 mg total) by mouth daily       Objective     /82 (BP Location: Left arm, Patient Position: Sitting, Cuff Size: Standard)   Pulse 80   Temp (!) 97 4 °F (36 3 °C) (Tympanic)   Resp 15   Ht 5' 9\" (1 753 m)   Wt 99 8 kg (220 lb)   SpO2 98%   BMI 32 49 kg/m²     Physical Exam  Constitutional:       General: He is not in acute distress  HENT:      Head: Normocephalic  Mouth/Throat:      Pharynx: No oropharyngeal exudate  Eyes:      General: No scleral icterus  Conjunctiva/sclera: Conjunctivae normal       Pupils: Pupils are equal, round, and reactive to light  Neck:      Thyroid: No thyromegaly  Cardiovascular:      Rate and Rhythm: Normal rate and regular rhythm  Heart sounds: Normal heart sounds  No murmur heard  Pulmonary:      Effort: Pulmonary effort is normal  No respiratory distress  Breath sounds: Normal breath sounds  No wheezing or rales  Abdominal:      General: Bowel sounds are normal  There is no distension  Palpations: Abdomen is soft  Tenderness: There is no abdominal tenderness  There is no guarding or rebound  Musculoskeletal:         General: Tenderness present  Cervical back: Neck supple  Lymphadenopathy:      Cervical: No cervical adenopathy  Skin:     Coloration: Skin is not pale  Findings: No rash  Neurological:      Mental Status: He is alert and oriented to person, place, and time  Sensory: No sensory deficit  Motor: No weakness         April Alan MD  "

## 2023-06-14 ENCOUNTER — TELEPHONE (OUTPATIENT)
Dept: NEPHROLOGY | Facility: CLINIC | Age: 73
End: 2023-06-14

## 2023-06-14 NOTE — TELEPHONE ENCOUNTER
Called patient to see if patient was interested in scheduling a consult  patient stated he is in to much pain with his legs  Patient stated he will call back  Referral will be deferred for one month

## 2023-06-15 ENCOUNTER — TELEPHONE (OUTPATIENT)
Dept: FAMILY MEDICINE CLINIC | Facility: CLINIC | Age: 73
End: 2023-06-15

## 2023-06-15 DIAGNOSIS — G62.9 NEUROPATHY: Primary | ICD-10-CM

## 2023-06-15 RX ORDER — GABAPENTIN 100 MG/1
100 CAPSULE ORAL 2 TIMES DAILY
Qty: 60 CAPSULE | Refills: 1 | Status: SHIPPED | OUTPATIENT
Start: 2023-06-15 | End: 2023-06-20 | Stop reason: SDUPTHER

## 2023-06-15 RX ORDER — DIFLUNISAL 500 MG/1
500 TABLET, FILM COATED ORAL 2 TIMES DAILY
Qty: 60 TABLET | Refills: 0 | Status: SHIPPED | OUTPATIENT
Start: 2023-06-15 | End: 2023-06-20

## 2023-06-15 NOTE — TELEPHONE ENCOUNTER
Patient was seen earlier this week  The pain in his right leg goes from hip to bottom of his knee, like sciatica  The gel is not helping  Is there anything else you can give him? Please advise

## 2023-06-20 ENCOUNTER — TELEPHONE (OUTPATIENT)
Dept: FAMILY MEDICINE CLINIC | Facility: CLINIC | Age: 73
End: 2023-06-20

## 2023-06-20 ENCOUNTER — CLINICAL SUPPORT (OUTPATIENT)
Dept: FAMILY MEDICINE CLINIC | Facility: CLINIC | Age: 73
End: 2023-06-20
Payer: COMMERCIAL

## 2023-06-20 DIAGNOSIS — G62.9 NEUROPATHY: ICD-10-CM

## 2023-06-20 DIAGNOSIS — M54.50 ACUTE BILATERAL LOW BACK PAIN WITHOUT SCIATICA: ICD-10-CM

## 2023-06-20 PROCEDURE — 96372 THER/PROPH/DIAG INJ SC/IM: CPT

## 2023-06-20 PROCEDURE — 99211 OFF/OP EST MAY X REQ PHY/QHP: CPT

## 2023-06-20 RX ORDER — KETOROLAC TROMETHAMINE 30 MG/ML
60 INJECTION, SOLUTION INTRAMUSCULAR; INTRAVENOUS ONCE
Status: COMPLETED | OUTPATIENT
Start: 2023-06-20 | End: 2023-06-20

## 2023-06-20 RX ORDER — GABAPENTIN 100 MG/1
200 CAPSULE ORAL 2 TIMES DAILY
Qty: 120 CAPSULE | Refills: 1 | Status: SHIPPED | OUTPATIENT
Start: 2023-06-20

## 2023-06-20 RX ADMIN — KETOROLAC TROMETHAMINE 60 MG: 30 INJECTION, SOLUTION INTRAMUSCULAR; INTRAVENOUS at 10:48

## 2023-06-20 NOTE — TELEPHONE ENCOUNTER
Patient came to window, back pain is worse  Per your recommendation, he was given injection  Do you want to change medication?  Please advise

## 2023-06-23 ENCOUNTER — TELEPHONE (OUTPATIENT)
Dept: FAMILY MEDICINE CLINIC | Facility: CLINIC | Age: 73
End: 2023-06-23

## 2023-06-23 NOTE — TELEPHONE ENCOUNTER
Patient's sister called stating that he is still having back pain  He can only walk so far and has to sit down  Please advise

## 2023-08-31 ENCOUNTER — VBI (OUTPATIENT)
Dept: ADMINISTRATIVE | Facility: OTHER | Age: 73
End: 2023-08-31

## 2023-09-12 ENCOUNTER — RA CDI HCC (OUTPATIENT)
Dept: OTHER | Facility: HOSPITAL | Age: 73
End: 2023-09-12

## 2023-09-12 NOTE — PROGRESS NOTES
[address] [facility] coding opportunities     E11.22     Chart Reviewed number of suggestions sent to Provider: 1     Patients Insurance     Medicare Insurance: [id number]

## 2023-09-18 ENCOUNTER — OFFICE VISIT (OUTPATIENT)
Dept: FAMILY MEDICINE CLINIC | Facility: CLINIC | Age: 73
End: 2023-09-18
Payer: COMMERCIAL

## 2023-09-18 VITALS
RESPIRATION RATE: 16 BRPM | TEMPERATURE: 98.4 F | BODY MASS INDEX: 32.44 KG/M2 | SYSTOLIC BLOOD PRESSURE: 130 MMHG | WEIGHT: 219 LBS | HEART RATE: 64 BPM | DIASTOLIC BLOOD PRESSURE: 78 MMHG | OXYGEN SATURATION: 98 % | HEIGHT: 69 IN

## 2023-09-18 DIAGNOSIS — M1A.9XX0 CHRONIC GOUT WITHOUT TOPHUS, UNSPECIFIED CAUSE, UNSPECIFIED SITE: ICD-10-CM

## 2023-09-18 DIAGNOSIS — E78.49 OTHER HYPERLIPIDEMIA: ICD-10-CM

## 2023-09-18 DIAGNOSIS — I10 ESSENTIAL HYPERTENSION, BENIGN: ICD-10-CM

## 2023-09-18 DIAGNOSIS — E78.5 HYPERLIPIDEMIA ASSOCIATED WITH TYPE 2 DIABETES MELLITUS: ICD-10-CM

## 2023-09-18 DIAGNOSIS — I70.213 ATHEROSCLEROSIS OF NATIVE ARTERY OF BOTH LOWER EXTREMITIES WITH INTERMITTENT CLAUDICATION (HCC): ICD-10-CM

## 2023-09-18 DIAGNOSIS — I48.0 PAROXYSMAL ATRIAL FIBRILLATION (HCC): ICD-10-CM

## 2023-09-18 DIAGNOSIS — Z00.01 ENCOUNTER FOR GENERAL ADULT MEDICAL EXAMINATION WITH ABNORMAL FINDINGS: Primary | ICD-10-CM

## 2023-09-18 DIAGNOSIS — E55.9 AVITAMINOSIS D: ICD-10-CM

## 2023-09-18 DIAGNOSIS — E11.69 HYPERLIPIDEMIA ASSOCIATED WITH TYPE 2 DIABETES MELLITUS: ICD-10-CM

## 2023-09-18 DIAGNOSIS — M17.0 OSTEOARTHRITIS OF BOTH KNEES, UNSPECIFIED OSTEOARTHRITIS TYPE: ICD-10-CM

## 2023-09-18 DIAGNOSIS — M54.50 ACUTE BILATERAL LOW BACK PAIN WITHOUT SCIATICA: ICD-10-CM

## 2023-09-18 PROBLEM — I42.9 FAMILIAL CARDIOMYOPATHY (HCC): Status: RESOLVED | Noted: 2020-03-02 | Resolved: 2023-09-18

## 2023-09-18 LAB — SL AMB POCT HEMOGLOBIN AIC: 6 (ref ?–6.5)

## 2023-09-18 PROCEDURE — 99214 OFFICE O/P EST MOD 30 MIN: CPT | Performed by: INTERNAL MEDICINE

## 2023-09-18 PROCEDURE — G0439 PPPS, SUBSEQ VISIT: HCPCS | Performed by: INTERNAL MEDICINE

## 2023-09-18 PROCEDURE — 83036 HEMOGLOBIN GLYCOSYLATED A1C: CPT | Performed by: INTERNAL MEDICINE

## 2023-09-18 RX ORDER — AMIODARONE HYDROCHLORIDE 200 MG/1
200 TABLET ORAL DAILY
Qty: 90 TABLET | Refills: 3 | Status: SHIPPED | OUTPATIENT
Start: 2023-09-18

## 2023-09-18 RX ORDER — FUROSEMIDE 20 MG/1
20 TABLET ORAL DAILY
Qty: 90 TABLET | Refills: 3 | Status: SHIPPED | OUTPATIENT
Start: 2023-09-18

## 2023-09-18 RX ORDER — ALLOPURINOL 100 MG/1
100 TABLET ORAL DAILY
Qty: 90 TABLET | Refills: 3 | Status: SHIPPED | OUTPATIENT
Start: 2023-09-18

## 2023-09-18 RX ORDER — LOSARTAN POTASSIUM 50 MG/1
50 TABLET ORAL DAILY
Qty: 90 TABLET | Refills: 3 | Status: SHIPPED | OUTPATIENT
Start: 2023-09-18

## 2023-09-18 RX ORDER — METOPROLOL SUCCINATE 25 MG/1
25 TABLET, EXTENDED RELEASE ORAL DAILY
Qty: 90 TABLET | Refills: 3 | Status: SHIPPED | OUTPATIENT
Start: 2023-09-18

## 2023-09-18 RX ORDER — ATORVASTATIN CALCIUM 20 MG/1
20 TABLET, FILM COATED ORAL DAILY
Qty: 90 TABLET | Refills: 3 | Status: SHIPPED | OUTPATIENT
Start: 2023-09-18

## 2023-09-18 RX ORDER — ERGOCALCIFEROL 1.25 MG/1
50000 CAPSULE ORAL WEEKLY
Qty: 13 CAPSULE | Refills: 1 | Status: SHIPPED | OUTPATIENT
Start: 2023-09-18

## 2023-09-18 NOTE — PROGRESS NOTES
Name: Verónica Cartagena      : 1950      MRN: [id number]  Encounter Provider: Raenell Fothergill, MD  Encounter Date: 2023   Encounter department: [address] [place] [address]     1. Encounter for general adult medical examination with abnormal findings    2. BMI 32.0-32.9,adult  -     metoprolol succinate (Toprol XL) 25 mg 24 hr tablet; Take 1 tablet (25 mg total) by mouth daily    3. Essential hypertension, benign  -     losartan (COZAAR) 50 mg tablet; Take 1 tablet (50 mg total) by mouth daily  -     furosemide (Lasix) 20 mg tablet; Take 1 tablet (20 mg total) by mouth daily  -     amiodarone 200 mg tablet; Take 1 tablet (200 mg total) by mouth daily    4. Avitaminosis D  -     ergocalciferol (VITAMIN D2) 50,000 units; Take 1 capsule (50,000 Units total) by mouth once a week    5. Acute bilateral low back pain without sciatica  -     Diclofenac Sodium (VOLTAREN) 1 %; Apply 2 g topically 4 (four) times a day    6. Other hyperlipidemia  -     atorvastatin (LIPITOR) 20 mg tablet; Take 1 tablet (20 mg total) by mouth daily Please STOP Pravastatin. .    7. Chronic gout without tophus, unspecified cause, unspecified site  -     allopurinol (ZYLOPRIM) 100 mg tablet; Take 1 tablet (100 mg total) by mouth daily  -     Amiodarone level; Future    8. Hyperlipidemia associated with type 2 diabetes mellitus ([address])  Comments:  stable. , continue same., FU w cardiology. RTC in 3 mos w Blood work. .. Orders:  -     POCT hemoglobin A1c  -     Comprehensive metabolic panel; Future; Expected date: 2023  -     CBC and differential; Future; Expected date: 2023  -     Lipid Panel with Direct LDL reflex; Future; Expected date: 2023  -     TSH, 3rd generation with Free T4 reflex; Future; Expected date: 2023  -     Microalbumin, Random Urine (W/Creatinine) ([facility] ONLY); Future; Expected date: 2023  -     Amiodarone level;  Future  -     UA (URINE) with reflex to Scope; Future; Expected date: 09/25/2023  -     Magnesium; Future    9. Paroxysmal atrial fibrillation (HCC)  Comments:  stable. continue same. FU w cardiology. RTC in 3 mos w Blood work. .  Orders:  -     Amiodarone level; Future    10. Atherosclerosis of native artery of both lower extremities with intermittent claudication ([address])  Comments:  stable continue same FU w cardiology RTC in 3 mos w Blood work    6. Osteoarthritis of both knees, unspecified osteoarthritis type  -     XR knee 3 vw left non injury; Future; Expected date: 09/18/2023  -     XR knee 3 vw right non injury; Future; Expected date: 09/18/2023  -     Ambulatory Referral to Orthopedic Surgery; Future    life style mod  FU w cardiology  AWV : Done in Detail. .  RTC in 3 mos w Blood work       Subjective      68 Y O Man is here for AWV and Regular check up, he has increased knee pain and stiffness, recent Blood work and med list reviewed  w pt in detail. .. Review of Systems   Constitutional: Negative for chills, fatigue and fever. HENT: Positive for postnasal drip. Negative for congestion, facial swelling, sore throat, trouble swallowing and voice change. Eyes: Negative for pain, discharge and visual disturbance. Respiratory: Negative for cough, shortness of breath and wheezing. Cardiovascular: Negative for chest pain, palpitations and leg swelling. Gastrointestinal: Negative for abdominal pain, blood in stool, constipation, diarrhea and nausea. Endocrine: Negative for polydipsia, polyphagia and polyuria. Genitourinary: Negative for difficulty urinating, hematuria and urgency. Musculoskeletal: Positive for arthralgias and gait problem. Negative for myalgias. Skin: Negative for rash. Neurological: Negative for dizziness, tremors, weakness and headaches. Hematological: Negative for adenopathy. Does not bruise/bleed easily. Psychiatric/Behavioral: Negative for dysphoric mood, sleep disturbance and suicidal ideas.        Current Outpatient Medications on File Prior to Visit   Medication Sig   • gabapentin (Neurontin) 100 mg capsule Take 2 capsules (200 mg total) by mouth 2 (two) times a day   • ketoconazole (NIZORAL) 2 % shampoo Apply 1 application topically 3 (three) times a week   • linaCLOtide 290 MCG CAPS Take 1 capsule by mouth in the morning   • loratadine (Claritin) 10 mg tablet Take 1 tablet (10 mg total) by mouth daily In the evening   • nitrofurantoin (MACROBID) 100 mg capsule Take 1 capsule (100 mg total) by mouth in the morning With food/lunch   • pantoprazole (PROTONIX) 20 mg tablet Take 1 tablet (20 mg total) by mouth daily   • warfarin (COUMADIN) 5 mg tablet Take 1 tablet (5 mg total) by mouth daily   • [DISCONTINUED] allopurinol (ZYLOPRIM) 100 mg tablet Take 1 tablet (100 mg total) by mouth daily   • [DISCONTINUED] amiodarone 200 mg tablet Take 1 tablet (200 mg total) by mouth daily   • [DISCONTINUED] atorvastatin (LIPITOR) 20 mg tablet Take 1 tablet (20 mg total) by mouth daily Please STOP Pravastatin. .   • [DISCONTINUED] Diclofenac Sodium (VOLTAREN) 1 % Apply 2 g topically 4 (four) times a day   • [DISCONTINUED] diclofenac sodium (VOLTAREN) 50 mg EC tablet Take 1 tablet (50 mg total) by mouth 2 (two) times a day   • [DISCONTINUED] ergocalciferol (VITAMIN D2) 50,000 units Take 1 capsule (50,000 Units total) by mouth once a week   • [DISCONTINUED] furosemide (Lasix) 20 mg tablet Take 1 tablet (20 mg total) by mouth daily   • [DISCONTINUED] losartan (COZAAR) 50 mg tablet Take 1 tablet (50 mg total) by mouth daily   • [DISCONTINUED] metoprolol succinate (Toprol XL) 25 mg 24 hr tablet Take 1 tablet (25 mg total) by mouth daily       Objective     /78 (BP Location: Left arm, Patient Position: Sitting, Cuff Size: Standard)   Pulse 64   Temp 98.4 °F (36.9 °C)   Resp 16   Ht 5' 9" (1.753 m)   Wt 99.3 kg (219 lb)   SpO2 98%   BMI 32.34 kg/m²     Physical Exam  Constitutional:       General: He is not in acute distress. HENT:      Head: Normocephalic. Mouth/Throat:      Pharynx: No oropharyngeal exudate. Eyes:      General: No scleral icterus. Conjunctiva/sclera: Conjunctivae normal.      Pupils: Pupils are equal, round, and reactive to light. Neck:      Thyroid: No thyromegaly. Cardiovascular:      Rate and Rhythm: Normal rate and regular rhythm. Heart sounds: Murmur heard. Pulmonary:      Effort: Pulmonary effort is normal. No respiratory distress. Breath sounds: Normal breath sounds. No wheezing or rales. Abdominal:      General: Bowel sounds are normal. There is no distension. Palpations: Abdomen is soft. Tenderness: There is no abdominal tenderness. There is no guarding or rebound. Musculoskeletal:         General: Tenderness present. Cervical back: Neck supple. Lymphadenopathy:      Cervical: No cervical adenopathy. Skin:     Coloration: Skin is not pale. Findings: No rash. Neurological:      Mental Status: He is alert and oriented to person, place, and time. Sensory: No sensory deficit. Motor: No weakness.        Nadeem Shook MD

## 2023-09-18 NOTE — PATIENT INSTRUCTIONS
Medicare Preventive Visit Patient Instructions  Thank you for completing your Welcome to Medicare Visit or Medicare Annual Wellness Visit today. Your next wellness visit will be due in one year (9/18/2024). The screening/preventive services that you may require over the next 5-10 years are detailed below. Some tests may not apply to you based off risk factors and/or age. Screening tests ordered at today's visit but not completed yet may show as past due. Also, please note that scanned in results may not display below. Preventive Screenings:  Service Recommendations Previous Testing/Comments   Colorectal Cancer Screening  · Colonoscopy    · Fecal Occult Blood Test (FOBT)/Fecal Immunochemical Test (FIT)  · Fecal DNA/Cologuard Test  · Flexible Sigmoidoscopy Age: 43-73 years old   Colonoscopy: every 10 years (May be performed more frequently if at higher risk)  OR  FOBT/FIT: every 1 year  OR  Cologuard: every 3 years  OR  Sigmoidoscopy: every 5 years  Screening may be recommended earlier than age 39 if at higher risk for colorectal cancer. Also, an individualized decision between you and your healthcare provider will decide whether screening between the ages of 77-80 would be appropriate.  Colonoscopy: Not on file  FOBT/FIT: Not on file  Cologuard: Not on file  Sigmoidoscopy: Not on file    Risks and Benefits Discussed     Prostate Cancer Screening Individualized decision between patient and health care provider in men between ages of 53-66   Medicare will cover every 12 months beginning on the day after your 50th birthday PSA: 1.6 ng/mL     Screening Current  Risks and Benefits Discussed     Hepatitis C Screening Once for adults born between 1945 and 1965  More frequently in patients at high risk for Hepatitis C Hep C Antibody: 02/28/2020    Screening Current  Risks and Benefits Discussed   Diabetes Screening 1-2 times per year if you're at risk for diabetes or have pre-diabetes Fasting glucose: 106 mg/dL (5/9/2023)  A1C: 5.7 (6/12/2023)  Screening Not Indicated  History Diabetes  Risks and Benefits Discussed  Screening Current   Cholesterol Screening Once every 5 years if you don't have a lipid disorder. May order more often based on risk factors. Lipid panel: 05/09/2023  Screening Not Indicated  History Lipid Disorder  Risks and Benefits Discussed      Other Preventive Screenings Covered by Medicare:  1. Abdominal Aortic Aneurysm (AAA) Screening: covered once if your at risk. You're considered to be at risk if you have a family history of AAA or a male between the age of 70-76 who smoking at least 100 cigarettes in your lifetime. 2. Lung Cancer Screening: covers low dose CT scan once per year if you meet all of the following conditions: (1) Age 48-67; (2) No signs or symptoms of lung cancer; (3) Current smoker or have quit smoking within the last 15 years; (4) You have a tobacco smoking history of at least 20 pack years (packs per day x number of years you smoked); (5) You get a written order from a healthcare provider. 3. Glaucoma Screening: covered annually if you're considered high risk: (1) You have diabetes OR (2) Family history of glaucoma OR (3)  aged 48 and older OR (3)  American aged 72 and older  3. Osteoporosis Screening: covered every 2 years if you meet one of the following conditions: (1) Have a vertebral abnormality; (2) On glucocorticoid therapy for more than 3 months; (3) Have primary hyperparathyroidism; (4) On osteoporosis medications and need to assess response to drug therapy. 5. HIV Screening: covered annually if you're between the age of 14-79. Also covered annually if you are younger than 13 and older than 72 with risk factors for HIV infection. For pregnant patients, it is covered up to 3 times per pregnancy.     Immunizations:  Immunization Recommendations   Influenza Vaccine Annual influenza vaccination during flu season is recommended for all persons aged >= 6 months who do not have contraindications   Pneumococcal Vaccine   * Pneumococcal conjugate vaccine = PCV13 (Prevnar 13), PCV15 (Vaxneuvance), PCV20 (Prevnar 20)  * Pneumococcal polysaccharide vaccine = PPSV23 (Pneumovax) Adults 2364 years old: 1-3 doses may be recommended based on certain risk factors  Adults 72 years old: 1-2 doses may be recommended based off what pneumonia vaccine you previously received   Hepatitis B Vaccine 3 dose series if at intermediate or high risk (ex: diabetes, end stage renal disease, liver disease)   Tetanus (Td) Vaccine - COST NOT COVERED BY MEDICARE PART B Following completion of primary series, a booster dose should be given every 10 years to maintain immunity against tetanus. Td may also be given as tetanus wound prophylaxis. Tdap Vaccine - COST NOT COVERED BY MEDICARE PART B Recommended at least once for all adults. For pregnant patients, recommended with each pregnancy. Shingles Vaccine (Shingrix) - COST NOT COVERED BY MEDICARE PART B  2 shot series recommended in those aged 48 and above     Health Maintenance Due:      Topic Date Due   • Colorectal Cancer Screening  Never done   • Hepatitis C Screening  Completed     Immunizations Due:      Topic Date Due   • COVID-19 Vaccine (3 - Moderna series) 07/07/2021   • Influenza Vaccine (1) 09/01/2023     Advance Directives   What are advance directives? Advance directives are legal documents that state your wishes and plans for medical care. These plans are made ahead of time in case you lose your ability to make decisions for yourself. Advance directives can apply to any medical decision, such as the treatments you want, and if you want to donate organs. What are the types of advance directives? There are many types of advance directives, and each state has rules about how to use them. You may choose a combination of any of the following:  · Living will: This is a written record of the treatment you want.  You can also choose which treatments you do not want, which to limit, and which to stop at a certain time. This includes surgery, medicine, IV fluid, and tube feedings. · Durable power of  for healthcare [facility]): This is a written record that states who you want to make healthcare choices for you when you are unable to make them for yourself. This person, called a proxy, is usually a family member or a friend. You may choose more than 1 proxy. · Do not resuscitate (DNR) order:  A DNR order is used in case your heart stops beating or you stop breathing. It is a request not to have certain forms of treatment, such as CPR. A DNR order may be included in other types of advance directives. · Medical directive: This covers the care that you want if you are in a coma, near death, or unable to make decisions for yourself. You can list the treatments you want for each condition. Treatment may include pain medicine, surgery, blood transfusions, dialysis, IV or tube feedings, and a ventilator (breathing machine). · Values history: This document has questions about your views, beliefs, and how you feel and think about life. This information can help others choose the care that you would choose. Why are advance directives important? An advance directive helps you control your care. Although spoken wishes may be used, it is better to have your wishes written down. Spoken wishes can be misunderstood, or not followed. Treatments may be given even if you do not want them. An advance directive may make it easier for your family to make difficult choices about your care. Weight Management   Why it is important to manage your weight:  Being overweight increases your risk of health conditions such as heart disease, high blood pressure, type 2 diabetes, and certain types of cancer. It can also increase your risk for osteoarthritis, sleep apnea, and other respiratory problems. Aim for a slow, steady weight loss.  Even a small amount of weight loss can lower your risk of health problems. How to lose weight safely:  A safe and healthy way to lose weight is to eat fewer calories and get regular exercise. You can lose up about 1 pound a week by decreasing the number of calories you eat by 500 calories each day. Healthy meal plan for weight management:  A healthy meal plan includes a variety of foods, contains fewer calories, and helps you stay healthy. A healthy meal plan includes the following:  · Eat whole-grain foods more often. A healthy meal plan should contain fiber. Fiber is the part of grains, fruits, and vegetables that is not broken down by your body. Whole-grain foods are healthy and provide extra fiber in your diet. Some examples of whole-grain foods are whole-wheat breads and pastas, oatmeal, brown rice, and bulgur. · Eat a variety of vegetables every day. Include dark, leafy greens such as spinach, kale, carlos greens, and mustard greens. Eat yellow and orange vegetables such as carrots, sweet potatoes, and winter squash. · Eat a variety of fruits every day. Choose fresh or canned fruit (canned in its own juice or light syrup) instead of juice. Fruit juice has very little or no fiber. · Eat low-fat dairy foods. Drink fat-free (skim) milk or 1% milk. Eat fat-free yogurt and low-fat cottage cheese. Try low-fat cheeses such as mozzarella and other reduced-fat cheeses. · Choose meat and other protein foods that are low in fat. Choose beans or other legumes such as split peas or lentils. Choose fish, skinless poultry (chicken or turkey), or lean cuts of red meat (beef or pork). Before you cook meat or poultry, cut off any visible fat. · Use less fat and oil. Try baking foods instead of frying them. Add less fat, such as margarine, sour cream, regular salad dressing and mayonnaise to foods. Eat fewer high-fat foods. Some examples of high-fat foods include french fries, doughnuts, ice cream, and cakes. · Eat fewer sweets. Limit foods and drinks that are high in sugar. This includes candy, cookies, regular soda, and sweetened drinks. Exercise:  Exercise at least 30 minutes per day on most days of the week. Some examples of exercise include walking, biking, dancing, and swimming. You can also fit in more physical activity by taking the stairs instead of the elevator or parking farther away from stores. Ask your healthcare provider about the best exercise plan for you. © Copyright [employer] 2018 Information is for End User's use only and may not be sold, redistributed or otherwise used for commercial purposes.  All illustrations and images included in CareNotes® are the copyrighted property of A.[employer].A[employer]M., Inc. or  [employer]

## 2023-09-18 NOTE — PROGRESS NOTES
Diabetic Foot Exam    Patient's shoes and socks removed. Right Foot/Ankle   Right Foot Inspection  Skin Exam: skin normal and skin intact. No dry skin, no warmth, no callus, no erythema, no maceration, no abnormal color, no pre-ulcer, no ulcer and no callus. Toe Exam: ROM and strength within normal limits. Sensory   Vibration: diminished  Proprioception: diminished  Monofilament testing: diminished    Vascular  Capillary refills: < 3 seconds  The right DP pulse is 1+. The right PT pulse is 0. Left Foot/Ankle  Left Foot Inspection  Skin Exam: skin normal and skin intact. No dry skin, no warmth, no erythema, no maceration, normal color, no pre-ulcer, no ulcer and no callus. Toe Exam: ROM and strength within normal limits. Sensory   Vibration: diminished  Proprioception: diminished  Monofilament testing: diminished    Vascular  Capillary refills: < 3 seconds  The left DP pulse is 1+. The left PT pulse is 0.      Assign Risk Category  No deformity present  Loss of protective sensation  Weak pulses  Risk: 2

## 2023-09-18 NOTE — PROGRESS NOTES
Assessment and Plan:     Problem List Items Addressed This Visit    None       Preventive health issues were discussed with patient, and age appropriate screening tests were ordered as noted in patient's After Visit Summary. Personalized health advice and appropriate referrals for health education or preventive services given if needed, as noted in patient's After Visit Summary. 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    Family History:     Family History   Problem Relation Age of Onset   • Hypertension Mother    • Diabetes Mother    • No Known Problems Father       Social History:     Social History     Socioeconomic History   • Marital status: Single     Spouse name: Not on file   • Number of children: Not on file   • Years of education: Not on file   • Highest education level: Not on file   Occupational History   • Occupation: retired   Tobacco Use   • Smoking status: Never   • Smokeless tobacco: Never   • Tobacco comments:     No passive smoke exposure   Vaping Use   • Vaping Use: Never used   Substance and Sexual Activity   • Alcohol use: No   • Drug use: No   • Sexual activity: Not Currently   Other Topics Concern   • Not on file   Social History Narrative   • Not on file     Social Determinants of Health     Financial Resource Strain: Low Risk  (9/23/2020)    Overall Financial Resource Strain (CARDIA)    • Difficulty of Paying Living Expenses: Not hard at all   Food Insecurity: No Food Insecurity (9/23/2020)    Hunger Vital Sign    • Worried About Running Out of Food in the Last Year: Never true    • Ran Out of Food in the Last Year: Never true   Transportation Needs: No Transportation Needs (9/23/2020)    PRAPARE - Transportation    • Lack of Transportation (Medical): No    • Lack of Transportation (Non-Medical):  No   Physical Activity: Sufficiently Active (9/23/2020)    Exercise Vital Sign    • Days of Exercise per Week: 5 days    • Minutes of Exercise per Session: 30 min   Stress: No Stress Concern Present (9/23/2020)    [address] [facility]    • Feeling of Stress : Not at all   Social Connections: Unknown (9/23/2020)    Social Connection and Isolation Panel [NHANES]    • Frequency of Communication with Friends and Family: Patient refused    • Frequency of Social Gatherings with Friends and Family: Patient refused    • Attends Tenriism Services: Patient refused    • Active Member of Clubs or Organizations: Patient refused    • Attends Club or Organization Meetings: Patient refused    • Marital Status: Patient refused   Intimate Partner Violence: Not At Risk (9/23/2020)    Humiliation, Afraid, Rape, and Kick questionnaire    • Fear of Current or Ex-Partner: No    • Emotionally Abused: No    • Physically Abused: No    • Sexually Abused: No   Housing Stability: Not on file      Medications and Allergies:     Current Outpatient Medications   Medication Sig Dispense Refill   • allopurinol (ZYLOPRIM) 100 mg tablet Take 1 tablet (100 mg total) by mouth daily 90 tablet 3   • amiodarone 200 mg tablet Take 1 tablet (200 mg total) by mouth daily 90 tablet 3   • atorvastatin (LIPITOR) 20 mg tablet Take 1 tablet (20 mg total) by mouth daily Please STOP Pravastatin. . 90 tablet 3   • Diclofenac Sodium (VOLTAREN) 1 % Apply 2 g topically 4 (four) times a day 200 g 2   • diclofenac sodium (VOLTAREN) 50 mg EC tablet Take 1 tablet (50 mg total) by mouth 2 (two) times a day 60 tablet 0   • ergocalciferol (VITAMIN D2) 50,000 units Take 1 capsule (50,000 Units total) by mouth once a week 13 capsule 1   • furosemide (Lasix) 20 mg tablet Take 1 tablet (20 mg total) by mouth daily 90 tablet 3   • gabapentin (Neurontin) 100 mg capsule Take 2 capsules (200 mg total) by mouth 2 (two) times a day 120 capsule 1   • ketoconazole (NIZORAL) 2 % shampoo Apply 1 application topically 3 (three) times a week 120 mL 2   • linaCLOtide 290 MCG CAPS Take 1 capsule by mouth in the morning 90 capsule 3   • loratadine (Claritin) 10 mg tablet Take 1 tablet (10 mg total) by mouth daily In the evening 30 tablet 1   • losartan (COZAAR) 50 mg tablet Take 1 tablet (50 mg total) by mouth daily 90 tablet 3   • metoprolol succinate (Toprol XL) 25 mg 24 hr tablet Take 1 tablet (25 mg total) by mouth daily 90 tablet 3   • nitrofurantoin (MACROBID) 100 mg capsule Take 1 capsule (100 mg total) by mouth in the morning With food/lunch 30 capsule 0   • pantoprazole (PROTONIX) 20 mg tablet Take 1 tablet (20 mg total) by mouth daily 90 tablet 3   • warfarin (COUMADIN) 5 mg tablet Take 1 tablet (5 mg total) by mouth daily 90 tablet 3     No current facility-administered medications for this visit.      Allergies   Allergen Reactions   • Metoprolol Shortness Of Breath     "50mg" via Pashto ; states "When I take the 25mg I'm normal."      Immunizations:     Immunization History   Administered Date(s) Administered   • COVID-19 MODERNA VACC 0.5 ML IM 04/14/2021, 05/12/2021   • Influenza Split 09/24/2012   • Pneumococcal Conjugate 13-Valent 02/06/2019   • Pneumococcal Polysaccharide PPV23 10/13/2021   • TD (adult) Preservative Free 08/23/2016   • Td (adult), adsorbed 08/23/2016      Health Maintenance:         Topic Date Due   • Colorectal Cancer Screening  Never done   • Hepatitis C Screening  Completed         Topic Date Due   • COVID-19 Vaccine (3 - Moderna series) 07/07/2021   • Influenza Vaccine (1) 09/01/2023      Medicare Screening Tests and Risk Assessments:     Silvio is here for his Subsequent Wellness visit. Last Medicare Wellness visit information reviewed, patient interviewed and updates made to the record today. Health Risk Assessment:   Patient rates overall health as good. Patient feels that their physical health rating is same. Patient is very satisfied with their life. Eyesight was rated as same. Hearing was rated as same. Patient feels that their emotional and mental health rating is same. Patients states they are never, rarely angry. Patient states they are never, rarely unusually tired/fatigued. Pain experienced in the last 7 days has been none. Patient states that he has experienced no weight loss or gain in last 6 months. Fall Risk Screening: In the past year, patient has experienced: no history of falling in past year      Home Safety:  Patient does not have trouble with stairs inside or outside of their home. Patient has working smoke alarms and has working carbon monoxide detector. Home safety hazards include: none. Medications:   Patient is currently taking over-the-counter supplements. OTC medications include: see medication list. Patient is able to manage medications. Activities of Daily Living (ADLs)/Instrumental Activities of Daily Living (IADLs):   Walk and transfer into and out of bed and chair?: Yes  Dress and groom yourself?: Yes    Bathe or shower yourself?: Yes    Feed yourself?  Yes  Do your laundry/housekeeping?: Yes  Manage your money, pay your bills and track your expenses?: Yes  Make your own meals?: Yes    Do your own shopping?: Yes    Previous Hospitalizations:   Any hospitalizations or ED visits within the last 12 months?: No      PREVENTIVE SCREENINGS      Cardiovascular Screening:    General: Screening Not Indicated, History Lipid Disorder and Risks and Benefits Discussed      Diabetes Screening:     General: Screening Not Indicated, History Diabetes, Risks and Benefits Discussed and Screening Current      Colorectal Cancer Screening:     General: Risks and Benefits Discussed      Prostate Cancer Screening:    General: Screening Current and Risks and Benefits Discussed      Osteoporosis Screening:    General: Screening Not Indicated, History Osteoporosis and Risks and Benefits Discussed      Abdominal Aortic Aneurysm (AAA) Screening:    Risk factors include: age between 70-77 yo        General: Risks and Benefits Discussed      Lung Cancer Screening:     General: Screening Not Indicated and Risks and Benefits Discussed      Hepatitis C Screening:    General: Screening Current and Risks and Benefits Discussed    Screening, Brief Intervention, and Referral to Treatment (SBIRT)    Screening      Single Item Drug Screening:  How often have you used an illegal drug (including marijuana) or a prescription medication for non-medical reasons in the past year? never    Single Item Drug Screen Score: 0  Interpretation: Negative screen for possible drug use disorder    Other Counseling Topics:   Car/seat belt/driving safety, skin self-exam, sunscreen and calcium and vitamin D intake and regular weightbearing exercise. No results found. Physical Exam:     There were no vitals taken for this visit.     Physical Exam     Alli Paulino MD

## 2023-09-21 ENCOUNTER — VBI (OUTPATIENT)
Dept: ADMINISTRATIVE | Facility: OTHER | Age: 73
End: 2023-09-21

## 2023-10-09 ENCOUNTER — APPOINTMENT (OUTPATIENT)
Dept: LAB | Age: 73
End: 2023-10-09
Payer: COMMERCIAL

## 2023-10-09 DIAGNOSIS — M1A.9XX0 CHRONIC GOUT WITHOUT TOPHUS, UNSPECIFIED CAUSE, UNSPECIFIED SITE: ICD-10-CM

## 2023-10-09 DIAGNOSIS — E11.69 HYPERLIPIDEMIA ASSOCIATED WITH TYPE 2 DIABETES MELLITUS: ICD-10-CM

## 2023-10-09 DIAGNOSIS — E78.5 HYPERLIPIDEMIA ASSOCIATED WITH TYPE 2 DIABETES MELLITUS: ICD-10-CM

## 2023-10-09 DIAGNOSIS — I48.0 PAROXYSMAL ATRIAL FIBRILLATION (HCC): ICD-10-CM

## 2023-10-09 LAB — MAGNESIUM SERPL-MCNC: 2.3 MG/DL (ref 1.9–2.7)

## 2023-10-09 PROCEDURE — 80151 DRUG ASSAY AMIODARONE: CPT

## 2023-10-09 PROCEDURE — 36415 COLL VENOUS BLD VENIPUNCTURE: CPT

## 2023-10-09 PROCEDURE — 83735 ASSAY OF MAGNESIUM: CPT

## 2023-10-12 LAB
AMIODARONE SERPL-MCNC: 2354 NG/ML (ref 1000–2500)
DESETHYLAMIODARONE SERPL-MCNC: 1820 NG/ML

## 2023-10-18 ENCOUNTER — OFFICE VISIT (OUTPATIENT)
Dept: OBGYN CLINIC | Facility: MEDICAL CENTER | Age: 73
End: 2023-10-18

## 2023-10-18 ENCOUNTER — APPOINTMENT (OUTPATIENT)
Dept: RADIOLOGY | Facility: MEDICAL CENTER | Age: 73
End: 2023-10-18
Payer: COMMERCIAL

## 2023-10-18 VITALS
HEIGHT: 69 IN | HEART RATE: 76 BPM | SYSTOLIC BLOOD PRESSURE: 131 MMHG | DIASTOLIC BLOOD PRESSURE: 76 MMHG | WEIGHT: 222 LBS | BODY MASS INDEX: 32.88 KG/M2

## 2023-10-18 DIAGNOSIS — M17.0 OSTEOARTHRITIS OF BOTH KNEES, UNSPECIFIED OSTEOARTHRITIS TYPE: Primary | ICD-10-CM

## 2023-10-18 DIAGNOSIS — M17.0 OSTEOARTHRITIS OF BOTH KNEES, UNSPECIFIED OSTEOARTHRITIS TYPE: ICD-10-CM

## 2023-10-18 PROCEDURE — 73564 X-RAY EXAM KNEE 4 OR MORE: CPT

## 2023-10-18 NOTE — PROGRESS NOTES
Ortho Sports Medicine Knee New Patient Visit     Assesment:   68 y.o. male bilateral knee severe osteoarthritis    Plan: It is recommended to see one of my partners to discuss TKA, however patient notes he cannot have surgery due to his previous heart surgery. Patient was offered steroid injection and declined that as well. Order was placed for PT. Patient will return in 6-8 weeks if symptoms worsen or fail to improve. Conservative treatment:    Ice to knee for 20 minutes at least 1-2 times daily. PT for ROM/strengthening to knee, hip and core. OTC NSAIDS prn for pain. Tylenol for pain. Let pain guide gradual return activities. Imaging: All imaging from today was reviewed by myself and explained to the patient. Injection:    No Injection planned at this time. Surgery: TKA was recommended today, patient *      Follow up:    No follow-ups on file. Chief Complaint   Patient presents with    Left Knee - Pain    Right Knee - Pain       History of Present Illness: The patient is a 68 y.o. male whose occupation is unknown, referred to me by their primary care physician, seen in clinic for consultation of bilateral knee pain. Patient has severe bilateral knee osteoarthritis. Knee Surgical History:  None    Past Medical, Social and Family History:  Past Medical History:   Diagnosis Date    Anemia 3/12/2013    Atrial fibrillation ([address])     Coronary artery disease     GERD (gastroesophageal reflux disease)     Hyperlipidemia associated with type 2 diabetes mellitus  5/4/2021    Hypertension     Obesity      History reviewed. No pertinent surgical history.   Allergies   Allergen Reactions    Metoprolol Shortness Of Breath     "50mg" via Korean ; states "When I take the 25mg I'm normal."     Current Outpatient Medications on File Prior to Visit   Medication Sig Dispense Refill    allopurinol (ZYLOPRIM) 100 mg tablet Take 1 tablet (100 mg total) by mouth daily 90 tablet 3    amiodarone 200 mg tablet Take 1 tablet (200 mg total) by mouth daily 90 tablet 3    atorvastatin (LIPITOR) 20 mg tablet Take 1 tablet (20 mg total) by mouth daily Please STOP Pravastatin. . 90 tablet 3    Diclofenac Sodium (VOLTAREN) 1 % Apply 2 g topically 4 (four) times a day 200 g 2    ergocalciferol (VITAMIN D2) 50,000 units Take 1 capsule (50,000 Units total) by mouth once a week 13 capsule 1    furosemide (Lasix) 20 mg tablet Take 1 tablet (20 mg total) by mouth daily 90 tablet 3    gabapentin (Neurontin) 100 mg capsule Take 2 capsules (200 mg total) by mouth 2 (two) times a day 120 capsule 1    ketoconazole (NIZORAL) 2 % shampoo Apply 1 application topically 3 (three) times a week 120 mL 2    linaCLOtide 290 MCG CAPS Take 1 capsule by mouth in the morning 90 capsule 3    loratadine (Claritin) 10 mg tablet Take 1 tablet (10 mg total) by mouth daily In the evening 30 tablet 1    losartan (COZAAR) 50 mg tablet Take 1 tablet (50 mg total) by mouth daily 90 tablet 3    metoprolol succinate (Toprol XL) 25 mg 24 hr tablet Take 1 tablet (25 mg total) by mouth daily 90 tablet 3    nitrofurantoin (MACROBID) 100 mg capsule Take 1 capsule (100 mg total) by mouth in the morning With food/lunch 30 capsule 0    pantoprazole (PROTONIX) 20 mg tablet Take 1 tablet (20 mg total) by mouth daily 90 tablet 3    warfarin (COUMADIN) 5 mg tablet Take 1 tablet (5 mg total) by mouth daily 90 tablet 3     No current facility-administered medications on file prior to visit. Social History     Socioeconomic History    Marital status: Single     Spouse name: Not on file    Number of children: Not on file    Years of education: Not on file    Highest education level: Not on file   Occupational History    Occupation: retired   Tobacco Use    Smoking status: Never    Smokeless tobacco: Never    Tobacco comments:     No passive smoke exposure   Vaping Use    Vaping Use: Never used   Substance and Sexual Activity    Alcohol use:  No Drug use: No    Sexual activity: Not Currently   Other Topics Concern    Not on file   Social History Narrative    Not on file     Social Determinants of Health     Financial Resource Strain: Low Risk  (9/18/2023)    Overall Financial Resource Strain (CARDIA)     Difficulty of Paying Living Expenses: Not hard at all   Food Insecurity: No Food Insecurity (9/23/2020)    Hunger Vital Sign     Worried About Running Out of Food in the Last Year: Never true     Ran Out of Food in the Last Year: Never true   Transportation Needs: No Transportation Needs (9/18/2023)    PRAPARE - Transportation     Lack of Transportation (Medical): No     Lack of Transportation (Non-Medical): No   Physical Activity: Sufficiently Active (9/23/2020)    Exercise Vital Sign     Days of Exercise per Week: 5 days     Minutes of Exercise per Session: 30 min   Stress: No Stress Concern Present (9/23/2020)    [facility]     Feeling of Stress : Not at all   Social Connections: Unknown (9/23/2020)    Social Connection and Isolation Panel [NHANES]     Frequency of Communication with Friends and Family: Patient refused     Frequency of Social Gatherings with Friends and Family: Patient refused     Attends [employer] Services: Patient refused     Active Member of Clubs or Organizations: Patient refused     Attends Club or Organization Meetings: Patient refused     Marital Status: Patient refused   Intimate Partner Violence: Not At Risk (9/23/2020)    Humiliation, Afraid, Rape, and Kick questionnaire     Fear of Current or Ex-Partner: No     Emotionally Abused: No     Physically Abused: No     Sexually Abused: No   Housing Stability: Not on file         I have reviewed the past medical, surgical, social and family history, medications and allergies as documented in the EMR. Review of systems: ROS is negative other than that noted in the HPI. Constitutional: Negative for fatigue and fever. HENT: Negative for sore throat. Respiratory: Negative for shortness of breath. Cardiovascular: Negative for chest pain. Gastrointestinal: Negative for abdominal pain. Endocrine: Negative for cold intolerance and heat intolerance. Genitourinary: Negative for flank pain. Musculoskeletal: Negative for back pain. Skin: Negative for rash. Allergic/Immunologic: Negative for immunocompromised state. Neurological: Negative for dizziness. Psychiatric/Behavioral: Negative for agitation. Physical Exam:    Blood pressure 131/76, pulse 76, height 5' 9" (1.753 m), weight 101 kg (222 lb). General/Constitutional: NAD, well developed, well nourished  HENT: Normocephalic, atraumatic  CV: Intact distal pulses, regular rate  Resp: No respiratory distress or labored breathing  GI: Soft and non-tender   Lymphatic: No lymphadenopathy palpated  Neuro: Alert and Oriented x 3, no focal deficits  Psych: Normal mood, normal affect, normal judgement, normal behavior  Skin: Warm, dry, no rashes, no erythema      Knee Exam (focused): RIGHT LEFT   ROM:   0-110   0-110   Palpation: Effusion negative negative     MJL tenderness Positive Positive     LJL tenderness Positive Positive   Meniscus:  Agustin Negative Negative    Apley's Compression Negative Negative   Instability: Varus stable stable     Valgus stable stable   Special Tests: Lachman Negative Negative     Posterior drawer Negative Negative     Anterior drawer Negative Negative     Pivot shift not tested not tested     Dial not tested not tested   Patella: Palpation no tenderness no tenderness     Mobility 1/4 1/4     Apprehension Negative Negative   Other: Single leg 1/4 squat not tested not tested                  LE NV Exam: +2 DP/PT pulses bilaterally  Sensation intact to light touch L2-S1 bilaterally     Bilateral hip ROM demonstrates no pain actively or passively    No calf tenderness to palpation bilaterally    Knee Imaging    X-rays of the bilateral knee were reviewed, which demonstrate severe osteoarthritis. I have reviewed the radiology report and agree with their impression.     Scribe Attestation      I,:  Eliane Alas am acting as a scribe while in the presence of the attending physician.:       I,:  Vinnie Orosco, DO personally performed the services described in this documentation    as scribed in my presence.:

## 2023-12-05 ENCOUNTER — APPOINTMENT (OUTPATIENT)
Dept: LAB | Age: 73
End: 2023-12-05
Payer: COMMERCIAL

## 2023-12-05 DIAGNOSIS — I42.9 CARDIOMYOPATHY, UNSPECIFIED TYPE (HCC): ICD-10-CM

## 2023-12-05 DIAGNOSIS — I47.20 VENTRICULAR TACHYARRHYTHMIA (HCC): ICD-10-CM

## 2023-12-05 DIAGNOSIS — Z45.02 AICD AT END OF BATTERY LIFE: ICD-10-CM

## 2023-12-05 LAB
ALBUMIN SERPL BCP-MCNC: 4 G/DL (ref 3.5–5)
ALP SERPL-CCNC: 75 U/L (ref 34–104)
ALT SERPL W P-5'-P-CCNC: 45 U/L (ref 7–52)
ANION GAP SERPL CALCULATED.3IONS-SCNC: 8 MMOL/L
AST SERPL W P-5'-P-CCNC: 38 U/L (ref 13–39)
BASOPHILS # BLD AUTO: 0.06 THOUSANDS/ÂΜL (ref 0–0.1)
BASOPHILS NFR BLD AUTO: 1 % (ref 0–1)
BILIRUB SERPL-MCNC: 0.64 MG/DL (ref 0.2–1)
BUN SERPL-MCNC: 27 MG/DL (ref 5–25)
CALCIUM SERPL-MCNC: 9 MG/DL (ref 8.4–10.2)
CHLORIDE SERPL-SCNC: 110 MMOL/L (ref 96–108)
CO2 SERPL-SCNC: 25 MMOL/L (ref 21–32)
CREAT SERPL-MCNC: 1.76 MG/DL (ref 0.6–1.3)
EOSINOPHIL # BLD AUTO: 0.25 THOUSAND/ÂΜL (ref 0–0.61)
EOSINOPHIL NFR BLD AUTO: 4 % (ref 0–6)
ERYTHROCYTE [DISTWIDTH] IN BLOOD BY AUTOMATED COUNT: 15.5 % (ref 11.6–15.1)
GFR SERPL CREATININE-BSD FRML MDRD: 37 ML/MIN/1.73SQ M
GLUCOSE SERPL-MCNC: 96 MG/DL (ref 65–140)
HCT VFR BLD AUTO: 38.8 % (ref 36.5–49.3)
HGB BLD-MCNC: 11.7 G/DL (ref 12–17)
IMM GRANULOCYTES # BLD AUTO: 0.05 THOUSAND/UL (ref 0–0.2)
IMM GRANULOCYTES NFR BLD AUTO: 1 % (ref 0–2)
INR PPP: 1.87 (ref 0.84–1.19)
LYMPHOCYTES # BLD AUTO: 1.71 THOUSANDS/ÂΜL (ref 0.6–4.47)
LYMPHOCYTES NFR BLD AUTO: 25 % (ref 14–44)
MAGNESIUM SERPL-MCNC: 2.4 MG/DL (ref 1.9–2.7)
MCH RBC QN AUTO: 27.7 PG (ref 26.8–34.3)
MCHC RBC AUTO-ENTMCNC: 30.2 G/DL (ref 31.4–37.4)
MCV RBC AUTO: 92 FL (ref 82–98)
MONOCYTES # BLD AUTO: 0.54 THOUSAND/ÂΜL (ref 0.17–1.22)
MONOCYTES NFR BLD AUTO: 8 % (ref 4–12)
NEUTROPHILS # BLD AUTO: 4.22 THOUSANDS/ÂΜL (ref 1.85–7.62)
NEUTS SEG NFR BLD AUTO: 61 % (ref 43–75)
NRBC BLD AUTO-RTO: 0 /100 WBCS
PLATELET # BLD AUTO: 174 THOUSANDS/UL (ref 149–390)
PMV BLD AUTO: 11.5 FL (ref 8.9–12.7)
POTASSIUM SERPL-SCNC: 4.9 MMOL/L (ref 3.5–5.3)
PROT SERPL-MCNC: 6.9 G/DL (ref 6.4–8.4)
PROTHROMBIN TIME: 21.2 SECONDS (ref 11.6–14.5)
RBC # BLD AUTO: 4.22 MILLION/UL (ref 3.88–5.62)
SODIUM SERPL-SCNC: 143 MMOL/L (ref 135–147)
WBC # BLD AUTO: 6.83 THOUSAND/UL (ref 4.31–10.16)

## 2023-12-05 PROCEDURE — 85610 PROTHROMBIN TIME: CPT

## 2023-12-05 PROCEDURE — 83735 ASSAY OF MAGNESIUM: CPT

## 2023-12-05 PROCEDURE — 80053 COMPREHEN METABOLIC PANEL: CPT

## 2023-12-05 PROCEDURE — 85025 COMPLETE CBC W/AUTO DIFF WBC: CPT

## 2023-12-05 PROCEDURE — 36415 COLL VENOUS BLD VENIPUNCTURE: CPT

## 2023-12-21 ENCOUNTER — TELEPHONE (OUTPATIENT)
Dept: NEPHROLOGY | Facility: CLINIC | Age: 73
End: 2023-12-21

## 2023-12-21 NOTE — TELEPHONE ENCOUNTER
Placed call to pt with  to see if pt would like to schedule consult.  LM for pt in Belarusian to contact our office to schedule.

## 2024-01-02 ENCOUNTER — OFFICE VISIT (OUTPATIENT)
Dept: FAMILY MEDICINE CLINIC | Facility: CLINIC | Age: 74
End: 2024-01-02
Payer: COMMERCIAL

## 2024-01-02 VITALS
OXYGEN SATURATION: 98 % | DIASTOLIC BLOOD PRESSURE: 78 MMHG | HEART RATE: 68 BPM | SYSTOLIC BLOOD PRESSURE: 130 MMHG | RESPIRATION RATE: 14 BRPM | TEMPERATURE: 98.2 F | BODY MASS INDEX: 32.58 KG/M2 | WEIGHT: 220 LBS | HEIGHT: 69 IN

## 2024-01-02 DIAGNOSIS — I70.213 ATHEROSCLEROSIS OF NATIVE ARTERY OF BOTH LOWER EXTREMITIES WITH INTERMITTENT CLAUDICATION (HCC): ICD-10-CM

## 2024-01-02 DIAGNOSIS — N18.32 STAGE 3B CHRONIC KIDNEY DISEASE (HCC): ICD-10-CM

## 2024-01-02 DIAGNOSIS — N40.0 ENLARGED PROSTATE: ICD-10-CM

## 2024-01-02 DIAGNOSIS — E11.69 HYPERLIPIDEMIA ASSOCIATED WITH TYPE 2 DIABETES MELLITUS: ICD-10-CM

## 2024-01-02 DIAGNOSIS — K62.5 RECTAL BLEED: ICD-10-CM

## 2024-01-02 DIAGNOSIS — E78.5 HYPERLIPIDEMIA ASSOCIATED WITH TYPE 2 DIABETES MELLITUS: ICD-10-CM

## 2024-01-02 DIAGNOSIS — R73.09 ELEVATED HEMOGLOBIN A1C: ICD-10-CM

## 2024-01-02 DIAGNOSIS — I48.0 PAROXYSMAL ATRIAL FIBRILLATION (HCC): Primary | ICD-10-CM

## 2024-01-02 PROCEDURE — 99214 OFFICE O/P EST MOD 30 MIN: CPT | Performed by: INTERNAL MEDICINE

## 2024-01-02 NOTE — PROGRESS NOTES
BMI Counseling: Body mass index is 32.49 kg/m². The BMI is above normal. Nutrition recommendations include reducing portion sizes.

## 2024-01-02 NOTE — PROGRESS NOTES
Name: Silvio Drew      : 1950      MRN: [id number]  Encounter Provider: Mac Stephens MD  Encounter Date: 2024   Encounter department: [facility]    Assessment & Plan     1. Paroxysmal atrial fibrillation (HCC)  Comments:  S/P Pacer..  continue same  FU w cardiology  Life style Mod  RTc in 3 mos w Blood work  Orders:  -     Comprehensive metabolic panel; Future; Expected date: 2024  -     CBC and differential; Future; Expected date: 2024  -     Lipid Panel with Direct LDL reflex; Future; Expected date: 2024  -     TSH, 3rd generation with Free T4 reflex; Future; Expected date: 2024  -     Microalbumin, Random Urine (W/Creatinine) ([facility] ONLY); Future; Expected date: 2024  -     Microalbumin, Random Urine (W/Creatinine) ([facility] ONLY)    2. Atherosclerosis of native artery of both lower extremities with intermittent claudication (HCC)  Comments:  as above...    3. Hyperlipidemia associated with type 2 diabetes mellitus   -     Lipid Panel with Direct LDL reflex; Future; Expected date: 2024    4. Elevated hemoglobin A1c  Comments:  life style Mod  RTC in 3 mos w Blood work  Orders:  -     Comprehensive metabolic panel; Future; Expected date: 2024  -     CBC and differential; Future; Expected date: 2024  -     Lipid Panel with Direct LDL reflex; Future; Expected date: 2024  -     TSH, 3rd generation with Free T4 reflex; Future; Expected date: 2024  -     Microalbumin, Random Urine (W/Creatinine) ([facility] ONLY); Future; Expected date: 2024  -     Microalbumin, Random Urine (W/Creatinine) ([facility] ONLY)  -     UA (URINE) with reflex to Scope; Future; Expected date: 2024  -     PSA Total, Diagnostic; Future  -     Magnesium; Future  -     Vitamin D 25 hydroxy; Future; Expected date: 2024  -     Vitamin B12; Future    5. Enlarged prostate  -     UA (URINE) with reflex to Scope; Future; Expected date:  01/09/2024  -     PSA Total, Diagnostic; Future    6. Rectal bleed  Comments:  GI Consult ASAP  Orders:  -     Occult Blood, Fecal Immunochemical; Future  -     Ambulatory referral to Gastroenterology; Future; Expected date: 01/02/2024    7. Stage 3b chronic kidney disease (HCC)  Comments:  FU w Nephrology    Life style mod  RTC in 3 mos w Blood work       Subjective      73 Y O Man is here for Regular Check Up, He has few symptoms, recent Blood work and med list Reviewed w  Pt in detail...      Review of Systems   Constitutional:  Negative for chills, fatigue and fever.   HENT:  Positive for postnasal drip. Negative for congestion, facial swelling, sore throat, trouble swallowing and voice change.    Eyes:  Negative for pain, discharge and visual disturbance.   Respiratory:  Negative for cough, shortness of breath and wheezing.    Cardiovascular:  Negative for chest pain, palpitations and leg swelling.   Gastrointestinal:  Positive for blood in stool. Negative for abdominal pain, constipation, diarrhea and nausea.   Endocrine: Negative for polydipsia, polyphagia and polyuria.   Genitourinary:  Negative for difficulty urinating, hematuria and urgency.   Musculoskeletal:  Negative for arthralgias and myalgias.   Skin:  Negative for rash.   Neurological:  Negative for dizziness, tremors, weakness and headaches.   Hematological:  Negative for adenopathy. Does not bruise/bleed easily.   Psychiatric/Behavioral:  Negative for dysphoric mood, sleep disturbance and suicidal ideas.        Current Outpatient Medications on File Prior to Visit   Medication Sig    allopurinol (ZYLOPRIM) 100 mg tablet Take 1 tablet (100 mg total) by mouth daily    amiodarone 200 mg tablet Take 1 tablet (200 mg total) by mouth daily    atorvastatin (LIPITOR) 20 mg tablet Take 1 tablet (20 mg total) by mouth daily Please STOP Pravastatin..    Diclofenac Sodium (VOLTAREN) 1 % Apply 2 g topically 4 (four) times a day    ergocalciferol (VITAMIN D2)  "50,000 units Take 1 capsule (50,000 Units total) by mouth once a week    furosemide (Lasix) 20 mg tablet Take 1 tablet (20 mg total) by mouth daily    gabapentin (Neurontin) 100 mg capsule Take 2 capsules (200 mg total) by mouth 2 (two) times a day    ketoconazole (NIZORAL) 2 % shampoo Apply 1 application topically 3 (three) times a week    linaCLOtide 290 MCG CAPS Take 1 capsule by mouth in the morning    loratadine (Claritin) 10 mg tablet Take 1 tablet (10 mg total) by mouth daily In the evening    losartan (COZAAR) 50 mg tablet Take 1 tablet (50 mg total) by mouth daily    metoprolol succinate (Toprol XL) 25 mg 24 hr tablet Take 1 tablet (25 mg total) by mouth daily    nitrofurantoin (MACROBID) 100 mg capsule Take 1 capsule (100 mg total) by mouth in the morning With food/lunch    pantoprazole (PROTONIX) 20 mg tablet Take 1 tablet (20 mg total) by mouth daily    warfarin (COUMADIN) 5 mg tablet Take 1 tablet (5 mg total) by mouth daily       Objective     /78 (BP Location: Left arm, Patient Position: Sitting, Cuff Size: Standard)   Pulse 68   Temp 98.2 °F (36.8 °C) (Tympanic)   Resp 14   Ht 5' 9\" (1.753 m)   Wt 99.8 kg (220 lb)   SpO2 98%   BMI 32.49 kg/m²     Physical Exam  Constitutional:       General: He is not in acute distress.  HENT:      Head: Normocephalic.      Mouth/Throat:      Pharynx: No oropharyngeal exudate.   Eyes:      General: No scleral icterus.     Conjunctiva/sclera: Conjunctivae normal.      Pupils: Pupils are equal, round, and reactive to light.   Neck:      Thyroid: No thyromegaly.   Cardiovascular:      Rate and Rhythm: Normal rate and regular rhythm.      Heart sounds: Normal heart sounds. No murmur heard.  Pulmonary:      Effort: Pulmonary effort is normal. No respiratory distress.      Breath sounds: Normal breath sounds. No wheezing or rales.   Abdominal:      General: Bowel sounds are normal. There is no distension.      Palpations: Abdomen is soft.      Tenderness: " There is no abdominal tenderness. There is no guarding or rebound.   Musculoskeletal:         General: No tenderness.      Cervical back: Neck supple.   Lymphadenopathy:      Cervical: No cervical adenopathy.   Skin:     Coloration: Skin is not pale.      Findings: No rash.   Neurological:      Mental Status: He is alert and oriented to person, place, and time.      Sensory: No sensory deficit.      Motor: No weakness.       Mac Stephens MD

## 2024-01-11 ENCOUNTER — OFFICE VISIT (OUTPATIENT)
Dept: GASTROENTEROLOGY | Facility: MEDICAL CENTER | Age: 74
End: 2024-01-11
Payer: COMMERCIAL

## 2024-01-11 VITALS
WEIGHT: 221.2 LBS | TEMPERATURE: 98.1 F | DIASTOLIC BLOOD PRESSURE: 76 MMHG | SYSTOLIC BLOOD PRESSURE: 126 MMHG | HEIGHT: 69 IN | HEART RATE: 77 BPM | BODY MASS INDEX: 32.76 KG/M2

## 2024-01-11 DIAGNOSIS — K59.00 CONSTIPATION, UNSPECIFIED CONSTIPATION TYPE: ICD-10-CM

## 2024-01-11 DIAGNOSIS — D64.9 ANEMIA, UNSPECIFIED TYPE: ICD-10-CM

## 2024-01-11 DIAGNOSIS — Z12.11 COLON CANCER SCREENING: ICD-10-CM

## 2024-01-11 DIAGNOSIS — K21.9 GASTROESOPHAGEAL REFLUX DISEASE, UNSPECIFIED WHETHER ESOPHAGITIS PRESENT: Primary | ICD-10-CM

## 2024-01-11 DIAGNOSIS — K62.5 RECTAL BLEEDING: ICD-10-CM

## 2024-01-11 PROCEDURE — 99214 OFFICE O/P EST MOD 30 MIN: CPT | Performed by: INTERNAL MEDICINE

## 2024-01-11 RX ORDER — POLYETHYLENE GLYCOL 3350 17 G/17G
17 POWDER, FOR SOLUTION ORAL DAILY
Qty: 255 G | Refills: 0 | Status: SHIPPED | OUTPATIENT
Start: 2024-01-11

## 2024-01-11 RX ORDER — BISACODYL 5 MG/1
TABLET, DELAYED RELEASE ORAL
Qty: 2 TABLET | Refills: 0 | Status: SHIPPED | OUTPATIENT
Start: 2024-01-11

## 2024-01-12 ENCOUNTER — TELEPHONE (OUTPATIENT)
Dept: GASTROENTEROLOGY | Facility: MEDICAL CENTER | Age: 74
End: 2024-01-12

## 2024-01-12 NOTE — TELEPHONE ENCOUNTER
Irma from Dr. Stephens's office calling about cardiac clearance. I warm transfer to Lela at [place] office.

## 2024-01-12 NOTE — TELEPHONE ENCOUNTER
Spoke with GI office, they are going to send the cardio clearance to his cardiologist at [facility], along with coumadin hold.

## 2024-01-12 NOTE — TELEPHONE ENCOUNTER
Our mutual patient is scheduled for procedure:     Procedure: Colon/EGD     Date: 03/05/2024     With: Jaiyeola    Our office is requesting cardiac clearance for their upcoming procedure. AS well as a 5 day Coumadin hold.     Physician Approving clearance: ________________________    Any Questions please call us at [phone]    Your reply to this message will be accepted as clearance.     Thank you.

## 2024-01-12 NOTE — TELEPHONE ENCOUNTER
Ruthann Mcdowell from Dr. Stephens PCP office called and they would like to have him cleared by his Cardiologist from [facility] as well as they only provided a refill for his Coumadin and would like [facility] to give medication clearance.

## 2024-01-18 NOTE — TELEPHONE ENCOUNTER
Cardiac clearance scanned in to chart as intermediate risk and to hold coumadin for 4 days. Please advise if this is acceptable to continue with the procedure. Thank you.

## 2024-02-09 ENCOUNTER — TELEPHONE (OUTPATIENT)
Dept: FAMILY MEDICINE CLINIC | Facility: CLINIC | Age: 74
End: 2024-02-09

## 2024-02-09 DIAGNOSIS — K58.1 IRRITABLE BOWEL SYNDROME WITH CONSTIPATION: ICD-10-CM

## 2024-02-20 ENCOUNTER — TELEPHONE (OUTPATIENT)
Dept: GASTROENTEROLOGY | Facility: MEDICAL CENTER | Age: 74
End: 2024-02-20

## 2024-02-20 NOTE — TELEPHONE ENCOUNTER
----- Message from Lucero Joshi RN sent at 2/20/2024  1:32 PM EST -----  Regarding: Anesthesia review  Good Afternoon,     Anesthesia has reviewed this patients chart and due to having a Low EF  and a complicated cardiac history as well as being none compliant with cardiac medications as noted in patient's chart they feel this patient is better suited for [place] or [facility]. Please reschedule the patient for a higher acuity hospital.       Thank you  Lucero FRANCES RN   Clinical Coordinator- GI Lab  [facility].

## 2024-03-14 ENCOUNTER — TELEPHONE (OUTPATIENT)
Dept: FAMILY MEDICINE CLINIC | Facility: CLINIC | Age: 74
End: 2024-03-14

## 2024-03-14 DIAGNOSIS — G62.9 NEUROPATHY: ICD-10-CM

## 2024-03-14 DIAGNOSIS — M54.50 ACUTE BILATERAL LOW BACK PAIN WITHOUT SCIATICA: ICD-10-CM

## 2024-03-14 RX ORDER — GABAPENTIN 100 MG/1
200 CAPSULE ORAL 3 TIMES DAILY
Qty: 180 CAPSULE | Refills: 1 | Status: SHIPPED | OUTPATIENT
Start: 2024-03-14 | End: 2024-03-18 | Stop reason: SDUPTHER

## 2024-03-14 NOTE — TELEPHONE ENCOUNTER
Pt came into office stating that he has been having back pain, took 650mg tylenol and nothing is helping. Wanted to be seen today. Instructed pt that something will be sent into the pharmacy for him and that we will see him on Monday at 1. - as

## 2024-03-18 ENCOUNTER — OFFICE VISIT (OUTPATIENT)
Dept: FAMILY MEDICINE CLINIC | Facility: CLINIC | Age: 74
End: 2024-03-18
Payer: COMMERCIAL

## 2024-03-18 VITALS
HEART RATE: 76 BPM | TEMPERATURE: 98.5 F | RESPIRATION RATE: 14 BRPM | BODY MASS INDEX: 32.73 KG/M2 | WEIGHT: 221 LBS | SYSTOLIC BLOOD PRESSURE: 128 MMHG | OXYGEN SATURATION: 96 % | DIASTOLIC BLOOD PRESSURE: 78 MMHG | HEIGHT: 69 IN

## 2024-03-18 DIAGNOSIS — M54.16 LUMBAR RADICULOPATHY: Primary | ICD-10-CM

## 2024-03-18 DIAGNOSIS — G62.9 NEUROPATHY: ICD-10-CM

## 2024-03-18 DIAGNOSIS — M54.42 ACUTE LEFT-SIDED LOW BACK PAIN WITH LEFT-SIDED SCIATICA: ICD-10-CM

## 2024-03-18 DIAGNOSIS — E11.69 HYPERLIPIDEMIA ASSOCIATED WITH TYPE 2 DIABETES MELLITUS  (HCC): ICD-10-CM

## 2024-03-18 DIAGNOSIS — E78.5 HYPERLIPIDEMIA ASSOCIATED WITH TYPE 2 DIABETES MELLITUS  (HCC): ICD-10-CM

## 2024-03-18 PROCEDURE — 96372 THER/PROPH/DIAG INJ SC/IM: CPT | Performed by: INTERNAL MEDICINE

## 2024-03-18 PROCEDURE — 99214 OFFICE O/P EST MOD 30 MIN: CPT | Performed by: INTERNAL MEDICINE

## 2024-03-18 RX ORDER — GABAPENTIN 100 MG/1
200 CAPSULE ORAL 3 TIMES DAILY
Qty: 180 CAPSULE | Refills: 1 | Status: SHIPPED | OUTPATIENT
Start: 2024-03-18

## 2024-03-18 RX ORDER — DIFLUNISAL 500 MG/1
500 TABLET, FILM COATED ORAL
Qty: 60 TABLET | Refills: 1 | Status: SHIPPED | OUTPATIENT
Start: 2024-03-18

## 2024-03-18 RX ORDER — KETOROLAC TROMETHAMINE 30 MG/ML
60 INJECTION, SOLUTION INTRAMUSCULAR; INTRAVENOUS ONCE
Status: COMPLETED | OUTPATIENT
Start: 2024-03-18 | End: 2024-03-18

## 2024-03-18 RX ORDER — CYANOCOBALAMIN 1000 UG/ML
1000 INJECTION, SOLUTION INTRAMUSCULAR; SUBCUTANEOUS ONCE
Status: COMPLETED | OUTPATIENT
Start: 2024-03-18 | End: 2024-03-18

## 2024-03-18 RX ADMIN — CYANOCOBALAMIN 1000 MCG: 1000 INJECTION, SOLUTION INTRAMUSCULAR; SUBCUTANEOUS at 11:21

## 2024-03-18 RX ADMIN — KETOROLAC TROMETHAMINE 60 MG: 30 INJECTION, SOLUTION INTRAMUSCULAR; INTRAVENOUS at 11:20

## 2024-03-18 NOTE — PROGRESS NOTES
Name: Silvio Drew      : 1950      MRN: [id number]  Encounter Provider: Mac Stephens MD  Encounter Date: 3/18/2024   Encounter department: [place] [facility]    Assessment & Plan     1. Lumbar radiculopathy  Comments:  moist Heat  Home P.T.  Stat; X rays  RTc in 2 weeks  Orders:  -     ketorolac (TORADOL) 60 mg/2 mL IM injection 60 mg  -     cyanocobalamin injection 1,000 mcg  -     XR spine lumbar minimum 4 views non injury; Future; Expected date: 2024  -     diflunisal (DOLOBID) 500 mg tablet; Take 1 tablet (500 mg total) by mouth 2 (two) times daily after meals  -     b complex-C-E-zinc (STRESS FORMULA/ZINC) tablet; Take 1 tablet by mouth daily    2. Hyperlipidemia associated with type 2 diabetes mellitus   -     Ambulatory Referral to Ophthalmology; Future    3. Neuropathy  -     gabapentin (Neurontin) 100 mg capsule; Take 2 capsules (200 mg total) by mouth 3 (three) times a day    4. Acute left-sided low back pain with left-sided sciatica  Comments:  as above  Toradol 60 mg IM Now  Vit B12 IM Now  RTC in 2-3  weeks  consider pain mangt consult  Orders:  -     ketorolac (TORADOL) 60 mg/2 mL IM injection 60 mg  -     cyanocobalamin injection 1,000 mcg  -     XR spine lumbar minimum 4 views non injury; Future; Expected date: 2024  -     diflunisal (DOLOBID) 500 mg tablet; Take 1 tablet (500 mg total) by mouth 2 (two) times daily after meals  -     b complex-C-E-zinc (STRESS FORMULA/ZINC) tablet; Take 1 tablet by mouth daily    Moist Heat  Life style mod  RTC in 2-3 weeks       Subjective      73 Y O man is here for Increasing severe acute low back pain radiating to left lower ext since last week,..      Review of Systems   Constitutional:  Negative for chills, fatigue and fever.   HENT:  Negative for congestion, facial swelling, sore throat, trouble swallowing and voice change.    Eyes:  Negative for pain, discharge and visual disturbance.   Respiratory:  Negative  for cough, shortness of breath and wheezing.    Cardiovascular:  Negative for chest pain, palpitations and leg swelling.   Gastrointestinal:  Negative for abdominal pain, blood in stool, constipation, diarrhea and nausea.   Endocrine: Negative for polydipsia, polyphagia and polyuria.   Genitourinary:  Negative for difficulty urinating, hematuria and urgency.   Musculoskeletal:  Positive for arthralgias, back pain and gait problem. Negative for myalgias.   Skin:  Negative for rash.   Neurological:  Positive for numbness. Negative for dizziness, tremors, weakness and headaches.   Hematological:  Negative for adenopathy. Does not bruise/bleed easily.   Psychiatric/Behavioral:  Negative for dysphoric mood, sleep disturbance and suicidal ideas.        Current Outpatient Medications on File Prior to Visit   Medication Sig    allopurinol (ZYLOPRIM) 100 mg tablet Take 1 tablet (100 mg total) by mouth daily    amiodarone 200 mg tablet Take 1 tablet (200 mg total) by mouth daily    atorvastatin (LIPITOR) 20 mg tablet Take 1 tablet (20 mg total) by mouth daily Please STOP Pravastatin..    bisacodyl (DULCOLAX) 5 mg EC tablet Take as instructed on bowel preparation instructions    Diclofenac Sodium (VOLTAREN) 1 % Apply 2 g topically 4 (four) times a day    ergocalciferol (VITAMIN D2) 50,000 units Take 1 capsule (50,000 Units total) by mouth once a week    furosemide (Lasix) 20 mg tablet Take 1 tablet (20 mg total) by mouth daily    ketoconazole (NIZORAL) 2 % shampoo Apply 1 application topically 3 (three) times a week    linaCLOtide 290 MCG CAPS Take 1 capsule by mouth in the morning    loratadine (Claritin) 10 mg tablet Take 1 tablet (10 mg total) by mouth daily In the evening    losartan (COZAAR) 50 mg tablet Take 1 tablet (50 mg total) by mouth daily    metoprolol succinate (Toprol XL) 25 mg 24 hr tablet Take 1 tablet (25 mg total) by mouth daily    nitrofurantoin (MACROBID) 100 mg capsule Take 1 capsule (100 mg total) by  "mouth in the morning With food/lunch    pantoprazole (PROTONIX) 20 mg tablet Take 1 tablet (20 mg total) by mouth daily    polyethylene glycol (GLYCOLAX) 17 GM/SCOOP powder Take 17 g by mouth daily    warfarin (COUMADIN) 5 mg tablet Take 1 tablet (5 mg total) by mouth daily    [DISCONTINUED] gabapentin (Neurontin) 100 mg capsule Take 2 capsules (200 mg total) by mouth 3 (three) times a day       Objective     /78 (BP Location: Left arm, Patient Position: Sitting, Cuff Size: Standard)   Pulse 76   Temp 98.5 °F (36.9 °C) (Tympanic)   Resp 14   Ht 5' 9\" (1.753 m)   Wt 100 kg (221 lb)   SpO2 96%   BMI 32.64 kg/m²     Physical Exam  Constitutional:       General: He is not in acute distress.  HENT:      Head: Normocephalic.      Mouth/Throat:      Pharynx: No oropharyngeal exudate.   Eyes:      General: No scleral icterus.     Conjunctiva/sclera: Conjunctivae normal.      Pupils: Pupils are equal, round, and reactive to light.   Neck:      Thyroid: No thyromegaly.   Cardiovascular:      Rate and Rhythm: Normal rate and regular rhythm.      Heart sounds: Normal heart sounds. No murmur heard.  Pulmonary:      Effort: Pulmonary effort is normal. No respiratory distress.      Breath sounds: Normal breath sounds. No wheezing or rales.   Abdominal:      General: Bowel sounds are normal. There is no distension.      Palpations: Abdomen is soft.      Tenderness: There is no abdominal tenderness. There is no guarding or rebound.   Musculoskeletal:         General: Tenderness present.      Cervical back: Neck supple.   Lymphadenopathy:      Cervical: No cervical adenopathy.   Skin:     Coloration: Skin is not pale.      Findings: No rash.   Neurological:      Mental Status: He is alert and oriented to person, place, and time.      Sensory: Sensory deficit present.      Motor: No weakness.      Comments: Pt uses a cane to support gait       Mac Stephens MD    "

## 2024-03-20 ENCOUNTER — HOSPITAL ENCOUNTER (EMERGENCY)
Facility: HOSPITAL | Age: 74
Discharge: HOME | End: 2024-03-20
Attending: EMERGENCY MEDICINE
Payer: COMMERCIAL

## 2024-03-20 ENCOUNTER — APPOINTMENT (EMERGENCY)
Dept: RADIOLOGY | Facility: HOSPITAL | Age: 74
End: 2024-03-20
Payer: COMMERCIAL

## 2024-03-20 VITALS
DIASTOLIC BLOOD PRESSURE: 88 MMHG | OXYGEN SATURATION: 99 % | SYSTOLIC BLOOD PRESSURE: 189 MMHG | TEMPERATURE: 97.8 F | HEART RATE: 53 BPM | RESPIRATION RATE: 14 BRPM

## 2024-03-20 DIAGNOSIS — M54.42 ACUTE LOW BACK PAIN WITH LEFT-SIDED SCIATICA: Primary | ICD-10-CM

## 2024-03-20 PROCEDURE — 99284 EMERGENCY DEPT VISIT MOD MDM: CPT

## 2024-03-20 PROCEDURE — 96372 THER/PROPH/DIAG INJ SC/IM: CPT

## 2024-03-20 PROCEDURE — 72100 X-RAY EXAM L-S SPINE 2/3 VWS: CPT

## 2024-03-20 PROCEDURE — 99284 EMERGENCY DEPT VISIT MOD MDM: CPT | Performed by: EMERGENCY MEDICINE

## 2024-03-20 RX ORDER — KETOROLAC TROMETHAMINE 30 MG/ML
15 INJECTION, SOLUTION INTRAMUSCULAR; INTRAVENOUS ONCE
Status: COMPLETED | OUTPATIENT
Start: 2024-03-20 | End: 2024-03-20

## 2024-03-20 RX ORDER — METHOCARBAMOL 500 MG/1
500 TABLET, FILM COATED ORAL 2 TIMES DAILY
Qty: 20 TABLET | Refills: 0 | Status: SHIPPED | OUTPATIENT
Start: 2024-03-20

## 2024-03-20 RX ORDER — METHOCARBAMOL 750 MG/1
750 TABLET, FILM COATED ORAL ONCE
Status: COMPLETED | OUTPATIENT
Start: 2024-03-20 | End: 2024-03-20

## 2024-03-20 RX ORDER — LIDOCAINE 40 MG/G
CREAM TOPICAL AS NEEDED
Qty: 30 G | Refills: 0 | Status: SHIPPED | OUTPATIENT
Start: 2024-03-20

## 2024-03-20 RX ORDER — LIDOCAINE 50 MG/G
1 PATCH TOPICAL ONCE
Status: DISCONTINUED | OUTPATIENT
Start: 2024-03-20 | End: 2024-03-20 | Stop reason: HOSPADM

## 2024-03-20 RX ORDER — METHYLPREDNISOLONE 4 MG/1
TABLET ORAL
Qty: 21 TABLET | Refills: 0 | Status: SHIPPED | OUTPATIENT
Start: 2024-03-20

## 2024-03-20 RX ADMIN — METHOCARBAMOL 750 MG: 750 TABLET ORAL at 11:34

## 2024-03-20 RX ADMIN — KETOROLAC TROMETHAMINE 15 MG: 30 INJECTION, SOLUTION INTRAMUSCULAR; INTRAVENOUS at 11:34

## 2024-03-20 RX ADMIN — LIDOCAINE 1 PATCH: 50 PATCH TOPICAL at 11:35

## 2024-03-20 NOTE — ED NOTES
Fall risk precautions in place: bractlet, socks, sign and call bell within reach.     Yaakov Soto  03/20/24 [id number]       Yaakov Soto  03/20/24 [id number]

## 2024-03-20 NOTE — ED PROVIDER NOTES
History  Chief Complaint   Patient presents with    Hip Pain     Began 1 week ago with L hip pain that radiates down the LLE. Pt denies injury.     73-year-old male presents for evaluation of shooting pain rating from his left lower back down to his left foot pain.  Started gradually several days ago, is constant, getting Osmar worse, is worse with ambulation, bending/twisting.  He was seen yesterday by his PCP and given injection of pain medication states his pain got better but not completely resolved.  Denies recent falls or back trauma, abdominal pain, fevers, chills, saddle anesthesia, history of IV drug use.      History provided by:  Patient   used: Yes    Hip Pain  Associated symptoms: no abdominal pain, no chest pain, no congestion, no diarrhea, no ear pain, no fatigue, no fever, no myalgias, no nausea, no rash, no rhinorrhea, no shortness of breath, no sore throat, no vomiting and no wheezing        Prior to Admission Medications   Prescriptions Last Dose Informant Patient Reported? Taking?   Diclofenac Sodium (VOLTAREN) 1 %   No No   Sig: Apply 2 g topically 4 (four) times a day   allopurinol (ZYLOPRIM) 100 mg tablet   No No   Sig: Take 1 tablet (100 mg total) by mouth daily   amiodarone 200 mg tablet   No No   Sig: Take 1 tablet (200 mg total) by mouth daily   atorvastatin (LIPITOR) 20 mg tablet   No No   Sig: Take 1 tablet (20 mg total) by mouth daily Please STOP Pravastatin..   b complex-C-E-zinc (STRESS FORMULA/ZINC) tablet   No No   Sig: Take 1 tablet by mouth daily   bisacodyl (DULCOLAX) 5 mg EC tablet   No No   Sig: Take as instructed on bowel preparation instructions   diflunisal (DOLOBID) 500 mg tablet   No No   Sig: Take 1 tablet (500 mg total) by mouth 2 (two) times daily after meals   ergocalciferol (VITAMIN D2) 50,000 units   No No   Sig: Take 1 capsule (50,000 Units total) by mouth once a week   furosemide (Lasix) 20 mg tablet   No No   Sig: Take 1 tablet (20 mg total)  by mouth daily   gabapentin (Neurontin) 100 mg capsule   No No   Sig: Take 2 capsules (200 mg total) by mouth 3 (three) times a day   ketoconazole (NIZORAL) 2 % shampoo  Self No No   Sig: Apply 1 application topically 3 (three) times a week   linaCLOtide 290 MCG CAPS   No No   Sig: Take 1 capsule by mouth in the morning   loratadine (Claritin) 10 mg tablet  Self No No   Sig: Take 1 tablet (10 mg total) by mouth daily In the evening   losartan (COZAAR) 50 mg tablet   No No   Sig: Take 1 tablet (50 mg total) by mouth daily   metoprolol succinate (Toprol XL) 25 mg 24 hr tablet   No No   Sig: Take 1 tablet (25 mg total) by mouth daily   nitrofurantoin (MACROBID) 100 mg capsule   No No   Sig: Take 1 capsule (100 mg total) by mouth in the morning With food/lunch   pantoprazole (PROTONIX) 20 mg tablet   No No   Sig: Take 1 tablet (20 mg total) by mouth daily   polyethylene glycol (GLYCOLAX) 17 GM/SCOOP powder   No No   Sig: Take 17 g by mouth daily   warfarin (COUMADIN) 5 mg tablet   No No   Sig: Take 1 tablet (5 mg total) by mouth daily      Facility-Administered Medications: None       Past Medical History:   Diagnosis Date    Anemia 3/12/2013    Atrial fibrillation (HCC)     Coronary artery disease     GERD (gastroesophageal reflux disease)     Hyperlipidemia associated with type 2 diabetes mellitus  5/4/2021    Hypertension     Obesity        Past Surgical History:   Procedure Laterality Date    CARDIAC PACEMAKER PLACEMENT  12/2023    replacement    CORONARY ARTERY BYPASS GRAFT         Family History   Problem Relation Age of Onset    Hypertension Mother     Diabetes Mother     No Known Problems Father      I have reviewed and agree with the history as documented.    E-Cigarette/Vaping    E-Cigarette Use Never User      E-Cigarette/Vaping Substances    Nicotine No     THC No     CBD No     Flavoring No      Social History     Tobacco Use    Smoking status: Never    Smokeless tobacco: Never    Tobacco comments:     No  passive smoke exposure   Vaping Use    Vaping status: Never Used   Substance Use Topics    Alcohol use: No    Drug use: No       Review of Systems   Constitutional:  Negative for activity change, appetite change, fatigue and fever.   HENT:  Negative for congestion, dental problem, ear pain, rhinorrhea and sore throat.    Eyes:  Negative for pain and redness.   Respiratory:  Negative for chest tightness, shortness of breath and wheezing.    Cardiovascular:  Negative for chest pain and palpitations.   Gastrointestinal:  Negative for abdominal pain, blood in stool, constipation, diarrhea, nausea and vomiting.   Endocrine: Negative for cold intolerance and heat intolerance.   Genitourinary:  Negative for dysuria, frequency and hematuria.   Musculoskeletal:  Positive for back pain. Negative for arthralgias and myalgias.   Skin:  Negative for color change, pallor and rash.   Neurological:  Negative for weakness and numbness.   Hematological:  Does not bruise/bleed easily.   Psychiatric/Behavioral:  Negative for agitation, hallucinations and suicidal ideas.        Physical Exam  Physical Exam  Vitals and nursing note reviewed.   Constitutional:       Appearance: He is well-developed.   HENT:      Mouth/Throat:      Pharynx: No oropharyngeal exudate.   Eyes:      Conjunctiva/sclera: Conjunctivae normal.   Neck:      Comments: No meningeal signs  Cardiovascular:      Rate and Rhythm: Normal rate and regular rhythm.      Heart sounds: Normal heart sounds.   Pulmonary:      Effort: Pulmonary effort is normal. No respiratory distress.      Breath sounds: Normal breath sounds. No wheezing or rales.   Chest:      Chest wall: No tenderness.   Abdominal:      General: Bowel sounds are normal. There is no distension.      Palpations: Abdomen is soft. There is no mass.      Tenderness: There is no abdominal tenderness.      Hernia: No hernia is present.      Comments: No cvat.  Nttp over t/l spines. Tttp LL lumbar region with mild  hypertonicy, +SLR left, nvi b/l le.   Musculoskeletal:         General: Normal range of motion.      Cervical back: Normal range of motion and neck supple.   Lymphadenopathy:      Cervical: No cervical adenopathy.   Skin:     Findings: No erythema or rash.      Comments: No edema   Neurological:      Mental Status: He is alert and oriented to person, place, and time.      Cranial Nerves: No cranial nerve deficit.   Psychiatric:         Behavior: Behavior normal.         Vital Signs  ED Triage Vitals [03/20/24 1044]   Temperature Pulse Respirations Blood Pressure SpO2   97.8 °F (36.6 °C) (!) 53 14 (!) 189/88 99 %      Temp Source Heart Rate Source Patient Position - Orthostatic VS BP Location FiO2 (%)   Oral -- Sitting Right arm --      Pain Score       9           Vitals:    03/20/24 1044   BP: (!) 189/88   Pulse: (!) 53   Patient Position - Orthostatic VS: Sitting         Visual Acuity      ED Medications  Medications   lidocaine (LIDODERM) 5 % patch 1 patch (1 patch Topical Medication Applied 3/20/24 1135)   ketorolac (TORADOL) injection 15 mg (15 mg Intramuscular Given 3/20/24 1134)   methocarbamol (ROBAXIN) tablet 750 mg (750 mg Oral Given 3/20/24 1134)       Diagnostic Studies  Results Reviewed       None                   XR spine lumbar 2 or 3 views injury   ED Interpretation by Mata Ruiz MD (03/20 1206)   Primary reviewed: no acute abnormality                 Procedures  Procedures         ED Course  ED Course as of 03/20/24 1234   Wed Mar 20, 2024   1153 Pt now requesting xray will do xray to r/o fx   1207 Xray negative, pt appropriate for dc to home.                                SBIRT 22yo+      Flowsheet Row Most Recent Value   Initial Alcohol Screen: US AUDIT-C     1. How often do you have a drink containing alcohol? 0 Filed at: 03/20/2024 1059   2. How many drinks containing alcohol do you have on a typical day you are drinking?  0 Filed at: 03/20/2024 1059   3a. Male UNDER 65: How often do you  have five or more drinks on one occasion? 0 Filed at: 03/20/2024 1059   3b. FEMALE Any Age, or MALE 65+: How often do you have 4 or more drinks on one occassion? 0 Filed at: 03/20/2024 1059   Audit-C Score 0 Filed at: 03/20/2024 1059   DANIEL: How many times in the past year have you...    Used an illegal drug or used a prescription medication for non-medical reasons? Never Filed at: 03/20/2024 1059                      Medical Decision Making  Acute low back pain without history exam (infectious etiology, intra-abdominal pathology, cauda equina and imaging and labs are not indicated.  Will treat for sciatica which is the most likely cause of patient's symptoms, refer to comprehensive spine for definitive care    Amount and/or Complexity of Data Reviewed  Radiology: ordered and independent interpretation performed.    Risk  OTC drugs.  Prescription drug management.             Disposition  Final diagnoses:   Acute low back pain with left-sided sciatica     Time reflects when diagnosis was documented in both MDM as applicable and the Disposition within this note       Time User Action Codes Description Comment    3/20/2024 11:35 AM Mata Ruiz Add [M54.42] Acute low back pain with left-sided sciatica           ED Disposition       ED Disposition   Discharge    Condition   Stable    Date/Time   Wed Mar 20, 2024 1134    Comment   Silvio Drew discharge to home/self care.                   Follow-up Information       Follow up With Specialties Details Why Contact Info Additional Information    Mac Setphens MD Internal Medicine Schedule an appointment as soon as possible for a visit in 1 week  [address] [place] [id number]  [phone]       [facility] Physical Therapy Schedule an appointment as soon as possible for a visit in 2 days  [phone]            Discharge Medication List as of 3/20/2024 11:38 AM        START taking these medications    Details   lidocaine (LMX) 4  % cream Apply topically as needed for moderate pain, Starting Wed 3/20/2024, Normal      methocarbamol (ROBAXIN) 500 mg tablet Take 1 tablet (500 mg total) by mouth 2 (two) times a day, Starting Wed 3/20/2024, Normal      methylPREDNISolone 4 MG tablet therapy pack Use as directed on package, Normal           CONTINUE these medications which have NOT CHANGED    Details   allopurinol (ZYLOPRIM) 100 mg tablet Take 1 tablet (100 mg total) by mouth daily, Starting Mon 9/18/2023, Normal      amiodarone 200 mg tablet Take 1 tablet (200 mg total) by mouth daily, Starting Mon 9/18/2023, Normal      atorvastatin (LIPITOR) 20 mg tablet Take 1 tablet (20 mg total) by mouth daily Please STOP Pravastatin.., Starting Mon 9/18/2023, Normal      b complex-C-E-zinc (STRESS FORMULA/ZINC) tablet Take 1 tablet by mouth daily, Starting Mon 3/18/2024, Normal      bisacodyl (DULCOLAX) 5 mg EC tablet Take as instructed on bowel preparation instructions, Normal      Diclofenac Sodium (VOLTAREN) 1 % Apply 2 g topically 4 (four) times a day, Starting Thu 3/14/2024, Normal      diflunisal (DOLOBID) 500 mg tablet Take 1 tablet (500 mg total) by mouth 2 (two) times daily after meals, Starting Mon 3/18/2024, Normal      ergocalciferol (VITAMIN D2) 50,000 units Take 1 capsule (50,000 Units total) by mouth once a week, Starting Mon 9/18/2023, Normal      furosemide (Lasix) 20 mg tablet Take 1 tablet (20 mg total) by mouth daily, Starting Mon 9/18/2023, Normal      gabapentin (Neurontin) 100 mg capsule Take 2 capsules (200 mg total) by mouth 3 (three) times a day, Starting Mon 3/18/2024, Normal      ketoconazole (NIZORAL) 2 % shampoo Apply 1 application topically 3 (three) times a week, Starting Fri 8/2/2019, Normal      linaCLOtide 290 MCG CAPS Take 1 capsule by mouth in the morning, Starting Fri 2/9/2024, Normal      loratadine (Claritin) 10 mg tablet Take 1 tablet (10 mg total) by mouth daily In the evening, Starting Mon 8/31/2020, Normal       losartan (COZAAR) 50 mg tablet Take 1 tablet (50 mg total) by mouth daily, Starting Mon 9/18/2023, Normal      metoprolol succinate (Toprol XL) 25 mg 24 hr tablet Take 1 tablet (25 mg total) by mouth daily, Starting Mon 9/18/2023, Normal      nitrofurantoin (MACROBID) 100 mg capsule Take 1 capsule (100 mg total) by mouth in the morning With food/lunch, Starting Thu 11/17/2022, Normal      pantoprazole (PROTONIX) 20 mg tablet Take 1 tablet (20 mg total) by mouth daily, Starting Wed 3/8/2023, Normal      polyethylene glycol (GLYCOLAX) 17 GM/SCOOP powder Take 17 g by mouth daily, Starting Thu 1/11/2024, Normal      warfarin (COUMADIN) 5 mg tablet Take 1 tablet (5 mg total) by mouth daily, Starting Mon 3/6/2023, Normal                 PDMP Review       None            ED Provider  Electronically Signed by             Mata Ruiz MD  03/20/24 0302

## 2024-03-21 ENCOUNTER — TELEPHONE (OUTPATIENT)
Dept: PHYSICAL THERAPY | Facility: OTHER | Age: 74
End: 2024-03-21

## 2024-03-21 NOTE — TELEPHONE ENCOUNTER
Call placed to the patient per [facility] referral.    Voice message left for patient to call back. Phone number and hours of business provided.     This is the 1st attempt to reach the patient.  Will defer per protocol.    I.S#[id number] [place]

## 2024-03-29 NOTE — TELEPHONE ENCOUNTER
Call placed to the patient per [facility] referral.    Voice message left for patient to call back. Phone number and hours of business provided.     This the 2nd attempt to reach the patient.   Will defer per protocol.

## 2024-04-06 ENCOUNTER — HOSPITAL ENCOUNTER (INPATIENT)
Facility: HOSPITAL | Age: 74
LOS: 4 days | DRG: 871 | End: 2024-04-10
Attending: EMERGENCY MEDICINE | Admitting: INTERNAL MEDICINE
Payer: COMMERCIAL

## 2024-04-06 ENCOUNTER — APPOINTMENT (EMERGENCY)
Dept: CT IMAGING | Facility: HOSPITAL | Age: 74
DRG: 871 | End: 2024-04-06
Payer: COMMERCIAL

## 2024-04-06 DIAGNOSIS — K76.9 LIVER LESION: ICD-10-CM

## 2024-04-06 DIAGNOSIS — R79.1 SUPRATHERAPEUTIC INR: ICD-10-CM

## 2024-04-06 DIAGNOSIS — N17.9 AKI (ACUTE KIDNEY INJURY) (HCC): ICD-10-CM

## 2024-04-06 DIAGNOSIS — E04.1 THYROID NODULE: ICD-10-CM

## 2024-04-06 DIAGNOSIS — M54.50 LOW BACK PAIN: ICD-10-CM

## 2024-04-06 DIAGNOSIS — R65.21 SEPTIC SHOCK (HCC): Primary | ICD-10-CM

## 2024-04-06 DIAGNOSIS — I51.7 CARDIOMEGALY: ICD-10-CM

## 2024-04-06 DIAGNOSIS — N28.1 RENAL CYST: ICD-10-CM

## 2024-04-06 DIAGNOSIS — R78.81 BACTEREMIA: ICD-10-CM

## 2024-04-06 DIAGNOSIS — A41.9 SEPTIC SHOCK (HCC): Primary | ICD-10-CM

## 2024-04-06 DIAGNOSIS — D64.9 ANEMIA: ICD-10-CM

## 2024-04-06 PROBLEM — D68.9 COAGULOPATHY (HCC): Status: ACTIVE | Noted: 2024-04-06

## 2024-04-06 PROBLEM — N18.30 ACUTE RENAL FAILURE SUPERIMPOSED ON STAGE 3 CHRONIC KIDNEY DISEASE (HCC): Status: ACTIVE | Noted: 2024-04-06

## 2024-04-06 PROBLEM — R57.9 SHOCK (HCC): Status: ACTIVE | Noted: 2024-04-06

## 2024-04-06 PROBLEM — R93.5 ABNORMAL CT OF THE ABDOMEN: Status: ACTIVE | Noted: 2024-04-06

## 2024-04-06 LAB
ALBUMIN SERPL BCP-MCNC: 3.3 G/DL (ref 3.5–5)
ALP SERPL-CCNC: 76 U/L (ref 34–104)
ALT SERPL W P-5'-P-CCNC: 51 U/L (ref 7–52)
ANION GAP SERPL CALCULATED.3IONS-SCNC: 7 MMOL/L (ref 4–13)
APTT PPP: 96 SECONDS (ref 23–37)
AST SERPL W P-5'-P-CCNC: 58 U/L (ref 13–39)
ATRIAL RATE: 74 BPM
BASOPHILS # BLD AUTO: 0.03 THOUSANDS/ÂΜL (ref 0–0.1)
BASOPHILS NFR BLD AUTO: 0 % (ref 0–1)
BILIRUB SERPL-MCNC: 0.86 MG/DL (ref 0.2–1)
BILIRUB UR QL STRIP: NEGATIVE
BUN SERPL-MCNC: 45 MG/DL (ref 5–25)
CALCIUM ALBUM COR SERPL-MCNC: 8.6 MG/DL (ref 8.3–10.1)
CALCIUM SERPL-MCNC: 8 MG/DL (ref 8.4–10.2)
CHLORIDE SERPL-SCNC: 113 MMOL/L (ref 96–108)
CLARITY UR: CLEAR
CO2 SERPL-SCNC: 19 MMOL/L (ref 21–32)
COLOR UR: YELLOW
CREAT SERPL-MCNC: 3.39 MG/DL (ref 0.6–1.3)
EOSINOPHIL # BLD AUTO: 0 THOUSAND/ÂΜL (ref 0–0.61)
EOSINOPHIL NFR BLD AUTO: 0 % (ref 0–6)
ERYTHROCYTE [DISTWIDTH] IN BLOOD BY AUTOMATED COUNT: 16.5 % (ref 11.6–15.1)
FLUAV RNA RESP QL NAA+PROBE: NEGATIVE
FLUBV RNA RESP QL NAA+PROBE: NEGATIVE
GFR SERPL CREATININE-BSD FRML MDRD: 17 ML/MIN/1.73SQ M
GLUCOSE SERPL-MCNC: 102 MG/DL (ref 65–140)
GLUCOSE UR STRIP-MCNC: NEGATIVE MG/DL
HCT VFR BLD AUTO: 27.7 % (ref 36.5–49.3)
HGB BLD-MCNC: 8.4 G/DL (ref 12–17)
HGB UR QL STRIP.AUTO: ABNORMAL
IMM GRANULOCYTES # BLD AUTO: 0.18 THOUSAND/UL (ref 0–0.2)
IMM GRANULOCYTES NFR BLD AUTO: 1 % (ref 0–2)
INR PPP: 6.04 (ref 0.84–1.19)
KETONES UR STRIP-MCNC: NEGATIVE MG/DL
LACTATE SERPL-SCNC: 1.1 MMOL/L (ref 0.5–2)
LEUKOCYTE ESTERASE UR QL STRIP: ABNORMAL
LIPASE SERPL-CCNC: 25 U/L (ref 11–82)
LYMPHOCYTES # BLD AUTO: 0.63 THOUSANDS/ÂΜL (ref 0.6–4.47)
LYMPHOCYTES NFR BLD AUTO: 3 % (ref 14–44)
MCH RBC QN AUTO: 26.3 PG (ref 26.8–34.3)
MCHC RBC AUTO-ENTMCNC: 30.3 G/DL (ref 31.4–37.4)
MCV RBC AUTO: 87 FL (ref 82–98)
MONOCYTES # BLD AUTO: 1.01 THOUSAND/ÂΜL (ref 0.17–1.22)
MONOCYTES NFR BLD AUTO: 5 % (ref 4–12)
NEUTROPHILS # BLD AUTO: 16.8 THOUSANDS/ÂΜL (ref 1.85–7.62)
NEUTS SEG NFR BLD AUTO: 91 % (ref 43–75)
NITRITE UR QL STRIP: NEGATIVE
NRBC BLD AUTO-RTO: 0 /100 WBCS
PH UR STRIP.AUTO: 5.5 [PH]
PLATELET # BLD AUTO: 147 THOUSANDS/UL (ref 149–390)
PMV BLD AUTO: 11.4 FL (ref 8.9–12.7)
POTASSIUM SERPL-SCNC: 4.4 MMOL/L (ref 3.5–5.3)
PROCALCITONIN SERPL-MCNC: 16.26 NG/ML
PROT SERPL-MCNC: 6.1 G/DL (ref 6.4–8.4)
PROT UR STRIP-MCNC: ABNORMAL MG/DL
PROTHROMBIN TIME: 53.5 SECONDS (ref 11.6–14.5)
QRS AXIS: 24 DEGREES
QRSD INTERVAL: 144 MS
QT INTERVAL: 446 MS
QTC INTERVAL: 495 MS
RBC # BLD AUTO: 3.19 MILLION/UL (ref 3.88–5.62)
RSV RNA RESP QL NAA+PROBE: NEGATIVE
SARS-COV-2 RNA RESP QL NAA+PROBE: NEGATIVE
SODIUM SERPL-SCNC: 139 MMOL/L (ref 135–147)
SP GR UR STRIP.AUTO: 1.04 (ref 1–1.03)
T WAVE AXIS: -17 DEGREES
UROBILINOGEN UR STRIP-ACNC: <2 MG/DL
VENTRICULAR RATE: 74 BPM
WBC # BLD AUTO: 18.65 THOUSAND/UL (ref 4.31–10.16)

## 2024-04-06 PROCEDURE — 87086 URINE CULTURE/COLONY COUNT: CPT | Performed by: NURSE PRACTITIONER

## 2024-04-06 PROCEDURE — 87154 CUL TYP ID BLD PTHGN 6+ TRGT: CPT | Performed by: EMERGENCY MEDICINE

## 2024-04-06 PROCEDURE — 36415 COLL VENOUS BLD VENIPUNCTURE: CPT | Performed by: EMERGENCY MEDICINE

## 2024-04-06 PROCEDURE — 85730 THROMBOPLASTIN TIME PARTIAL: CPT | Performed by: EMERGENCY MEDICINE

## 2024-04-06 PROCEDURE — 85025 COMPLETE CBC W/AUTO DIFF WBC: CPT | Performed by: EMERGENCY MEDICINE

## 2024-04-06 PROCEDURE — 0241U HB NFCT DS VIR RESP RNA 4 TRGT: CPT | Performed by: EMERGENCY MEDICINE

## 2024-04-06 PROCEDURE — 81001 URINALYSIS AUTO W/SCOPE: CPT | Performed by: NURSE PRACTITIONER

## 2024-04-06 PROCEDURE — 99291 CRITICAL CARE FIRST HOUR: CPT | Performed by: EMERGENCY MEDICINE

## 2024-04-06 PROCEDURE — 87077 CULTURE AEROBIC IDENTIFY: CPT | Performed by: EMERGENCY MEDICINE

## 2024-04-06 PROCEDURE — 96368 THER/DIAG CONCURRENT INF: CPT

## 2024-04-06 PROCEDURE — 96367 TX/PROPH/DG ADDL SEQ IV INF: CPT

## 2024-04-06 PROCEDURE — 3066F NEPHROPATHY DOC TX: CPT | Performed by: INTERNAL MEDICINE

## 2024-04-06 PROCEDURE — 99285 EMERGENCY DEPT VISIT HI MDM: CPT

## 2024-04-06 PROCEDURE — 70450 CT HEAD/BRAIN W/O DYE: CPT

## 2024-04-06 PROCEDURE — 71260 CT THORAX DX C+: CPT

## 2024-04-06 PROCEDURE — 80053 COMPREHEN METABOLIC PANEL: CPT | Performed by: EMERGENCY MEDICINE

## 2024-04-06 PROCEDURE — 85610 PROTHROMBIN TIME: CPT | Performed by: EMERGENCY MEDICINE

## 2024-04-06 PROCEDURE — 93005 ELECTROCARDIOGRAM TRACING: CPT

## 2024-04-06 PROCEDURE — 87081 CULTURE SCREEN ONLY: CPT | Performed by: NURSE PRACTITIONER

## 2024-04-06 PROCEDURE — 74177 CT ABD & PELVIS W/CONTRAST: CPT

## 2024-04-06 PROCEDURE — 87040 BLOOD CULTURE FOR BACTERIA: CPT | Performed by: EMERGENCY MEDICINE

## 2024-04-06 PROCEDURE — NC001 PR NO CHARGE: Performed by: INTERNAL MEDICINE

## 2024-04-06 PROCEDURE — 84145 PROCALCITONIN (PCT): CPT | Performed by: EMERGENCY MEDICINE

## 2024-04-06 PROCEDURE — 96365 THER/PROPH/DIAG IV INF INIT: CPT

## 2024-04-06 PROCEDURE — 83690 ASSAY OF LIPASE: CPT | Performed by: EMERGENCY MEDICINE

## 2024-04-06 PROCEDURE — 93010 ELECTROCARDIOGRAM REPORT: CPT | Performed by: INTERNAL MEDICINE

## 2024-04-06 PROCEDURE — 83605 ASSAY OF LACTIC ACID: CPT | Performed by: EMERGENCY MEDICINE

## 2024-04-06 RX ORDER — ACETAMINOPHEN 325 MG/1
650 TABLET ORAL EVERY 6 HOURS PRN
Status: DISCONTINUED | OUTPATIENT
Start: 2024-04-06 | End: 2024-04-10 | Stop reason: HOSPADM

## 2024-04-06 RX ORDER — CHLORHEXIDINE GLUCONATE ORAL RINSE 1.2 MG/ML
15 SOLUTION DENTAL EVERY 12 HOURS SCHEDULED
Status: DISCONTINUED | OUTPATIENT
Start: 2024-04-06 | End: 2024-04-09

## 2024-04-06 RX ORDER — AMIODARONE HYDROCHLORIDE 200 MG/1
200 TABLET ORAL DAILY
Status: DISCONTINUED | OUTPATIENT
Start: 2024-04-07 | End: 2024-04-10 | Stop reason: HOSPADM

## 2024-04-06 RX ORDER — ATORVASTATIN CALCIUM 20 MG/1
20 TABLET, FILM COATED ORAL
Status: DISCONTINUED | OUTPATIENT
Start: 2024-04-07 | End: 2024-04-10 | Stop reason: HOSPADM

## 2024-04-06 RX ORDER — CEFEPIME HYDROCHLORIDE 1 G/50ML
1000 INJECTION, SOLUTION INTRAVENOUS EVERY 12 HOURS
Status: DISCONTINUED | OUTPATIENT
Start: 2024-04-07 | End: 2024-04-07

## 2024-04-06 RX ORDER — POLYETHYLENE GLYCOL 3350 17 G/17G
17 POWDER, FOR SOLUTION ORAL DAILY
Status: DISCONTINUED | OUTPATIENT
Start: 2024-04-07 | End: 2024-04-10 | Stop reason: HOSPADM

## 2024-04-06 RX ORDER — LIDOCAINE 50 MG/G
2 PATCH TOPICAL DAILY
Status: DISCONTINUED | OUTPATIENT
Start: 2024-04-06 | End: 2024-04-10 | Stop reason: HOSPADM

## 2024-04-06 RX ORDER — PANTOPRAZOLE SODIUM 20 MG/1
20 TABLET, DELAYED RELEASE ORAL
Status: DISCONTINUED | OUTPATIENT
Start: 2024-04-07 | End: 2024-04-10 | Stop reason: HOSPADM

## 2024-04-06 RX ORDER — VANCOMYCIN HYDROCHLORIDE 1 G/200ML
10 INJECTION, SOLUTION INTRAVENOUS AS NEEDED
Status: DISCONTINUED | OUTPATIENT
Start: 2024-04-07 | End: 2024-04-07

## 2024-04-06 RX ORDER — SODIUM CHLORIDE, SODIUM GLUCONATE, SODIUM ACETATE, POTASSIUM CHLORIDE, MAGNESIUM CHLORIDE, SODIUM PHOSPHATE, DIBASIC, AND POTASSIUM PHOSPHATE .53; .5; .37; .037; .03; .012; .00082 G/100ML; G/100ML; G/100ML; G/100ML; G/100ML; G/100ML; G/100ML
75 INJECTION, SOLUTION INTRAVENOUS CONTINUOUS
Status: DISCONTINUED | OUTPATIENT
Start: 2024-04-06 | End: 2024-04-07

## 2024-04-06 RX ORDER — CEFEPIME HYDROCHLORIDE 2 G/50ML
2000 INJECTION, SOLUTION INTRAVENOUS ONCE
Status: COMPLETED | OUTPATIENT
Start: 2024-04-06 | End: 2024-04-06

## 2024-04-06 RX ADMIN — CEFEPIME HYDROCHLORIDE 2000 MG: 2 INJECTION, SOLUTION INTRAVENOUS at 18:45

## 2024-04-06 RX ADMIN — NOREPINEPHRINE BITARTRATE 5 MCG/MIN: 1 INJECTION, SOLUTION, CONCENTRATE INTRAVENOUS at 19:12

## 2024-04-06 RX ADMIN — SODIUM CHLORIDE 2000 ML: 0.9 INJECTION, SOLUTION INTRAVENOUS at 18:41

## 2024-04-06 RX ADMIN — NOREPINEPHRINE BITARTRATE 5 MCG/MIN: 1 INJECTION, SOLUTION, CONCENTRATE INTRAVENOUS at 21:14

## 2024-04-06 RX ADMIN — SODIUM CHLORIDE, SODIUM GLUCONATE, SODIUM ACETATE, POTASSIUM CHLORIDE, MAGNESIUM CHLORIDE, SODIUM PHOSPHATE, DIBASIC, AND POTASSIUM PHOSPHATE 125 ML/HR: .53; .5; .37; .037; .03; .012; .00082 INJECTION, SOLUTION INTRAVENOUS at 21:13

## 2024-04-06 RX ADMIN — VANCOMYCIN HYDROCHLORIDE 2000 MG: 1 INJECTION, POWDER, LYOPHILIZED, FOR SOLUTION INTRAVENOUS at 19:19

## 2024-04-06 RX ADMIN — SODIUM CHLORIDE 1000 ML: 0.9 INJECTION, SOLUTION INTRAVENOUS at 18:42

## 2024-04-06 RX ADMIN — SODIUM CHLORIDE, SODIUM LACTATE, POTASSIUM CHLORIDE, AND CALCIUM CHLORIDE 500 ML: .6; .31; .03; .02 INJECTION, SOLUTION INTRAVENOUS at 21:18

## 2024-04-06 RX ADMIN — IOHEXOL 100 ML: 350 INJECTION, SOLUTION INTRAVENOUS at 18:43

## 2024-04-06 NOTE — ED PROVIDER NOTES
History  Chief Complaint   Patient presents with    Weakness - Generalized     Arrives via ems from home, per ems pt has increased weakness, difficulty ambuting. Ems noted BP to be in the 70s     A 72 yo male with pmhx of anemia, afib (on coumadin), CAD s/p CABG, HLD, HTN and cardiomyopathy s/p ICD; BIBA for generalized weakness.  History predominantly provided from pt's brother who is at bedside.  Brother reports that since yesterday pt has been complaining of increasing low back pain which radiates down the bilateral lower extremities.  He denies associated weakness, however has been unable to ambulate secondary to the pain.  Patient also reports an isolated fever yesterday, along with flulike symptoms for which she took over-the-counter cold medicine today.  Lastly patient reports a fall two days ago although is unable to elaborate further.  Patient otherwise denies chest pain, shortness of breath, abdominal pain, nausea, vomiting, diarrhea, dysuria, paresthesias, focal weakness, saddle anesthesias, bowel/bladder incontinence and rashes.      History provided by:  Patient, medical records and relative   used: Yes ([employer] [id number])        Prior to Admission Medications   Prescriptions Last Dose Informant Patient Reported? Taking?   Diclofenac Sodium (VOLTAREN) 1 %   No No   Sig: Apply 2 g topically 4 (four) times a day   allopurinol (ZYLOPRIM) 100 mg tablet   No No   Sig: Take 1 tablet (100 mg total) by mouth daily   amiodarone 200 mg tablet   No No   Sig: Take 1 tablet (200 mg total) by mouth daily   atorvastatin (LIPITOR) 20 mg tablet   No No   Sig: Take 1 tablet (20 mg total) by mouth daily Please STOP Pravastatin..   b complex-C-E-zinc (STRESS FORMULA/ZINC) tablet   No No   Sig: Take 1 tablet by mouth daily   bisacodyl (DULCOLAX) 5 mg EC tablet   No No   Sig: Take as instructed on bowel preparation instructions   diflunisal (DOLOBID) 500 mg tablet   No No   Sig: Take 1 tablet (500 mg total)  by mouth 2 (two) times daily after meals   ergocalciferol (VITAMIN D2) 50,000 units   No No   Sig: Take 1 capsule (50,000 Units total) by mouth once a week   furosemide (Lasix) 20 mg tablet   No No   Sig: Take 1 tablet (20 mg total) by mouth daily   gabapentin (Neurontin) 100 mg capsule   No No   Sig: Take 2 capsules (200 mg total) by mouth 3 (three) times a day   ketoconazole (NIZORAL) 2 % shampoo  Self No No   Sig: Apply 1 application topically 3 (three) times a week   lidocaine (LMX) 4 % cream   No No   Sig: Apply topically as needed for moderate pain   linaCLOtide 290 MCG CAPS   No No   Sig: Take 1 capsule by mouth in the morning   loratadine (Claritin) 10 mg tablet  Self No No   Sig: Take 1 tablet (10 mg total) by mouth daily In the evening   losartan (COZAAR) 50 mg tablet   No No   Sig: Take 1 tablet (50 mg total) by mouth daily   methocarbamol (ROBAXIN) 500 mg tablet   No No   Sig: Take 1 tablet (500 mg total) by mouth 2 (two) times a day   methylPREDNISolone 4 MG tablet therapy pack   No No   Sig: Use as directed on package   metoprolol succinate (Toprol XL) 25 mg 24 hr tablet Not Taking  No No   Sig: Take 1 tablet (25 mg total) by mouth daily   Patient not taking: Reported on 4/6/2024   nitrofurantoin (MACROBID) 100 mg capsule   No No   Sig: Take 1 capsule (100 mg total) by mouth in the morning With food/lunch   pantoprazole (PROTONIX) 20 mg tablet   No No   Sig: Take 1 tablet (20 mg total) by mouth daily   polyethylene glycol (GLYCOLAX) 17 GM/SCOOP powder   No No   Sig: Take 17 g by mouth daily   warfarin (COUMADIN) 5 mg tablet   No No   Sig: Take 1 tablet (5 mg total) by mouth daily      Facility-Administered Medications: None       Past Medical History:   Diagnosis Date    Anemia 3/12/2013    Atrial fibrillation (HCC)     Coagulopathy (HCC) 4/6/2024    Coronary artery disease     GERD (gastroesophageal reflux disease)     Hyperlipidemia associated with type 2 diabetes mellitus  (HCC) 5/4/2021     Hypertension     Obesity     Thyroid nodule 4/6/2024       Past Surgical History:   Procedure Laterality Date    CARDIAC PACEMAKER PLACEMENT  12/2023    replacement    CORONARY ARTERY BYPASS GRAFT         Family History   Problem Relation Age of Onset    Hypertension Mother     Diabetes Mother     No Known Problems Father      I have reviewed and agree with the history as documented.    E-Cigarette/Vaping    E-Cigarette Use Never User      E-Cigarette/Vaping Substances    Nicotine No     THC No     CBD No     Flavoring No      Social History     Tobacco Use    Smoking status: Never    Smokeless tobacco: Never    Tobacco comments:     No passive smoke exposure   Vaping Use    Vaping status: Never Used   Substance Use Topics    Alcohol use: No    Drug use: No       Review of Systems   Constitutional:  Positive for fever.   Musculoskeletal:  Positive for back pain.   All other systems reviewed and are negative.      Physical Exam  Physical Exam  General Appearance: alert and oriented, ill-appearing  Skin:  Warm, dry, intact.  No cyanosis  HEENT: Atraumatic, normocephalic.  No eye drainage.  Normal hearing.  Moist mucous membranes.    Neck: Supple, trachea midline  Cardiac: RRR; no murmurs, rub, gallops.  1-2+ pitting edema, 2+ pulses  Pulmonary: lungs CTAB; no wheezes, rales, rhonchi  Gastrointestinal: abdomen soft, nontender, nondistended; no guarding or rebound tenderness; good bowel sounds, no mass or bruits.  Ecchymosis noted to the right flank.  Extremities: No midline spinal tenderness.  Extremities nontender with full range of motion.  No deformities.  No calf tenderness, no clubbing  Neuro:  no focal motor or sensory deficits, CN 2-12 grossly intact  Psych:  Normal mood and affect, normal judgement and insight      Vital Signs  ED Triage Vitals   Temperature Pulse Respirations Blood Pressure SpO2   04/06/24 1807 04/06/24 1803 04/06/24 1803 04/06/24 1803 04/06/24 1803   97.8 °F (36.6 °C) 74 18 (!) 77/47 93 %       Temp Source Heart Rate Source Patient Position - Orthostatic VS BP Location FiO2 (%)   04/06/24 1807 04/06/24 1803 04/06/24 1803 04/06/24 1803 --   Oral Monitor Lying Right arm       Pain Score       04/06/24 2100       No Pain           Vitals:    04/06/24 2100 04/06/24 2119 04/06/24 2134 04/06/24 2200   BP: 109/62 96/50 108/61 112/56   Pulse: 68 68 68 68   Patient Position - Orthostatic VS: Lying Lying Lying Lying         Visual Acuity      ED Medications  Medications   NOREPINEPHRINE 4 MG  ML NSS (CMPD ORDER) infusion (5 mcg/min Intravenous New Bag 4/6/24 2114)   amiodarone tablet 200 mg (has no administration in time range)   atorvastatin (LIPITOR) tablet 20 mg (has no administration in time range)   pantoprazole (PROTONIX) EC tablet 20 mg (has no administration in time range)   polyethylene glycol (MIRALAX) packet 17 g (has no administration in time range)   acetaminophen (TYLENOL) tablet 650 mg (has no administration in time range)   lidocaine (LIDODERM) 5 % patch 2 patch (has no administration in time range)   chlorhexidine (PERIDEX) 0.12 % oral rinse 15 mL (has no administration in time range)   multi-electrolyte (PLASMALYTE-A/ISOLYTE-S PH 7.4) IV solution (125 mL/hr Intravenous New Bag 4/6/24 2113)   cefepime (MAXIPIME) IVPB (premix in dextrose) 1,000 mg 50 mL (has no administration in time range)   vancomycin (VANCOCIN) IVPB (premix in dextrose) 1,000 mg 200 mL (has no administration in time range)   cefepime (MAXIPIME) IVPB (premix in dextrose) 2,000 mg 50 mL (0 mg Intravenous Stopped 4/6/24 1918)   sodium chloride 0.9 % bolus 2,000 mL (0 mL Intravenous Stopped 4/6/24 1928)   sodium chloride 0.9 % bolus 1,000 mL (0 mL Intravenous Stopped 4/6/24 1850)   iohexol (OMNIPAQUE) 350 MG/ML injection (MULTI-DOSE) 100 mL (100 mL Intravenous Given 4/6/24 1843)   vancomycin (VANCOCIN) 2,000 mg in sodium chloride 0.9 % 500 mL IVPB (0 mg Intravenous Stopped 4/6/24 0322)   lactated ringers bolus 500 mL (0 mL  Intravenous Stopped 4/6/24 2225)       Diagnostic Studies  Results Reviewed       Procedure Component Value Units Date/Time    FLU/RSV/COVID - if FLU/RSV clinically relevant [[id number]]  (Normal) Collected: 04/06/24 1834    Lab Status: Final result Specimen: Nares from Nose Updated: 04/06/24 1931     SARS-CoV-2 Negative     INFLUENZA A PCR Negative     INFLUENZA B PCR Negative     RSV PCR Negative    Narrative:      FOR PEDIATRIC PATIENTS - copy/paste COVID Guidelines URL to browser: [email or web address]    SARS-CoV-2 assay is a Nucleic Acid Amplification assay intended for the  qualitative detection of nucleic acid from SARS-CoV-2 in nasopharyngeal  swabs. Results are for the presumptive identification of SARS-CoV-2 RNA.    Positive results are indicative of infection with SARS-CoV-2, the virus  causing COVID-19, but do not rule out bacterial infection or co-infection  with other viruses. Laboratories within the [place] and its  territories are required to report all positive results to the appropriate  public health authorities. Negative results do not preclude SARS-CoV-2  infection and should not be used as the sole basis for treatment or other  patient management decisions. Negative results must be combined with  clinical observations, patient history, and epidemiological information.  This test has not been FDA cleared or approved.    This test has been authorized by FDA under an Emergency Use Authorization  (EUA). This test is only authorized for the duration of time the  declaration that circumstances exist justifying the authorization of the  emergency use of an in vitro diagnostic tests for detection of SARS-CoV-2  virus and/or diagnosis of COVID-19 infection under section 564(b)(1) of  the Act, 21 U.S.C. 360bbb-3(b)(1), unless the authorization is terminated  or revoked sooner. The test has been validated but independent review by FDA  and CLIA is  pending.    Test performed using [employer]: This RT-PCR assay targets N2,  a region unique to SARS-CoV-2. A conserved region in the E-gene was chosen  for pan-Sarbecovirus detection which includes SARS-CoV-2.    According to CMS-2020-01-R, this platform meets the definition of high-throughput technology.    Procalcitonin [[id number]]  (Abnormal) Collected: 04/06/24 1820    Lab Status: Final result Specimen: Blood from Arm, Left Updated: 04/06/24 1854     Procalcitonin 16.26 ng/ml     Comprehensive metabolic panel [[id number]]  (Abnormal) Collected: 04/06/24 1820    Lab Status: Final result Specimen: Blood from Arm, Left Updated: 04/06/24 1850     Sodium 139 mmol/L      Potassium 4.4 mmol/L      Chloride 113 mmol/L      CO2 19 mmol/L      ANION GAP 7 mmol/L      BUN 45 mg/dL      Creatinine 3.39 mg/dL      Glucose 102 mg/dL      Calcium 8.0 mg/dL      Corrected Calcium 8.6 mg/dL      AST 58 U/L      ALT 51 U/L      Alkaline Phosphatase 76 U/L      Total Protein 6.1 g/dL      Albumin 3.3 g/dL      Total Bilirubin 0.86 mg/dL      eGFR 17 ml/min/1.73sq m     Narrative:      National Kidney Disease Foundation guidelines for Chronic Kidney Disease (CKD):     Stage 1 with normal or high GFR (GFR > 90 mL/min/1.73 square meters)    Stage 2 Mild CKD (GFR = 60-89 mL/min/1.73 square meters)    Stage 3A Moderate CKD (GFR = 45-59 mL/min/1.73 square meters)    Stage 3B Moderate CKD (GFR = 30-44 mL/min/1.73 square meters)    Stage 4 Severe CKD (GFR = 15-29 mL/min/1.73 square meters)    Stage 5 End Stage CKD (GFR <15 mL/min/1.73 square meters)  Note: GFR calculation is accurate only with a steady state creatinine    Lipase [[id number]]  (Normal) Collected: 04/06/24 1820    Lab Status: Final result Specimen: Blood from Arm, Left Updated: 04/06/24 1850     Lipase 25 u/L     Lactic acid [[id number]]  (Normal) Collected: 04/06/24 1820    Lab Status: Final result Specimen: Blood from Arm, Left Updated: 04/06/24 0681     LACTIC ACID  1.1 mmol/L     Narrative:      Result may be elevated if tourniquet was used during collection.    Blood culture #2 [[id number]] Collected: 04/06/24 1841    Lab Status: In process Specimen: Blood from Arm, Right Updated: 04/06/24 1848    Blood culture #1 [[id number]] Collected: 04/06/24 1838    Lab Status: In process Specimen: Blood from Arm, Left Updated: 04/06/24 1842    Protime-INR [[id number]]  (Abnormal) Collected: 04/06/24 1820    Lab Status: Final result Specimen: Blood from Arm, Left Updated: 04/06/24 1842     Protime 53.5 seconds      INR 6.04    APTT [[id number]]  (Abnormal) Collected: 04/06/24 1820    Lab Status: Final result Specimen: Blood from Arm, Left Updated: 04/06/24 1842     PTT 96 seconds     CBC and differential [[id number]]  (Abnormal) Collected: 04/06/24 1820    Lab Status: Final result Specimen: Blood from Arm, Left Updated: 04/06/24 1827     WBC 18.65 Thousand/uL      RBC 3.19 Million/uL      Hemoglobin 8.4 g/dL      Hematocrit 27.7 %      MCV 87 fL      MCH 26.3 pg      MCHC 30.3 g/dL      RDW 16.5 %      MPV 11.4 fL      Platelets 147 Thousands/uL      nRBC 0 /100 WBCs      Neutrophils Relative 91 %      Immature Grans % 1 %      Lymphocytes Relative 3 %      Monocytes Relative 5 %      Eosinophils Relative 0 %      Basophils Relative 0 %      Neutrophils Absolute 16.80 Thousands/µL      Absolute Immature Grans 0.18 Thousand/uL      Absolute Lymphocytes 0.63 Thousands/µL      Absolute Monocytes 1.01 Thousand/µL      Eosinophils Absolute 0.00 Thousand/µL      Basophils Absolute 0.03 Thousands/µL     Narrative:      This is an appended report.  These results have been appended to a previously verified report.                   CT head without contrast   Final Result by Roland Martinez MD (04/06 1916)      No acute intracranial abnormality.                  Workstation performed: [id number]         CT chest abdomen pelvis w contrast   Final Result by Roland Martinez MD (04/06 1934)      No acute  posttraumatic abnormality detected.      Cardiomegaly.      Indeterminate hypodense lesion in the inferior right lobe of the liver measuring 1.5 cm. This is not a simple cyst. MRI should be considered for further characterization.      Multiple renal cysts including a probable complex cyst in the left kidney as described.      Small bladder diverticulum.      Incidental thyroid nodule(s) for which nonemergent thyroid ultrasound is recommended.         Workstation performed: [id number]                    Procedures  CriticalCare Time    Date/Time: 4/6/2024 7:44 PM    Performed by: Alma Rosa Riley DO  Authorized by: Alma Rosa Riley DO    Critical care provider statement:     Critical care time (minutes):  80    Critical care time was exclusive of:  Separately billable procedures and treating other patients and teaching time    Critical care was necessary to treat or prevent imminent or life-threatening deterioration of the following conditions:  Sepsis, shock, renal failure, circulatory failure and trauma    Critical care was time spent personally by me on the following activities:  Obtaining history from patient or surrogate, development of treatment plan with patient or surrogate, examination of patient, evaluation of patient's response to treatment, re-evaluation of patient's condition, ordering and review of radiographic studies, ordering and performing treatments and interventions, blood draw for specimens, discussions with consultants, review of old charts and ordering and review of laboratory studies (levo gtt, IV Abx)    I assumed direction of critical care for this patient from another provider in my specialty: no       ECG 12 Lead Documentation  Date/Time: today/date: 4/6/2024  Performed by: Alma Rosa Riley    ECG reviewed by me, the ED Provider: yes    Patient location:  ED   Previous ECG:  Compared to current, no change   Rate:  74  ECG rate assessment: normal    Rhythm: wide complex rhythm     Ectopy:   frequent PVC's    QRS axis:  Normal  Intervals: normal   Q waves: None   ST segments:  Normal  T waves: normal      Impression: Wide complex rhythm with frequent PVC's       ED Course  ED Course as of 04/06/24 2256   Sat Apr 06, 2024 1829 WBC(!): 18.65  Will start IV ABx   1831 Hemoglobin(!): 8.4  Recent baseline 11   1843 POCT INR(!!): 6.04  Pt is on coumadin.  No findings to suggest acute bleeding.  Reviewed CT CAP without obvious hemoperitoneum or solid organ injury.   1849 On review of records, pt has known history of cardiomyopathy with AICD.  Unclear EF.  Will hold on completion of 30 cc/kg IVF bolus with concern for development of volume overload.  Pt to complete 2L IVF.  Will order vasopressors due to persistent hypotension   1852 Creatinine(!): 3.39  Baseline 1.5-1.7   1856 Procalcitonin(!): 16.26   1922 CT head without contrast  No acute intracranial abnormality.   1926 Pt and brother update on labs thus far.  He reports to feeling better, overall appears to be improving   1929 Spoke with Dr. Adams and Helen, critical care team, reviewing pt's history, presentation and work up. Will accept in admission, pending imaging   1938 CT chest abdomen pelvis w contrast  1.) No acute posttraumatic abnormality detected.  2.) Cardiomegaly.  3.) Indeterminate hypodense lesion in the inferior right lobe of the liver measuring 1.5 cm. This is not a simple cyst. MRI should be considered for further characterization.  4.) Multiple renal cysts including a probable complex cyst in the left kidney as described.  5.) Small bladder diverticulum.  6.) Incidental thyroid nodule(s) for which nonemergent thyroid ultrasound is recommended.                            Initial Sepsis Screening       Row Name 04/06/24 2019                Is the patient's history suggestive of a new or worsening infection? Yes (Proceed)  -NG        Suspected source of infection suspect infection, source unknown  -NG        Indicate SIRS criteria  "Leukocytosis (WBC > 15185 IJL) OR Leukopenia (WBC <4000 IJL) OR Bandemia (WBC >10% bands);Tachypnea > 20 resp per min  -NG        Are two or more of the above signs & symptoms of infection both present and new to the patient? Yes (Proceed)  -NG        Assess for evidence of organ dysfunction: Are any of the below criteria present within 6 hours of suspected infection and SIRS criteria that are NOT considered to be chronic conditions? SBP < 90;MAP < 65;INR > 1.5 or aPTT > 60 secs;Creatinine > 2.0  -NG        Date of presentation of severe sepsis --        Time of presentation of severe sepsis --        Date of presentation of septic shock 04/06/24  -NG        Time of presentation of septic shock 2021  -NG        Fluid Resuscitation: 30 ml/kg IV fluid bolus will be given based on actual body weight  -NG        The 30 mL/kg fluid bolus was not given to the patient despite having hypotension, a lactate of >= 4 mmol/L, or documentation of septic shock secondary to: --        Instead of the 30 ml/kg fluid bolus, the following volume of crystalloid fluid will be ordered: --  3L  -NG        Of the following fluid type: --        Is the patient is persistently hypotensive in the hour after fluid bolus administration? If yes, patient meets criteria for vasopressor use. YES  -NG        Sepsis Note: Click \"NEXT\" below (NOT \"close\") to generate sepsis note based on above information. YES (proceed by clicking \"NEXT\")  -NG                  User Key  (r) = Recorded By, (t) = Taken By, (c) = Cosigned By      Initials Name Provider Type    NG REBEL Mcfarland Nurse Practitioner                  Default Flowsheet Data (last 720 hours)       Sepsis Reassess       Row Name 04/06/24 2240                   Repeat Volume Status and Tissue Perfusion Assessment Performed    Date of Reassessment: 04/06/24  -NG        Time of Reassessment: 2240  -NG        Sepsis Reassessment Note: Click \"NEXT\" below (NOT \"close\") to generate sepsis " "reassessment note. YES (proceed by clicking \"NEXT\")  -NG        Repeat Volume Status and Tissue Perfusion Assessment Performed --                  User Key  (r) = Recorded By, (t) = Taken By, (c) = Cosigned By      Initials Name Provider Type    REBEL Rivas Nurse Practitioner                  SBIRT 22yo+      Flowsheet Row Most Recent Value   Initial Alcohol Screen: US AUDIT-C     1. How often do you have a drink containing alcohol? 0 Filed at: 04/06/2024 1948   2. How many drinks containing alcohol do you have on a typical day you are drinking?  0 Filed at: 04/06/2024 1948   3b. FEMALE Any Age, or MALE 65+: How often do you have 4 or more drinks on one occassion? 0 Filed at: 04/06/2024 1948   Audit-C Score 0 Filed at: 04/06/2024 1948   DANIEL: How many times in the past year have you...    Used an illegal drug or used a prescription medication for non-medical reasons? Never Filed at: 04/06/2024 1948                      Medical Decision Making  A 73-year-old male presents with generalized malaise, also with acute on chronic low back pain.  On arrival to the ED, patient noted to be hypotensive.  He is afebrile.  Patient noted to have an area of ecchymosis to the right flank.  Given fall, Coumadin use and hypotension will obtain imaging to rule out traumatic injury.  Will also check labs to evaluate for infection/sepsis, anemia, VARGHESE and electrolyte abnormality.  Will give IVF and re-assess.    Amount and/or Complexity of Data Reviewed  Labs: ordered. Decision-making details documented in ED Course.  Radiology: ordered. Decision-making details documented in ED Course.    Risk  Prescription drug management.  Decision regarding hospitalization.             Disposition  Final diagnoses:   Septic shock (HCC)   VARGHESE (acute kidney injury) (HCC)   Supratherapeutic INR   Anemia   Low back pain   Cardiomegaly   Liver lesion   Renal cyst   Thyroid nodule     Time reflects when diagnosis was documented in both MDM " as applicable and the Disposition within this note       Time User Action Codes Description Comment    4/6/2024  7:40 PM Rosemary, Alma Rosa Add [A41.9,  R65.21] Septic shock (HCC)     4/6/2024  7:41 PM [place], Alma Rosa Add [N17.9] VARGHESE (acute kidney injury) (HCC)     4/6/2024  7:41 PM [place], Alma Rosa Add [R79.1] Supratherapeutic INR     4/6/2024  7:41 PM [place], Alma Rosa Add [D64.9] Anemia     4/6/2024  7:42 PM [place], Alma Rosa Add [M54.50] Low back pain     4/6/2024  7:42 PM [place], Alma Rosa Add [I51.7] Cardiomegaly     4/6/2024  7:43 PM Rosemary, Alma Rosa Add [K76.9] Liver lesion     4/6/2024  7:43 PM Rosemary, Alma Rosa Add [N28.1] Renal cyst     4/6/2024  7:43 PM [place], Alma Rosa Add [E04.1] Thyroid nodule           ED Disposition       ED Disposition   Admit    Condition   Stable    Date/Time   Sat Apr 6, 2024 1940    Comment   Case was discussed with Critical Care Team and the patient's admission status was agreed to be Admission Status: inpatient status to the service of Dr. Adams.               Follow-up Information    None         Current Discharge Medication List        CONTINUE these medications which have NOT CHANGED    Details   allopurinol (ZYLOPRIM) 100 mg tablet Take 1 tablet (100 mg total) by mouth daily  Qty: 90 tablet, Refills: 3    Associated Diagnoses: Chronic gout without tophus, unspecified cause, unspecified site      amiodarone 200 mg tablet Take 1 tablet (200 mg total) by mouth daily  Qty: 90 tablet, Refills: 3    Associated Diagnoses: Essential hypertension, benign      atorvastatin (LIPITOR) 20 mg tablet Take 1 tablet (20 mg total) by mouth daily Please STOP Pravastatin..  Qty: 90 tablet, Refills: 3    Associated Diagnoses: Other hyperlipidemia      b complex-C-E-zinc (STRESS FORMULA/ZINC) tablet Take 1 tablet by mouth daily  Qty: 90 tablet, Refills: 3    Associated Diagnoses: Lumbar radiculopathy; Acute left-sided low back pain with left-sided sciatica      bisacodyl (DULCOLAX) 5 mg EC tablet Take as instructed  on bowel preparation instructions  Qty: 2 tablet, Refills: 0    Associated Diagnoses: Constipation, unspecified constipation type; Colon cancer screening      Diclofenac Sodium (VOLTAREN) 1 % Apply 2 g topically 4 (four) times a day  Qty: 200 g, Refills: 2    Associated Diagnoses: Acute bilateral low back pain without sciatica      diflunisal (DOLOBID) 500 mg tablet Take 1 tablet (500 mg total) by mouth 2 (two) times daily after meals  Qty: 60 tablet, Refills: 1    Associated Diagnoses: Lumbar radiculopathy; Acute left-sided low back pain with left-sided sciatica      ergocalciferol (VITAMIN D2) 50,000 units Take 1 capsule (50,000 Units total) by mouth once a week  Qty: 13 capsule, Refills: 1    Associated Diagnoses: Avitaminosis D      furosemide (Lasix) 20 mg tablet Take 1 tablet (20 mg total) by mouth daily  Qty: 90 tablet, Refills: 3    Associated Diagnoses: Essential hypertension, benign      gabapentin (Neurontin) 100 mg capsule Take 2 capsules (200 mg total) by mouth 3 (three) times a day  Qty: 180 capsule, Refills: 1    Associated Diagnoses: Neuropathy      ketoconazole (NIZORAL) 2 % shampoo Apply 1 application topically 3 (three) times a week  Qty: 120 mL, Refills: 2    Associated Diagnoses: Rash      lidocaine (LMX) 4 % cream Apply topically as needed for moderate pain  Qty: 30 g, Refills: 0    Associated Diagnoses: Acute low back pain with left-sided sciatica      linaCLOtide 290 MCG CAPS Take 1 capsule by mouth in the morning  Qty: 90 capsule, Refills: 3    Associated Diagnoses: Irritable bowel syndrome with constipation      loratadine (Claritin) 10 mg tablet Take 1 tablet (10 mg total) by mouth daily In the evening  Qty: 30 tablet, Refills: 1    Associated Diagnoses: Poison ivy      losartan (COZAAR) 50 mg tablet Take 1 tablet (50 mg total) by mouth daily  Qty: 90 tablet, Refills: 3    Associated Diagnoses: Essential hypertension, benign      methocarbamol (ROBAXIN) 500 mg tablet Take 1 tablet (500 mg  total) by mouth 2 (two) times a day  Qty: 20 tablet, Refills: 0    Associated Diagnoses: Acute low back pain with left-sided sciatica      methylPREDNISolone 4 MG tablet therapy pack Use as directed on package  Qty: 21 tablet, Refills: 0    Associated Diagnoses: Acute low back pain with left-sided sciatica      metoprolol succinate (Toprol XL) 25 mg 24 hr tablet Take 1 tablet (25 mg total) by mouth daily  Qty: 90 tablet, Refills: 3    Associated Diagnoses: BMI 32.0-32.9,adult      nitrofurantoin (MACROBID) 100 mg capsule Take 1 capsule (100 mg total) by mouth in the morning With food/lunch  Qty: 30 capsule, Refills: 0    Associated Diagnoses: Chronic UTI      pantoprazole (PROTONIX) 20 mg tablet Take 1 tablet (20 mg total) by mouth daily  Qty: 90 tablet, Refills: 3    Associated Diagnoses: Gastroesophageal reflux disease without esophagitis      polyethylene glycol (GLYCOLAX) 17 GM/SCOOP powder Take 17 g by mouth daily  Qty: 255 g, Refills: 0    Associated Diagnoses: Constipation, unspecified constipation type      warfarin (COUMADIN) 5 mg tablet Take 1 tablet (5 mg total) by mouth daily  Qty: 90 tablet, Refills: 3    Associated Diagnoses: Chronic atrial fibrillation (HCC)             No discharge procedures on file.    PDMP Review         Value Time User    PDMP Reviewed  Yes 4/6/2024  8:12 PM REBEL Mcfarland            ED Provider  Electronically Signed by             Alma Rosa Riley DO  04/06/24 2649

## 2024-04-06 NOTE — ED NOTES
Pt attempted to urinate in bedside urinal. Unable to provide sample at this time. Provider aware.     Lucina Rowe RN  04/06/24 1940

## 2024-04-07 ENCOUNTER — APPOINTMENT (INPATIENT)
Dept: NON INVASIVE DIAGNOSTICS | Facility: HOSPITAL | Age: 74
DRG: 871 | End: 2024-04-07
Payer: COMMERCIAL

## 2024-04-07 LAB
ANION GAP SERPL CALCULATED.3IONS-SCNC: 7 MMOL/L (ref 4–13)
AORTIC ROOT: 3.7 CM
APICAL FOUR CHAMBER EJECTION FRACTION: 39 %
ATRIAL RATE: 75 BPM
ATRIAL RATE: 79 BPM
AV PEAK GRADIENT: 7 MMHG
AV REGURGITATION PRESSURE HALF TIME: 194 MS
BACTERIA UR QL AUTO: ABNORMAL /HPF
BASOPHILS # BLD AUTO: 0.03 THOUSANDS/ÂΜL (ref 0–0.1)
BASOPHILS NFR BLD AUTO: 0 % (ref 0–1)
BSA FOR ECHO PROCEDURE: 2.12 M2
BUN SERPL-MCNC: 42 MG/DL (ref 5–25)
CALCIUM SERPL-MCNC: 7.9 MG/DL (ref 8.4–10.2)
CHLORIDE SERPL-SCNC: 115 MMOL/L (ref 96–108)
CO2 SERPL-SCNC: 20 MMOL/L (ref 21–32)
CREAT SERPL-MCNC: 2.98 MG/DL (ref 0.6–1.3)
EOSINOPHIL # BLD AUTO: 0.06 THOUSAND/ÂΜL (ref 0–0.61)
EOSINOPHIL NFR BLD AUTO: 1 % (ref 0–6)
ERYTHROCYTE [DISTWIDTH] IN BLOOD BY AUTOMATED COUNT: 16.7 % (ref 11.6–15.1)
FRACTIONAL SHORTENING: 17 % (ref 28–44)
GFR SERPL CREATININE-BSD FRML MDRD: 19 ML/MIN/1.73SQ M
GLUCOSE SERPL-MCNC: 84 MG/DL (ref 65–140)
HCT VFR BLD AUTO: 26.7 % (ref 36.5–49.3)
HCT VFR BLD AUTO: 28.1 % (ref 36.5–49.3)
HGB BLD-MCNC: 8.1 G/DL (ref 12–17)
HGB BLD-MCNC: 8.4 G/DL (ref 12–17)
HYALINE CASTS #/AREA URNS LPF: ABNORMAL /LPF
IMM GRANULOCYTES # BLD AUTO: 0.05 THOUSAND/UL (ref 0–0.2)
IMM GRANULOCYTES NFR BLD AUTO: 1 % (ref 0–2)
INR PPP: 6.31 (ref 0.84–1.19)
INTERVENTRICULAR SEPTUM IN DIASTOLE (PARASTERNAL SHORT AXIS VIEW): 1 CM
INTERVENTRICULAR SEPTUM: 1 CM (ref 0.6–1.1)
LAAS-AP2: 35.4 CM2
LAAS-AP4: 40.1 CM2
LEFT ATRIUM SIZE: 6.1 CM
LEFT ATRIUM VOLUME (MOD BIPLANE): 170 ML
LEFT ATRIUM VOLUME INDEX (MOD BIPLANE): 79.8 ML/M2
LEFT INTERNAL DIMENSION IN SYSTOLE: 6.2 CM (ref 2.1–4)
LEFT VENTRICULAR INTERNAL DIMENSION IN DIASTOLE: 7.5 CM (ref 3.5–6)
LEFT VENTRICULAR POSTERIOR WALL IN END DIASTOLE: 1 CM
LEFT VENTRICULAR STROKE VOLUME: 149 ML
LVSV (TEICH): 149 ML
LYMPHOCYTES # BLD AUTO: 0.69 THOUSANDS/ÂΜL (ref 0.6–4.47)
LYMPHOCYTES NFR BLD AUTO: 6 % (ref 14–44)
MAGNESIUM SERPL-MCNC: 2.2 MG/DL (ref 1.9–2.7)
MCH RBC QN AUTO: 26.4 PG (ref 26.8–34.3)
MCHC RBC AUTO-ENTMCNC: 30.3 G/DL (ref 31.4–37.4)
MCV RBC AUTO: 87 FL (ref 82–98)
MONOCYTES # BLD AUTO: 0.65 THOUSAND/ÂΜL (ref 0.17–1.22)
MONOCYTES NFR BLD AUTO: 6 % (ref 4–12)
MUCOUS THREADS UR QL AUTO: ABNORMAL
MV E'TISSUE VEL-LAT: 6 CM/S
MV E'TISSUE VEL-SEP: 6 CM/S
MV EROA: 0.2 CM2
MV REGURGITANT VOLUME: 22 ML
NEUTROPHILS # BLD AUTO: 9.37 THOUSANDS/ÂΜL (ref 1.85–7.62)
NEUTS SEG NFR BLD AUTO: 86 % (ref 43–75)
NON-SQ EPI CELLS URNS QL MICRO: ABNORMAL /HPF
NRBC BLD AUTO-RTO: 0 /100 WBCS
PHOSPHATE SERPL-MCNC: 3 MG/DL (ref 2.3–4.1)
PISA MRMAX VEL: 0.33 M/S
PISA RADIUS: 0.6 CM
PLATELET # BLD AUTO: 129 THOUSANDS/UL (ref 149–390)
PMV BLD AUTO: 11 FL (ref 8.9–12.7)
POTASSIUM SERPL-SCNC: 4.6 MMOL/L (ref 3.5–5.3)
PR INTERVAL: 795 MS
PR INTERVAL: 828 MS
PROCALCITONIN SERPL-MCNC: 19.31 NG/ML
PROTHROMBIN TIME: 55.2 SECONDS (ref 11.6–14.5)
QRS AXIS: -10 DEGREES
QRS AXIS: 36 DEGREES
QRSD INTERVAL: 146 MS
QRSD INTERVAL: 146 MS
QT INTERVAL: 404 MS
QT INTERVAL: 408 MS
QTC INTERVAL: 456 MS
QTC INTERVAL: 464 MS
RBC # BLD AUTO: 3.07 MILLION/UL (ref 3.88–5.62)
RBC #/AREA URNS AUTO: ABNORMAL /HPF
RIGHT ATRIAL 2D VOLUME: 61 ML
RIGHT ATRIUM AREA SYSTOLE A4C: 22.1 CM2
RIGHT VENTRICLE ID DIMENSION: 5.5 CM
SL CV AV DECELERATION TIME RETROGRADE: 667 MS
SL CV AV PEAK GRADIENT RETROGRADE: 15 MMHG
SL CV LEFT ATRIUM LENGTH A2C: 7 CM
SL CV PED ECHO LEFT VENTRICLE DIASTOLIC VOLUME (MOD BIPLANE) 2D: 346 ML
SL CV PED ECHO LEFT VENTRICLE SYSTOLIC VOLUME (MOD BIPLANE) 2D: 197 ML
SODIUM SERPL-SCNC: 142 MMOL/L (ref 135–147)
T WAVE AXIS: -12 DEGREES
T WAVE AXIS: -2 DEGREES
TRICUSPID ANNULAR PLANE SYSTOLIC EXCURSION: 1.8 CM
VANCOMYCIN SERPL-MCNC: 18.1 UG/ML (ref 10–20)
VENTRICULAR RATE: 75 BPM
VENTRICULAR RATE: 79 BPM
WBC # BLD AUTO: 10.85 THOUSAND/UL (ref 4.31–10.16)
WBC #/AREA URNS AUTO: ABNORMAL /HPF

## 2024-04-07 PROCEDURE — 93010 ELECTROCARDIOGRAM REPORT: CPT | Performed by: INTERNAL MEDICINE

## 2024-04-07 PROCEDURE — 93306 TTE W/DOPPLER COMPLETE: CPT | Performed by: INTERNAL MEDICINE

## 2024-04-07 PROCEDURE — 85610 PROTHROMBIN TIME: CPT | Performed by: NURSE PRACTITIONER

## 2024-04-07 PROCEDURE — 80202 ASSAY OF VANCOMYCIN: CPT | Performed by: NURSE PRACTITIONER

## 2024-04-07 PROCEDURE — 99232 SBSQ HOSP IP/OBS MODERATE 35: CPT | Performed by: INTERNAL MEDICINE

## 2024-04-07 PROCEDURE — 80048 BASIC METABOLIC PNL TOTAL CA: CPT | Performed by: NURSE PRACTITIONER

## 2024-04-07 PROCEDURE — 85014 HEMATOCRIT: CPT | Performed by: NURSE PRACTITIONER

## 2024-04-07 PROCEDURE — 93306 TTE W/DOPPLER COMPLETE: CPT

## 2024-04-07 PROCEDURE — 85025 COMPLETE CBC W/AUTO DIFF WBC: CPT | Performed by: NURSE PRACTITIONER

## 2024-04-07 PROCEDURE — 84145 PROCALCITONIN (PCT): CPT

## 2024-04-07 PROCEDURE — 84100 ASSAY OF PHOSPHORUS: CPT | Performed by: NURSE PRACTITIONER

## 2024-04-07 PROCEDURE — 83735 ASSAY OF MAGNESIUM: CPT | Performed by: NURSE PRACTITIONER

## 2024-04-07 PROCEDURE — 85018 HEMOGLOBIN: CPT | Performed by: NURSE PRACTITIONER

## 2024-04-07 PROCEDURE — 93005 ELECTROCARDIOGRAM TRACING: CPT

## 2024-04-07 RX ORDER — GABAPENTIN 100 MG/1
200 CAPSULE ORAL 3 TIMES DAILY
Status: DISCONTINUED | OUTPATIENT
Start: 2024-04-07 | End: 2024-04-10 | Stop reason: HOSPADM

## 2024-04-07 RX ORDER — VANCOMYCIN HYDROCHLORIDE 750 MG/150ML
10 INJECTION, SOLUTION INTRAVENOUS AS NEEDED
Status: DISCONTINUED | OUTPATIENT
Start: 2024-04-07 | End: 2024-04-07

## 2024-04-07 RX ORDER — CEFTRIAXONE 1 G/50ML
1000 INJECTION, SOLUTION INTRAVENOUS EVERY 24 HOURS
Status: DISCONTINUED | OUTPATIENT
Start: 2024-04-07 | End: 2024-04-08

## 2024-04-07 RX ORDER — VANCOMYCIN HYDROCHLORIDE 750 MG/150ML
750 INJECTION, SOLUTION INTRAVENOUS ONCE
Status: COMPLETED | OUTPATIENT
Start: 2024-04-07 | End: 2024-04-07

## 2024-04-07 RX ADMIN — AMIODARONE HYDROCHLORIDE 200 MG: 200 TABLET ORAL at 09:23

## 2024-04-07 RX ADMIN — CEFTRIAXONE 1000 MG: 1 INJECTION, SOLUTION INTRAVENOUS at 14:42

## 2024-04-07 RX ADMIN — LIDOCAINE 5% 2 PATCH: 700 PATCH TOPICAL at 05:22

## 2024-04-07 RX ADMIN — PANTOPRAZOLE SODIUM 20 MG: 20 TABLET, DELAYED RELEASE ORAL at 06:47

## 2024-04-07 RX ADMIN — CHLORHEXIDINE GLUCONATE 15 ML: 1.2 RINSE ORAL at 21:17

## 2024-04-07 RX ADMIN — CHLORHEXIDINE GLUCONATE 15 ML: 1.2 RINSE ORAL at 09:23

## 2024-04-07 RX ADMIN — GABAPENTIN 200 MG: 100 CAPSULE ORAL at 16:27

## 2024-04-07 RX ADMIN — VANCOMYCIN HYDROCHLORIDE 750 MG: 750 INJECTION, SOLUTION INTRAVENOUS at 12:09

## 2024-04-07 RX ADMIN — POLYETHYLENE GLYCOL 3350 17 G: 17 POWDER, FOR SOLUTION ORAL at 09:23

## 2024-04-07 RX ADMIN — GABAPENTIN 200 MG: 100 CAPSULE ORAL at 21:15

## 2024-04-07 RX ADMIN — ATORVASTATIN CALCIUM 20 MG: 20 TABLET, FILM COATED ORAL at 16:27

## 2024-04-07 NOTE — ASSESSMENT & PLAN NOTE
Home regimen with metoprolol, amiodarone, and warfarin  Currently in sinus rhythm  AICD generator change 12/23 at [facility]  Continue telemetry  Holding home metoprolol 2/2 hypotension  Holding home warfarin 2/2 supratherapeutic INR  Continue home amiodarone

## 2024-04-07 NOTE — ED NOTES
Report  called to receiving ICU nurse NATE Bauer RN  04/06/24 2029       Lucina Rowe RN  04/06/24 2109

## 2024-04-07 NOTE — ASSESSMENT & PLAN NOTE
Unclear if he is taking Coumadin.  Last Rx per available records in [facility] 3/2023.  CT did reveal evidence of hypodensity in the liver.  No available recent INR labs.   Hold AC at this time  Repeat INR in a.m. pending  No evidence of current active bleeding.  If bleeding noted consider FFP/possibly Kcentra  Consider MRI of liver

## 2024-04-07 NOTE — ASSESSMENT & PLAN NOTE
CT c/a/p without traumatic findings.  Multiple bilateral renal cysts noted.  Hypodense lesion of liver noted. (MRI f/u recommended).    Serial abdominal exams

## 2024-04-07 NOTE — PLAN OF CARE
Problem: Potential for Falls  Goal: Patient will remain free of falls  Description: INTERVENTIONS:  - Educate patient/family on patient safety including physical limitations  - Instruct patient to call for assistance with activity   - Consult OT/PT to assist with strengthening/mobility   - Keep Call bell within reach  - Keep bed low and locked with side rails adjusted as appropriate  - Keep care items and personal belongings within reach  - Initiate and maintain comfort rounds  - Make Fall Risk Sign visible to staff  - Apply yellow socks and bracelet for high fall risk patients  - Consider moving patient to room near nurses station  Outcome: Progressing     Problem: Prexisting or High Potential for Compromised Skin Integrity  Goal: Skin integrity is maintained or improved  Description: INTERVENTIONS:  - Identify patients at risk for skin breakdown  - Assess and monitor skin integrity  - Assess and monitor nutrition and hydration status  - Monitor labs   - Assess for incontinence   - Turn and reposition patient  - Assist with mobility/ambulation  - Relieve pressure over bony prominences  - Avoid friction and shearing  - Provide appropriate hygiene as needed including keeping skin clean and dry  - Evaluate need for skin moisturizer/barrier cream  - Collaborate with interdisciplinary team   - Patient/family teaching  - Consider wound care consult   Outcome: Progressing     Problem: PAIN - ADULT  Goal: Verbalizes/displays adequate comfort level or baseline comfort level  Description: Interventions:  - Encourage patient to monitor pain and request assistance  - Assess pain using appropriate pain scale  - Administer analgesics based on type and severity of pain and evaluate response  - Implement non-pharmacological measures as appropriate and evaluate response  - Consider cultural and social influences on pain and pain management  - Notify physician/advanced practitioner if interventions unsuccessful or patient reports  new pain  Outcome: Progressing     Problem: INFECTION - ADULT  Goal: Absence or prevention of progression during hospitalization  Description: INTERVENTIONS:  - Assess and monitor for signs and symptoms of infection  - Monitor lab/diagnostic results  - Monitor all insertion sites, i.e. indwelling lines, tubes, and drains  - Monitor endotracheal if appropriate and nasal secretions for changes in amount and color  - [place] appropriate cooling/warming therapies per order  - Administer medications as ordered  - Instruct and encourage patient and family to use good hand hygiene technique  - Identify and instruct in appropriate isolation precautions for identified infection/condition  Outcome: Progressing  Goal: Absence of fever/infection during neutropenic period  Description: INTERVENTIONS:  - Monitor WBC    Outcome: Progressing     Problem: SAFETY ADULT  Goal: Patient will remain free of falls  Description: INTERVENTIONS:  - Educate patient/family on patient safety including physical limitations  - Instruct patient to call for assistance with activity   - Consult OT/PT to assist with strengthening/mobility   - Keep Call bell within reach  - Keep bed low and locked with side rails adjusted as appropriate  - Keep care items and personal belongings within reach  - Initiate and maintain comfort rounds  - Make Fall Risk Sign visible to staff  - Apply yellow socks and bracelet for high fall risk patients  - Consider moving patient to room near nurses station  Outcome: Progressing  Goal: Maintain or return to baseline ADL function  Description: INTERVENTIONS:  -  Assess patient's ability to carry out ADLs; assess patient's baseline for ADL function and identify physical deficits which impact ability to perform ADLs (bathing, care of mouth/teeth, toileting, grooming, dressing, etc.)  - Assess/evaluate cause of self-care deficits   - Assess range of motion  - Assess patient's mobility; develop plan if impaired  - Assess  patient's need for assistive devices and provide as appropriate  - Encourage maximum independence but intervene and supervise when necessary  - Involve family in performance of ADLs  - Assess for home care needs following discharge   - Consider OT consult to assist with ADL evaluation and planning for discharge  - Provide patient education as appropriate  Outcome: Progressing  Goal: Maintains/Returns to pre admission functional level  Description: INTERVENTIONS:  - Perform AM-PAC 6 Click Basic Mobility/ Daily Activity assessment daily.  - Set and communicate daily mobility goal to care team and patient/family/caregiver.   - Collaborate with rehabilitation services on mobility goals if consulted  - Out of bed for toileting  - Record patient progress and toleration of activity level   Outcome: Progressing     Problem: DISCHARGE PLANNING  Goal: Discharge to home or other facility with appropriate resources  Description: INTERVENTIONS:  - Identify barriers to discharge w/patient and caregiver  - Arrange for needed discharge resources and transportation as appropriate  - Identify discharge learning needs (meds, wound care, etc.)  - Arrange for interpretive services to assist at discharge as needed  - Refer to Case Management Department for coordinating discharge planning if the patient needs post-hospital services based on physician/advanced practitioner order or complex needs related to functional status, cognitive ability, or social support system  Outcome: Progressing     Problem: Knowledge Deficit  Goal: Patient/family/caregiver demonstrates understanding of disease process, treatment plan, medications, and discharge instructions  Description: Complete learning assessment and assess knowledge base.  Interventions:  - Provide teaching at level of understanding  - Provide teaching via preferred learning methods  Outcome: Progressing

## 2024-04-07 NOTE — PROGRESS NOTES
[facility]  Progress Note  Name: Silvio Drew I  MRN: [id number]  Unit/Bed#: ICU 03 I Date of Admission: 4/6/2024   Date of Service: 4/7/2024 I Hospital Day: 1    Assessment/Plan   * Shock (HCC)  Assessment & Plan  AEB: leukocytosis, tachypenia.  Patient also reports subjective fever and chills yesterday.  His is c/o generalized weakness and suffered a fall w/o head strike at home.  On evaluation he was noted to have small bruise on right flank and INR was supratherapeutic 6.  CT negative for traumatic injury/bleeding.  States that his urine output is decreased today, but denies urinary symptoms.  Denies respiratory and GI symptoms, hematemesis, melena, hematochezia.   Suspect distributive though source unclear, can also consider hypovolemia 2/2 reported epistaxis.  Less likely cardiogenic.  Hypotensive on arrival to ER 77/47 -- given IVF and started on Levophed in the ER.   Blood cultures obtained in ER  UA/micro: +leuks, innumerable WBC, negative nitrites, occasional bacteria  MRSA swab in process  Check echo  Last echo: not available.   ICD generator change 12/2023 at [facility]  Received 3.5L IVF.  30 mL/kg = 3120   Continue Levophed, titrate for goal MAP > 65 mmHg  Lactic 1.1 -- no further need to trend  Procal 16 -- check in am and as needed  Hgb baseline 11-12.  In ER 8.4 -- see anemia  CT c/a/p: without traumatic injury.  Cardiomegaly. Multiple renal cysts.  Hypodense lesion of liver.  Small bladder diverticulum.  Incidental thyroid nodule.  No obvious source of infection noted on imaging.  Continue cefepime, vancomycin with renal dosing  Pharmacy consult for Vanco dosing    Coagulopathy (HCC)  Assessment & Plan  Unclear if he is taking Coumadin.  Last Rx per available records in [facility] 3/2023.  CT did reveal evidence of hypodensity in the liver.  No available recent INR labs.   Hold AC at this time  Repeat INR in a.m. pending  No evidence of current active bleeding.  If bleeding noted  consider FFP/possibly Kcentra  Consider MRI of liver    Acute renal failure superimposed on stage 3 chronic kidney disease (HCC)  Assessment & Plan  Lab Results   Component Value Date    EGFR 17 04/06/2024    EGFR 37 12/05/2023    EGFR 39 05/09/2023    CREATININE 3.39 (H) 04/06/2024    CREATININE 1.76 (H) 12/05/2023    CREATININE 1.69 (H) 05/09/2023     Baseline creatinine 1.6 - 1.7.  Noted 3.39 on initial labs  VARGHESE likely multifocal in the setting of prerenal losses, medication compliance, hypotension.  Received 3.5 L IVF  Continue  gentle IVF with isolyte  Avoid hypotension, nephrotoxins  Renally dose medications as needed  Monitor UO and renal indices  Urinary retention protocol    Anemia  Assessment & Plan  Baseline hgb 11-12.  Noted 8.4 in ER.  Patient reports epistaxis prior earlier today.    Patient had fall earlier today at home, denied head strike.  On evaluation noted to have small ecchymotic area on right flank.  CT head negative.  CT c/a/p negative for traumatic injury/bleeding  May have hypovolemia component in the setting of supratherapeutic INR.  Unclear if he is taking Coumadin, will hold  Repeat Hgb 8.4  No current need for transfusion  Transfuse as needed to maintain hemoglobin > 7  CBC this am pending     Atrial fibrillation (HCC)  Assessment & Plan  Home regimen with metoprolol, amiodarone, and warfarin  Currently in sinus rhythm  AICD generator change 12/23 at [facility]  Continue telemetry  Holding home metoprolol 2/2 hypotension  Holding home warfarin 2/2 supratherapeutic INR  Continue home amiodarone    Abnormal CT of the abdomen  Assessment & Plan  CT c/a/p without traumatic findings.  Multiple bilateral renal cysts noted.  Hypodense lesion of liver noted. (MRI f/u recommended).    Serial abdominal exams.    Hyperlipidemia  Assessment & Plan  Continue atorvastatin.    Hypertension  Assessment & Plan  Holding home metoprolol, furosemide and losartan in the setting of hypotension requiring  vasopressor.    Thyroid nodule  Assessment & Plan  Incidental thyroid nodule noted on CT.  F/u as outpatient for thyroid US             Disposition: Critical care    ICU Core Measures     A: Assess, Prevent, and Manage Pain Has pain been assessed? Yes  Need for changes to pain regimen? No   B: Both SAT/SAT  N/A   C: Choice of Sedation RASS Goal: N/A patient not on sedation  Need for changes to sedation or analgesia regimen? No   D: Delirium CAM-ICU: Negative   E: Early Mobility  Plan for early mobility? Yes   F: Family Engagement Plan for family engagement today? Yes       Antibiotic Review: Awaiting culture results.       Prophylaxis:  VTE VTE covered by:    None       Stress Ulcer  covered bypantoprazole (PROTONIX) 20 mg tablet [[id number]] (Long-Term Med), pantoprazole (PROTONIX) EC tablet 20 mg [[id number]]         Significant 24hr Events     24hr events:   Admitted overnight with shock -- suspected distributive vs hypovolemia  INR supra-therapeutic  Started on IVF resuscitation, Levophed, broad spectrum antibiotics  Weaning down Levophed dose     Subjective   Review of Systems   Constitutional:  Positive for fatigue. Negative for chills and fever.   HENT: Negative.     Eyes: Negative.    Respiratory:  Negative for cough, chest tightness, shortness of breath and wheezing.    Cardiovascular:  Negative for chest pain, palpitations and leg swelling.   Gastrointestinal:  Negative for abdominal distention, abdominal pain, diarrhea, nausea and vomiting.   Endocrine: Negative.    Genitourinary:  Positive for decreased urine volume. Negative for difficulty urinating, frequency and hematuria.   Musculoskeletal:  Positive for arthralgias (arthritis of BL knees). Negative for myalgias.   Skin:  Negative for pallor, rash and wound.   Allergic/Immunologic: Negative.    Neurological:  Negative for dizziness, syncope, weakness and light-headedness.   Hematological:  Bruises/bleeds easily.   Psychiatric/Behavioral: Negative.         Objective                            Vitals I/O      Most Recent Min/Max in 24hrs   Temp 97.6 °F (36.4 °C) Temp  Min: 97.6 °F (36.4 °C)  Max: 97.8 °F (36.6 °C)   Pulse 74 Pulse  Min: 67  Max: 74   Resp 22 Resp  Min: 16  Max: 22   /60 BP  Min: 77/47  Max: 122/58   O2 Sat 97 % SpO2  Min: 92 %  Max: 99 %      Intake/Output Summary (Last 24 hours) at 4/7/2024 0444  Last data filed at 4/7/2024 0400  Gross per 24 hour   Intake 3433.7 ml   Output 325 ml   Net 3108.7 ml       Diet NPO; Sips of clear liquids    Invasive Monitoring           Physical Exam   Physical Exam  Vitals and nursing note reviewed.   Eyes:      General: Vision grossly intact. No scleral icterus.        Right eye: No discharge.         Left eye: No discharge.      Extraocular Movements: Extraocular movements intact.      Conjunctiva/sclera: Conjunctivae normal.      Pupils: Pupils are equal, round, and reactive to light.   Skin:     General: Skin is warm, dry and not mottled extremities.      Capillary Refill: Capillary refill takes less than 2 seconds.      Coloration: Skin is not jaundiced or pale.      Findings: No lesion, rash or wound.   HENT:      Head: Normocephalic and atraumatic.      Right Ear: Hearing normal. No drainage.      Left Ear: Hearing normal. No drainage.      Nose: No nasal deformity, nasal tenderness, congestion, rhinorrhea, epistaxis or nasogastric tube.      Mouth/Throat:      Mouth: Mucous membranes are moist. No injury or angioedema.      Dentition: Normal dentition.      Pharynx: No oropharyngeal exudate.   Neck:      Vascular: No JVD.   no midline tenderness Cardiovascular:      Rate and Rhythm: Normal rate and regular rhythm.      Pulses: Decreased pulses.           Radial pulses are 2+ on the right side and 2+ on the left side.        Dorsalis pedis pulses are 1+ on the right side and 1+ on the left side.      Heart sounds: No murmur heard.     No friction rub. No gallop.   Musculoskeletal:         General: No  deformity or signs of injury. Normal range of motion.      Right lower leg: No edema.      Left lower leg: No edema.   Abdominal: General: Bowel sounds are normal. There is no distension.      Palpations: Abdomen is soft.      Tenderness: There is no abdominal tenderness.   Constitutional:       General: He is not in acute distress.     Appearance: He is well-developed and well-nourished. He is ill-appearing. He is not toxic-appearing.      Interventions: He is not sedated, not intubated and not restrained.  Pulmonary:      Effort: Pulmonary effort is normal. No accessory muscle usage, respiratory distress or accessory muscle usage. He is not intubated.      Breath sounds: Normal breath sounds. No wheezing, rhonchi or rales.   Psychiatric:         Mood and Affect:  mood and affect abnormal.  Neurological:      General: No focal deficit present.      Mental Status: He is alert and oriented to person, place and time. Mental status is at baseline. He is CAM ICU negative.      Cranial Nerves: No dysarthria or facial asymmetry.      Sensory: Sensation is intact.      Motor: gross motor function is at baseline for patient. Strength full and intact in all extremities. No pronator drift or motor deficit.            Diagnostic Studies      EKG: sinus rhythm   Imagin/6 CT c/a/p: CT c/a/p: without traumatic injury.  Cardiomegaly. Multiple renal cysts.  Hypodense lesion of liver.  Small bladder diverticulum.  Incidental thyroid nodule.  No obvious source of infection noted on imaging.   I have personally reviewed pertinent reports.   and I have personally reviewed pertinent films in PACS     Medications:  Scheduled PRN   amiodarone, 200 mg, Daily  atorvastatin, 20 mg, Daily With Dinner  cefepime, 1,000 mg, Q12H  chlorhexidine, 15 mL, Q12H ROB  lidocaine, 2 patch, Daily  pantoprazole, 20 mg, Daily Before Breakfast  polyethylene glycol, 17 g, Daily      acetaminophen, 650 mg, Q6H PRN  vancomycin, 10 mg/kg, PRN        Continuous    multi-electrolyte, 75 mL/hr, Last Rate: 75 mL/hr (04/06/24 8919)  norepinephrine, 1-30 mcg/min, Last Rate: 2 mcg/min (04/07/24 0307)         Labs:    CBC    Recent Labs     04/06/24  1820 04/07/24  0001   WBC 18.65*  --    HGB 8.4* 8.4*   HCT 27.7* 28.1*   *  --      BMP    Recent Labs     04/06/24  1820   SODIUM 139   K 4.4   *   CO2 19*   AGAP 7   BUN 45*   CREATININE 3.39*   CALCIUM 8.0*       Coags    Recent Labs     04/06/24  1820   INR 6.04*   PTT 96*        Additional Electrolytes  No recent results       Blood Gas    No recent results  No recent results LFTs  Recent Labs     04/06/24  1820   ALT 51   AST 58*   ALKPHOS 76   ALB 3.3*   TBILI 0.86       Infectious  Recent Labs     04/06/24  1820   PROCALCITONI 16.26*     Glucose  Recent Labs     04/06/24  1820   GLUC 102               REBEL Mcfarland

## 2024-04-07 NOTE — NURSING NOTE
Unable to review PTA med list, patient states he takes 5 pills in am and 4 pills in evening but does not know banes. Spoke with son, Ronnell, on phone and he does not know meds, will ask patient's brother to bring in list.

## 2024-04-07 NOTE — ASSESSMENT & PLAN NOTE
Lab Results   Component Value Date    EGFR 17 04/06/2024    EGFR 37 12/05/2023    EGFR 39 05/09/2023    CREATININE 3.39 (H) 04/06/2024    CREATININE 1.76 (H) 12/05/2023    CREATININE 1.69 (H) 05/09/2023     Baseline creatinine 1.6 - 1.7.  Noted 3.39 on initial labs  VARGHESE likely multifocal in the setting of prerenal losses, medication compliance, hypotension.  Received 3 L IVF in ER.   Continue IVF with isolyte  Avoid hypotension, nephrotoxins  Check UA  Renally dose medications as needed  Monitor UO and renal indices  Urinary retention protocol

## 2024-04-07 NOTE — ASSESSMENT & PLAN NOTE
Baseline hgb 11-12.  Noted 8.4 in ER.  Patient reports epistaxis prior earlier today.    Patient had fall earlier today at home, denied head strike.  On evaluation noted to have small ecchymotic area on right flank.  CT head negative.  CT c/a/p negative for traumatic injury/bleeding  May have hypovolemia component in the setting of supratherapeutic INR.  Unclear if he is taking Coumadin, will hold  Monitor H&H every 6  No current need for transfusion  Transfuse as needed to maintain hemoglobin > 7

## 2024-04-07 NOTE — ASSESSMENT & PLAN NOTE
Holding home metoprolol, furosemide and losartan in the setting of hypotension requiring vasopressor.

## 2024-04-07 NOTE — ASSESSMENT & PLAN NOTE
Lab Results   Component Value Date    EGFR 17 04/06/2024    EGFR 37 12/05/2023    EGFR 39 05/09/2023    CREATININE 3.39 (H) 04/06/2024    CREATININE 1.76 (H) 12/05/2023    CREATININE 1.69 (H) 05/09/2023     Baseline creatinine 1.6 - 1.7.  Noted 3.39 on initial labs  VARGHESE likely multifocal in the setting of prerenal losses, medication compliance, hypotension.  Received 3.5 L IVF  Continue  gentle IVF with isolyte  Avoid hypotension, nephrotoxins  Renally dose medications as needed  Monitor UO and renal indices  Urinary retention protocol

## 2024-04-07 NOTE — SEPSIS NOTE
"  Sepsis Note   Silvio Drew 73 y.o. male MRN: [id number]  Unit/Bed#: ED-09 Encounter: [id number]       Initial Sepsis Screening       Row Name 04/06/24 2019                Is the patient's history suggestive of a new or worsening infection? Yes (Proceed)  -NG        Suspected source of infection suspect infection, source unknown  -NG        Indicate SIRS criteria Leukocytosis (WBC > 63940 IJL) OR Leukopenia (WBC <4000 IJL) OR Bandemia (WBC >10% bands);Tachypnea > 20 resp per min  -NG        Are two or more of the above signs & symptoms of infection both present and new to the patient? Yes (Proceed)  -NG        Assess for evidence of organ dysfunction: Are any of the below criteria present within 6 hours of suspected infection and SIRS criteria that are NOT considered to be chronic conditions? SBP < 90;MAP < 65;INR > 1.5 or aPTT > 60 secs;Creatinine > 2.0  -NG        Date of presentation of severe sepsis --        Time of presentation of severe sepsis --        Date of presentation of septic shock 04/06/24  -NG        Time of presentation of septic shock 2021  -NG        Fluid Resuscitation: 30 ml/kg IV fluid bolus will be given based on actual body weight  -NG        The 30 mL/kg fluid bolus was not given to the patient despite having hypotension, a lactate of >= 4 mmol/L, or documentation of septic shock secondary to: --        Instead of the 30 ml/kg fluid bolus, the following volume of crystalloid fluid will be ordered: --  3L  -NG        Of the following fluid type: --        Is the patient is persistently hypotensive in the hour after fluid bolus administration? If yes, patient meets criteria for vasopressor use. YES  -NG        Sepsis Note: Click \"NEXT\" below (NOT \"close\") to generate sepsis note based on above information. YES (proceed by clicking \"NEXT\")  -NG                  User Key  (r) = Recorded By, (t) = Taken By, (c) = Cosigned By      Initials Name Provider Type    REBEL Rivas Nurse " Practitioner                        Body mass index is 33.7 kg/m².  Wt Readings from Last 1 Encounters:   04/06/24 104 kg (228 lb 2.8 oz)        Ideal body weight: 70.7 kg (155 lb 13.8 oz)  Adjusted ideal body weight: 83.8 kg (184 lb 12.6 oz)

## 2024-04-07 NOTE — ASSESSMENT & PLAN NOTE
Home regimen with metoprolol, amiodarone, and warfarin  Currently in sinus rhythm  AICD generator change 12/23 at [facility]  Continue telemetry  Holding home metoprolol 2/2 hypotension  Holding home warfarin 2/2 supratherapeutic INR  Continue home amiodarone

## 2024-04-07 NOTE — H&P
Critical access hospital  H&P  Name: Silvio Drew 73 y.o. male I MRN: [id number]  Unit/Bed#: ICU 03 I Date of Admission: 4/6/2024   Date of Service: 4/6/2024 I Hospital Day: 0      Assessment/Plan   Shock (HCC)  Assessment & Plan  AEB: leukocytosis, tachypenia.  Patient also reports subjective fever and chills yesterday.  His is c/o generalized weakness and suffered a fall w/o head strike at home.  On evaluation he was noted to have small bruise on right flank and INR was supratherapeutic 6.  CT negative for traumatic injury/bleeding.  States that his urine output is decreased today, but denies urinary symptoms.  Denies respiratory and GI symptoms, hematemesis, melena, hematochezia.   Suspect distributive though source unclear, can also consider hypovolemia 2/2 reported epistaxis.  Less likely cardiogenic.  Hypotensive on arrival to ER 77/47 -- given IVF and started on Levophed in the ER.   Blood cultures obtained in ER  Check UA with reflex  Check MRSA swab  Check echo  Last echo: not available.   ICD generator change 12/2023 at [facility]  Received 3L IVF in the ER.  30 mL/kg = 3120 -- will give additional 500 mL IVF  Continue Levophed, titrate for goal MAP > 65 mmHg  Lactic 1.1 -- no further need to trend  Procal 16 -- check in am and as needed  Hgb baseline 11-12.  In ER 8.4 -- see anemia  CT c/a/p: without traumatic injury.  Cardiomegaly. Multiple renal cysts.  Hypodense lesion of liver.  Small bladder diverticulum.  Incidental thyroid nodule.  No obvious source of infection noted on imaging.  Continue cefepime, vancomycin with renal dosing  Pharmacy consult for Vanco dosing    Coagulopathy (HCC)  Assessment & Plan  Unclear if he is taking Coumadin.  Last Rx per available records in [facility] 3/2023.  CT did reveal evidence of hypodensity in the liver.  Hold AC at this time  Repeat INR in a.m.  No evidence of current active bleeding.  If bleeding noted consider FFP/possibly Kcentra  Consider MRI of  liver    Acute renal failure superimposed on stage 3 chronic kidney disease (HCC)  Assessment & Plan  Lab Results   Component Value Date    EGFR 17 04/06/2024    EGFR 37 12/05/2023    EGFR 39 05/09/2023    CREATININE 3.39 (H) 04/06/2024    CREATININE 1.76 (H) 12/05/2023    CREATININE 1.69 (H) 05/09/2023     Baseline creatinine 1.6 - 1.7.  Noted 3.39 on initial labs  VARGHESE likely multifocal in the setting of prerenal losses, medication compliance, hypotension.  Received 3 L IVF in ER.   Continue IVF with isolyte  Avoid hypotension, nephrotoxins  Check UA  Renally dose medications as needed  Monitor UO and renal indices  Urinary retention protocol    Anemia  Assessment & Plan  Baseline hgb 11-12.  Noted 8.4 in ER.  Patient reports epistaxis prior earlier today.    Patient had fall earlier today at home, denied head strike.  On evaluation noted to have small ecchymotic area on right flank.  CT head negative.  CT c/a/p negative for traumatic injury/bleeding  May have hypovolemia component in the setting of supratherapeutic INR.  Unclear if he is taking Coumadin, will hold  Monitor H&H every 6  No current need for transfusion  Transfuse as needed to maintain hemoglobin > 7    Atrial fibrillation (HCC)  Assessment & Plan  Home regimen with metoprolol, amiodarone, and warfarin  Currently in sinus rhythm  AICD generator change 12/23 at [facility]  Continue telemetry  Holding home metoprolol 2/2 hypotension  Holding home warfarin 2/2 supratherapeutic INR  Continue home amiodarone    Abnormal CT of the abdomen  Assessment & Plan  CT c/a/p without traumatic findings.  Multiple bilateral renal cysts noted.  Hypodense lesion of liver noted. (MRI f/u recommended).    Serial abdominal exams    Hyperlipidemia  Assessment & Plan  Continue atorvastatin    Hypertension  Assessment & Plan  Holding home metoprolol, furosemide and losartan in the setting of hypotension requiring vasopressor    Thyroid nodule  Assessment & Plan  Incidental  thyroid nodule noted on CT.  F/u as outpatient for thyroid US.           History of Present Illness     HPI: Silvio Drew is a 73 y.o. who presents with complaints of generalized weakness, subjective fever/chills, fall at home without head strike.  He denies shortness of breath, cough, chest pain, abdominal pain, hematemesis/melena/hematochezia, or urinary symptoms.  He does admit to epistaxis earlier today.  On arrival to the ER patient was noted to be hypotensive 74/47.  He was noted to have small ecchymotic area on right flank and CT was ordered given supratherapeutic INR 6.  CT without traumatic injury/bleeding, no evidence of infection.  CT did note multiple renal cysts, hypodensity of liver, incidental thyroid nodule.  He was given volume resuscitation, started on Levophed, cultures obtained, and given broad-spectrum antibiotics in the emergency department.  He will be admitted to the ICU under medical critical care.    History obtained from sibling, chart review, the patient.    Patient is Vietnamese speaking -- [employer] iPad  #[id number]     Review of Systems   Constitutional:  Positive for chills, fatigue and fever.   HENT: Negative.     Eyes: Negative.    Respiratory:  Negative for cough, chest tightness, shortness of breath and wheezing.    Cardiovascular:  Negative for chest pain, palpitations and leg swelling.   Gastrointestinal:  Negative for abdominal distention, abdominal pain, blood in stool, diarrhea, nausea and vomiting.   Endocrine: Negative.    Genitourinary:  Positive for decreased urine volume. Negative for difficulty urinating, enuresis, frequency and hematuria.   Musculoskeletal:  Positive for arthralgias (Arthritis of knees). Negative for myalgias.   Skin:  Positive for wound (Right flank bruise). Negative for color change, pallor and rash.   Allergic/Immunologic: Negative.    Neurological:  Positive for weakness (Generalized). Negative for dizziness, seizures, syncope and light-headedness.    Hematological: Negative.    Psychiatric/Behavioral:  Negative for agitation, confusion and decreased concentration. The patient is not nervous/anxious.      Disposition: Critical care  Historical Information   Past Medical History:  3/12/2013: Anemia  No date: Atrial fibrillation (HCC)  4/6/2024: Coagulopathy (HCC)  No date: Coronary artery disease  No date: GERD (gastroesophageal reflux disease)  5/4/2021: Hyperlipidemia associated with type 2 diabetes mellitus    (HCC)  No date: Hypertension  No date: Obesity  4/6/2024: Thyroid nodule Past Surgical History:  12/2023: CARDIAC PACEMAKER PLACEMENT      Comment:  replacement  No date: CORONARY ARTERY BYPASS GRAFT   Current Outpatient Medications   Medication Instructions    allopurinol (ZYLOPRIM) 100 mg, Oral, Daily    amiodarone 200 mg, Oral, Daily    atorvastatin (LIPITOR) 20 mg, Oral, Daily, Please STOP Pravastatin..    b complex-C-E-zinc (STRESS FORMULA/ZINC) tablet 1 tablet, Oral, Daily    bisacodyl (DULCOLAX) 5 mg EC tablet Take as instructed on bowel preparation instructions    Diclofenac Sodium (VOLTAREN) 2 g, Topical, 4 times daily    diflunisal (DOLOBID) 500 mg, Oral, 2 times daily after meals    ergocalciferol (VITAMIN D2) 50,000 Units, Oral, Weekly    furosemide (LASIX) 20 mg, Oral, Daily    gabapentin (NEURONTIN) 200 mg, Oral, 3 times daily    ketoconazole (NIZORAL) 2 % shampoo 1 application., Topical, 3 times weekly    lidocaine (LMX) 4 % cream Topical, As needed    linaCLOtide 290 MCG CAPS 1 capsule, Oral, Daily    loratadine (CLARITIN) 10 mg, Oral, Daily, In the evening    losartan (COZAAR) 50 mg, Oral, Daily    methocarbamol (ROBAXIN) 500 mg, Oral, 2 times daily    methylPREDNISolone 4 MG tablet therapy pack Use as directed on package    metoprolol succinate (TOPROL XL) 25 mg, Oral, Daily    nitrofurantoin (MACROBID) 100 mg, Oral, Daily, With food/lunch    pantoprazole (PROTONIX) 20 mg, Oral, Daily    polyethylene glycol (GLYCOLAX) 17 g, Oral, Daily  "   warfarin (COUMADIN) 5 mg, Oral, Daily    Allergies   Allergen Reactions    Metoprolol Shortness Of Breath     \"50mg\" via Korean ; states \"When I take the 25mg I'm normal.\"      Social History     Tobacco Use    Smoking status: Never    Smokeless tobacco: Never    Tobacco comments:     No passive smoke exposure   Vaping Use    Vaping status: Never Used   Substance Use Topics    Alcohol use: No    Drug use: No    Family History   Problem Relation Age of Onset    Hypertension Mother     Diabetes Mother     No Known Problems Father           Objective                            Vitals I/O      Most Recent Min/Max in 24hrs   Temp 97.8 °F (36.6 °C) Temp  Min: 97.8 °F (36.6 °C)  Max: 97.8 °F (36.6 °C)   Pulse 69 Pulse  Min: 67  Max: 74   Resp 18 Resp  Min: 16  Max: 20   BP 92/50 BP  Min: 77/47  Max: 112/55   O2 Sat 95 % SpO2  Min: 92 %  Max: 99 %      Intake/Output Summary (Last 24 hours) at 4/6/2024 2103  Last data filed at 4/6/2024 1928  Gross per 24 hour   Intake 2150 ml   Output --   Net 2150 ml       No diet orders on file    Invasive Monitoring           Physical Exam   Physical Exam  Vitals reviewed.   Eyes:      General: Vision grossly intact. No scleral icterus.        Right eye: No discharge.         Left eye: No discharge.      Extraocular Movements: Extraocular movements intact.      Conjunctiva/sclera: Conjunctivae normal.      Pupils: Pupils are equal, round, and reactive to light.   Skin:     General: Skin is warm, dry and not mottled extremities.      Capillary Refill: Capillary refill takes less than 2 seconds.      Coloration: Skin is not jaundiced or pale.      Findings: Ecchymosis present. No lesion or rash.          HENT:      Head: Normocephalic and atraumatic.      Right Ear: Hearing normal. No drainage.      Left Ear: Hearing normal. No drainage.      Nose: No nasal deformity, nasal tenderness, congestion, rhinorrhea, epistaxis or nasogastric tube.      Mouth/Throat:      Mouth: Mucous " membranes are dry. No injury or angioedema.      Dentition: Normal dentition.      Pharynx: No oropharyngeal exudate.   Neck:      Vascular: No JVD.   no midline tenderness Cardiovascular:      Rate and Rhythm: Normal rate and regular rhythm.      Pulses: Decreased pulses.           Radial pulses are 1+ on the right side and 1+ on the left side.        Dorsalis pedis pulses are 1+ on the right side and 1+ on the left side.      Heart sounds: No murmur heard.     No friction rub. No gallop.   Musculoskeletal:         General: Normal range of motion.      Right lower leg: No edema.      Left lower leg: No edema.   Abdominal: General: Bowel sounds are normal. There is no distension.      Palpations: Abdomen is soft.      Tenderness: There is no abdominal tenderness. There is no guarding.   Constitutional:       General: He is not in acute distress.     Appearance: He is well-developed and well-nourished. He is ill-appearing. He is not toxic-appearing.      Interventions: He is not sedated, not intubated and not restrained.  Pulmonary:      Effort: Tachypnea present. No accessory muscle usage, respiratory distress or accessory muscle usage. He is not intubated.      Breath sounds: No wheezing, rhonchi or rales.   Psychiatric:         Mood and Affect:  mood and affect abnormal.        Speech: Speech is not no receptive aphasia or no expressive aphasia.   Neurological:      General: No focal deficit present.      Mental Status: He is alert and oriented to person, place and time. Mental status is at baseline. He is CAM ICU negative and not agitated.      Cranial Nerves: No dysarthria or facial asymmetry.      Motor: Strength full and intact in all extremities. No pronator drift or motor deficit.            Diagnostic Studies      EKG: Sinus rhythm  Imagin/6 CT head: No acute intracranial abnormality   CT c/a/p: No obvious source of infection, cardiomegaly, multiple bilateral renal cyst, liver hypodensity, small  bladder diverticulum.  Incidental thyroid nodule.   I have personally reviewed pertinent reports.   and I have personally reviewed pertinent films in PACS     Medications:  Scheduled PRN   lactated ringers, 500 mL, Once  vancomycin, 20 mg/kg, Once          Continuous    norepinephrine, 1-30 mcg/min, Last Rate: 5 mcg/min (04/06/24 1912)         Labs:    CBC    Recent Labs     04/06/24  1820   WBC 18.65*   HGB 8.4*   HCT 27.7*   *     BMP    Recent Labs     04/06/24  1820   SODIUM 139   K 4.4   *   CO2 19*   AGAP 7   BUN 45*   CREATININE 3.39*   CALCIUM 8.0*       Coags    Recent Labs     04/06/24  1820   INR 6.04*   PTT 96*        Additional Electrolytes  No recent results       Blood Gas    No recent results  No recent results LFTs  Recent Labs     04/06/24  1820   ALT 51   AST 58*   ALKPHOS 76   ALB 3.3*   TBILI 0.86       Infectious  Recent Labs     04/06/24  1820   PROCALCITONI 16.26*     Glucose  Recent Labs     04/06/24  1820   GLUC 102             Anticipated Length of Stay is > 2 midnights  REBEL Mcfarland

## 2024-04-07 NOTE — ASSESSMENT & PLAN NOTE
AEB: leukocytosis, tachypenia.  Patient also reports subjective fever and chills yesterday.  His is c/o generalized weakness and suffered a fall w/o head strike at home.  On evaluation he was noted to have small bruise on right flank and INR was supratherapeutic 6.  CT negative for traumatic injury/bleeding.  States that his urine output is decreased today, but denies urinary symptoms.  Denies respiratory and GI symptoms, hematemesis, melena, hematochezia.   Suspect distributive though source unclear, can also consider hypovolemia 2/2 reported epistaxis.  Less likely cardiogenic.  Hypotensive on arrival to ER 77/47 -- given IVF and started on Levophed in the ER.   Blood cultures obtained in ER  Check UA with reflex  Check MRSA swab  Check echo  Last echo: not available.   ICD generator change 12/2023 at [facility]  Received 3L IVF in the ER.  30 mL/kg = 3120 -- will give additional 500 mL IVF  Continue Levophed, titrate for goal MAP > 65 mmHg  Lactic 1.1 -- no further need to trend  Procal 16 -- check in am and as needed  Hgb baseline 11-12.  In ER 8.4 -- see anemia  CT c/a/p: without traumatic injury.  Cardiomegaly. Multiple renal cysts.  Hypodense lesion of liver.  Small bladder diverticulum.  Incidental thyroid nodule.  No obvious source of infection noted on imaging.  Continue cefepime, vancomycin with renal dosing  Pharmacy consult for Vanco dosing

## 2024-04-07 NOTE — ASSESSMENT & PLAN NOTE
Holding home metoprolol, furosemide and losartan in the setting of hypotension requiring vasopressor

## 2024-04-07 NOTE — PROGRESS NOTES
Pt had 2 gram vanco load on 4/6 around 7pm. Pt being treated for bacteremia.  Crcl of 22.5 and a Scr of 3.39. Will draw a random trough with am labs on 4/7. Pulse dosing  at this time and redose vancomycin when level is <15

## 2024-04-07 NOTE — INCIDENTAL FINDINGS
The following findings require follow up:  Radiographic finding   Finding: Thyroid nodule   Follow up required: Ultra sound    Follow up should be done within 1   month(s)    Please notify the following clinician to assist with the follow up:   Dr. Mac Stephens MD

## 2024-04-07 NOTE — ASSESSMENT & PLAN NOTE
Baseline hgb 11-12.  Noted 8.4 in ER.  Patient reports epistaxis prior earlier today.    Patient had fall earlier today at home, denied head strike.  On evaluation noted to have small ecchymotic area on right flank.  CT head negative.  CT c/a/p negative for traumatic injury/bleeding  May have hypovolemia component in the setting of supratherapeutic INR.  Unclear if he is taking Coumadin, will hold  Repeat Hgb 8.4  No current need for transfusion  Transfuse as needed to maintain hemoglobin > 7  CBC this am pending

## 2024-04-07 NOTE — ASSESSMENT & PLAN NOTE
CT c/a/p without traumatic findings.  Multiple bilateral renal cysts noted.  Hypodense lesion of liver noted. (MRI f/u recommended).    Serial abdominal exams.

## 2024-04-07 NOTE — ASSESSMENT & PLAN NOTE
AEB: leukocytosis, tachypenia.  Patient also reports subjective fever and chills yesterday.  His is c/o generalized weakness and suffered a fall w/o head strike at home.  On evaluation he was noted to have small bruise on right flank and INR was supratherapeutic 6.  CT negative for traumatic injury/bleeding.  States that his urine output is decreased today, but denies urinary symptoms.  Denies respiratory and GI symptoms, hematemesis, melena, hematochezia.   Suspect distributive though source unclear, can also consider hypovolemia 2/2 reported epistaxis.  Less likely cardiogenic.  Hypotensive on arrival to ER 77/47 -- given IVF and started on Levophed in the ER.   Blood cultures obtained in ER  UA/micro: +leuks, innumerable WBC, negative nitrites, occasional bacteria  MRSA swab in process  Check echo  Last echo: not available.   ICD generator change 12/2023 at [facility]  Received 3.5L IVF.  30 mL/kg = 3120   Continue Levophed, titrate for goal MAP > 65 mmHg  Lactic 1.1 -- no further need to trend  Procal 16 -- check in am and as needed  Hgb baseline 11-12.  In ER 8.4 -- see anemia  CT c/a/p: without traumatic injury.  Cardiomegaly. Multiple renal cysts.  Hypodense lesion of liver.  Small bladder diverticulum.  Incidental thyroid nodule.  No obvious source of infection noted on imaging.  Continue cefepime, vancomycin with renal dosing  Pharmacy consult for Vanco dosing

## 2024-04-07 NOTE — PROGRESS NOTES
Silvio Drew is a 73 y.o. male who is currently ordered Vancomycin IV with management by the Pharmacy Consult Service.  Relevant clinical data and objective / subjective history reviewed.  Vancomycin Assessment:  Indication and Goal AUC/Trough:  Bacteremia (goal -600, trough >10)  Clinical Status: worsening with shock  Micro:      Renal Function:  SCr: 2.98 mg/dL from 3.39 mg/dL last evening; baseline is 1.4-1.7 mg/dL  Renal replacement: Not on dialysis  Days of Therapy: 2  Current Dose:  Received 2,000 mg loading dose on 4/6 @ 1919 followed by pulse dosing with 1,000 mg daily PRN vancomycin level </= 15 mcg/dL  Last Level: Random level this AM drawn @ 0538 is 18.1 (drawn approximately 10.5 hours after loading dose)  Vancomycin Plan:  New Dosing:  Continue with pulse dosing, but decrease to 750 mg daily PRN vancomycin level </= 15 mcg/dL. Patient will be given a dose today @ 1200.  Next Level: Random level with AM labs on 4/8  Renal Function Monitoring: Daily BMP and UOP assessment  Pharmacy will continue to follow closely for s/sx of nephrotoxicity, infusion reactions and appropriateness of therapy.  BMP and CBC will be ordered per protocol. We will continue to follow the patient’s culture results and clinical progress daily.    Yvrose Bearden, PharmD, BCCCP  Critical Care and Internal Medicine Clinical Pharmacist  [phone] or via [employer]

## 2024-04-07 NOTE — PLAN OF CARE
Problem: METABOLIC, FLUID AND ELECTROLYTES - ADULT  Goal: Electrolytes maintained within normal limits  Description: INTERVENTIONS:  - Monitor labs and assess patient for signs and symptoms of electrolyte imbalances  - Administer electrolyte replacement as ordered  - Monitor response to electrolyte replacements, including repeat lab results as appropriate  - Instruct patient on fluid and nutrition as appropriate  Outcome: Progressing  Goal: Fluid balance maintained  Description: INTERVENTIONS:  - Monitor labs   - Monitor I/O and WT  - Instruct patient on fluid and nutrition as appropriate  - Assess for signs & symptoms of volume excess or deficit  Outcome: Progressing  Goal: Glucose maintained within target range  Description: INTERVENTIONS:  - Monitor Blood Glucose as ordered  - Assess for signs and symptoms of hyperglycemia and hypoglycemia  - Administer ordered medications to maintain glucose within target range  - Assess nutritional intake and initiate nutrition service referral as needed  Outcome: Progressing     Problem: HEMATOLOGIC - ADULT  Goal: Maintains hematologic stability  Description: INTERVENTIONS  - Assess for signs and symptoms of bleeding or hemorrhage  - Monitor labs  - Administer supportive blood products/factors as ordered and appropriate  Outcome: Progressing     Problem: CARDIOVASCULAR - ADULT  Goal: Maintains optimal cardiac output and hemodynamic stability  Description: INTERVENTIONS:  - Monitor I/O, vital signs and rhythm  - Monitor for S/S and trends of decreased cardiac output  - Administer and titrate ordered vasoactive medications to optimize hemodynamic stability  - Assess quality of pulses, skin color and temperature  - Assess for signs of decreased coronary artery perfusion  - Instruct patient to report change in severity of symptoms  Outcome: Progressing  Goal: Absence of cardiac dysrhythmias or at baseline rhythm  Description: INTERVENTIONS:  - Continuous cardiac monitoring,  vital signs, obtain 12 lead EKG if ordered  - Administer antiarrhythmic and heart rate control medications as ordered  - Monitor electrolytes and administer replacement therapy as ordered  Outcome: Progressing

## 2024-04-07 NOTE — ASSESSMENT & PLAN NOTE
Unclear if he is taking Coumadin.  Last Rx per available records in [facility] 3/2023.  CT did reveal evidence of hypodensity in the liver.  Hold AC at this time  Repeat INR in a.m.  No evidence of current active bleeding.  If bleeding noted consider FFP/possibly Kcentra  Consider MRI of liver

## 2024-04-07 NOTE — ED NOTES
Pt arm bent causing Norepinephrine infusion to stop flow. This nurse educated pt to keep arm straight to allow therapeutic effects of medication.     Lucina Rowe RN  04/06/24 [id number]

## 2024-04-08 PROBLEM — A41.9 SEPTIC SHOCK (HCC): Status: ACTIVE | Noted: 2024-04-06

## 2024-04-08 PROBLEM — R78.81 GRAM-POSITIVE BACTEREMIA: Status: ACTIVE | Noted: 2024-04-08

## 2024-04-08 PROBLEM — R39.89 SUSPECTED UTI: Status: ACTIVE | Noted: 2024-04-08

## 2024-04-08 PROBLEM — R79.1 SUPRATHERAPEUTIC INR: Status: ACTIVE | Noted: 2024-04-08

## 2024-04-08 PROBLEM — I50.22 CHRONIC SYSTOLIC (CONGESTIVE) HEART FAILURE (HCC): Status: ACTIVE | Noted: 2024-04-08

## 2024-04-08 PROBLEM — R65.21 SEPTIC SHOCK (HCC): Status: ACTIVE | Noted: 2024-04-06

## 2024-04-08 LAB
ANION GAP SERPL CALCULATED.3IONS-SCNC: 8 MMOL/L (ref 4–13)
BACTERIA UR CULT: NORMAL
BASOPHILS # BLD AUTO: 0.02 THOUSANDS/ÂΜL (ref 0–0.1)
BASOPHILS NFR BLD AUTO: 0 % (ref 0–1)
BUN SERPL-MCNC: 35 MG/DL (ref 5–25)
CALCIUM SERPL-MCNC: 8.1 MG/DL (ref 8.4–10.2)
CHLORIDE SERPL-SCNC: 113 MMOL/L (ref 96–108)
CO2 SERPL-SCNC: 18 MMOL/L (ref 21–32)
CREAT SERPL-MCNC: 2.36 MG/DL (ref 0.6–1.3)
EOSINOPHIL # BLD AUTO: 0.12 THOUSAND/ÂΜL (ref 0–0.61)
EOSINOPHIL NFR BLD AUTO: 2 % (ref 0–6)
ERYTHROCYTE [DISTWIDTH] IN BLOOD BY AUTOMATED COUNT: 16.7 % (ref 11.6–15.1)
GFR SERPL CREATININE-BSD FRML MDRD: 26 ML/MIN/1.73SQ M
GLUCOSE SERPL-MCNC: 90 MG/DL (ref 65–140)
HCT VFR BLD AUTO: 24.2 % (ref 36.5–49.3)
HGB BLD-MCNC: 7.4 G/DL (ref 12–17)
IMM GRANULOCYTES # BLD AUTO: 0.04 THOUSAND/UL (ref 0–0.2)
IMM GRANULOCYTES NFR BLD AUTO: 1 % (ref 0–2)
INR PPP: 3.52 (ref 0.84–1.19)
LYMPHOCYTES # BLD AUTO: 0.88 THOUSANDS/ÂΜL (ref 0.6–4.47)
LYMPHOCYTES NFR BLD AUTO: 12 % (ref 14–44)
MCH RBC QN AUTO: 26.1 PG (ref 26.8–34.3)
MCHC RBC AUTO-ENTMCNC: 30.6 G/DL (ref 31.4–37.4)
MCV RBC AUTO: 86 FL (ref 82–98)
MONOCYTES # BLD AUTO: 0.62 THOUSAND/ÂΜL (ref 0.17–1.22)
MONOCYTES NFR BLD AUTO: 9 % (ref 4–12)
MRSA NOSE QL CULT: NORMAL
NEUTROPHILS # BLD AUTO: 5.47 THOUSANDS/ÂΜL (ref 1.85–7.62)
NEUTS SEG NFR BLD AUTO: 76 % (ref 43–75)
NRBC BLD AUTO-RTO: 0 /100 WBCS
PLATELET # BLD AUTO: 132 THOUSANDS/UL (ref 149–390)
PMV BLD AUTO: 11.5 FL (ref 8.9–12.7)
POTASSIUM SERPL-SCNC: 3.8 MMOL/L (ref 3.5–5.3)
PROTHROMBIN TIME: 35.3 SECONDS (ref 11.6–14.5)
RBC # BLD AUTO: 2.83 MILLION/UL (ref 3.88–5.62)
SODIUM SERPL-SCNC: 139 MMOL/L (ref 135–147)
VANCOMYCIN SERPL-MCNC: 16.2 UG/ML (ref 10–20)
WBC # BLD AUTO: 7.15 THOUSAND/UL (ref 4.31–10.16)

## 2024-04-08 PROCEDURE — 97163 PT EVAL HIGH COMPLEX 45 MIN: CPT

## 2024-04-08 PROCEDURE — 80048 BASIC METABOLIC PNL TOTAL CA: CPT | Performed by: NURSE PRACTITIONER

## 2024-04-08 PROCEDURE — 85025 COMPLETE CBC W/AUTO DIFF WBC: CPT | Performed by: STUDENT IN AN ORGANIZED HEALTH CARE EDUCATION/TRAINING PROGRAM

## 2024-04-08 PROCEDURE — 85610 PROTHROMBIN TIME: CPT | Performed by: NURSE PRACTITIONER

## 2024-04-08 PROCEDURE — 97166 OT EVAL MOD COMPLEX 45 MIN: CPT

## 2024-04-08 PROCEDURE — 80202 ASSAY OF VANCOMYCIN: CPT | Performed by: NURSE PRACTITIONER

## 2024-04-08 PROCEDURE — 87040 BLOOD CULTURE FOR BACTERIA: CPT | Performed by: INTERNAL MEDICINE

## 2024-04-08 PROCEDURE — 99232 SBSQ HOSP IP/OBS MODERATE 35: CPT | Performed by: INTERNAL MEDICINE

## 2024-04-08 RX ORDER — CEFTRIAXONE 1 G/50ML
1000 INJECTION, SOLUTION INTRAVENOUS ONCE
Status: COMPLETED | OUTPATIENT
Start: 2024-04-08 | End: 2024-04-08

## 2024-04-08 RX ORDER — VANCOMYCIN HYDROCHLORIDE 750 MG/150ML
750 INJECTION, SOLUTION INTRAVENOUS ONCE
Status: DISCONTINUED | OUTPATIENT
Start: 2024-04-08 | End: 2024-04-08

## 2024-04-08 RX ORDER — VANCOMYCIN HYDROCHLORIDE 750 MG/150ML
750 INJECTION, SOLUTION INTRAVENOUS EVERY 24 HOURS
Status: DISCONTINUED | OUTPATIENT
Start: 2024-04-08 | End: 2024-04-08 | Stop reason: SDUPTHER

## 2024-04-08 RX ORDER — CEFTRIAXONE 2 G/50ML
2000 INJECTION, SOLUTION INTRAVENOUS EVERY 24 HOURS
Status: DISCONTINUED | OUTPATIENT
Start: 2024-04-09 | End: 2024-04-10 | Stop reason: HOSPADM

## 2024-04-08 RX ORDER — VANCOMYCIN HYDROCHLORIDE 750 MG/150ML
10 INJECTION, SOLUTION INTRAVENOUS DAILY PRN
Status: DISCONTINUED | OUTPATIENT
Start: 2024-04-09 | End: 2024-04-09

## 2024-04-08 RX ORDER — VANCOMYCIN HYDROCHLORIDE 750 MG/150ML
750 INJECTION, SOLUTION INTRAVENOUS ONCE
Status: COMPLETED | OUTPATIENT
Start: 2024-04-08 | End: 2024-04-08

## 2024-04-08 RX ADMIN — LIDOCAINE 5% 2 PATCH: 700 PATCH TOPICAL at 06:31

## 2024-04-08 RX ADMIN — AMIODARONE HYDROCHLORIDE 200 MG: 200 TABLET ORAL at 08:54

## 2024-04-08 RX ADMIN — PANTOPRAZOLE SODIUM 20 MG: 20 TABLET, DELAYED RELEASE ORAL at 06:31

## 2024-04-08 RX ADMIN — POLYETHYLENE GLYCOL 3350 17 G: 17 POWDER, FOR SOLUTION ORAL at 08:53

## 2024-04-08 RX ADMIN — CEFTRIAXONE 1000 MG: 1 INJECTION, SOLUTION INTRAVENOUS at 16:28

## 2024-04-08 RX ADMIN — ATORVASTATIN CALCIUM 20 MG: 20 TABLET, FILM COATED ORAL at 16:27

## 2024-04-08 RX ADMIN — CEFTRIAXONE 1000 MG: 1 INJECTION, SOLUTION INTRAVENOUS at 13:11

## 2024-04-08 RX ADMIN — VANCOMYCIN HYDROCHLORIDE 750 MG: 750 INJECTION, SOLUTION INTRAVENOUS at 16:35

## 2024-04-08 RX ADMIN — ACETAMINOPHEN 325MG 650 MG: 325 TABLET ORAL at 09:00

## 2024-04-08 RX ADMIN — GABAPENTIN 200 MG: 100 CAPSULE ORAL at 20:00

## 2024-04-08 RX ADMIN — GABAPENTIN 200 MG: 100 CAPSULE ORAL at 08:54

## 2024-04-08 RX ADMIN — GABAPENTIN 200 MG: 100 CAPSULE ORAL at 16:27

## 2024-04-08 NOTE — PROGRESS NOTES
The vancomycin IV was discontinued yesterday; therefore, pharmacy will sign off as consultants. Thank you for allowing us to participate in this patient's care.

## 2024-04-08 NOTE — PHYSICAL THERAPY NOTE
PHYSICAL THERAPY EVALUATION          Patient Name: Silvio Drew  Today's Date: 4/8/2024  PT EVALUATION    73 y.o.    [id number]    Cardiomegaly [I51.7]  Low back pain [M54.50]  Thyroid nodule [E04.1]  Renal cyst [N28.1]  Weakness [R53.1]  Anemia [D64.9]  Liver lesion [K76.9]  VARGHESE (acute kidney injury) (HCC) [N17.9]  Supratherapeutic INR [R79.1]  Septic shock (HCC) [A41.9, R65.21]    Past Medical History:   Diagnosis Date    Anemia 3/12/2013    Atrial fibrillation (HCC)     Coagulopathy (HCC) 4/6/2024    Coronary artery disease     GERD (gastroesophageal reflux disease)     Hyperlipidemia associated with type 2 diabetes mellitus  (HCC) 5/4/2021    Hypertension     Obesity     Thyroid nodule 4/6/2024     Past Surgical History:   Procedure Laterality Date    CARDIAC PACEMAKER PLACEMENT  12/2023    replacement    CORONARY ARTERY BYPASS GRAFT          04/08/24 1027   PT Last Visit   PT Visit Date 04/08/24   Note Type   Note type Evaluation   Pain Assessment   Pain Assessment Tool 0-10   Pain Score No Pain   Restrictions/Precautions   Other Precautions Chair Alarm;Bed Alarm;Fall Risk  (language barrier)   Home Living   Type of Home House   Home Layout Two level;1/2 bath on main level;Bed/bath upstairs   Bathroom Shower/Tub Tub/shower unit   Home Equipment Cane   Prior Function   Level of [place] Independent with ADLs;Independent with functional mobility;Independent with IADLS   Lives With Alone   Receives Help From Family  (per son local siblings who pt sees daily)   IADLs Independent with driving;Independent with medication management;Independent with meal prep   Falls in the last 6 months 0   Comments indep w use of cane prn   General   Additional Pertinent History pt admitted 4/6/24 for shock. OOB to chair orders. PMHx significant for afib, CKD, CAD   Family/Caregiver Present No  (however spoke to son Ronnell on the phone to confirm social hx)   Cognition   Overall Cognitive Status WFL    Arousal/Participation Cooperative   Orientation Level Oriented X4  (generalized to situation)   Following Commands Follows one step commands with increased time or repetition  (in part dt language barrier)   Comments questionable insight   Bed Mobility   Supine to Sit 5  Supervision   Additional items HOB elevated;Increased time required   Transfers   Sit to Stand 5  Supervision   Additional items Increased time required   Stand to Sit 5  Supervision   Additional items Increased time required   Ambulation/Elevation   Gait pattern Improper Weight shift;Inconsistent anabell;Excessively slow  (lateral sway improved w RW)   Gait Assistance 5  Supervision   Additional items Assist x 1   Assistive Device None;Rolling walker   Distance 90', 100'   Stair Management Assistance 5  Supervision   Additional items Assist x 1   Stair Management Technique One rail R;Alternating pattern;Step to pattern;Foreward   Number of Stairs 4   Balance   Static Standing Fair   Dynamic Standing Fair -   Ambulatory Fair -   Endurance Deficit   Endurance Deficit Yes   Endurance Deficit Description PLATA, improved when using DME v no AD. vitals during session 86, 98% at rest and 84, 97% post amb   Activity Tolerance   Activity Tolerance Patient tolerated treatment well;Patient limited by fatigue   Medical Staff Made Aware Thelma OT   Nurse Made Aware yes   Assessment   Prognosis Good   Problem List Decreased endurance;Impaired balance;Impaired judgement;Decreased safety awareness   Assessment Silvio Drew is a 73 y.o. male admitted to [facility] on 4/6/2024 for Shock (HCC). PT was consulted and pt was seen on 4/8/2024 for mobility assessment and d/c planning. Pt presents w high fall risk. At baseline is indep w use of cane prn. Pt is currently functioning at a S level for bed mobility, transfers, ambulation and stair negotiation. Pt demonstrated ability to ambulate community distances (w x1 seated rest break) and negotiate steps to access home  environment. Noted improved activity tolerance and gait sequencing w use of RW v no AD. Despite sx of PLATA, vitals stable on RA. Pt will benefit from continued skilled IP PT to address the above mentioned impairments  in order to maximize recovery and increase functional independence when completing mobility and ADLs. Currently PT recommendations for DME include RW. At this time PT recommendations for d/c are home w PT services for balance, conditioning.   Barriers to Discharge None   Goals   Patient Goals none stated   [facility] Expiration Date 04/18/24   Short Term Goal #1 1).  Pt will perform bed mobility with Colleen demonstrating appropriate technique 100% of the time in order to improve function. 2) Perform all transfers with Colleen demonstrating safe and appropriate technique 100% of the time in order to improve ability to negotiate safely in home environment.3) Amb with least restrictive AD > 350'x1 with mod I in order to demonstrate ability to negotiate in home environment.4)  Improve overall strength and balance 1/2 grade in order to optimize ability to perform functional tasks and reduce fall risk.5) Increase activity tolerance to 45 minutes in order to improve endurance to functional tasks.6)  Negotiate stairs using most appropriate technique and mod I in order to be able to negotiate safely in home environment. 7) PT for ongoing patient and family/caregiver education, DME needs and d/c planning in order to promote highest level of function in least restrictive environment.   Plan   Treatment/Interventions Functional transfer training;LE strengthening/ROM;Elevations;Therapeutic exercise;Endurance training;Equipment eval/education;Patient/family training;Bed mobility;Gait training;Compensatory technique education;Continued evaluation;Spoke to nursing;OT;Family   PT Frequency 1-2x/wk   Discharge Recommendation   Rehab Resource Intensity Level, PT III (Minimum Resource Intensity)  (PT for balance)   Equipment  Recommended Walker   Walker Package Recommended Wheeled walker   Change/add to Walker Package? No     History: co - morbidities including age, use of assistive device prn, current experience including fall risk  Exam: impairments in systems including multiple body structures involved; neuromuscular (balance, gait, transfers), AM-PAC, cardiopulmonary (vitals), cognition; activity limitations (difficulties executing an action); participation restrictions (problems associated w involvement in life situations)  Clinical: unstable/unpredictable  Complexity:high      Estela Robbins, PT

## 2024-04-08 NOTE — ASSESSMENT & PLAN NOTE
INR still remains elevated at 3.52, from a prior peak of 6.31  Coumadin remains held  Monitor for bleeding

## 2024-04-08 NOTE — ASSESSMENT & PLAN NOTE
Incidental findings on CT imaging including nonspecific liver lesion with recommendation of outpatient MRI, and thyroid nodule with recommendation of outpatient thyroid ultrasound

## 2024-04-08 NOTE — PLAN OF CARE
Problem: Potential for Falls  Goal: Patient will remain free of falls  Description: INTERVENTIONS:  - Educate patient/family on patient safety including physical limitations  - Instruct patient to call for assistance with activity   - Consult OT/PT to assist with strengthening/mobility   - Keep Call bell within reach  - Keep bed low and locked with side rails adjusted as appropriate  - Keep care items and personal belongings within reach  - Initiate and maintain comfort rounds  - Make Fall Risk Sign visible to staff  - Offer Toileting every 2 Hours, in advance of need  - Initiate/Maintain bed alarm  - Obtain necessary fall risk management equipment: alarm   - Apply yellow socks and bracelet for high fall risk patients  - Consider moving patient to room near nurses station  4/8/2024 1033 by Tracy Dorantes RN  Outcome: Progressing  4/8/2024 1033 by Tracy Dorantes RN  Outcome: Progressing     Problem: Prexisting or High Potential for Compromised Skin Integrity  Goal: Skin integrity is maintained or improved  Description: INTERVENTIONS:  - Identify patients at risk for skin breakdown  - Assess and monitor skin integrity  - Assess and monitor nutrition and hydration status  - Monitor labs   - Assess for incontinence   - Turn and reposition patient  - Assist with mobility/ambulation  - Relieve pressure over bony prominences  - Avoid friction and shearing  - Provide appropriate hygiene as needed including keeping skin clean and dry  - Evaluate need for skin moisturizer/barrier cream  - Collaborate with interdisciplinary team   - Patient/family teaching  - Consider wound care consult   4/8/2024 1033 by Tracy Dorantes RN  Outcome: Progressing  4/8/2024 1033 by Tracy Dorantes RN  Outcome: Progressing     Problem: PAIN - ADULT  Goal: Verbalizes/displays adequate comfort level or baseline comfort level  Description: Interventions:  - Encourage patient to monitor pain and request assistance  - Assess pain using  appropriate pain scale  - Administer analgesics based on type and severity of pain and evaluate response  - Implement non-pharmacological measures as appropriate and evaluate response  - Consider cultural and social influences on pain and pain management  - Notify physician/advanced practitioner if interventions unsuccessful or patient reports new pain  4/8/2024 1033 by Tracy Dorantes RN  Outcome: Progressing  4/8/2024 1033 by Tracy Dorantes RN  Outcome: Progressing     Problem: INFECTION - ADULT  Goal: Absence or prevention of progression during hospitalization  Description: INTERVENTIONS:  - Assess and monitor for signs and symptoms of infection  - Monitor lab/diagnostic results  - Monitor all insertion sites, i.e. indwelling lines, tubes, and drains  - Monitor endotracheal if appropriate and nasal secretions for changes in amount and color  - Bee appropriate cooling/warming therapies per order  - Administer medications as ordered  - Instruct and encourage patient and family to use good hand hygiene technique  - Identify and instruct in appropriate isolation precautions for identified infection/condition  4/8/2024 1033 by Tracy Dorantes RN  Outcome: Progressing  4/8/2024 1033 by Tracy Dorantes RN  Outcome: Progressing  Goal: Absence of fever/infection during neutropenic period  Description: INTERVENTIONS:  - Monitor WBC    4/8/2024 1033 by Tracy Dorantes RN  Outcome: Progressing  4/8/2024 1033 by Tracy Dorantes RN  Outcome: Progressing     Problem: SAFETY ADULT  Goal: Patient will remain free of falls  Description: INTERVENTIONS:  - Educate patient/family on patient safety including physical limitations  - Instruct patient to call for assistance with activity   - Consult OT/PT to assist with strengthening/mobility   - Keep Call bell within reach  - Keep bed low and locked with side rails adjusted as appropriate  - Keep care items and personal belongings within reach  - Initiate and maintain  comfort rounds  - Make Fall Risk Sign visible to staff  - Offer Toileting every 2 Hours, in advance of need  - Initiate/Maintain bed alarm  - Obtain necessary fall risk management equipment: alarm   - Apply yellow socks and bracelet for high fall risk patients  - Consider moving patient to room near nurses station  4/8/2024 1033 by Tracy Dorantes RN  Outcome: Progressing  4/8/2024 1033 by Tracy Dorantes RN  Outcome: Progressing  Goal: Maintain or return to baseline ADL function  Description: INTERVENTIONS:  -  Assess patient's ability to carry out ADLs; assess patient's baseline for ADL function and identify physical deficits which impact ability to perform ADLs (bathing, care of mouth/teeth, toileting, grooming, dressing, etc.)  - Assess/evaluate cause of self-care deficits   - Assess range of motion  - Assess patient's mobility; develop plan if impaired  - Assess patient's need for assistive devices and provide as appropriate  - Encourage maximum independence but intervene and supervise when necessary  - Involve family in performance of ADLs  - Assess for home care needs following discharge   - Consider OT consult to assist with ADL evaluation and planning for discharge  - Provide patient education as appropriate  4/8/2024 1033 by Tracy Dorantes RN  Outcome: Progressing  4/8/2024 1033 by Tracy Dorantes RN  Outcome: Progressing  Goal: Maintains/Returns to pre admission functional level  Description: INTERVENTIONS:  - Perform AM-PAC 6 Click Basic Mobility/ Daily Activity assessment daily.  - Set and communicate daily mobility goal to care team and patient/family/caregiver.   - Collaborate with rehabilitation services on mobility goals if consulted  - Perform Range of Motion 3 times a day.  - Reposition patient every 2 hours.  - Dangle patient 3 times a day  - Stand patient 3 times a day  - Ambulate patient 3 times a day  - Out of bed to chair 3 times a day   - Out of bed for meals 3 times a day  - Out of bed  for toileting  - Record patient progress and toleration of activity level   4/8/2024 1033 by Tracy Dorantes RN  Outcome: Progressing  4/8/2024 1033 by Tracy Dorantes RN  Outcome: Progressing     Problem: DISCHARGE PLANNING  Goal: Discharge to home or other facility with appropriate resources  Description: INTERVENTIONS:  - Identify barriers to discharge w/patient and caregiver  - Arrange for needed discharge resources and transportation as appropriate  - Identify discharge learning needs (meds, wound care, etc.)  - Arrange for interpretive services to assist at discharge as needed  - Refer to Case Management Department for coordinating discharge planning if the patient needs post-hospital services based on physician/advanced practitioner order or complex needs related to functional status, cognitive ability, or social support system  4/8/2024 1033 by Tracy Dorantes RN  Outcome: Progressing  4/8/2024 1033 by Tracy Dorantes RN  Outcome: Progressing     Problem: Knowledge Deficit  Goal: Patient/family/caregiver demonstrates understanding of disease process, treatment plan, medications, and discharge instructions  Description: Complete learning assessment and assess knowledge base.  Interventions:  - Provide teaching at level of understanding  - Provide teaching via preferred learning methods  4/8/2024 1033 by Tracy Dorantes RN  Outcome: Progressing  4/8/2024 1033 by Tracy Dorantes RN  Outcome: Progressing     Problem: CARDIOVASCULAR - ADULT  Goal: Maintains optimal cardiac output and hemodynamic stability  Description: INTERVENTIONS:  - Monitor I/O, vital signs and rhythm  - Monitor for S/S and trends of decreased cardiac output  - Administer and titrate ordered vasoactive medications to optimize hemodynamic stability  - Assess quality of pulses, skin color and temperature  - Assess for signs of decreased coronary artery perfusion  - Instruct patient to report change in severity of symptoms  4/8/2024 1033  by Egzona Jose E, RN  Outcome: Progressing  4/8/2024 1033 by Tracy Dorantes RN  Outcome: Progressing  Goal: Absence of cardiac dysrhythmias or at baseline rhythm  Description: INTERVENTIONS:  - Continuous cardiac monitoring, vital signs, obtain 12 lead EKG if ordered  - Administer antiarrhythmic and heart rate control medications as ordered  - Monitor electrolytes and administer replacement therapy as ordered  4/8/2024 1033 by Tracy Dorantes RN  Outcome: Progressing  4/8/2024 1033 by Tracy Dorantes RN  Outcome: Progressing     Problem: METABOLIC, FLUID AND ELECTROLYTES - ADULT  Goal: Electrolytes maintained within normal limits  Description: INTERVENTIONS:  - Monitor labs and assess patient for signs and symptoms of electrolyte imbalances  - Administer electrolyte replacement as ordered  - Monitor response to electrolyte replacements, including repeat lab results as appropriate  - Instruct patient on fluid and nutrition as appropriate  4/8/2024 1033 by Tracy Dorantes RN  Outcome: Progressing  4/8/2024 1033 by Tracy Dorantes RN  Outcome: Progressing  Goal: Fluid balance maintained  Description: INTERVENTIONS:  - Monitor labs   - Monitor I/O and WT  - Instruct patient on fluid and nutrition as appropriate  - Assess for signs & symptoms of volume excess or deficit  4/8/2024 1033 by Tracy Dorantes RN  Outcome: Progressing  4/8/2024 1033 by Tracy Dorantes RN  Outcome: Progressing  Goal: Glucose maintained within target range  Description: INTERVENTIONS:  - Monitor Blood Glucose as ordered  - Assess for signs and symptoms of hyperglycemia and hypoglycemia  - Administer ordered medications to maintain glucose within target range  - Assess nutritional intake and initiate nutrition service referral as needed  Outcome: Progressing     Problem: HEMATOLOGIC - ADULT  Goal: Maintains hematologic stability  Description: INTERVENTIONS  - Assess for signs and symptoms of bleeding or hemorrhage  - Monitor labs  -  Administer supportive blood products/factors as ordered and appropriate  Outcome: Progressing

## 2024-04-08 NOTE — OCCUPATIONAL THERAPY NOTE
Occupational Therapy Evaluation     Patient Name: Silvio Drew  Today's Date: 4/8/2024  Problem List  Principal Problem:    Shock (HCC)  Active Problems:    Hyperlipidemia    Hypertension    Atrial fibrillation (HCC)    Anemia    Acute renal failure superimposed on stage 3 chronic kidney disease (HCC)    Abnormal CT of the abdomen    Thyroid nodule    Coagulopathy (HCC)    Past Medical History  Past Medical History:   Diagnosis Date    Anemia 3/12/2013    Atrial fibrillation (HCC)     Coagulopathy (HCC) 4/6/2024    Coronary artery disease     GERD (gastroesophageal reflux disease)     Hyperlipidemia associated with type 2 diabetes mellitus  (HCC) 5/4/2021    Hypertension     Obesity     Thyroid nodule 4/6/2024     Past Surgical History  Past Surgical History:   Procedure Laterality Date    CARDIAC PACEMAKER PLACEMENT  12/2023    replacement    CORONARY ARTERY BYPASS GRAFT           04/08/24 1008   OT Last Visit   OT Visit Date 04/08/24   Note Type   Note type Evaluation   Pain Assessment   Pain Assessment Tool 0-10   Pain Score No Pain   Restrictions/Precautions   Weight Bearing Precautions Per Order No   Other Precautions Chair Alarm;Bed Alarm;Fall Risk  (cognitive vs language barrier?)   Home Living   Type of Home House   Home Layout Two level;1/2 bath on main level;Bed/bath upstairs   Bathroom Shower/Tub Tub/shower unit   Home Equipment Cane   Prior Function   Level of [place] Independent with ADLs;Independent with functional mobility;Independent with IADLS   Lives With (S)  Alone   Receives Help From Family  (per son, local siblings who pt sees daily)   IADLs Independent with driving;Independent with medication management;Independent with meal prep   Falls in the last 6 months 0   Lifestyle   Autonomy PTA, was (I) with ADLs and was (I) with IADLs. Patient lives alone in a multi-story home. Bedroom/bathroom on second floor although 1/2 bathroom on main floor.   Reciprocal Relationships brothers/sisters,  "children   Service to Others caretaker   Intrinsic Gratification watching tv   General   Additional Pertinent History Comorbidities affecting pt’s functional performance include a significant PMH of: HLD, HTN, a-fib, anemia, ARF/CKD3.  Patient with active OT orders and activity orders for OOB to chair.   Additional General Comments Son assisting w translation and providing PLOF via phone.   Subjective   Subjective \"I was weak\"   ADL   Where Assessed Edge of bed   Eating Assistance 6  Modified independent   Grooming Assistance 5  Supervision/Setup   UB Bathing Assistance 5  Supervision/Setup   LB Bathing Assistance 4  Minimal Assistance   UB Dressing Assistance 5  Supervision/Setup   LB Dressing Assistance 4  Minimal Assistance   Toileting Assistance  5  Supervision/Setup   Bed Mobility   Supine to Sit 5  Supervision   Additional items HOB elevated;Increased time required   Transfers   Sit to Stand 5  Supervision   Additional items Increased time required   Stand to Sit 5  Supervision   Additional items Increased time required   Functional Mobility   Functional Mobility 5  Supervision   Additional Comments close S without AD - noted PLATA, fatigue. After rest break, pt utilized RW w less fatigue/PLATA noted.   Balance   Static Sitting Good   Dynamic Sitting Fair +   Static Standing Fair   Dynamic Standing Fair -   Ambulatory Fair -   Activity Tolerance   Activity Tolerance Patient tolerated treatment well;Patient limited by fatigue   Medical Staff Made Aware Estela PT   Nurse Made Aware Yes   RUE Assessment   RUE Assessment WFL   LUE Assessment   LUE Assessment WFL   Hand Function   Gross Motor Coordination Functional   Fine Motor Coordination Functional   Sensation   Light Touch No apparent deficits   Proprioception   Proprioception No apparent deficits   Vision-Basic Assessment   Current Vision No visual deficits   Vision - Complex Assessment   Ocular Range of Motion Intact   Perception   Inattention/Neglect Appears " HPI:  88 yo female with PMH of DVT (11/2019) on Eliquis, intermittent asthma (never intubated/no icu stays), polymyalgia rheumatica, osteoporosis with multiple vertebral compression fxs, presents with lower ext swelling x 1 week. LLE > RLE. Assoc with erythema, pain and warmth. Her symptoms started after her cat scratched her about a week ago. Denies fevers, chills, n/v. She also states that her pmd recently decreased her Eliquis dose from 2.5mg bid to ?1.5mg bid. Pt states her son is a pharmacist and he advised her to take a lower dose due to her age and body weight. She does not want her son to be called tonight as he is "busy working". Denies chest pain, sob, palpitations, nasal congestion, cough.     ED course: Was noted to have fever with temp 100.4, mildly tachycardic with hr 106, satting well on RA and hemodynamically stable. +leukocytosis with wbc 13.2 with neutrophilic shift. pt given ancef 2g and pain medications. (29 Apr 2020 21:01)      PAST MEDICAL & SURGICAL HISTORY:  Polymyalgia rheumatica  Diverticulosis  Asthma  S/P knee replacement, left  S/P hysterectomy: 1982  S/P appendectomy: 1962      ANTIMICROBIAL:  ampicillin/sulbactam  IVPB 3 Gram(s) IV Intermittent every 6 hours      PULMONARY:  ALBUTerol    90 MICROgram(s) HFA Inhaler 2 Puff(s) Inhalation every 6 hours PRN  budesonide  80 MICROgram(s)/formoterol 4.5 MICROgram(s) Inhaler 2 Puff(s) Inhalation two times a day    NEUROLOGIC:  acetaminophen   Tablet .. 650 milliGRAM(s) Oral every 6 hours PRN  oxycodone    5 mG/acetaminophen 325 mG 1 Tablet(s) Oral every 6 hours PRN  oxycodone    5 mG/acetaminophen 325 mG 2 Tablet(s) Oral every 6 hours PRN        HEMATOLOGIC:  apixaban 2.5 milliGRAM(s) Oral every 12 hours    GATROINTESTINAL:  polyethylene glycol 3350 17 Gram(s) Oral daily  senna 2 Tablet(s) Oral at bedtime      ENDO/METABOLIC:  predniSONE   Tablet 20 milliGRAM(s) Oral daily    IV FLUID/NUTRITION:  cyanocobalamin 1000 MICROGram(s) Oral daily  multivitamin 1 Tablet(s) Oral daily      IMMUNOLOGIC & OTHER  saccharomyces boulardii 250 milliGRAM(s) Oral two times a day        Allergies    dust (Unknown)  No Known Drug Allergies    Intolerances    provide [facility] soft diet (Unknown)      SOCIAL HISTORY:    FAMILY HISTORY:  Family history of stroke      Vital Signs Last 24 Hrs  T(C): 37.2 (30 Apr 2020 02:23), Max: 38 (29 Apr 2020 15:43)  T(F): 98.9 (30 Apr 2020 02:23), Max: 100.4 (29 Apr 2020 15:43)  HR: 107 (30 Apr 2020 02:23) (106 - 115)  BP: 164/81 (30 Apr 2020 02:23) (131/82 - 165/95)  BP(mean): --  RR: 24 (30 Apr 2020 02:23) (18 - 24)  SpO2: 96% (30 Apr 2020 02:23) (94% - 98%)          REVIEW OF SYSTEMS:    CONSTITUTIONAL: No fever, weight loss, or fatigue  NECK: No pain or stiffness  RESPIRATORY: No cough, wheezing, chills or hemoptysis; No shortness of breath  CARDIOVASCULAR: No chest pain, palpitations, dizziness, or leg swelling  GASTROINTESTINAL: No abdominal or epigastric pain. No nausea, vomiting, or hematemesis; No diarrhea or constipation. No melena or hematochezia.  GENITOURINARY: No dysuria, frequency, hematuria, or incontinence  NEUROLOGICAL: No headaches, memory loss, loss of strength, numbness, or tremors  SKIN: LLE cellulitis   LYMPH NODES: No enlarged glands  ENDOCRINE: No heat or cold intolerance; No hair loss  MUSCULOSKELETAL: No joint pain or swelling; No muscle, back, or extremity pain      PHYSICAL EXAM:    GENERAL: NAD, well-groomed, well-developed  HEAD:  Atraumatic, Normocephalic  ENMT: No tonsillar erythema, exudates, or enlargement; Moist mucous membranes, Good dentition, No lesions  NECK: Supple, No JVD, Normal thyroid  NERVOUS SYSTEM:  sleeping   CHEST/LUNG: Clear to percussion bilaterally; No rales, rhonchi, wheezing, or rubs  HEART: Regular rate and rhythm; No murmurs, rubs, or gallops  ABDOMEN: Soft, Nontender, Nondistended; Bowel sounds present  EXTREMITIES:  2+ Peripheral Pulses, No clubbing, cyanosis, or edema    SKIN: No rashes or lesions      LABS:                        11.6   10.66 )-----------( 248      ( 30 Apr 2020 06:58 )             36.1     04-30    136  |  96<L>  |  21.0<H>  ----------------------------<  87  4.2   |  26.0  |  0.60    Ca    9.0      30 Apr 2020 06:58    TPro  6.4<L>  /  Alb  3.7  /  TBili  0.7  /  DBili  x   /  AST  25  /  ALT  19  /  AlkPhos  112  04-29    PT/INR - ( 29 Apr 2020 11:02 )   PT: 16.1 sec;   INR: 1.41 ratio         PTT - ( 29 Apr 2020 11:02 )  PTT:23.6 sec        PT/INR - ( 29 Apr 2020 11:02 )   PT: 16.1 sec;   INR: 1.41 ratio         PTT - ( 29 Apr 2020 11:02 )  PTT:23.6 sec    RADIOLOGY & ADDITIONAL STUDIES: intact   Cognition   Overall Cognitive Status WFL   Arousal/Participation Alert;Responsive;Cooperative   Attention Attends with cues to redirect   Orientation Level Oriented X4  (generalized to situation)   Memory Decreased recall of precautions   Following Commands Follows one step commands with increased time or repetition   Comments Language barrier - son assisted w translation over phone. Noted decreased insight into deficits. Would benefit from further cognitive assessment.   Assessment   Limitation Decreased ADL status;Decreased Safe judgement during ADL;Decreased endurance;Decreased high-level ADLs;Decreased self-care trans   Prognosis Good   Assessment Patient is a 73 y.o. year old male seen for OT eval s/p admit to [facility] on 4/6/2024 with shock.  OT consulted to assess ADLs/IADLs/functional mobility and assist w/ D/C planning. Patient demonstrates the following deficits impacting occupational performance: weakness, decreased balance, decreased activity tolerance, limited functional reach, impaired problem solving, decreased safety awareness, PLATA, increased body habitus , impaired coordination, and decreased cardiovascular endurance. These impairments, as well at pt’s steps within home environment, limited home support, difficulty performing ADLs, difficulty performing IADLs, difficulty performing transfers/mobility, fall risk , functional decline , new use of AD for functional transfers/mobility, and language barrier, limit pt’s ability to safely engage in all baseline areas of occupation. Pt's CLOF as follows: eating/grooming: Stanislav and supervision , UB ADLs: supervision , LB ADLs: Sarah, toileting: supervision , bed mobility: supervision , functional transfers: supervision , functional mobility: supervision , sitting/standing tolerance: ~5 min. Pt would benefit from continued skilled OT while in acute setting to address deficits as defined above and to maximize (I) w/ ADLs/functional mobility. Occupational  "performance areas to address include: grooming, bathing/shower, toilet hygiene, dressing, medication management, health maintenance, functional mobility, community mobility, clothing management, cleaning, meal prep, money management, and household maintenance. Based on the aforementioned evaluation, functional performance deficits, and assessments, pt has been identified as a moderate complexity evaluation. At this time, recommendation for pt to receive post-acute rehabilitation services at a Level III (minimum resource intensity) due to above deficits and CLOF. OT will continue to follow pt 1-3x/wk to address the goals listed below to  w/in 10-14 days.   Goals   Patient Goals none offered   LTG Time Frame 10-   Plan   Treatment Interventions ADL retraining;Functional transfer training;UE strengthening/ROM;Endurance training;Cognitive reorientation;Patient/family training;Equipment evaluation/education;Neuromuscular reeducation;Compensatory technique education;Continued evaluation;Energy conservation;Activityengagement   Goal Expiration Date 24   OT Treatment Day 0   OT Frequency 2-3x/wk;1-2x/wk   Discharge Recommendation   Recommendation Geriatric Consult   Rehab Resource Intensity Level, OT III (Minimum Resource Intensity)  (would benefit from increased support from family.)   Additional Comments  Spoke w son whom confirms PLOF/provides information. He has not seen pt in years, per pt and son, but calls 3-4x/day (son lives in [place]). Pt/son reports pt's brothers/sisters visit daily. Son reports pt much more \"clear\" now as compared to PTA - he reports a certain medication pt was on was making him \"foggy.\"   AM-PAC Daily Activity Inpatient   Lower Body Dressing 3   Bathing 3   Toileting 3   Upper Body Dressing 3   Grooming 4   Eating 4   Daily Activity Raw Score 20   Daily Activity Standardized Score (Calc for Raw Score >=11) 42.03     Occupational Therapy goals: In 7-14 days:     1- Patient will " verbalize and demonstrate use of energy conservation/deep breathing technique and work simplification skills during functional activity with no verbal cues.   2- Patient will verbalize and demonstrate good body mechanics and joint protection techniques during ADLs/IADLs with no verbal cues   3- Pt will complete bed mobility at a Mod I level w/ G balance/safety demonstrated to decrease caregiver assistance required   4- Patient will increase OOB/ sitting tolerance to 2-4 hours per day for increased participation in self care and leisure tasks with no s/s of exertion.   5-Patient will increase standing tolerance time to 5 minutes with unilateral UE support to complete sink level ADLs@ mod I level    6- Pt will improve functional transfers to Mod I on/off all surfaces using DME as needed w/ G balance/safety   7- Patient will complete UB ADLs with Colleen utilizing appropriate DME/AE PRN   8- Patient will complete LB ADLs with Colleen utilizing appropriate DME/AE PRN   9- Patient will complete toileting tasks with Colleen with G hygiene/thoroughness utilizing appropriate DME/AE PRN   10- Pt will improve functional mobility during ADL/IADL/leisure tasks to Mod I using DME as needed w/ G balance/safety    11- Pt will be attentive 100% of the time during ongoing cognitive assessment w/ G participation to assist w/ safe d/c planning/recommendations   12- Pt will participate in simulated IADL management task to increase independence to Mod I w/ G safety and endurance   13- Pt will increase BUE strength by 1MM grade via AROM/AAROM/PROM exercises to increase independence in ADLs and transfers       Thelma Weiner OTR/L

## 2024-04-08 NOTE — ASSESSMENT & PLAN NOTE
1 of 2 bottles of blood cultures collected on 4/6 growing gram-positive cocci in pairs/chains and gram-positive rods  Continue IV Rocephin (increased to bacteremic dose) and initiate IV Vancomycin  Repeat blood cultures drawn today  Echocardiogram without obvious evidence of endocarditis  Septic shock stabilized, now off vasopressor support   If clinically declines, should pursue Infectious disease consultation

## 2024-04-08 NOTE — ASSESSMENT & PLAN NOTE
Holding Toprol-XL due to recovering hypotension/shock  Holding anticoagulation (Coumadin) in the setting of supratherapeutic INR

## 2024-04-08 NOTE — PLAN OF CARE
Problem: PHYSICAL THERAPY ADULT  Goal: Performs mobility at highest level of function for planned discharge setting.  See evaluation for individualized goals.  Description: Treatment/Interventions: Functional transfer training, LE strengthening/ROM, Elevations, Therapeutic exercise, Endurance training, Equipment eval/education, Patient/family training, Bed mobility, Gait training, Compensatory technique education, Continued evaluation, Spoke to nursing, OT, Family  Equipment Recommended: Walker       See flowsheet documentation for full assessment, interventions and recommendations.  Note: Prognosis: Good  Problem List: Decreased endurance, Impaired balance, Impaired judgement, Decreased safety awareness  Assessment: Silvio Drew is a 73 y.o. male admitted to [facility] on 4/6/2024 for Shock (HCC). PT was consulted and pt was seen on 4/8/2024 for mobility assessment and d/c planning. Pt presents w high fall risk. At baseline is indep w use of cane prn. Pt is currently functioning at a S level for bed mobility, transfers, ambulation and stair negotiation. Pt demonstrated ability to ambulate community distances (w x1 seated rest break) and negotiate steps to access home environment. Noted improved activity tolerance and gait sequencing w use of RW v no AD. Despite sx of PLATA, vitals stable on RA. Pt will benefit from continued skilled IP PT to address the above mentioned impairments  in order to maximize recovery and increase functional independence when completing mobility and ADLs. Currently PT recommendations for DME include RW. At this time PT recommendations for d/c are home w PT services for balance, conditioning.  Barriers to Discharge: None     Rehab Resource Intensity Level, PT: III (Minimum Resource Intensity) (PT for balance)    See flowsheet documentation for full assessment.

## 2024-04-08 NOTE — PLAN OF CARE
Problem: OCCUPATIONAL THERAPY ADULT  Goal: Performs self-care activities at highest level of function for planned discharge setting.  See evaluation for individualized goals.  Description: Treatment Interventions: ADL retraining, Functional transfer training, UE strengthening/ROM, Endurance training, Cognitive reorientation, Patient/family training, Equipment evaluation/education, Neuromuscular reeducation, Compensatory technique education, Continued evaluation, Energy conservation, Activityengagement          See flowsheet documentation for full assessment, interventions and recommendations.   Note: Limitation: Decreased ADL status, Decreased Safe judgement during ADL, Decreased endurance, Decreased high-level ADLs, Decreased self-care trans  Prognosis: Good  Assessment: Patient is a 73 y.o. year old male seen for OT eval s/p admit to [facility] on 4/6/2024 with shock.  OT consulted to assess ADLs/IADLs/functional mobility and assist w/ D/C planning. Patient demonstrates the following deficits impacting occupational performance: weakness, decreased balance, decreased activity tolerance, limited functional reach, impaired problem solving, decreased safety awareness, PLATA, increased body habitus , impaired coordination, and decreased cardiovascular endurance. These impairments, as well at pt’s steps within home environment, limited home support, difficulty performing ADLs, difficulty performing IADLs, difficulty performing transfers/mobility, fall risk , functional decline , new use of AD for functional transfers/mobility, and language barrier, limit pt’s ability to safely engage in all baseline areas of occupation. Pt's CLOF as follows: eating/grooming: Stanislav and supervision , UB ADLs: supervision , LB ADLs: Sarah, toileting: supervision , bed mobility: supervision , functional transfers: supervision , functional mobility: supervision , sitting/standing tolerance: ~5 min. Pt would benefit from continued skilled OT while in  acute setting to address deficits as defined above and to maximize (I) w/ ADLs/functional mobility. Occupational performance areas to address include: grooming, bathing/shower, toilet hygiene, dressing, medication management, health maintenance, functional mobility, community mobility, clothing management, cleaning, meal prep, money management, and household maintenance. Based on the aforementioned evaluation, functional performance deficits, and assessments, pt has been identified as a moderate complexity evaluation. At this time, recommendation for pt to receive post-acute rehabilitation services at a Level III (minimum resource intensity) due to above deficits and CLOF. OT will continue to follow pt 1-3x/wk to address the goals listed below to  w/in 10-14 days.  Recommendation: Geriatric Consult  Rehab Resource Intensity Level, OT: III (Minimum Resource Intensity) (would benefit from increased support from family.)

## 2024-04-08 NOTE — CASE MANAGEMENT
Case Management Assessment & Discharge Planning Note    Patient name Silvio Drew  Location [facility] 4 /E4 -* MRN [id number]  : 1950 Date 2024       Current Admission Date: 2024  Current Admission Diagnosis:Shock (HCC)   Patient Active Problem List    Diagnosis Date Noted    Shock (HCC) 2024    Acute renal failure superimposed on stage 3 chronic kidney disease (HCC) 2024    Abnormal CT of the abdomen 2024    Thyroid nodule 2024    Coagulopathy (HCC) 2024    Stage 3b chronic kidney disease (HCC) 2024    Hyperlipidemia associated with type 2 diabetes mellitus  (HCC) 2021    Atherosclerosis of native artery of both lower extremities with intermittent claudication (HCC) 2019    Anemia 2013    Hyperlipidemia 2012    Hypertension 2012    Atrial fibrillation (HCC) 2012      LOS (days): 2  Geometric Mean LOS (GMLOS) (days):   Days to GMLOS:     OBJECTIVE:    Risk of Unplanned Readmission Score: 23.95         Current admission status: Inpatient       Preferred Pharmacy:   [id number] [address]  [address] [facility] [address]  Phone: [phone] Fax: [phone]    Primary Care Provider: Mac Stephens MD    Primary Insurance: [employer]  Secondary Insurance: [place] [employer] AND [employer] [facility]    ASSESSMENT:  Active Health Care Proxies    There are no active Health Care Proxies on file.       Readmission Root Cause  30 Day Readmission: No    Patient Information  Admitted from:: Home  Mental Status: Alert  During Assessment patient was accompanied by: Brother (Pt speaks limited English)  Assessment information provided by:: Brother  Primary Caregiver: Self  Support Systems: Family members (Siblings)  County of Residence: [place]  What city do you live in?: [place]  Type of Current Residence: 2 Tell home  Living Arrangements: Lives Alone    Activities of Daily  Living Prior to Admission  Functional Status: Independent  Completes ADLs independently?: Yes  Ambulates independently?: Yes    Patient Information Continued  Income Source: Pension/longterm  Does patient have prescription coverage?: Yes  Does patient receive dialysis treatments?: No  Does patient have a history of substance abuse?: No  Does patient have a history of Mental Health Diagnosis?: No    Means of Transportation  Means of Transport to [facility]:: Drives Self      Social Determinants of Health (SDOH)      Flowsheet Row Most Recent Value   Housing Stability    In the last 12 months, was there a time when you were not able to pay the mortgage or rent on time? Y  [Family assists]   In the last 12 months, how many places have you lived? 1   In the last 12 months, was there a time when you did not have a steady place to sleep or slept in a shelter (including now)? N   Transportation Needs    In the past 12 months, has lack of transportation kept you from medical appointments or from getting medications? no   In the past 12 months, has lack of transportation kept you from meetings, work, or from getting things needed for daily living? No   Food Insecurity    Within the past 12 months, you worried that your food would run out before you got the money to buy more. Never true   Within the past 12 months, the food you bought just didn't last and you didn't have money to get more. Never true   Utilities    In the past 12 months has the electric, gas, oil, or water company threatened to shut off services in your home? Yes  [Family assists]            DISCHARGE DETAILS:    Discharge planning discussed with:: Ang Drew (Brother)  [phone]  Freedom of Choice: Yes  Comments - Freedom of Choice: Pt will prefer discharging home. Family is requesting STR or Home Therapy  CM contacted family/caregiver?: Yes  Were Treatment Team discharge recommendations reviewed with patient/caregiver?: Yes  Did patient/caregiver verbalize  "understanding of patient care needs?: Yes  Were patient/caregiver advised of the risks associated with not following Treatment Team discharge recommendations?: Yes    Contacts  Patient Contacts: Ang Drew (Brother) [phone]  Relationship to Patient:: Family  Contact Method: Phone  Phone Number: Ang Drew (Brother) [phone]  Reason/Outcome: Continuity of Care, Emergency Contact, Referral, Discharge Planning    Additional Comments: CM met w/ Pt to confirm Pt's mentation. Pt is A&O but not fluent in English. Pt presented as \"wifty\", over and above the language barrier. CM spoke w/ Pts brother who assisted with completing this asssessment. As per brother Santae- Pt has sisters and brothers (including himself) that assist Pt as able and make sure Pt has food and bills are paid, and food is in the home. Pt is , his wife and (now adult) children left Pt about 12 years ago. Since then, as per Ang, Pt has slowly declined. Brother is asking for caregivers to come into the home and is in agreement with VNA therapy and nurse visits as well. CM reviewed Pt's insurance which shows an active CHC plan that would cover waiver HHAs. CM to call [facility] to ask for Pt's . This would provide CM the ability to institute the CHC services that appears Pt has access to. It appears Pt was approved for the services, but perhaps never initiated the service-or possible declined the services. Pts brother was not able to articulate why the waiver program is not active. CM on standby for PT/OT recommendations.  Ang is hoping Pt can fist discharge to [facility] before discharging home. Ang identified [facility] as preferred. CM remains available through discharge-    "

## 2024-04-08 NOTE — UTILIZATION REVIEW
Initial Clinical Review    Admission: Date/Time/Statement:   Admission Orders (From admission, onward)       Ordered        04/06/24 1944  INPATIENT ADMISSION  Once                          Orders Placed This Encounter   Procedures    INPATIENT ADMISSION     Standing Status:   Standing     Number of Occurrences:   1     Order Specific Question:   Level of Care     Answer:   Critical Care [15]     Order Specific Question:   Estimated length of stay     Answer:   More than 2 Midnights     Order Specific Question:   Certification     Answer:   I certify that inpatient services are medically necessary for this patient for a duration of greater than two midnights. See H&P and MD Progress Notes for additional information about the patient's course of treatment.     ED Arrival Information       Expected   -    Arrival   4/6/2024 17:56    Acuity   Emergent              Means of arrival   Ambulance    Escorted by   [facility]    Service   Hospitalist    Admission type   Emergency              Arrival complaint   medical prob             Chief Complaint   Patient presents with    Weakness - Generalized     Arrives via ems from home, per ems pt has increased weakness, difficulty ambuting. Ems noted BP to be in the 70s       Initial Presentation: 73 y.o. male presents to the ED via EMS from home with c/o generalized weakness, subjective fever/chills, fall at home without head strike, epistaxis today.  PMH: HTN, HLD, A Fib on Warfarin, CKD, AICD.    In the ED he was hypotensive.  Labs - supratherapeutic INR 6.04, leukocytosis, elevated BUN/CR, elevated procal, abnormal UA.  Imaging - cardiomegaly, lesion inferior R liver lobe, multiple renal cysts, small bladder diverticulum.  Thyroid nodules.  Treated with IV antibiotics, IV fluids, Norepi drip.  On exam small ecchymotic area R flank, tachypnea, ill-appearing, decreased pulses, normal breath sounds.  Admitted to INPATIENT to critical care status with Shock,  coagulopathy, ARF on CKD, anemia, A fib, abnormal CT abd, HTN, thyroid nodule - Norepi drip, IV fluids, Echo, blood cultures, UA, IV antibiotics, hold AC, H/H q 6 hr, transfuse hgb < 7.0, hold Metoprolol, tele, serial abd exam, OP thyroid US.      Date: 4/7   Day 2:   Likely hypovolemic vs septic shock, poss UTI, improving and off pressors, change IV antibiotics to IV Ceftriaxone, follow cultures, was downgraded to MS today.  Remains on room air, some improvement in BP today, afebrile.      Date: 4/8 Day 3: Has surpassed a 2nd midnight with active treatments and services.  Septic shock with gram + bacteremia, redrawn blood cultures, Echo negative for endocarditis, VS stable.  No oxygen, continue IV antibiotics, continue holding Coumadin. INR 3.52 today.   On exam still with weakness, fatigue, c/o dental pain, clear breath sounds.  Hgb down to 7.4 today.  BUN/CR trending downward.     ED Triage Vitals   Temperature Pulse Respirations Blood Pressure SpO2   04/06/24 1807 04/06/24 1803 04/06/24 1803 04/06/24 1803 04/06/24 1803   97.8 °F (36.6 °C) 74 18 (!) 77/47 93 %      Temp Source Heart Rate Source Patient Position - Orthostatic VS BP Location FiO2 (%)   04/06/24 1807 04/06/24 1803 04/06/24 1803 04/06/24 1803 --   Oral Monitor Lying Right arm       Pain Score       04/06/24 2100       No Pain          Wt Readings from Last 1 Encounters:   04/08/24 102 kg (224 lb 10.4 oz)     Additional Vital Signs:   Date/Time Temp Pulse Resp BP MAP (mmHg) SpO2 O2 Device Patient Position - Orthostatic VS   04/08/24 1605 98 °F (36.7 °C) 84 18 125/73 88 99 % None (Room air) Sitting   04/08/24 0800 98.5 °F (36.9 °C) 83 18 127/72 -- 94 % None (Room air) Lying   04/07/24 2352 99.3 °F (37.4 °C) 79 18 114/64 77 96 % None (Room air) Lying   04/07/24 2118 -- -- -- -- -- -- None (Room air) --   04/07/24 1755 97.9 °F (36.6 °C) 78 19 120/62 75 94 % None (Room air) Sitting   04/07/24 1700 -- 78 19 103/57 79 97 % -- --   04/07/24 1600 -- 76 27  Abnormal  111/59 72 99 % -- --   04/07/24 1500 -- 78 19 110/61 83 96 % -- --   04/07/24 1444 98.6 °F (37 °C) -- -- -- -- -- -- --   04/07/24 1302 -- 80 -- 94/49 Abnormal  -- -- -- --   04/07/24 1200 -- 78 23 Abnormal  94/49 Abnormal  62 Abnormal  96 % -- --   04/07/24 1102 98.6 °F (37 °C) 84 23 Abnormal  124/53 60 Abnormal  97 % -- --   04/07/24 1000 -- 78 25 Abnormal  105/56 71 96 % -- --   04/07/24 0900 -- 76 22 111/53 82 98 % -- --   04/07/24 0800 98.1 °F (36.7 °C) 76 21 107/52 64 Abnormal  99 % -- --   04/07/24 0730 -- 76 17 107/54 68 97 % None (Room air) Lying   04/07/24 0700 -- 76 22 102/51 70 97 % None (Room air) --   04/07/24 0600 -- 74 21 107/53 82 98 % None (Room air) --   04/07/24 0542 98.6 °F (37 °C) 74 15 112/60 80 98 % None (Room air) --   04/07/24 0500 -- 74 22 105/51 75 98 % -- --   04/07/24 0400 -- 74 22 106/60 78 97 % None (Room air) Lying   04/07/24 0300 -- 74 22 115/60 93 97 % None (Room air) --   04/07/24 0200 -- 72 20 115/65 88 97 % None (Room air) Lying   04/07/24 0100 -- 72 22 100/57 70 96 % None (Room air) Lying   04/07/24 0034 -- 70 19 118/65 84 97 % -- --   04/07/24 0019 -- 72 20 111/62 81 97 % -- --   04/07/24 0000 -- 70 22 122/58 81 98 % None (Room air) Lying   04/06/24 2300 -- 70 20 119/63 81 96 % None (Room air) Lying   04/06/24 2200 -- 68 17 112/56 83 98 % None (Room air) Lying   04/06/24 2134 -- 68 18 108/61 89 98 % None (Room air) Lying   04/06/24 2119 -- 68 20 96/50 76 98 % None (Room air) Lying   04/06/24 2100 97.6 °F (36.4 °C) 68 20 109/62 85 98 % None (Room air) Lying   04/06/24 2025 -- 69 18 92/50 -- 95 % None (Room air) Lying   04/06/24 2013 -- 68 16 112/55 -- 95 % None (Room air) Lying   04/06/24 2000 -- 68 20 100/51 72 94 % None (Room air) Lying   04/06/24 1952 -- 67 16 112/52 -- 99 % None (Room air) Lying   04/06/24 1945 -- 68 19 95/47 Abnormal  68 96 % None (Room air) Lying   04/06/24 1940 -- 68 16 95/48 Abnormal  -- 97 % None (Room air) Lying   04/06/24 1930 -- 67 16  103/51 73 97 % None (Room air) Lying   04/06/24 1929 -- 67 18 93/49 Abnormal  -- 92 % None (Room air) Lying   04/06/24 1912 -- 69 -- 86/50 Abnormal  -- -- -- --   04/06/24 1850 -- 68 16 84/49 Abnormal  -- 97 % None (Room air) --   04/06/24 1843 -- 69 16 83/46 Abnormal  -- 98 % None (Room air) Lying   04/06/24 1820 -- 71 16 85/47 Abnormal  -- 96 % None (Room air) --   04/06/24 1811 -- -- -- -- -- -- None (Room air) --   04/06/24 1810 -- -- -- 85/51 Abnormal  -- -- -- --       Pertinent Labs/Diagnostic Test Results:     4/6 ECG - Suspected atrial fibilaltion with competing junctional rhythm and intermittent PVCs  Abnormal ECG    4/7 ECG - Sinus or ectopic  with marked first degree AV block. Cannot exclude accelarated junctional rhythm  Left bundle branch block  Abnormal ECG    4/7 Echo - Markedly dilated left ventricle cavity, moderately reduced left ventricular systolic function.  There is global hypokinesis with regional wall motion variability.  Indeterminate grade of diastolic dysfunction.  Left ventricular ejection fraction is estimated around 36%.  Normal right ventricle size and systolic function.  Severe left atrial cavity enlargement mild right atrial cavity enlargement.  Aortic valve leaflet sclerosis with focal calcification.  Mild aortic valve regurgitation.  Patient is status post mitral valve annuloplasty.  There is mild approaching moderate residual mitral valve regurgitation.  Trace tricuspid valve regurgitation.  No obvious pulmonary hypertension.  No pericardial effusion.  Borderline dilated aortic root.     Compared to report of previous echocardiogram from [facility] there is overall no significant change.     CT head without contrast   Final Result by Roland Martinez MD (04/06 1916)      No acute intracranial abnormality.                  Workstation performed: [id number]         CT chest abdomen pelvis w contrast   Final Result by Roland Martinez MD (04/06 1934)      No  acute posttraumatic abnormality detected.      Cardiomegaly.      Indeterminate hypodense lesion in the inferior right lobe of the liver measuring 1.5 cm. This is not a simple cyst. MRI should be considered for further characterization.      Multiple renal cysts including a probable complex cyst in the left kidney as described.      Small bladder diverticulum.      Incidental thyroid nodule(s) for which nonemergent thyroid ultrasound is recommended.         Workstation performed: [id number]           Results from last 7 days   Lab Units 04/06/24  1834   SARS-COV-2  Negative     Results from last 7 days   Lab Units 04/08/24  0535 04/07/24  0521 04/07/24  0001 04/06/24  1820   WBC Thousand/uL 7.15 10.85*  --  18.65*   HEMOGLOBIN g/dL 7.4* 8.1* 8.4* 8.4*   HEMATOCRIT % 24.2* 26.7* 28.1* 27.7*   PLATELETS Thousands/uL 132* 129*  --  147*   NEUTROS ABS Thousands/µL 5.47 9.37*  --  16.80*         Results from last 7 days   Lab Units 04/08/24  0535 04/07/24  0521 04/06/24  1820   SODIUM mmol/L 139 142 139   POTASSIUM mmol/L 3.8 4.6 4.4   CHLORIDE mmol/L 113* 115* 113*   CO2 mmol/L 18* 20* 19*   ANION GAP mmol/L 8 7 7   BUN mg/dL 35* 42* 45*   CREATININE mg/dL 2.36* 2.98* 3.39*   EGFR ml/min/1.73sq m 26 19 17   CALCIUM mg/dL 8.1* 7.9* 8.0*   MAGNESIUM mg/dL  --  2.2  --    PHOSPHORUS mg/dL  --  3.0  --      Results from last 7 days   Lab Units 04/06/24  1820   AST U/L 58*   ALT U/L 51   ALK PHOS U/L 76   TOTAL PROTEIN g/dL 6.1*   ALBUMIN g/dL 3.3*   TOTAL BILIRUBIN mg/dL 0.86         Results from last 7 days   Lab Units 04/08/24  0535 04/07/24  0521 04/06/24  1820   GLUCOSE RANDOM mg/dL 90 84 102     Results from last 7 days   Lab Units 04/08/24  0535 04/07/24  0521 04/06/24  1820   PROTIME seconds 35.3* 55.2* 53.5*   INR  3.52* 6.31* 6.04*   PTT seconds  --   --  96*         Results from last 7 days   Lab Units 04/07/24  1442 04/06/24  1820   PROCALCITONIN ng/ml 19.31* 16.26*     Results from last 7 days   Lab Units  04/06/24  1820   LACTIC ACID mmol/L 1.1     Results from last 7 days   Lab Units 04/06/24  1820   LIPASE u/L 25                 Results from last 7 days   Lab Units 04/06/24  2349   CLARITY UA  Clear   COLOR UA  Yellow   SPEC GRAV UA  1.045*   PH UA  5.5   GLUCOSE UA mg/dl Negative   KETONES UA mg/dl Negative   BLOOD UA  Small*   PROTEIN UA mg/dl 30 (1+)*   NITRITE UA  Negative   BILIRUBIN UA  Negative   UROBILINOGEN UA (BE) mg/dl <2.0   LEUKOCYTES UA  Moderate*   WBC UA /hpf Innumerable*   RBC UA /hpf 2-4*   BACTERIA UA /hpf Occasional   EPITHELIAL CELLS WET PREP /hpf Occasional   MUCUS THREADS  Occasional*     Results from last 7 days   Lab Units 04/06/24  1834   INFLUENZA A PCR  Negative   INFLUENZA B PCR  Negative   RSV PCR  Negative     Results from last 7 days   Lab Units 04/06/24  2349 04/06/24  1841 04/06/24  1838   BLOOD CULTURE   --   --  No Growth at 24 hrs.   GRAM STAIN RESULT   --  Gram positive cocci in pairs and chains*  Gram positive rods*  --    URINE CULTURE  No Growth <1000 cfu/mL  --   --              Results from last 7 days   Lab Units 04/08/24  0535 04/07/24  0538   VANCOMYCIN RM ug/mL 16.2 18.1       ED Treatment:   Medication Administration from 04/06/2024 1756 to 04/06/2024 2044         Date/Time Order Dose Route Action     04/06/2024 1845 EDT cefepime (MAXIPIME) IVPB (premix in dextrose) 2,000 mg 50 mL 2,000 mg Intravenous New Bag     04/06/2024 1841 EDT sodium chloride 0.9 % bolus 2,000 mL 2,000 mL Intravenous New Bag     04/06/2024 1842 EDT sodium chloride 0.9 % bolus 1,000 mL 1,000 mL Intravenous New Bag     04/06/2024 1843 EDT iohexol (OMNIPAQUE) 350 MG/ML injection (MULTI-DOSE) 100 mL 100 mL Intravenous Given     04/06/2024 1912 EDT NOREPINEPHRINE 4 MG  ML NSS (CMPD ORDER) infusion 5 mcg/min Intravenous New Bag     04/06/2024 1919 EDT vancomycin (VANCOCIN) 2,000 mg in sodium chloride 0.9 % 500 mL IVPB 2,000 mg Intravenous New Bag          Past Medical History:   Diagnosis Date     Anemia 3/12/2013    Atrial fibrillation (HCC)     Coagulopathy (HCC) 4/6/2024    Coronary artery disease     GERD (gastroesophageal reflux disease)     Hyperlipidemia associated with type 2 diabetes mellitus  (HCC) 5/4/2021    Hypertension     Obesity     Thyroid nodule 4/6/2024     Present on Admission:   Hyperlipidemia   Primary hypertension   Atrial fibrillation (HCC)   Bicytopenia      Admitting Diagnosis: Cardiomegaly [I51.7]  Low back pain [M54.50]  Thyroid nodule [E04.1]  Renal cyst [N28.1]  Weakness [R53.1]  Anemia [D64.9]  Liver lesion [K76.9]  VARGHESE (acute kidney injury) (HCC) [N17.9]  Supratherapeutic INR [R79.1]  Septic shock (HCC) [A41.9, R65.21]  Age/Sex: 73 y.o. male  Admission Orders:  Scheduled Medications:  amiodarone, 200 mg, Oral, Daily  atorvastatin, 20 mg, Oral, Daily With Dinner  [START ON 4/9/2024] cefTRIAXone, 2,000 mg, Intravenous, Q24H  chlorhexidine, 15 mL, Mouth/Throat, Q12H ROB  gabapentin, 200 mg, Oral, TID  lidocaine, 2 patch, Topical, Daily  pantoprazole, 20 mg, Oral, Daily Before Breakfast  polyethylene glycol, 17 g, Oral, Daily  vancomycin, 750 mg, Intravenous, Once      Continuous IV Infusions:    IV fluids @ 75 cc/hr - d/c 4/7  Norepi drip - d/c 4/7     PRN Meds:  acetaminophen, 650 mg, Oral, Q6H PRN - x 1 4/8  [START ON 4/9/2024] vancomycin, 10 mg/kg (Adjusted), Intravenous, Daily PRN    Critical Care  Blood cultures  IV antibioticsdaily wt  Fluid restriction  IP CONSULT TO CASE MANAGEMENT  IP CONSULT TO PHARMACY    Network Utilization Review Department  ATTENTION: Please call with any questions or concerns to [phone] and carefully listen to the prompts so that you are directed to the right person. All voicemails are confidential.   For Discharge needs, contact Care Management DC Support Team at [phone] opt. [phone]  Send all requests for admission clinical reviews, approved or denied determinations and any other requests to dedicated fax number below belonging to the  [facility] where the patient is receiving treatment. List of dedicated fax numbers for the Facilities:  FACILITY NAME UR FAX NUMBER   ADMISSION DENIALS (Administrative/Medical Necessity) [phone]   DISCHARGE SUPPORT TEAM (NETWORK) [phone]   PARENT CHILD HEALTH (Maternity/NICU/Pediatrics) [phone]   [facility] [phone]   [facility] [phone]   [facility] [phone]   [facility] [phone]   [facility] [phone]   General acute hospital [phone]   [facility] [phone]   [facility] [phone]   [facility] [phone]   [facility] [phone]   [facility] [phone]   [facility] [phone]

## 2024-04-08 NOTE — ASSESSMENT & PLAN NOTE
Urinalysis with moderate pyuria -> subsequent urine culture without growth thus far  Continue IV Rocephin, as mentioned above

## 2024-04-08 NOTE — PROGRESS NOTES
Silvio Drew is a 73 y.o. male who is currently ordered Vancomycin IV with management by the Pharmacy Consult service.  Relevant clinical data and objective / subjective history reviewed.  Vancomycin Assessment:  Indication and Goal AUC/Trough: Bacteremia (goal -600, trough >10)  Clinical Status: no note yet today, but we are restarting vancomycin; level is still therapeutic from yesterday's dose.  Micro:     Renal Function:  SCr: 2.36 mg/dL (improving)  Renal replacement: Not on dialysis  Days of Therapy: 3  Current Dose: vanco level this am was 16.2 ng/mL    Vancomycin Plan:  New Dosing: Pulse dosing 10mg/kg prn vanco level < 15; can re-dose with 750 mg IV now, as level is predicted to be < 15 ng/ml at this time  Next Level: random level tomorrow with early am labs  Renal Function Monitoring: Daily BMP and UOP  Pharmacy will continue to follow closely for s/sx of nephrotoxicity, infusion reactions and appropriateness of therapy.  BMP and CBC will be ordered per protocol. We will continue to follow the patient’s culture results and clinical progress daily.    Anika Ingram, Pharmacist

## 2024-04-08 NOTE — ASSESSMENT & PLAN NOTE
EF of 36% w/ mild-moderate TR (h/o mitral valve annuloplasty noted)  Holding Lasix/Cozaar in the setting of acute kidney injury - holding Toprol-XL in setting of recovering hypotension/shock  Monitor/replete electrolyte deficiencies if present  Resume cardiac agents over the upcoming days if BP allows

## 2024-04-08 NOTE — PROGRESS NOTES
[facility]  Progress Note  Name: Sivlio Drew I  MRN: [id number]  Unit/Bed#: E4 -01 I Date of Admission: 4/6/2024   Date of Service: 4/8/2024 I Hospital Day: 2      Assessment & Plan:    * Septic shock (HCC)  Assessment & Plan  Hemodynamically stabilizing, now off vasopressor support -> transferred out of ICU yesterday  Continue to hold home antihypertensives monitor for continued BP stabilization   See plan for bacteremia and possible/suspected UTI blood  Goal MAP > 65    Gram-positive bacteremia  Assessment & Plan  1 of 2 bottles of blood cultures collected on 4/6 growing gram-positive cocci in pairs/chains and gram-positive rods  Continue IV Rocephin (increased to bacteremic dose) and initiate IV Vancomycin  Repeat blood cultures drawn today  Echocardiogram without obvious evidence of endocarditis  Septic shock stabilized, now off vasopressor support   If clinically declines, should pursue Infectious disease consultation    Suspected UTI  Assessment & Plan  Urinalysis with moderate pyuria -> subsequent urine culture without growth thus far  Continue IV Rocephin, as mentioned above    Acute kidney injury superimposed on stage 3 chronic kidney disease (HCC)  Assessment & Plan  Baseline creatinine of approximately 1.3-1.6 -> remains elevated but improved at 2.36 currently from a prior 3.39 peak  Possibly secondary to ATN from shock/hypotension -> holding Lasix/Cozaar/Toprol-XL  Monitor renal function and urine output  Limit/avoid nephrotoxins and ongoing hypotension as possible    Atrial fibrillation (HCC)  Assessment & Plan  Holding Toprol-XL due to recovering hypotension/shock  Holding anticoagulation (Coumadin) in the setting of supratherapeutic INR    Hyperlipidemia  Assessment & Plan  Continue statin    Primary hypertension  Assessment & Plan  Holding all antihypertensives in the setting of recovering shock    Chronic systolic CHF (HCC)  Assessment & Plan  EF of 36% w/  mild-moderate TR (h/o mitral valve annuloplasty noted)  Holding Lasix/Cozaar in the setting of acute kidney injury - holding Toprol-XL in setting of recovering hypotension/shock  Monitor/replete electrolyte deficiencies if present  Resume cardiac agents over the upcoming days if BP allows    Supratherapeutic INR  Assessment & Plan  INR still remains elevated at 3.52, from a prior peak of 6.31  Coumadin remains held  Monitor for bleeding    Abnormal CT of the abdomen  Assessment & Plan  Incidental findings on CT imaging including nonspecific liver lesion with recommendation of outpatient MRI, and thyroid nodule with recommendation of outpatient thyroid ultrasound    Bicytopenia  Assessment & Plan  Anemia/thrombocytopenia noted  Monitor CBC and transfuse if necessary      DVT Prophylaxis:  Elevated INR - holding Coumadin    AM-PAC Basic Mobility:  Basic Mobility Inpatient Raw Score: 19  JH-HLM Achieved: 7: Walk 25 feet or more  JH-HLM Goal: 6: Walk 10 steps or more    Patient Centered Rounds:  I have performed bedside rounds and discussed plan of care with nursing today.  Discussions with Specialists or Other Care Team Provider:  see above assessments if applicable    Education and Discussions with Family / Patient:  Patient at bedside, along with sister, over the phone today    Time Spent for Care:  35 minutes. More than 50% of total time spent on counseling and coordination of care, on one or more of the following: performing physical exam; counseling and coordination of care, obtaining or reviewing history, documenting in the medical record, reviewing/ordering tests/medications/procedures, and communicating with other healthcare professionals.    Current Length of Stay: 2 day(s)  Current Patient Status: Inpatient   Certification Statement:  Patient will continue to require additional hospital stay due to assessments as noted above.    Code Status: Level 1 - Full Code        Subjective:     Encountered earlier in  the day while sitting upright in a chair.  Other than some left-sided dental pain, denies new complaints at this time.  Weakness/fatigue persists.        Objective:     Vitals:   Temp (24hrs), Av.4 °F (36.9 °C), Min:97.9 °F (36.6 °C), Max:99.3 °F (37.4 °C)    Temp:  [97.9 °F (36.6 °C)-99.3 °F (37.4 °C)] 98 °F (36.7 °C)  HR:  [78-84] 84  Resp:  [18-19] 18  BP: (114-127)/(62-73) 125/73  SpO2:  [94 %-99 %] 99 %  Body mass index is 34.16 kg/m².     Input and Output Summary (last 24 hours):       Intake/Output Summary (Last 24 hours) at 2024 1707  Last data filed at 2024 0800  Gross per 24 hour   Intake 120 ml   Output --   Net 120 ml       Physical Exam:     GENERAL Mildly weak/fatigued   HEAD   Normocephalic - atraumatic   EYES Nonicteric   MOUTH   Mucosa moist - mild left lower mandibular swelling/tenderness   NECK   Supple - full range of motion   CARDIAC Rate controlled   PULMONARY    Clear breath sounds bilaterally - nonlabored respirations   ABDOMEN   Soft - nontender/nondistended   MUSCULOSKELETAL   Motor strength/range of motion mildly deconditioned   NEUROLOGIC   Alert/oriented at baseline   SKIN   Chronic wrinkles/blemishes    PSYCHIATRIC   Mood/affect stable         Additional Data:     Labs & Recent Cultures:    Results from last 7 days   Lab Units 24  0535   WBC Thousand/uL 7.15   HEMOGLOBIN g/dL 7.4*   HEMATOCRIT % 24.2*   PLATELETS Thousands/uL 132*   NEUTROS PCT % 76*   LYMPHS PCT % 12*   MONOS PCT % 9   EOS PCT % 2     Results from last 7 days   Lab Units 24  0535 24  0521 24  1820   POTASSIUM mmol/L 3.8   < > 4.4   CHLORIDE mmol/L 113*   < > 113*   CO2 mmol/L 18*   < > 19*   BUN mg/dL 35*   < > 45*   CREATININE mg/dL 2.36*   < > 3.39*   CALCIUM mg/dL 8.1*   < > 8.0*   ALK PHOS U/L  --   --  76   ALT U/L  --   --  51   AST U/L  --   --  58*    < > = values in this interval not displayed.     Results from last 7 days   Lab Units 24  0535   INR  3.52*              Results from last 7 days   Lab Units 04/07/24  1442 04/06/24  1820   LACTIC ACID mmol/L  --  1.1   PROCALCITONIN ng/ml 19.31* 16.26*         Results from last 7 days   Lab Units 04/06/24  2349 04/06/24  1841 04/06/24  1838   BLOOD CULTURE   --   --  No Growth at 24 hrs.   GRAM STAIN RESULT   --  Gram positive cocci in pairs and chains*  Gram positive rods*  --    URINE CULTURE  No Growth <1000 cfu/mL  --   --          Lines/Drains:  Invasive Devices       Peripheral Intravenous Line  Duration             Peripheral IV 04/06/24 Right Antecubital 1 day    Peripheral IV 04/07/24 Distal;Left;Ventral (anterior) Forearm 1 day    Peripheral IV 04/07/24 Distal;Right;Upper;Ventral (anterior) Arm 1 day                      Last 24 Hours Medication List:   Current Facility-Administered Medications   Medication Dose Route Frequency Provider Last Rate    acetaminophen  650 mg Oral Q6H PRN REBEL Anderson      amiodarone  200 mg Oral Daily REBEL Anderson      atorvastatin  20 mg Oral Daily With Dinner REBEL Anderson      [START ON 4/9/2024] cefTRIAXone  2,000 mg Intravenous Q24H Radha Morton MD      chlorhexidine  15 mL Mouth/Throat Q12H [facility] REBEL Anderson      gabapentin  200 mg Oral TID REBEL Anderson      lidocaine  2 patch Topical Daily REBEL Anderson      pantoprazole  20 mg Oral Daily Before Breakfast REBEL Anderson      polyethylene glycol  17 g Oral Daily REBEL Anderson      [START ON 4/9/2024] vancomycin  10 mg/kg (Adjusted) Intravenous Daily PRN Radha Morton MD      vancomycin  750 mg Intravenous Once Radha Morton  mg (04/08/24 1635)              RADHA MORTON MD   Hospitalist - [facility]        ** Please Note: This note is constructed using a voice recognition dictation system.  An occasional wrong word/phrase or “sound-a-like” substitution may have been picked up by dictation device due to the inherent limitations of voice  recognition software.  Read the chart carefully and recognize, using reasonable context, where substitutions may have occurred.**

## 2024-04-08 NOTE — ASSESSMENT & PLAN NOTE
Baseline creatinine of approximately 1.3-1.6 -> remains elevated but improved at 2.36 currently from a prior 3.39 peak  Possibly secondary to ATN from shock/hypotension -> holding Lasix/Cozaar/Toprol-XL  Monitor renal function and urine output  Limit/avoid nephrotoxins and ongoing hypotension as possible

## 2024-04-08 NOTE — ASSESSMENT & PLAN NOTE
Hemodynamically stabilizing, now off vasopressor support -> transferred out of ICU yesterday  Continue to hold home antihypertensives monitor for continued BP stabilization   See plan for bacteremia and possible/suspected UTI blood  Goal MAP > 65

## 2024-04-09 ENCOUNTER — APPOINTMENT (INPATIENT)
Dept: CT IMAGING | Facility: HOSPITAL | Age: 74
DRG: 871 | End: 2024-04-09
Payer: COMMERCIAL

## 2024-04-09 PROBLEM — K62.5 BRBPR (BRIGHT RED BLOOD PER RECTUM): Status: ACTIVE | Noted: 2024-04-09

## 2024-04-09 LAB
ANION GAP SERPL CALCULATED.3IONS-SCNC: 6 MMOL/L (ref 4–13)
BASOPHILS # BLD AUTO: 0.02 THOUSANDS/ÂΜL (ref 0–0.1)
BASOPHILS NFR BLD AUTO: 0 % (ref 0–1)
BUN SERPL-MCNC: 29 MG/DL (ref 5–25)
CALCIUM SERPL-MCNC: 8.2 MG/DL (ref 8.4–10.2)
CHLORIDE SERPL-SCNC: 114 MMOL/L (ref 96–108)
CO2 SERPL-SCNC: 20 MMOL/L (ref 21–32)
CREAT SERPL-MCNC: 1.91 MG/DL (ref 0.6–1.3)
EOSINOPHIL # BLD AUTO: 0.09 THOUSAND/ÂΜL (ref 0–0.61)
EOSINOPHIL NFR BLD AUTO: 2 % (ref 0–6)
ERYTHROCYTE [DISTWIDTH] IN BLOOD BY AUTOMATED COUNT: 16.4 % (ref 11.6–15.1)
GFR SERPL CREATININE-BSD FRML MDRD: 34 ML/MIN/1.73SQ M
GLUCOSE SERPL-MCNC: 91 MG/DL (ref 65–140)
HCT VFR BLD AUTO: 24.3 % (ref 36.5–49.3)
HGB BLD-MCNC: 7.3 G/DL (ref 12–17)
IMM GRANULOCYTES # BLD AUTO: 0.04 THOUSAND/UL (ref 0–0.2)
IMM GRANULOCYTES NFR BLD AUTO: 1 % (ref 0–2)
INR PPP: 2.27 (ref 0.84–1.19)
LYMPHOCYTES # BLD AUTO: 1.04 THOUSANDS/ÂΜL (ref 0.6–4.47)
LYMPHOCYTES NFR BLD AUTO: 18 % (ref 14–44)
MAGNESIUM SERPL-MCNC: 2.2 MG/DL (ref 1.9–2.7)
MCH RBC QN AUTO: 25.6 PG (ref 26.8–34.3)
MCHC RBC AUTO-ENTMCNC: 30 G/DL (ref 31.4–37.4)
MCV RBC AUTO: 85 FL (ref 82–98)
MONOCYTES # BLD AUTO: 0.49 THOUSAND/ÂΜL (ref 0.17–1.22)
MONOCYTES NFR BLD AUTO: 9 % (ref 4–12)
NEUTROPHILS # BLD AUTO: 3.97 THOUSANDS/ÂΜL (ref 1.85–7.62)
NEUTS SEG NFR BLD AUTO: 70 % (ref 43–75)
NRBC BLD AUTO-RTO: 0 /100 WBCS
PLATELET # BLD AUTO: 152 THOUSANDS/UL (ref 149–390)
PMV BLD AUTO: 11.4 FL (ref 8.9–12.7)
POTASSIUM SERPL-SCNC: 3.7 MMOL/L (ref 3.5–5.3)
PROCALCITONIN SERPL-MCNC: 6.87 NG/ML
PROTHROMBIN TIME: 25.2 SECONDS (ref 11.6–14.5)
RBC # BLD AUTO: 2.85 MILLION/UL (ref 3.88–5.62)
SODIUM SERPL-SCNC: 140 MMOL/L (ref 135–147)
VANCOMYCIN SERPL-MCNC: 14.6 UG/ML (ref 10–20)
WBC # BLD AUTO: 5.65 THOUSAND/UL (ref 4.31–10.16)

## 2024-04-09 PROCEDURE — 85025 COMPLETE CBC W/AUTO DIFF WBC: CPT | Performed by: INTERNAL MEDICINE

## 2024-04-09 PROCEDURE — 70486 CT MAXILLOFACIAL W/O DYE: CPT

## 2024-04-09 PROCEDURE — 99232 SBSQ HOSP IP/OBS MODERATE 35: CPT | Performed by: INTERNAL MEDICINE

## 2024-04-09 PROCEDURE — 80202 ASSAY OF VANCOMYCIN: CPT | Performed by: INTERNAL MEDICINE

## 2024-04-09 PROCEDURE — 83735 ASSAY OF MAGNESIUM: CPT | Performed by: INTERNAL MEDICINE

## 2024-04-09 PROCEDURE — 99223 1ST HOSP IP/OBS HIGH 75: CPT | Performed by: INTERNAL MEDICINE

## 2024-04-09 PROCEDURE — 85610 PROTHROMBIN TIME: CPT | Performed by: INTERNAL MEDICINE

## 2024-04-09 PROCEDURE — 80048 BASIC METABOLIC PNL TOTAL CA: CPT | Performed by: INTERNAL MEDICINE

## 2024-04-09 PROCEDURE — 84145 PROCALCITONIN (PCT): CPT | Performed by: INTERNAL MEDICINE

## 2024-04-09 RX ORDER — VANCOMYCIN HYDROCHLORIDE 1 G/200ML
12.5 INJECTION, SOLUTION INTRAVENOUS DAILY PRN
Status: DISCONTINUED | OUTPATIENT
Start: 2024-04-10 | End: 2024-04-10 | Stop reason: HOSPADM

## 2024-04-09 RX ORDER — VANCOMYCIN HYDROCHLORIDE 1 G/200ML
1000 INJECTION, SOLUTION INTRAVENOUS ONCE
Status: COMPLETED | OUTPATIENT
Start: 2024-04-09 | End: 2024-04-09

## 2024-04-09 RX ADMIN — ACETAMINOPHEN 325MG 650 MG: 325 TABLET ORAL at 09:18

## 2024-04-09 RX ADMIN — ATORVASTATIN CALCIUM 20 MG: 20 TABLET, FILM COATED ORAL at 16:10

## 2024-04-09 RX ADMIN — LIDOCAINE 5% 2 PATCH: 700 PATCH TOPICAL at 06:51

## 2024-04-09 RX ADMIN — AMIODARONE HYDROCHLORIDE 200 MG: 200 TABLET ORAL at 09:16

## 2024-04-09 RX ADMIN — PANTOPRAZOLE SODIUM 20 MG: 20 TABLET, DELAYED RELEASE ORAL at 06:51

## 2024-04-09 RX ADMIN — GABAPENTIN 200 MG: 100 CAPSULE ORAL at 16:10

## 2024-04-09 RX ADMIN — POLYETHYLENE GLYCOL 3350 17 G: 17 POWDER, FOR SOLUTION ORAL at 09:16

## 2024-04-09 RX ADMIN — GABAPENTIN 200 MG: 100 CAPSULE ORAL at 20:40

## 2024-04-09 RX ADMIN — GABAPENTIN 200 MG: 100 CAPSULE ORAL at 09:16

## 2024-04-09 RX ADMIN — CEFTRIAXONE 2000 MG: 2 INJECTION, SOLUTION INTRAVENOUS at 15:25

## 2024-04-09 RX ADMIN — VANCOMYCIN HYDROCHLORIDE 1000 MG: 1 INJECTION, SOLUTION INTRAVENOUS at 16:10

## 2024-04-09 NOTE — PROGRESS NOTES
Silvio Drew is a 73 y.o. male who is currently ordered Vancomycin IV with management by the Pharmacy Consult service.  Relevant clinical data and objective / subjective history reviewed.  Vancomycin Assessment:  Indication and Goal AUC/Trough: Bacteremia (goal -600, trough >10)  Clinical Status: stable, creatinine improving  Micro:     Renal Function:  SCr: 1.91 mg/dL  VARGHESE, baseline creat 1.3-1.6  Renal replacement: Not on dialysis  Days of Therapy: 4  Current Dose: increased dose to 1,000 mg IV vancomycin today at 4 pm (target level 10-15 ng/mL); random vanco level was 14.6 this am    Vancomycin Plan:  New Dosing: will determine future dose strategy based on renal fx + vanco level; continue pulse dosing strategy for now until creatinine is closer to baseline 1.3-1.6  Next Level: 4/10/24 with early am labs  Renal Function Monitoring: Daily BMP and UOP  Pharmacy will continue to follow closely for s/sx of nephrotoxicity, infusion reactions and appropriateness of therapy.  BMP and CBC will be ordered per protocol. We will continue to follow the patient’s culture results and clinical progress daily.    Anika Ingram, Pharmacist

## 2024-04-09 NOTE — PLAN OF CARE
Problem: Potential for Falls  Goal: Patient will remain free of falls  Description: INTERVENTIONS:  - Educate patient/family on patient safety including physical limitations  - Instruct patient to call for assistance with activity   - Consult OT/PT to assist with strengthening/mobility   - Keep Call bell within reach  - Keep bed low and locked with side rails adjusted as appropriate  - Keep care items and personal belongings within reach  - Initiate and maintain comfort rounds  - Make Fall Risk Sign visible to staff  - Offer Toileting every  Hours, in advance of need  - Initiate/Maintain alarm  - Obtain necessary fall risk management equipment:   - Apply yellow socks and bracelet for high fall risk patients  - Consider moving patient to room near nurses station  4/8/2024 2249 by Khang Bloom RN  Outcome: Progressing  4/8/2024 2249 by Khang Bloom RN  Outcome: Progressing     Problem: Prexisting or High Potential for Compromised Skin Integrity  Goal: Skin integrity is maintained or improved  Description: INTERVENTIONS:  - Identify patients at risk for skin breakdown  - Assess and monitor skin integrity  - Assess and monitor nutrition and hydration status  - Monitor labs   - Assess for incontinence   - Turn and reposition patient  - Assist with mobility/ambulation  - Relieve pressure over bony prominences  - Avoid friction and shearing  - Provide appropriate hygiene as needed including keeping skin clean and dry  - Evaluate need for skin moisturizer/barrier cream  - Collaborate with interdisciplinary team   - Patient/family teaching  - Consider wound care consult   4/8/2024 2249 by Khang Bloom RN  Outcome: Progressing  4/8/2024 2249 by Khang Bloom RN  Outcome: Progressing     Problem: PAIN - ADULT  Goal: Verbalizes/displays adequate comfort level or baseline comfort level  Description: Interventions:  - Encourage patient to monitor pain and request assistance  - Assess pain using appropriate pain  scale  - Administer analgesics based on type and severity of pain and evaluate response  - Implement non-pharmacological measures as appropriate and evaluate response  - Consider cultural and social influences on pain and pain management  - Notify physician/advanced practitioner if interventions unsuccessful or patient reports new pain  4/8/2024 2249 by Khang Bloom RN  Outcome: Progressing  4/8/2024 2249 by Khang Bloom RN  Outcome: Progressing     Problem: INFECTION - ADULT  Goal: Absence or prevention of progression during hospitalization  Description: INTERVENTIONS:  - Assess and monitor for signs and symptoms of infection  - Monitor lab/diagnostic results  - Monitor all insertion sites, i.e. indwelling lines, tubes, and drains  - Monitor endotracheal if appropriate and nasal secretions for changes in amount and color  - [place] appropriate cooling/warming therapies per order  - Administer medications as ordered  - Instruct and encourage patient and family to use good hand hygiene technique  - Identify and instruct in appropriate isolation precautions for identified infection/condition  4/8/2024 2249 by Khang Bloom RN  Outcome: Progressing  4/8/2024 2249 by Khang Bloom RN  Outcome: Progressing  Goal: Absence of fever/infection during neutropenic period  Description: INTERVENTIONS:  - Monitor WBC    4/8/2024 2249 by Khang Bloom RN  Outcome: Progressing  4/8/2024 2249 by Khang Bloom RN  Outcome: Progressing     Problem: SAFETY ADULT  Goal: Patient will remain free of falls  Description: INTERVENTIONS:  - Educate patient/family on patient safety including physical limitations  - Instruct patient to call for assistance with activity   - Consult OT/PT to assist with strengthening/mobility   - Keep Call bell within reach  - Keep bed low and locked with side rails adjusted as appropriate  - Keep care items and personal belongings within reach  - Initiate and maintain comfort rounds  - Make Fall  Risk Sign visible to staff  - Offer Toileting every  Hours, in advance of need  - Initiate/Maintain alarm  - Obtain necessary fall risk management equipment:   - Apply yellow socks and bracelet for high fall risk patients  - Consider moving patient to room near nurses station  4/8/2024 2249 by Khang Bloom RN  Outcome: Progressing  4/8/2024 2249 by Khang Bloom RN  Outcome: Progressing  Goal: Maintain or return to baseline ADL function  Description: INTERVENTIONS:  -  Assess patient's ability to carry out ADLs; assess patient's baseline for ADL function and identify physical deficits which impact ability to perform ADLs (bathing, care of mouth/teeth, toileting, grooming, dressing, etc.)  - Assess/evaluate cause of self-care deficits   - Assess range of motion  - Assess patient's mobility; develop plan if impaired  - Assess patient's need for assistive devices and provide as appropriate  - Encourage maximum independence but intervene and supervise when necessary  - Involve family in performance of ADLs  - Assess for home care needs following discharge   - Consider OT consult to assist with ADL evaluation and planning for discharge  - Provide patient education as appropriate  4/8/2024 2249 by Khang Bloom RN  Outcome: Progressing  4/8/2024 2249 by Khang Bloom RN  Outcome: Progressing  Goal: Maintains/Returns to pre admission functional level  Description: INTERVENTIONS:  - Perform AM-PAC 6 Click Basic Mobility/ Daily Activity assessment daily.  - Set and communicate daily mobility goal to care team and patient/family/caregiver.   - Collaborate with rehabilitation services on mobility goals if consulted  - Perform Range of Motion  times a day.  - Reposition patient every  hours.  - Dangle patient  times a day  - Stand patient  times a day  - Ambulate patient  times a day  - Out of bed to chair  times a day   - Out of bed for meals  times a day  - Out of bed for toileting  - Record patient progress and  toleration of activity level   4/8/2024 2249 by Khang Bloom RN  Outcome: Progressing  4/8/2024 2249 by Khang Bloom RN  Outcome: Progressing     Problem: DISCHARGE PLANNING  Goal: Discharge to home or other facility with appropriate resources  Description: INTERVENTIONS:  - Identify barriers to discharge w/patient and caregiver  - Arrange for needed discharge resources and transportation as appropriate  - Identify discharge learning needs (meds, wound care, etc.)  - Arrange for interpretive services to assist at discharge as needed  - Refer to Case Management Department for coordinating discharge planning if the patient needs post-hospital services based on physician/advanced practitioner order or complex needs related to functional status, cognitive ability, or social support system  4/8/2024 2249 by Khang Bloom RN  Outcome: Progressing  4/8/2024 2249 by Khang Bloom RN  Outcome: Progressing     Problem: Knowledge Deficit  Goal: Patient/family/caregiver demonstrates understanding of disease process, treatment plan, medications, and discharge instructions  Description: Complete learning assessment and assess knowledge base.  Interventions:  - Provide teaching at level of understanding  - Provide teaching via preferred learning methods  4/8/2024 2249 by Khang Bloom RN  Outcome: Progressing  4/8/2024 2249 by Khang Bloom RN  Outcome: Progressing     Problem: CARDIOVASCULAR - ADULT  Goal: Maintains optimal cardiac output and hemodynamic stability  Description: INTERVENTIONS:  - Monitor I/O, vital signs and rhythm  - Monitor for S/S and trends of decreased cardiac output  - Administer and titrate ordered vasoactive medications to optimize hemodynamic stability  - Assess quality of pulses, skin color and temperature  - Assess for signs of decreased coronary artery perfusion  - Instruct patient to report change in severity of symptoms  4/8/2024 2249 by Khang Blomo RN  Outcome:  Progressing  4/8/2024 2249 by Khang Bloom RN  Outcome: Progressing  Goal: Absence of cardiac dysrhythmias or at baseline rhythm  Description: INTERVENTIONS:  - Continuous cardiac monitoring, vital signs, obtain 12 lead EKG if ordered  - Administer antiarrhythmic and heart rate control medications as ordered  - Monitor electrolytes and administer replacement therapy as ordered  4/8/2024 2249 by Khang Bloom RN  Outcome: Progressing  4/8/2024 2249 by Khang Bloom RN  Outcome: Progressing     Problem: METABOLIC, FLUID AND ELECTROLYTES - ADULT  Goal: Electrolytes maintained within normal limits  Description: INTERVENTIONS:  - Monitor labs and assess patient for signs and symptoms of electrolyte imbalances  - Administer electrolyte replacement as ordered  - Monitor response to electrolyte replacements, including repeat lab results as appropriate  - Instruct patient on fluid and nutrition as appropriate  4/8/2024 2249 by Khang Bloom RN  Outcome: Progressing  4/8/2024 2249 by Khang Bloom RN  Outcome: Progressing  Goal: Fluid balance maintained  Description: INTERVENTIONS:  - Monitor labs   - Monitor I/O and WT  - Instruct patient on fluid and nutrition as appropriate  - Assess for signs & symptoms of volume excess or deficit  4/8/2024 2249 by Khang Bloom RN  Outcome: Progressing  4/8/2024 2249 by Khang Bloom RN  Outcome: Progressing  Goal: Glucose maintained within target range  Description: INTERVENTIONS:  - Monitor Blood Glucose as ordered  - Assess for signs and symptoms of hyperglycemia and hypoglycemia  - Administer ordered medications to maintain glucose within target range  - Assess nutritional intake and initiate nutrition service referral as needed  4/8/2024 2249 by Khang Bloom RN  Outcome: Progressing  4/8/2024 2249 by Khang Bloom RN  Outcome: Progressing     Problem: HEMATOLOGIC - ADULT  Goal: Maintains hematologic stability  Description: INTERVENTIONS  - Assess for signs and  symptoms of bleeding or hemorrhage  - Monitor labs  - Administer supportive blood products/factors as ordered and appropriate  4/8/2024 2249 by Khang Bloom, RN  Outcome: Progressing  4/8/2024 2249 by Khang Bloom, RN  Outcome: Progressing

## 2024-04-09 NOTE — PLAN OF CARE
Problem: Potential for Falls  Goal: Patient will remain free of falls  Description: INTERVENTIONS:  - Educate patient/family on patient safety including physical limitations  - Instruct patient to call for assistance with activity   - Consult OT/PT to assist with strengthening/mobility   - Keep Call bell within reach  - Keep bed low and locked with side rails adjusted as appropriate  - Keep care items and personal belongings within reach  - Initiate and maintain comfort rounds  - Make Fall Risk Sign visible to staff  - Offer Toileting every 2 Hours, in advance of need  - Initiate/Maintain bed alarm  - Obtain necessary fall risk management equipment:   - Apply yellow socks and bracelet for high fall risk patients  - Consider moving patient to room near nurses station  Outcome: Progressing     Problem: Prexisting or High Potential for Compromised Skin Integrity  Goal: Skin integrity is maintained or improved  Description: INTERVENTIONS:  - Identify patients at risk for skin breakdown  - Assess and monitor skin integrity  - Assess and monitor nutrition and hydration status  - Monitor labs   - Assess for incontinence   - Turn and reposition patient  - Assist with mobility/ambulation  - Relieve pressure over bony prominences  - Avoid friction and shearing  - Provide appropriate hygiene as needed including keeping skin clean and dry  - Evaluate need for skin moisturizer/barrier cream  - Collaborate with interdisciplinary team   - Patient/family teaching  - Consider wound care consult   Outcome: Progressing     Problem: PAIN - ADULT  Goal: Verbalizes/displays adequate comfort level or baseline comfort level  Description: Interventions:  - Encourage patient to monitor pain and request assistance  - Assess pain using appropriate pain scale  - Administer analgesics based on type and severity of pain and evaluate response  - Implement non-pharmacological measures as appropriate and evaluate response  - Consider cultural and  social influences on pain and pain management  - Notify physician/advanced practitioner if interventions unsuccessful or patient reports new pain  Outcome: Progressing     Problem: INFECTION - ADULT  Goal: Absence or prevention of progression during hospitalization  Description: INTERVENTIONS:  - Assess and monitor for signs and symptoms of infection  - Monitor lab/diagnostic results  - Monitor all insertion sites, i.e. indwelling lines, tubes, and drains  - Monitor endotracheal if appropriate and nasal secretions for changes in amount and color  - [place] appropriate cooling/warming therapies per order  - Administer medications as ordered  - Instruct and encourage patient and family to use good hand hygiene technique  - Identify and instruct in appropriate isolation precautions for identified infection/condition  Outcome: Progressing  Goal: Absence of fever/infection during neutropenic period  Description: INTERVENTIONS:  - Monitor WBC    Outcome: Progressing     Problem: SAFETY ADULT  Goal: Patient will remain free of falls  Description: INTERVENTIONS:  - Educate patient/family on patient safety including physical limitations  - Instruct patient to call for assistance with activity   - Consult OT/PT to assist with strengthening/mobility   - Keep Call bell within reach  - Keep bed low and locked with side rails adjusted as appropriate  - Keep care items and personal belongings within reach  - Initiate and maintain comfort rounds  - Make Fall Risk Sign visible to staff  - Offer Toileting every 2 Hours, in advance of need  - Initiate/Maintain bedalarm  - Obtain necessary fall risk management equipment:   - Apply yellow socks and bracelet for high fall risk patients  - Consider moving patient to room near nurses station  Outcome: Progressing  Goal: Maintain or return to baseline ADL function  Description: INTERVENTIONS:  -  Assess patient's ability to carry out ADLs; assess patient's baseline for ADL function and  identify physical deficits which impact ability to perform ADLs (bathing, care of mouth/teeth, toileting, grooming, dressing, etc.)  - Assess/evaluate cause of self-care deficits   - Assess range of motion  - Assess patient's mobility; develop plan if impaired  - Assess patient's need for assistive devices and provide as appropriate  - Encourage maximum independence but intervene and supervise when necessary  - Involve family in performance of ADLs  - Assess for home care needs following discharge   - Consider OT consult to assist with ADL evaluation and planning for discharge  - Provide patient education as appropriate  Outcome: Progressing  Goal: Maintains/Returns to pre admission functional level  Description: INTERVENTIONS:  - Perform AM-PAC 6 Click Basic Mobility/ Daily Activity assessment daily.  - Set and communicate daily mobility goal to care team and patient/family/caregiver.   - Collaborate with rehabilitation services on mobility goals if consulted  - Perform Range of Motion 3 times a day.  - Reposition patient every 3 hours.  - Dangle patient 3 times a day  - Stand patient 3 times a day  - Ambulate patient 3 times a day  - Out of bed to chair 3 times a day   - Out of bed for meals 3 times a day  - Out of bed for toileting  - Record patient progress and toleration of activity level   Outcome: Progressing     Problem: DISCHARGE PLANNING  Goal: Discharge to home or other facility with appropriate resources  Description: INTERVENTIONS:  - Identify barriers to discharge w/patient and caregiver  - Arrange for needed discharge resources and transportation as appropriate  - Identify discharge learning needs (meds, wound care, etc.)  - Arrange for interpretive services to assist at discharge as needed  - Refer to Case Management Department for coordinating discharge planning if the patient needs post-hospital services based on physician/advanced practitioner order or complex needs related to functional status,  cognitive ability, or social support system  Outcome: Progressing     Problem: Knowledge Deficit  Goal: Patient/family/caregiver demonstrates understanding of disease process, treatment plan, medications, and discharge instructions  Description: Complete learning assessment and assess knowledge base.  Interventions:  - Provide teaching at level of understanding  - Provide teaching via preferred learning methods  Outcome: Progressing     Problem: CARDIOVASCULAR - ADULT  Goal: Maintains optimal cardiac output and hemodynamic stability  Description: INTERVENTIONS:  - Monitor I/O, vital signs and rhythm  - Monitor for S/S and trends of decreased cardiac output  - Administer and titrate ordered vasoactive medications to optimize hemodynamic stability  - Assess quality of pulses, skin color and temperature  - Assess for signs of decreased coronary artery perfusion  - Instruct patient to report change in severity of symptoms  Outcome: Progressing  Goal: Absence of cardiac dysrhythmias or at baseline rhythm  Description: INTERVENTIONS:  - Continuous cardiac monitoring, vital signs, obtain 12 lead EKG if ordered  - Administer antiarrhythmic and heart rate control medications as ordered  - Monitor electrolytes and administer replacement therapy as ordered  Outcome: Progressing     Problem: METABOLIC, FLUID AND ELECTROLYTES - ADULT  Goal: Electrolytes maintained within normal limits  Description: INTERVENTIONS:  - Monitor labs and assess patient for signs and symptoms of electrolyte imbalances  - Administer electrolyte replacement as ordered  - Monitor response to electrolyte replacements, including repeat lab results as appropriate  - Instruct patient on fluid and nutrition as appropriate  Outcome: Progressing  Goal: Fluid balance maintained  Description: INTERVENTIONS:  - Monitor labs   - Monitor I/O and WT  - Instruct patient on fluid and nutrition as appropriate  - Assess for signs & symptoms of volume excess or  deficit  Outcome: Progressing  Goal: Glucose maintained within target range  Description: INTERVENTIONS:  - Monitor Blood Glucose as ordered  - Assess for signs and symptoms of hyperglycemia and hypoglycemia  - Administer ordered medications to maintain glucose within target range  - Assess nutritional intake and initiate nutrition service referral as needed  Outcome: Progressing     Problem: HEMATOLOGIC - ADULT  Goal: Maintains hematologic stability  Description: INTERVENTIONS  - Assess for signs and symptoms of bleeding or hemorrhage  - Monitor labs  - Administer supportive blood products/factors as ordered and appropriate  Outcome: Progressing

## 2024-04-09 NOTE — QUICK NOTE
Pt had a bm without blood this afternoon. Will continue to hold coumadin. If no further bleeding may restart in am.

## 2024-04-09 NOTE — RESTORATIVE TECHNICIAN NOTE
Restorative Technician Note      Patient Name: Silvio Drew     Restorative Tech Visit Date: 04/09/24  Note Type: Mobility  Patient Position Upon Consult: Supine  Activity Performed: Ambulated  Assistive Device: Roller walker  Patient Position at End of Consult: Bedside chair; All needs within reach; Bed/Chair alarm activated

## 2024-04-09 NOTE — CONSULTS
Consultation - Infectious Disease   Silvio Drew 73 y.o. male MRN: [id number]  Unit/Bed#: E4 -01 Encounter: [id number]      IMPRESSION & RECOMMENDATIONS:   Impression/Recommendations:  1.  Septic shock, POA.  Secondary to bacteremia.  Now much improved.  Blood pressures have stabilized.  WBC count has normalized.    -Antibiotic plan as below  -Follow temperatures and hemodynamics.  -Recheck CBC in AM to assess for ongoing clinical response    2.  Polymicrobial bacteremia.  Streptococcus/GPR.  Likely true bacteremia in the context of #1.  Consider dental origin.  Patient does have a cardiac device.  No other clear explanation on CT chest/abdomen/pelvis.  CT does show a nonspecific liver lesion not suggestive of abscess.  No apparent vegetations on 2D echo, technically difficult study.    -Continue vancomycin/ceftriaxone for now  -Vancomycin dosing per pharmacy  -Follow-up final admission blood cultures and tailor antibiotics accordingly  -Follow-up repeat blood cultures to ensure clearance of bacteremia  -Suspect patient will need a ZULY  -Check facial CT, as below    3.  Dental pain.  Patient was supposed to undergo tooth extraction which has been delayed due to his anticoagulation.    -Check facial CT  -Antibiotic plan as above  -May need inpatient OMFS evaluation    4.  BRBPR.  Appears to be hemorrhoidal.  No acute findings on CT abdomen/pelvis.    -Workup as above  -May need GI evaluation if recurrent    5.  Acute kidney injury, on CKD 3.  In the setting of septic shock.  No obstructive  process on CT.    -Follow BMP and dose adjust antibiotics as indicated  -Volume management per primary service    6.  Abnormal CT abdomen.  With nonspecific liver lesion which is not consistent with abscess.  Patient will need nonemergent MRI for further workup.      Antibiotics:  Vancomycin/ceftriaxone    I discussed above plan with patient, his sister over the phone, and with primary service who agrees with continuation of  IV antibiotics and facial CT.  I personally reviewed prior medical records.  Thank you for this consultation.  We will follow along with you.        HISTORY OF PRESENT ILLNESS:  Reason for Consult: Bacteremia    HPI: Silvio Drew is a 73 y.o. male with CKD 3, A-fib, CHF with AICD/pacer who originally presented with fever and suspected septic shock of unclear etiology.  Initial CT chest/abdomen/pelvis with no apparent infectious findings.  Patient received empiric antibiotics.  He was weaned off pressor support.  Now admission blood cultures have isolated what looks like Streptococcus and GPR .  We are consulted for further recommendations.  Patient has since been transferred out of ICU.  He is feeling overall better but fatigued.  States he occasionally gets bright red blood in his stool which he and his sister attributes to known hemorrhoids.  No abdominal pain.  No urinary symptoms.  He has had swelling in the left side of his mouth for which he sees his dentist who could not perform a tooth extraction yet due to his anticoagulation.  No issues with his cardiac device.    REVIEW OF SYSTEMS:    A complete system-based review of systems is otherwise negative.    PAST MEDICAL HISTORY:  Past Medical History:   Diagnosis Date    Anemia 3/12/2013    Atrial fibrillation (HCC)     Coagulopathy (HCC) 4/6/2024    Coronary artery disease     GERD (gastroesophageal reflux disease)     Hyperlipidemia associated with type 2 diabetes mellitus  (HCC) 5/4/2021    Hypertension     Obesity     Thyroid nodule 4/6/2024     Past Surgical History:   Procedure Laterality Date    CARDIAC PACEMAKER PLACEMENT  12/2023    replacement    CORONARY ARTERY BYPASS GRAFT         FAMILY HISTORY:  Non-contributory    SOCIAL HISTORY:  Social History     Substance and Sexual Activity   Alcohol Use No     Social History     Substance and Sexual Activity   Drug Use No     Social History     Tobacco Use   Smoking Status Never   Smokeless Tobacco Never  "  Tobacco Comments    No passive smoke exposure       ALLERGIES:  Allergies   Allergen Reactions    Metoprolol Shortness Of Breath     \"50mg\" via [place] ; states \"When I take the 25mg I'm normal.\"       MEDICATIONS:  All current active medications have been reviewed.    PHYSICAL EXAM:  Vitals:  Temp:  [98 °F (36.7 °C)-99.2 °F (37.3 °C)] 98.2 °F (36.8 °C)  HR:  [] 107  Resp:  [18] 18  BP: (114-140)/(67-85) 119/85  SpO2:  [96 %-99 %] 98 %  Temp (24hrs), Av.7 °F (37.1 °C), Min:98 °F (36.7 °C), Max:99.2 °F (37.3 °C)  Current: Temperature: 98.2 °F (36.8 °C)     Physical Exam:  General:  Well-nourished, well-developed, in no acute distress  Eyes:  Conjunctive clear with no hemorrhages or effusions  Oropharynx:  No ulcers, no lesions, mild left cheek swelling.  No fluctuance, open wounds or visible purulence.  Neck:  Supple, no lymphadenopathy  Lungs:  Clear to auscultation bilaterally, no accessory muscle use  Cardiac:  Regular rate and rhythm, pacer site with no overlying erythema or tenderness  Abdomen:  Soft, non-tender, non-distended  Extremities:  No peripheral cyanosis, clubbing, or edema  Skin:  No rashes, no ulcers  Neurological:  Moves all four extremities spontaneously, sensation grossly intact      LABS, IMAGING, & OTHER STUDIES:  Lab Results:  I have personally reviewed pertinent labs.  Results from last 7 days   Lab Units 24  0527 24  0535 24  0521 24  1820   POTASSIUM mmol/L 3.7 3.8 4.6 4.4   CHLORIDE mmol/L 114* 113* 115* 113*   CO2 mmol/L 20* 18* 20* 19*   BUN mg/dL 29* 35* 42* 45*   CREATININE mg/dL 1.91* 2.36* 2.98* 3.39*   EGFR ml/min/1.73sq m 34 26 19 17   CALCIUM mg/dL 8.2* 8.1* 7.9* 8.0*   AST U/L  --   --   --  58*   ALT U/L  --   --   --  51   ALK PHOS U/L  --   --   --  76     Results from last 7 days   Lab Units 24  0527 24  0535 24  0521   WBC Thousand/uL 5.65 7.15 10.85*   HEMOGLOBIN g/dL 7.3* 7.4* 8.1*   PLATELETS Thousands/uL 152 " 132* 129*     Results from last 7 days   Lab Units 04/08/24  1547 04/06/24  2349 04/06/24  1841 04/06/24  1838   BLOOD CULTURE  Received in Microbiology Lab. Culture in Progress.  Received in Microbiology Lab. Culture in Progress.  --   --   --    GRAM STAIN RESULT   --   --  Gram positive cocci in pairs and chains*  Gram positive rods* Gram positive cocci in pairs and chains*  Gram positive rods*   URINE CULTURE   --  No Growth <1000 cfu/mL  --   --    MRSA CULTURE ONLY   --  No Methicillin Resistant Staphlyococcus aureus (MRSA) isolated  --   --        Imaging Studies:   I have personally reviewed pertinent imaging study reports and images in PACS.    CT chest/abdomen/pelvis with no acute posttraumatic abnormality.  Indeterminate hypodense lesion in inferior right lobe of the liver.    CT head with no acute intracranial pathology.    EKG, Pathology, and Other Studies:   I have personally reviewed pertinent reports.

## 2024-04-09 NOTE — ASSESSMENT & PLAN NOTE
Holding Toprol-XL due to recovering hypotension/shock  Holding anticoagulation (Coumadin) in the setting of supratherapeutic INR  Inr improved to 2.27  Continue to hold coumadin due to possible brbpr   Check inr in am

## 2024-04-09 NOTE — PROGRESS NOTES
Formerly [facility]  Progress Note  Name: Silvio Drew I  MRN: [id number]  Unit/Bed#: E4 -01 I Date of Admission: 4/6/2024   Date of Service: 4/9/2024 I Hospital Day: 3    Assessment/Plan   * Septic shock (HCC)  Assessment & Plan  Hemodynamically stabilizing, now off vasopressor support -> transferred out of ICU yesterday  Continue to hold home antihypertensives monitor for continued BP stabilization   See plan for bacteremia and possible/suspected UTI blood  Goal MAP > 65    BRBPR (bright red blood per rectum)  Assessment & Plan  States he had brbpr this am with bm  It was unwitnessed  He did have a bm this am  Will hold coumadin  Monitor h/h   If he has repeat brbpr that does not seem hemorrhoidal will consult gi and change diet to clear liquid    Supratherapeutic INR  Assessment & Plan  INR elevated at 6.31 on admission  Inr improved to 2.27  Continue to hold coumadin due to question of brbpr     Chronic systolic CHF (HCC)  Assessment & Plan  EF of 36% w/ mild-moderate TR (h/o mitral valve annuloplasty noted)  Holding Lasix/Cozaar in the setting of acute kidney injury - holding Toprol-XL in setting of recovering hypotension/shock  Monitor/replete electrolyte deficiencies if present  Resume cardiac agents over the upcoming days if BP allows    Suspected UTI  Assessment & Plan  Urinalysis with moderate pyuria -> subsequent urine culture without growth thus far  Continue IV Rocephin, as mentioned above    Gram-positive bacteremia  Assessment & Plan  Both sets of  blood cultures collected on 4/6 growing gram-positive cocci in pairs/chains and gram-positive rods  Continue IV Rocephin (increased to bacteremic dose) and IV Vancomycin  Repeat blood cultures pending   Echocardiogram without obvious evidence of endocarditis  Septic shock stabilized, now off vasopressor support   Consult ID  Unclear source   Possible uti      Abnormal CT of the abdomen  Assessment & Plan  Incidental findings on CT  imaging including nonspecific liver lesion with recommendation of outpatient MRI, and thyroid nodule with recommendation of outpatient thyroid ultrasound    Acute kidney injury superimposed on stage 3 chronic kidney disease (HCC)  Assessment & Plan  Baseline creatinine of approximately 1.3-1.6 -> remains elevated but improved to 1.9 currently from a prior 3.39 peak  Possibly secondary to ATN from shock/hypotension -> holding Lasix/Cozaar/Toprol-XL  Monitor renal function and urine output  Limit/avoid nephrotoxins and ongoing hypotension as possible  Check bmp in am     Bicytopenia  Assessment & Plan  Anemia/thrombocytopenia noted  Monitor CBC and transfuse if necessary    Atrial fibrillation (HCC)  Assessment & Plan  Holding Toprol-XL due to recovering hypotension/shock  Holding anticoagulation (Coumadin) in the setting of supratherapeutic INR  Inr improved to 2.27  Continue to hold coumadin due to possible brbpr   Check inr in am    Primary hypertension  Assessment & Plan  Holding all antihypertensives in the setting of recovering shock    Hyperlipidemia  Assessment & Plan  Continue statin               VTE Pharmacologic Prophylaxis: VTE Score: 4 coumadin remains on hold due to possible gi bleed     Mobility:   Basic Mobility Inpatient Raw Score: 19  -HLM Goal: 6: Walk 10 steps or more  JH-HLM Achieved: 7: Walk 25 feet or more    Patient Centered Rounds:  discussed with his nurse       Education and Discussions with Family / Patient: Updated  (son) via phone.    Total Time Spent on Date of Encounter in care of patient: 40 mins. This time was spent on one or more of the following: performing physical exam; counseling and coordination of care; obtaining or reviewing history; documenting in the medical record; reviewing/ordering tests, medications or procedures; communicating with other healthcare professionals and discussing with patient's family/caregivers.    Current Length of Stay: 3  day(s)  Current Patient Status: Inpatient     Discharge Plan: Anticipate discharge in >72 hrs to home.    Code Status: Level 1 - Full Code    Subjective:   Pt seen and examined. Son was on the phone to help with interpretation. He states he had a blood in the toilet this am and didn't have a bm. Later stated he had a bm but there was a lot of blood. This was discussed with his nurse who stated the blood was not confirmed. Pt currently denies abd pain. No further episode of brbpr. No f/c no cp no sob no n/v/d     Objective:     Vitals:   Temp (24hrs), Av.8 °F (37.1 °C), Min:98 °F (36.7 °C), Max:99.2 °F (37.3 °C)    Temp:  [98 °F (36.7 °C)-99.2 °F (37.3 °C)] 99.2 °F (37.3 °C)  HR:  [] 109  Resp:  [18] 18  BP: (114-140)/(67-73) 114/67  SpO2:  [96 %-99 %] 96 %  Body mass index is 33.68 kg/m².     Input and Output Summary (last 24 hours):   No intake or output data in the 24 hours ending 24 1039    Physical Exam:   Physical Exam  Constitutional:       Appearance: Normal appearance.   HENT:      Head: Normocephalic and atraumatic.   Eyes:      Extraocular Movements: Extraocular movements intact.      Pupils: Pupils are equal, round, and reactive to light.   Cardiovascular:      Rate and Rhythm: Normal rate and regular rhythm.      Heart sounds: No murmur heard.     No friction rub. No gallop.   Pulmonary:      Effort: Pulmonary effort is normal. No respiratory distress.      Breath sounds: Normal breath sounds. No wheezing, rhonchi or rales.   Abdominal:      General: Bowel sounds are normal. There is no distension.      Palpations: Abdomen is soft.      Tenderness: There is no abdominal tenderness. There is no guarding or rebound.   Musculoskeletal:      Right lower leg: No edema.      Left lower leg: No edema.   Skin:     Findings: No lesion.   Neurological:      Mental Status: He is alert and oriented to person, place, and time.          Additional Data:     Labs:  Results from last 7 days   Lab Units  04/09/24  0527   WBC Thousand/uL 5.65   HEMOGLOBIN g/dL 7.3*   HEMATOCRIT % 24.3*   PLATELETS Thousands/uL 152   SEGS PCT % 70   LYMPHO PCT % 18   MONO PCT % 9   EOS PCT % 2     Results from last 7 days   Lab Units 04/09/24  0527 04/07/24  0521 04/06/24  1820   SODIUM mmol/L 140   < > 139   POTASSIUM mmol/L 3.7   < > 4.4   CHLORIDE mmol/L 114*   < > 113*   CO2 mmol/L 20*   < > 19*   BUN mg/dL 29*   < > 45*   CREATININE mg/dL 1.91*   < > 3.39*   ANION GAP mmol/L 6   < > 7   CALCIUM mg/dL 8.2*   < > 8.0*   ALBUMIN g/dL  --   --  3.3*   TOTAL BILIRUBIN mg/dL  --   --  0.86   ALK PHOS U/L  --   --  76   ALT U/L  --   --  51   AST U/L  --   --  58*   GLUCOSE RANDOM mg/dL 91   < > 102    < > = values in this interval not displayed.     Results from last 7 days   Lab Units 04/09/24  0527   INR  2.27*             Results from last 7 days   Lab Units 04/09/24  0527 04/07/24  1442 04/06/24  1820   LACTIC ACID mmol/L  --   --  1.1   PROCALCITONIN ng/ml 6.87* 19.31* 16.26*       Lines/Drains:  Invasive Devices       Peripheral Intravenous Line  Duration             Peripheral IV 04/06/24 Right Antecubital 2 days    Peripheral IV 04/07/24 Distal;Left;Ventral (anterior) Forearm 2 days    Peripheral IV 04/07/24 Distal;Right;Upper;Ventral (anterior) Arm 2 days                          Imaging: Reviewed radiology reports from this admission including: abdominal/pelvic CT    Recent Cultures (last 7 days):   Results from last 7 days   Lab Units 04/08/24  1547 04/06/24  2349 04/06/24  1841 04/06/24  1838   BLOOD CULTURE  Received in Microbiology Lab. Culture in Progress.  Received in Microbiology Lab. Culture in Progress.  --   --   --    GRAM STAIN RESULT   --   --  Gram positive cocci in pairs and chains*  Gram positive rods* Gram positive cocci in pairs and chains*  Gram positive rods*   URINE CULTURE   --  No Growth <1000 cfu/mL  --   --        Last 24 Hours Medication List:   Current Facility-Administered Medications    Medication Dose Route Frequency Provider Last Rate    acetaminophen  650 mg Oral Q6H PRN REBEL Anderson      amiodarone  200 mg Oral Daily REBEL Anderson      atorvastatin  20 mg Oral Daily With Dinner REBEL Anderson      cefTRIAXone  2,000 mg Intravenous Q24H Radha Morton MD      gabapentin  200 mg Oral TID REBEL Anderson      lidocaine  2 patch Topical Daily REBEL Anderson      pantoprazole  20 mg Oral Daily Before Breakfast REBEL Anderson      polyethylene glycol  17 g Oral Daily REBEL Anderson      vancomycin  1,000 mg Intravenous Once Casie Judd DO      vancomycin  10 mg/kg (Adjusted) Intravenous Daily PRN Radha Morton MD          Today, Patient Was Seen By: Casie Judd DO

## 2024-04-09 NOTE — ASSESSMENT & PLAN NOTE
Baseline creatinine of approximately 1.3-1.6 -> remains elevated but improved to 1.9 currently from a prior 3.39 peak  Possibly secondary to ATN from shock/hypotension -> holding Lasix/Cozaar/Toprol-XL  Monitor renal function and urine output  Limit/avoid nephrotoxins and ongoing hypotension as possible  Check bmp in am

## 2024-04-09 NOTE — ASSESSMENT & PLAN NOTE
States he had brbpr this am with bm  It was unwitnessed  He did have a bm this am  Will hold coumadin  Monitor h/h   If he has repeat brbpr that does not seem hemorrhoidal will consult gi and change diet to clear liquid

## 2024-04-09 NOTE — ASSESSMENT & PLAN NOTE
INR elevated at 6.31 on admission  Inr improved to 2.27  Continue to hold coumadin due to question of brbpr

## 2024-04-09 NOTE — ASSESSMENT & PLAN NOTE
Spoke with Jonh Kuhn, [phone]. She will investigate case and get back to ED if necessary. Intake case # [id number]. Urinalysis with moderate pyuria -> subsequent urine culture without growth thus far  Continue IV Rocephin, as mentioned above

## 2024-04-09 NOTE — ASSESSMENT & PLAN NOTE
Both sets of  blood cultures collected on 4/6 growing gram-positive cocci in pairs/chains and gram-positive rods  Continue IV Rocephin (increased to bacteremic dose) and IV Vancomycin  Repeat blood cultures pending   Echocardiogram without obvious evidence of endocarditis  Septic shock stabilized, now off vasopressor support   Consult ID  Unclear source   Possible uti

## 2024-04-10 ENCOUNTER — HOSPITAL ENCOUNTER (INPATIENT)
Facility: HOSPITAL | Age: 74
LOS: 5 days | Discharge: HOME HEALTH | DRG: 158 | End: 2024-04-15
Attending: INTERNAL MEDICINE | Admitting: INTERNAL MEDICINE
Payer: COMMERCIAL

## 2024-04-10 VITALS
BODY MASS INDEX: 33.42 KG/M2 | WEIGHT: 220.5 LBS | TEMPERATURE: 97.8 F | RESPIRATION RATE: 18 BRPM | SYSTOLIC BLOOD PRESSURE: 109 MMHG | HEART RATE: 86 BPM | HEIGHT: 68 IN | OXYGEN SATURATION: 97 % | DIASTOLIC BLOOD PRESSURE: 70 MMHG

## 2024-04-10 DIAGNOSIS — B95.5 STREPTOCOCCAL BACTEREMIA: ICD-10-CM

## 2024-04-10 DIAGNOSIS — H53.10 SUBJECTIVE VISUAL DISTURBANCE OF LEFT EYE: ICD-10-CM

## 2024-04-10 DIAGNOSIS — A41.9 SEPTIC SHOCK (HCC): ICD-10-CM

## 2024-04-10 DIAGNOSIS — K04.7 DENTAL INFECTION: Primary | ICD-10-CM

## 2024-04-10 DIAGNOSIS — Z01.810 PREOP CARDIOVASCULAR EXAM: ICD-10-CM

## 2024-04-10 DIAGNOSIS — R78.81 STREPTOCOCCAL BACTEREMIA: ICD-10-CM

## 2024-04-10 DIAGNOSIS — R65.21 SEPTIC SHOCK (HCC): ICD-10-CM

## 2024-04-10 PROBLEM — D64.9 ANEMIA: Status: ACTIVE | Noted: 2024-04-10

## 2024-04-10 PROBLEM — H53.9 VISUAL DISTURBANCE: Status: ACTIVE | Noted: 2024-04-10

## 2024-04-10 LAB
ANION GAP SERPL CALCULATED.3IONS-SCNC: 8 MMOL/L (ref 4–13)
BASOPHILS # BLD AUTO: 0.03 THOUSANDS/ÂΜL (ref 0–0.1)
BASOPHILS NFR BLD AUTO: 1 % (ref 0–1)
BUN SERPL-MCNC: 22 MG/DL (ref 5–25)
CALCIUM SERPL-MCNC: 8.4 MG/DL (ref 8.4–10.2)
CHLORIDE SERPL-SCNC: 111 MMOL/L (ref 96–108)
CO2 SERPL-SCNC: 21 MMOL/L (ref 21–32)
CREAT SERPL-MCNC: 1.56 MG/DL (ref 0.6–1.3)
EOSINOPHIL # BLD AUTO: 0.18 THOUSAND/ÂΜL (ref 0–0.61)
EOSINOPHIL NFR BLD AUTO: 3 % (ref 0–6)
ERYTHROCYTE [DISTWIDTH] IN BLOOD BY AUTOMATED COUNT: 16.5 % (ref 11.6–15.1)
GFR SERPL CREATININE-BSD FRML MDRD: 43 ML/MIN/1.73SQ M
GLUCOSE SERPL-MCNC: 94 MG/DL (ref 65–140)
HCT VFR BLD AUTO: 26.4 % (ref 36.5–49.3)
HGB BLD-MCNC: 8.1 G/DL (ref 12–17)
IMM GRANULOCYTES # BLD AUTO: 0.04 THOUSAND/UL (ref 0–0.2)
IMM GRANULOCYTES NFR BLD AUTO: 1 % (ref 0–2)
INR PPP: 1.72 (ref 0.84–1.19)
LYMPHOCYTES # BLD AUTO: 1.36 THOUSANDS/ÂΜL (ref 0.6–4.47)
LYMPHOCYTES NFR BLD AUTO: 22 % (ref 14–44)
MCH RBC QN AUTO: 25.7 PG (ref 26.8–34.3)
MCHC RBC AUTO-ENTMCNC: 30.7 G/DL (ref 31.4–37.4)
MCV RBC AUTO: 84 FL (ref 82–98)
MONOCYTES # BLD AUTO: 0.49 THOUSAND/ÂΜL (ref 0.17–1.22)
MONOCYTES NFR BLD AUTO: 8 % (ref 4–12)
NEUTROPHILS # BLD AUTO: 4.12 THOUSANDS/ÂΜL (ref 1.85–7.62)
NEUTS SEG NFR BLD AUTO: 65 % (ref 43–75)
NRBC BLD AUTO-RTO: 0 /100 WBCS
PLATELET # BLD AUTO: 191 THOUSANDS/UL (ref 149–390)
PMV BLD AUTO: 11 FL (ref 8.9–12.7)
POTASSIUM SERPL-SCNC: 3.8 MMOL/L (ref 3.5–5.3)
PROTHROMBIN TIME: 20.3 SECONDS (ref 11.6–14.5)
RBC # BLD AUTO: 3.15 MILLION/UL (ref 3.88–5.62)
SODIUM SERPL-SCNC: 140 MMOL/L (ref 135–147)
VANCOMYCIN SERPL-MCNC: 16.3 UG/ML (ref 10–20)
WBC # BLD AUTO: 6.22 THOUSAND/UL (ref 4.31–10.16)

## 2024-04-10 PROCEDURE — 99233 SBSQ HOSP IP/OBS HIGH 50: CPT | Performed by: INTERNAL MEDICINE

## 2024-04-10 PROCEDURE — 97116 GAIT TRAINING THERAPY: CPT | Performed by: PHYSICAL THERAPIST

## 2024-04-10 PROCEDURE — 80048 BASIC METABOLIC PNL TOTAL CA: CPT | Performed by: INTERNAL MEDICINE

## 2024-04-10 PROCEDURE — 99223 1ST HOSP IP/OBS HIGH 75: CPT | Performed by: INTERNAL MEDICINE

## 2024-04-10 PROCEDURE — 99239 HOSP IP/OBS DSCHRG MGMT >30: CPT | Performed by: INTERNAL MEDICINE

## 2024-04-10 PROCEDURE — 85025 COMPLETE CBC W/AUTO DIFF WBC: CPT | Performed by: INTERNAL MEDICINE

## 2024-04-10 PROCEDURE — 80202 ASSAY OF VANCOMYCIN: CPT | Performed by: INTERNAL MEDICINE

## 2024-04-10 PROCEDURE — 97530 THERAPEUTIC ACTIVITIES: CPT | Performed by: PHYSICAL THERAPIST

## 2024-04-10 PROCEDURE — 85610 PROTHROMBIN TIME: CPT | Performed by: INTERNAL MEDICINE

## 2024-04-10 RX ORDER — ATORVASTATIN CALCIUM 20 MG/1
20 TABLET, FILM COATED ORAL
Status: CANCELLED | OUTPATIENT
Start: 2024-04-11

## 2024-04-10 RX ORDER — PANTOPRAZOLE SODIUM 20 MG/1
20 TABLET, DELAYED RELEASE ORAL
Status: CANCELLED | OUTPATIENT
Start: 2024-04-11

## 2024-04-10 RX ORDER — LIDOCAINE 50 MG/G
2 PATCH TOPICAL DAILY
Status: CANCELLED | OUTPATIENT
Start: 2024-04-11

## 2024-04-10 RX ORDER — VANCOMYCIN HYDROCHLORIDE 1 G/200ML
12.5 INJECTION, SOLUTION INTRAVENOUS DAILY PRN
Status: DISCONTINUED | OUTPATIENT
Start: 2024-04-10 | End: 2024-04-10

## 2024-04-10 RX ORDER — VANCOMYCIN HYDROCHLORIDE 1 G/200ML
1000 INJECTION, SOLUTION INTRAVENOUS ONCE
Status: COMPLETED | OUTPATIENT
Start: 2024-04-10 | End: 2024-04-10

## 2024-04-10 RX ORDER — GABAPENTIN 100 MG/1
200 CAPSULE ORAL 3 TIMES DAILY
Status: DISCONTINUED | OUTPATIENT
Start: 2024-04-10 | End: 2024-04-15 | Stop reason: HOSPADM

## 2024-04-10 RX ORDER — POLYETHYLENE GLYCOL 3350 17 G/17G
17 POWDER, FOR SOLUTION ORAL DAILY
Status: CANCELLED | OUTPATIENT
Start: 2024-04-11

## 2024-04-10 RX ORDER — GABAPENTIN 100 MG/1
200 CAPSULE ORAL 3 TIMES DAILY
Status: CANCELLED | OUTPATIENT
Start: 2024-04-10

## 2024-04-10 RX ORDER — PANTOPRAZOLE SODIUM 20 MG/1
20 TABLET, DELAYED RELEASE ORAL
Status: DISCONTINUED | OUTPATIENT
Start: 2024-04-11 | End: 2024-04-15 | Stop reason: HOSPADM

## 2024-04-10 RX ORDER — ACETAMINOPHEN 325 MG/1
650 TABLET ORAL EVERY 6 HOURS PRN
Status: DISCONTINUED | OUTPATIENT
Start: 2024-04-10 | End: 2024-04-15 | Stop reason: HOSPADM

## 2024-04-10 RX ORDER — AMIODARONE HYDROCHLORIDE 200 MG/1
200 TABLET ORAL DAILY
Status: CANCELLED | OUTPATIENT
Start: 2024-04-11

## 2024-04-10 RX ORDER — ATORVASTATIN CALCIUM 20 MG/1
20 TABLET, FILM COATED ORAL
Status: DISCONTINUED | OUTPATIENT
Start: 2024-04-11 | End: 2024-04-15 | Stop reason: HOSPADM

## 2024-04-10 RX ORDER — LIDOCAINE 50 MG/G
2 PATCH TOPICAL DAILY
Status: DISCONTINUED | OUTPATIENT
Start: 2024-04-11 | End: 2024-04-15 | Stop reason: HOSPADM

## 2024-04-10 RX ORDER — VANCOMYCIN HYDROCHLORIDE 1 G/200ML
12.5 INJECTION, SOLUTION INTRAVENOUS DAILY PRN
Status: CANCELLED | OUTPATIENT
Start: 2024-04-10

## 2024-04-10 RX ORDER — CEFTRIAXONE 2 G/50ML
2000 INJECTION, SOLUTION INTRAVENOUS EVERY 24 HOURS
Status: CANCELLED | OUTPATIENT
Start: 2024-04-11

## 2024-04-10 RX ORDER — AMIODARONE HYDROCHLORIDE 200 MG/1
200 TABLET ORAL DAILY
Status: DISCONTINUED | OUTPATIENT
Start: 2024-04-11 | End: 2024-04-15 | Stop reason: HOSPADM

## 2024-04-10 RX ORDER — ACETAMINOPHEN 325 MG/1
650 TABLET ORAL EVERY 6 HOURS PRN
Status: CANCELLED | OUTPATIENT
Start: 2024-04-10

## 2024-04-10 RX ORDER — POLYETHYLENE GLYCOL 3350 17 G/17G
17 POWDER, FOR SOLUTION ORAL DAILY
Status: DISCONTINUED | OUTPATIENT
Start: 2024-04-11 | End: 2024-04-15 | Stop reason: HOSPADM

## 2024-04-10 RX ORDER — VANCOMYCIN HYDROCHLORIDE 1 G/200ML
12.5 INJECTION, SOLUTION INTRAVENOUS EVERY 24 HOURS
Status: DISCONTINUED | OUTPATIENT
Start: 2024-04-11 | End: 2024-04-13

## 2024-04-10 RX ORDER — SODIUM CHLORIDE, SODIUM GLUCONATE, SODIUM ACETATE, POTASSIUM CHLORIDE, MAGNESIUM CHLORIDE, SODIUM PHOSPHATE, DIBASIC, AND POTASSIUM PHOSPHATE .53; .5; .37; .037; .03; .012; .00082 G/100ML; G/100ML; G/100ML; G/100ML; G/100ML; G/100ML; G/100ML
50 INJECTION, SOLUTION INTRAVENOUS CONTINUOUS
Status: DISCONTINUED | OUTPATIENT
Start: 2024-04-11 | End: 2024-04-14

## 2024-04-10 RX ADMIN — ATORVASTATIN CALCIUM 20 MG: 20 TABLET, FILM COATED ORAL at 18:37

## 2024-04-10 RX ADMIN — POLYETHYLENE GLYCOL 3350 17 G: 17 POWDER, FOR SOLUTION ORAL at 09:30

## 2024-04-10 RX ADMIN — GABAPENTIN 200 MG: 100 CAPSULE ORAL at 09:30

## 2024-04-10 RX ADMIN — VANCOMYCIN HYDROCHLORIDE 1000 MG: 1 INJECTION, SOLUTION INTRAVENOUS at 18:38

## 2024-04-10 RX ADMIN — LIDOCAINE 5% 2 PATCH: 700 PATCH TOPICAL at 05:29

## 2024-04-10 RX ADMIN — CEFTRIAXONE 2000 MG: 2 INJECTION, SOLUTION INTRAVENOUS at 15:05

## 2024-04-10 RX ADMIN — GABAPENTIN 200 MG: 100 CAPSULE ORAL at 18:37

## 2024-04-10 RX ADMIN — PANTOPRAZOLE SODIUM 20 MG: 20 TABLET, DELAYED RELEASE ORAL at 05:29

## 2024-04-10 RX ADMIN — GABAPENTIN 200 MG: 100 CAPSULE ORAL at 22:36

## 2024-04-10 RX ADMIN — AMIODARONE HYDROCHLORIDE 200 MG: 200 TABLET ORAL at 09:30

## 2024-04-10 NOTE — DISCHARGE SUMMARY
Discharge Summary - Silvio Drew, 1950, [id number]        Admission Date: 4/6/2024  Discharge Date: 4/10/2024    Discharge Diagnosis:   1.  Strep sepsis, most likely dental source  2.  Septic shock secondary to #1  3.  Acute kidney injury secondary to #2  4.  Chronic systolic congestive heart failure  5.  Atrial fibrillation  6.  Hypertension  7.  Hyperlipidemia  8.  Nonspecific liver lesion on CT scan  9.  Thyroid nodule  10.  Anemia and thrombocytopenia  11.  Rectal bleeding, thought to be hemorrhoidal  12.  Pyuria, urine culture negative  13.  Coagulopathy secondary to warfarin    Consulting Physicians:  Dr. Lazo, infectious disease  The patient was initially admitted to the service of Dr. Adams, critical care medicine    Procedures Performed:   None    HPI: The patient is a 73-year-old man who came to the emergency room after suffering a fall.  The patient was feeling generally weak.  He reported fever and chills.  He noted epistaxis earlier on the day of admission.  In the emergency room he was found to have a blood pressure in the 70s.  The patient has been having dental problems and was scheduled to have several extractions as an outpatient.  This was postponed because of coagulopathy.  The patient was admitted to ICU for further care.    Hospital Course: The patient was admitted to ICU and underwent appropriate fluid resuscitation.  Cultures were obtained and he was started on antibiotics.  Ultimately, blood cultures obtained on April 6 were both positive for strep intermedius.  CAT scanning of the abdomen, pelvis, and chest revealed no likely source for the patient's infection.  He was noted to have pyuria but urine culture was negative.  It is suspected that the patient's dental infection is the source of his sepsis.    At the time of admission the patient was noted to have acute kidney injury.  His creatinine was approximately 3.5.  With resolution of his shock, his kidney function improved and  returned toward baseline.    The patient has a history of atrial fibrillation.  He has been on amiodarone for this.  He is also chronically anticoagulated with warfarin.  At the time of admission, his INR was 6.  Warfarin has been on hold since then.    Patient developed rectal bleeding on his last hospital day.  This was bright red and was thought to be hemorrhoidal.  His hemoglobin remained stable.  No specific intervention was undertaken for this.  Obviously, if this recurs, additional evaluation will be needed.    The patient was noted to have a nonspecific liver lesion for which an outpatient MRI was suggested.    On the patient's initial CAT scan, a thyroid nodule was noted.  Thyroid ultrasound as an outpatient was suggested.    On the day of discharge the patient was feeling reasonably well.  Vital signs were stable.  Lungs were clear.  Cardiac exam revealed a regular rhythm.  I could hear no murmur or gallop.  The abdomen was soft and nontender.  There was no mass or tenderness.  No edema was noted.  Neurologic evaluation was unremarkable.  The patient did have some swelling and tenderness over the left side of his mandible.    Disposition: The patient was transferred to [facility] to facilitate oral surgery evaluation.  At the time of his transfer, diet and activity are unrestricted.  He will be under the care of [facility] internal medicine in [place].  Consultation with infectious disease and oral surgery will be needed.  ZULY was planned to further evaluate the possibility of endocarditis but this could not be completed during his stay in [place].  As mentioned, the patient has been chronically on warfarin but this has been on hold since his admission.  It was recommended to the patient that he should follow-up with his primary care physician within 1 week of his ultimate discharge.    Discharge instructions/Information to patient and family:   See after visit summary for information  provided to patient and family.      Provisions for Follow-Up Care:  See after visit summary for information related to follow-up care and any pertinent home health orders.      Planned Readmission: No    Discharge Statement   I spent 45 minutes discharging the patient. This time was spent on the day of discharge. I had direct contact with the patient on the day of discharge.     Discharge Medications:  See after visit summary for reconciled discharge medications provided to patient and family.

## 2024-04-10 NOTE — PLAN OF CARE
Problem: PHYSICAL THERAPY ADULT  Goal: Performs mobility at highest level of function for planned discharge setting.  See evaluation for individualized goals.  Description: Treatment/Interventions: Functional transfer training, LE strengthening/ROM, Elevations, Therapeutic exercise, Endurance training, Equipment eval/education, Patient/family training, Bed mobility, Gait training, Compensatory technique education, Continued evaluation, Spoke to nursing, OT, Family  Equipment Recommended: Walker       See flowsheet documentation for full assessment, interventions and recommendations.  Outcome: Progressing  Note: Prognosis: Good  Problem List: Decreased strength, Decreased endurance, Impaired balance, Decreased mobility, Obesity  Assessment: Patient was seen today per POC. Overall, pt demonstrated improved mobility and inc tolerance to activity. Required inc standing and seated rest breaks between all ambulation trials 2* inc PLATA and LE fatigue. Improved endurance noted with inc distance ambulated. 350'x2 with RW and S in unit. Dec step length with forward flexed trunk but no gross LOB noted. Inc LOB when removing unilateral UE from RW. SpO2 during ambulation dec to 88%on RA and inc to 96% with seated rest break. Cues for bilateral UE on RW at all times; reinforcement required. Mild knee pain with LAQ therefore focused session on functional mobility. Based on pt presentation and impaired function, pt would benefit from level III, (minimum resource intensity) at D/C. The patient's AM-PAC Basic Mobility Inpatient Short Form Raw Score is 22. A Raw score of greater than 16 suggests the patient may benefit from discharge to home. Please also refer to the recommendation of the Physical Therapist for safe discharge planning.  Barriers to Discharge: None     Rehab Resource Intensity Level, PT: III (Minimum Resource Intensity)    See flowsheet documentation for full assessment.

## 2024-04-10 NOTE — PLAN OF CARE
Problem: Potential for Falls  Goal: Patient will remain free of falls  Description: INTERVENTIONS:  - Educate patient/family on patient safety including physical limitations  - Instruct patient to call for assistance with activity   - Consult OT/PT to assist with strengthening/mobility   - Keep Call bell within reach  - Keep bed low and locked with side rails adjusted as appropriate  - Keep care items and personal belongings within reach  - Initiate and maintain comfort rounds  - Make Fall Risk Sign visible to staff  - Offer Toileting every  Hours, in advance of need  - Initiate/Maintain alarm  - Obtain necessary fall risk management equipment:   - Apply yellow socks and bracelet for high fall risk patients  - Consider moving patient to room near nurses station  Outcome: Progressing     Problem: Prexisting or High Potential for Compromised Skin Integrity  Goal: Skin integrity is maintained or improved  Description: INTERVENTIONS:  - Identify patients at risk for skin breakdown  - Assess and monitor skin integrity  - Assess and monitor nutrition and hydration status  - Monitor labs   - Assess for incontinence   - Turn and reposition patient  - Assist with mobility/ambulation  - Relieve pressure over bony prominences  - Avoid friction and shearing  - Provide appropriate hygiene as needed including keeping skin clean and dry  - Evaluate need for skin moisturizer/barrier cream  - Collaborate with interdisciplinary team   - Patient/family teaching  - Consider wound care consult   Outcome: Progressing     Problem: PAIN - ADULT  Goal: Verbalizes/displays adequate comfort level or baseline comfort level  Description: Interventions:  - Encourage patient to monitor pain and request assistance  - Assess pain using appropriate pain scale  - Administer analgesics based on type and severity of pain and evaluate response  - Implement non-pharmacological measures as appropriate and evaluate response  - Consider cultural and  social influences on pain and pain management  - Notify physician/advanced practitioner if interventions unsuccessful or patient reports new pain  Outcome: Progressing     Problem: INFECTION - ADULT  Goal: Absence or prevention of progression during hospitalization  Description: INTERVENTIONS:  - Assess and monitor for signs and symptoms of infection  - Monitor lab/diagnostic results  - Monitor all insertion sites, i.e. indwelling lines, tubes, and drains  - Monitor endotracheal if appropriate and nasal secretions for changes in amount and color  - [place] appropriate cooling/warming therapies per order  - Administer medications as ordered  - Instruct and encourage patient and family to use good hand hygiene technique  - Identify and instruct in appropriate isolation precautions for identified infection/condition  Outcome: Progressing  Goal: Absence of fever/infection during neutropenic period  Description: INTERVENTIONS:  - Monitor WBC    Outcome: Progressing     Problem: SAFETY ADULT  Goal: Patient will remain free of falls  Description: INTERVENTIONS:  - Educate patient/family on patient safety including physical limitations  - Instruct patient to call for assistance with activity   - Consult OT/PT to assist with strengthening/mobility   - Keep Call bell within reach  - Keep bed low and locked with side rails adjusted as appropriate  - Keep care items and personal belongings within reach  - Initiate and maintain comfort rounds  - Make Fall Risk Sign visible to staff  - Offer Toileting every  Hours, in advance of need  - Initiate/Maintain alarm  - Obtain necessary fall risk management equipment:   - Apply yellow socks and bracelet for high fall risk patients  - Consider moving patient to room near nurses station  Outcome: Progressing  Goal: Maintain or return to baseline ADL function  Description: INTERVENTIONS:  -  Assess patient's ability to carry out ADLs; assess patient's baseline for ADL function and  identify physical deficits which impact ability to perform ADLs (bathing, care of mouth/teeth, toileting, grooming, dressing, etc.)  - Assess/evaluate cause of self-care deficits   - Assess range of motion  - Assess patient's mobility; develop plan if impaired  - Assess patient's need for assistive devices and provide as appropriate  - Encourage maximum independence but intervene and supervise when necessary  - Involve family in performance of ADLs  - Assess for home care needs following discharge   - Consider OT consult to assist with ADL evaluation and planning for discharge  - Provide patient education as appropriate  Outcome: Progressing  Goal: Maintains/Returns to pre admission functional level  Description: INTERVENTIONS:  - Perform AM-PAC 6 Click Basic Mobility/ Daily Activity assessment daily.  - Set and communicate daily mobility goal to care team and patient/family/caregiver.   - Collaborate with rehabilitation services on mobility goals if consulted  - Perform Range of Motion  times a day.  - Reposition patient every  hours.  - Dangle patient  times a day  - Stand patient  times a day  - Ambulate patient  times a day  - Out of bed to chair  times a day   - Out of bed for meals  times a day  - Out of bed for toileting  - Record patient progress and toleration of activity level   Outcome: Progressing     Problem: DISCHARGE PLANNING  Goal: Discharge to home or other facility with appropriate resources  Description: INTERVENTIONS:  - Identify barriers to discharge w/patient and caregiver  - Arrange for needed discharge resources and transportation as appropriate  - Identify discharge learning needs (meds, wound care, etc.)  - Arrange for interpretive services to assist at discharge as needed  - Refer to Case Management Department for coordinating discharge planning if the patient needs post-hospital services based on physician/advanced practitioner order or complex needs related to functional status,  cognitive ability, or social support system  Outcome: Progressing     Problem: Knowledge Deficit  Goal: Patient/family/caregiver demonstrates understanding of disease process, treatment plan, medications, and discharge instructions  Description: Complete learning assessment and assess knowledge base.  Interventions:  - Provide teaching at level of understanding  - Provide teaching via preferred learning methods  Outcome: Progressing     Problem: CARDIOVASCULAR - ADULT  Goal: Maintains optimal cardiac output and hemodynamic stability  Description: INTERVENTIONS:  - Monitor I/O, vital signs and rhythm  - Monitor for S/S and trends of decreased cardiac output  - Administer and titrate ordered vasoactive medications to optimize hemodynamic stability  - Assess quality of pulses, skin color and temperature  - Assess for signs of decreased coronary artery perfusion  - Instruct patient to report change in severity of symptoms  Outcome: Progressing  Goal: Absence of cardiac dysrhythmias or at baseline rhythm  Description: INTERVENTIONS:  - Continuous cardiac monitoring, vital signs, obtain 12 lead EKG if ordered  - Administer antiarrhythmic and heart rate control medications as ordered  - Monitor electrolytes and administer replacement therapy as ordered  Outcome: Progressing     Problem: METABOLIC, FLUID AND ELECTROLYTES - ADULT  Goal: Electrolytes maintained within normal limits  Description: INTERVENTIONS:  - Monitor labs and assess patient for signs and symptoms of electrolyte imbalances  - Administer electrolyte replacement as ordered  - Monitor response to electrolyte replacements, including repeat lab results as appropriate  - Instruct patient on fluid and nutrition as appropriate  Outcome: Progressing  Goal: Fluid balance maintained  Description: INTERVENTIONS:  - Monitor labs   - Monitor I/O and WT  - Instruct patient on fluid and nutrition as appropriate  - Assess for signs & symptoms of volume excess or  deficit  Outcome: Progressing  Goal: Glucose maintained within target range  Description: INTERVENTIONS:  - Monitor Blood Glucose as ordered  - Assess for signs and symptoms of hyperglycemia and hypoglycemia  - Administer ordered medications to maintain glucose within target range  - Assess nutritional intake and initiate nutrition service referral as needed  Outcome: Progressing     Problem: HEMATOLOGIC - ADULT  Goal: Maintains hematologic stability  Description: INTERVENTIONS  - Assess for signs and symptoms of bleeding or hemorrhage  - Monitor labs  - Administer supportive blood products/factors as ordered and appropriate  Outcome: Progressing

## 2024-04-10 NOTE — PROGRESS NOTES
Patient progress Note - Infectious Disease   Silvio Drew 73 y.o. male MRN: [id number]  Unit/Bed#: E4 -01 Encounter: [id number]      IMPRESSION & RECOMMENDATIONS:   Impression/Recommendations:  1.  Septic shock, POA.  Secondary to bacteremia.  Now much improved.  Blood pressures have stabilized.  WBC count has normalized.     -Antibiotic plan as below  -Follow temperatures and hemodynamics.  -Recheck CBC in AM to assess for ongoing clinical response     2.  Polymicrobial bacteremia.  Streptococcus/GPR.  Likely true bacteremia in the context of #1.  Consider dental origin, as below.  Patient does have a cardiac device.  No other clear explanation on CT chest/abdomen/pelvis. CT does show a nonspecific liver lesion not suggestive of abscess.  No apparent vegetations on 2D echo, technically difficult study.  Repeat blood cultures are negative thus far.     -Continue vancomycin/ceftriaxone for now  -Vancomycin dosing per pharmacy  -Follow-up final admission blood cultures and tailor antibiotics accordingly  -Follow-up repeat blood cultures to ensure clearance of bacteremia  -Recommend ZULY to rule out endocarditis, especially given presence of cardiac device.     3.  Dental pain.  Patient was supposed to undergo tooth extraction which has been delayed due to his anticoagulation.  Facial CT does show periapical lucencies and left maxilla/mandible.     -Antibiotic plan as above  -Recommend inpatient OMFS evaluation if able     4.  BRBPR.  Appears to be hemorrhoidal.  No acute findings on CT abdomen/pelvis.     -Workup as above  -May need GI evaluation if recurrent     5.  Acute kidney injury, on CKD 3.  In the setting of septic shock.  No obstructive  process on CT.     -Follow BMP and dose adjust antibiotics as indicated  -Volume management per primary service     6.  Abnormal CT abdomen.  With nonspecific liver lesion which is not consistent with abscess.  Patient will need nonemergent MRI for further workup.     I  discussed above plan with patient, his sister over the phone, and with primary service who agrees with continuation of IV antibiotics.     Antibiotics:  Vancomycin/ceftriaxone  Negative blood cultures          Subjective:  Patient is feeling okay but does complain of getting winded when ambulating to the bathroom.  No cough.  No fevers or chills.  Underwent facial CT yesterday.    Objective:  Vitals:  Temp:  [98.1 °F (36.7 °C)-98.6 °F (37 °C)] 98.1 °F (36.7 °C)  HR:  [102-109] 102  Resp:  [18] 18  BP: (118-135)/(74-92) 135/92  SpO2:  [96 %-98 %] 97 %  Temp (24hrs), Av.3 °F (36.8 °C), Min:98.1 °F (36.7 °C), Max:98.6 °F (37 °C)  Current: Temperature: 98.1 °F (36.7 °C)    Physical Exam:   General:  No acute distress, nontoxic  HEENT atraumatic normocephalic  Neck trachea midline  Psychiatric:  Awake and alert  Pulmonary:  Normal respiratory excursion without accessory muscle use  Abdomen:  Soft, nontender  Extremities:  No edema  Skin:  No rashes  Neuro moves all extremities spontaneously    Lab Results:  I have personally reviewed pertinent labs.  Results from last 7 days   Lab Units 04/10/24  0519 24  0527 24  0535 24  0521 24  1820   POTASSIUM mmol/L 3.8 3.7 3.8   < > 4.4   CHLORIDE mmol/L 111* 114* 113*   < > 113*   CO2 mmol/L 21 20* 18*   < > 19*   BUN mg/dL 22 29* 35*   < > 45*   CREATININE mg/dL 1.56* 1.91* 2.36*   < > 3.39*   EGFR ml/min/1.73sq m 43 34 26   < > 17   CALCIUM mg/dL 8.4 8.2* 8.1*   < > 8.0*   AST U/L  --   --   --   --  58*   ALT U/L  --   --   --   --  51   ALK PHOS U/L  --   --   --   --  76    < > = values in this interval not displayed.     Results from last 7 days   Lab Units 04/10/24  0519 24  0527 24  0535   WBC Thousand/uL 6.22 5.65 7.15   HEMOGLOBIN g/dL 8.1* 7.3* 7.4*   PLATELETS Thousands/uL 191 152 132*     Results from last 7 days   Lab Units 24  1547 24  2349 24  1841 24  1838   BLOOD CULTURE  No Growth at 24 hrs.   No Growth at 24 hrs.  --   --   --    GRAM STAIN RESULT   --   --  Gram positive cocci in pairs and chains*  Gram positive rods* Gram positive cocci in pairs and chains*  Gram positive rods*   URINE CULTURE   --  No Growth <1000 cfu/mL  --   --    MRSA CULTURE ONLY   --  No Methicillin Resistant Staphlyococcus aureus (MRSA) isolated  --   --        Imaging Studies:   I have personally reviewed pertinent imaging study reports and images in PACS.    Facial CT with small periapical lucencies, one within left maxila and one within left mandible.     EKG, Pathology, and Other Studies:   I have personally reviewed pertinent reports.

## 2024-04-10 NOTE — PHYSICAL THERAPY NOTE
PT PROGRESS NOTE    Name: Silvio Drew  AGE: 73 y.o.  MRN: [id number]  LENGTH OF STAY: 4     04/10/24 1625   PT Last Visit   PT Visit Date 04/10/24   Note Type   Note Type Treatment   Pain Assessment   Pain Assessment Tool 0-10   Pain Score No Pain   Restrictions/Precautions   Weight Bearing Precautions Per Order No   Other Precautions Chair Alarm;Fall Risk   General   Chart Reviewed Yes   Family/Caregiver Present No   Cognition   Overall Cognitive Status WFL   Arousal/Participation Alert;Cooperative   Attention Attends with cues to redirect   Orientation Level Oriented X4   Following Commands Follows one step commands with increased time or repetition   Subjective   Subjective I have two minutes and then my IV is done   Bed Mobility   Supine to Sit Unable to assess   Sit to Supine Unable to assess   Additional Comments Pt greeted OOB in chair. Pt OOB in chair at end of session.   Transfers   Sit to Stand 5  Supervision   Additional items Increased time required  (w/ RW)   Stand to Sit 5  Supervision   Additional items Increased time required  (w/ RW)   Additional Comments demonstrated proper technique and safety   Ambulation/Elevation   Gait pattern Decreased foot clearance;Forward Flexion;Wide LANETTE;Short stride;Step through pattern   Gait Assistance 5  Supervision   Additional items Verbal cues   Assistive Device Rolling walker   Distance 4'x1; 10'x1; 350'x2   Ambulation/Elevation Additional Comments inc fatigue and PLATA. SpO2 during ambulation dec to 88%on RA and inc to 96% with seated rest break.   Balance   Static Sitting Good   Dynamic Sitting Fair +   Static Standing Fair  (w/ RW)   Dynamic Standing Fair -  (w/ RW)   Ambulatory Fair -  (w/ RW)   Endurance Deficit   Endurance Deficit Yes   Endurance Deficit Description PLATA   Activity Tolerance   Activity Tolerance Patient tolerated treatment well   Nurse Made Aware RN yes   Exercises   Knee AROM Long Arc Quad Sitting;10 reps;AROM;Bilateral   Assessment    Prognosis Good   Problem List Decreased strength;Decreased endurance;Impaired balance;Decreased mobility;Obesity   Assessment Patient was seen today per POC. Overall, pt demonstrated improved mobility and inc tolerance to activity. Required inc standing and seated rest breaks between all ambulation trials 2* inc PLATA and LE fatigue. Improved endurance noted with inc distance ambulated. 350'x2 with RW and S in unit. Dec step length with forward flexed trunk but no gross LOB noted. Inc LOB when removing unilateral UE from RW. SpO2 during ambulation dec to 88%on RA and inc to 96% with seated rest break. Cues for bilateral UE on RW at all times; reinforcement required. Mild knee pain with LAQ therefore focused session on functional mobility. Based on pt presentation and impaired function, pt would benefit from level III, (minimum resource intensity) at D/C. The patient's AM-[facility] Basic Mobility Inpatient Short Form Raw Score is 22. A Raw score of greater than 16 suggests the patient may benefit from discharge to home. Please also refer to the recommendation of the Physical Therapist for safe discharge planning.   Goals   Patient Goals to get stronger   STG Expiration Date 04/18/24   Plan   Treatment/Interventions Functional transfer training;LE strengthening/ROM;Elevations;Therapeutic exercise;Endurance training;Patient/family training;Gait training;Bed mobility;Spoke to nursing;OT   Progress Progressing toward goals   PT Frequency 1-2x/wk   Discharge Recommendation   Rehab Resource Intensity Level, PT III (Minimum Resource Intensity)   AM-PAC Basic Mobility Inpatient   Turning in Flat Bed Without Bedrails 4   Lying on Back to Sitting on Edge of Flat Bed Without Bedrails 4   Moving Bed to Chair 4   Standing Up From Chair Using Arms 4   Walk in Room 3   Climb 3-5 Stairs With Railing 3   Basic Mobility Inpatient Raw Score 22   Basic Mobility Standardized Score 47.4   [facility] Highest Level Of Mobility   [facility] Goal 7:  Walk 25 feet or more   -HLM Achieved 8: Walk 250 feet ot more   Education   Education Provided Mobility training;Home exercise program;Assistive device   Patient Demonstrates acceptance/verbal understanding   End of Consult   Patient Position at End of Consult Bedside chair;Bed/Chair alarm activated;All needs within reach     Alma Rosa Clement, PT

## 2024-04-10 NOTE — ASSESSMENT & PLAN NOTE
Presented to [facility] and was admitted to ICU with septic shock. He stabilized and was transferred to [facility] there.  He was found to have polymicrobial bacteremia and dental infections, due to a lack of OMS services he was transferred to [facility].  Await culture results  Continue ceftriaxone and vancomycin  Consult OMS for dental evaluation and extraction if indicated  Check ZULY to evaluate for endocarditis and seeding of the pacemaker.

## 2024-04-10 NOTE — H&P
[facility]  H&P  Name: Sivlio Drew 73 y.o. male I MRN: [id number]  Unit/Bed#: [facility] 831-01 I Date of Admission: 4/10/2024   Date of Service: 4/10/2024 I Hospital Day: 0      Assessment/Plan   * Streptococcal bacteremia  Assessment & Plan  Presented to [facility] and was admitted to ICU with septic shock. He stabilized and was transferred to [facility] there.  He was found to have polymicrobial bacteremia and dental infections, due to a lack of OMS services he was transferred to [facility].  Await culture results  Continue ceftriaxone and vancomycin  Consult OMS for dental evaluation and extraction if indicated  Check ZULY to evaluate for endocarditis and seeding of the pacemaker.    Septic shock (HCC)  Assessment & Plan  Present on admission at [facility], now resolved  Plan as above    Visual disturbance  Assessment & Plan  Of left eye  Due to bacteremia there may be septic emboli to the retina, will consult with ophthalmology for evaluation    Anemia  Assessment & Plan  Likely due to chronic kidney disease, sepsis  Check anemia studies    Dental infection  Assessment & Plan  Apical lucencies present on CT scan  Given bacteremia will likely need an OMS     Chronic systolic CHF (HCC)  Assessment & Plan  Wt Readings from Last 3 Encounters:   04/10/24 100 kg (220 lb 8 oz)   03/18/24 100 kg (221 lb)   01/11/24 100 kg (221 lb 3.2 oz)     EF of 36% w/ mild-moderate TR (h/o mitral valve annuloplasty noted)  Holding Lasix/Cozaar in the setting of acute kidney injury - holding Toprol-XL in setting of recovering hypotension/shock  Monitor/replete electrolyte deficiencies if present  Resume cardiac agents over the upcoming days if BP allows          Abnormal CT of the abdomen  Assessment & Plan  Incidental liver lesion noted   Will need an MRI of the liver when stable as an outpatient    Acute kidney injury superimposed on stage 3 chronic kidney disease (HCC)  Assessment & Plan  Lab Results    Component Value Date    EGFR 43 04/10/2024    EGFR 34 04/09/2024    EGFR 26 04/08/2024    CREATININE 1.56 (H) 04/10/2024    CREATININE 1.91 (H) 04/09/2024    CREATININE 2.36 (H) 04/08/2024   Baseline creatinine appears to be 1.4-1.6  VARGHESE on admission was likely due to septic shock  Creatinine has returned to baseline  Monitor BMP daily for now  Renally dose medications  Avoid nephrotoxins if possible    Atrial fibrillation (HCC)  Assessment & Plan  Rate controlled with amiodarone  Warfarin on hold for surgery    Primary hypertension  Assessment & Plan  BP is acceptable  Continue furosemide losartan and metoprolol         VTE Pharmacologic Prophylaxis: VTE Score: 3 Moderate Risk (Score 3-4) - Pharmacological DVT Prophylaxis Ordered: warfarin (Coumadin).  Code Status: Level 1 - Full Code       Anticipated Length of Stay: Patient will be admitted on an inpatient basis with an anticipated length of stay of greater than 2 midnights secondary to bacteremia.    Total Time Spent on Date of Encounter in care of patient: 45 mins. This time was spent on one or more of the following: performing physical exam; counseling and coordination of care; obtaining or reviewing history; documenting in the medical record; reviewing/ordering tests, medications or procedures; communicating with other healthcare professionals and discussing with patient's family/caregivers.    Chief Complaint: bacteremia, dental infection    History of Present Illness:  Silvio Drew is a 73 y.o. male with a PMH of AFIB who presents with septic shock and bacteremia. The patient was admitted to ICU at [facility] and underwent appropriate fluid resuscitation.  Cultures were obtained and he was started on antibiotics.  Ultimately, blood cultures obtained on April 6 were both positive for strep intermedius.  CAT scanning of the abdomen, pelvis, and chest revealed no likely source for the patient's infection.  He was noted to have pyuria but urine culture  was negative.  It is suspected that the patient's dental infection is the source of his sepsis.     At the time of admission the patient was noted to have acute kidney injury.  His creatinine was approximately 3.5.  With resolution of his shock, his kidney function improved and returned toward baseline.     The patient has a history of atrial fibrillation.  He has been on amiodarone for this.  He is also chronically anticoagulated with warfarin.  At the time of admission, his INR was 6.  Warfarin has been on hold since then.     Patient developed rectal bleeding on his last hospital day.  This was bright red and was thought to be hemorrhoidal.  His hemoglobin remained stable.  No specific intervention was undertaken for this.    Due to a lack on OMS services he was transferred to [facility] for further management.    Review of Systems:  Review of Systems   All other systems reviewed and are negative.      Past Medical and Surgical History:   Past Medical History:   Diagnosis Date    Anemia 3/12/2013    Atrial fibrillation (HCC)     Coagulopathy (HCC) 4/6/2024    Coronary artery disease     GERD (gastroesophageal reflux disease)     Hyperlipidemia associated with type 2 diabetes mellitus  (HCC) 5/4/2021    Hypertension     Obesity     Thyroid nodule 4/6/2024       Past Surgical History:   Procedure Laterality Date    CARDIAC PACEMAKER PLACEMENT  12/2023    replacement    CORONARY ARTERY BYPASS GRAFT         Meds/Allergies:  Prior to Admission medications    Medication Sig Start Date End Date Taking? Authorizing Provider   allopurinol (ZYLOPRIM) 100 mg tablet Take 1 tablet (100 mg total) by mouth daily 9/18/23   Mac Stephens MD   amiodarone 200 mg tablet Take 1 tablet (200 mg total) by mouth daily 9/18/23   Mac Stephens MD   atorvastatin (LIPITOR) 20 mg tablet Take 1 tablet (20 mg total) by mouth daily Please STOP Pravastatin.. 9/18/23   Mac Stephens MD   b complex-C-E-zinc (STRESS FORMULA/ZINC) tablet Take 1 tablet by mouth  daily 3/18/24   Mac Stephens MD   bisacodyl (DULCOLAX) 5 mg EC tablet Take as instructed on bowel preparation instructions 1/11/24   Diana M Jaiyeola, MD   Diclofenac Sodium (VOLTAREN) 1 % Apply 2 g topically 4 (four) times a day 3/14/24   Mac Stephens MD   diflunisal (DOLOBID) 500 mg tablet Take 1 tablet (500 mg total) by mouth 2 (two) times daily after meals 3/18/24   Mac Stephens MD   ergocalciferol (VITAMIN D2) 50,000 units Take 1 capsule (50,000 Units total) by mouth once a week 9/18/23   Mac Stephens MD   furosemide (Lasix) 20 mg tablet Take 1 tablet (20 mg total) by mouth daily 9/18/23   Mac Stephens MD   gabapentin (Neurontin) 100 mg capsule Take 2 capsules (200 mg total) by mouth 3 (three) times a day 3/18/24   Mac Stephens MD   ketoconazole (NIZORAL) 2 % shampoo Apply 1 application topically 3 (three) times a week 8/2/19   Mac Stephens MD   lidocaine (LMX) 4 % cream Apply topically as needed for moderate pain 3/20/24   Mata Ruiz MD   linaCLOtide 290 MCG CAPS Take 1 capsule by mouth in the morning 2/9/24   Mac Stephens MD   loratadine (Claritin) 10 mg tablet Take 1 tablet (10 mg total) by mouth daily In the evening 8/31/20   Mac Stephens MD   losartan (COZAAR) 50 mg tablet Take 1 tablet (50 mg total) by mouth daily 9/18/23   Mac Stephens MD   methocarbamol (ROBAXIN) 500 mg tablet Take 1 tablet (500 mg total) by mouth 2 (two) times a day 3/20/24   Mata Ruiz MD   methylPREDNISolone 4 MG tablet therapy pack Use as directed on package 3/20/24   Mata Ruiz MD   metoprolol succinate (Toprol XL) 25 mg 24 hr tablet Take 1 tablet (25 mg total) by mouth daily  Patient not taking: Reported on 4/6/2024 9/18/23   Mac Stephens MD   nitrofurantoin (MACROBID) 100 mg capsule Take 1 capsule (100 mg total) by mouth in the morning With food/lunch 11/17/22   Mac Stephens MD   pantoprazole (PROTONIX) 20 mg tablet Take 1 tablet (20 mg total) by mouth daily 3/8/23   Mac Stephens MD   polyethylene glycol  "(GLYCOLAX) 17 GM/SCOOP powder Take 17 g by mouth daily 1/11/24   Diana M Jaiyeola, MD   warfarin (COUMADIN) 5 mg tablet Take 1 tablet (5 mg total) by mouth daily 3/6/23   Mac Stephens MD     I have reviewed home medications using recent Epic encounter.    Allergies:   Allergies   Allergen Reactions    Metoprolol Shortness Of Breath     \"50mg\" via Slovenian ; states \"When I take the 25mg I'm normal.\"       Social History:  Marital Status: Single   Occupation: retired  Patient Pre-hospital Living Situation: Home  Patient Pre-hospital Level of Mobility: walks  Patient Pre-hospital Diet Restrictions: None  Substance Use History:   Social History     Substance and Sexual Activity   Alcohol Use No     Social History     Tobacco Use   Smoking Status Never   Smokeless Tobacco Never   Tobacco Comments    No passive smoke exposure     Social History     Substance and Sexual Activity   Drug Use No       Family History:  Family History   Problem Relation Age of Onset    Hypertension Mother     Diabetes Mother     No Known Problems Father        Physical Exam:     Vitals:   Blood Pressure: 138/78 (04/10/24 2011)  Pulse: 90 (04/10/24 2011)  Temperature: 97.5 °F (36.4 °C) (04/10/24 2011)  Respirations: 20 (04/10/24 2011)  SpO2: 97 % (04/10/24 2011)    Physical Exam  Constitutional:       Appearance: Normal appearance.   HENT:      Head: Normocephalic and atraumatic.      Comments: Left maxillary and mandibular edema     Nose: Nose normal.      Mouth/Throat:      Mouth: Mucous membranes are moist.   Eyes:      Extraocular Movements: Extraocular movements intact.   Cardiovascular:      Rate and Rhythm: Normal rate and regular rhythm.      Heart sounds: No murmur heard.  Pulmonary:      Effort: Pulmonary effort is normal.      Breath sounds: No wheezing or rales.   Abdominal:      General: There is no distension.      Palpations: Abdomen is soft.      Tenderness: There is no abdominal tenderness.   Musculoskeletal:         " General: Normal range of motion.      Right lower leg: No edema.      Left lower leg: No edema.   Skin:     General: Skin is warm and dry.   Neurological:      Mental Status: He is alert and oriented to person, place, and time.   Psychiatric:         Mood and Affect: Mood normal.         Behavior: Behavior normal.          Additional Data:     Lab Results:  Results from last 7 days   Lab Units 04/10/24  0519   WBC Thousand/uL 6.22   HEMOGLOBIN g/dL 8.1*   HEMATOCRIT % 26.4*   PLATELETS Thousands/uL 191   SEGS PCT % 65   LYMPHO PCT % 22   MONO PCT % 8   EOS PCT % 3     Results from last 7 days   Lab Units 04/10/24  0519 04/07/24  0521 04/06/24  1820   SODIUM mmol/L 140   < > 139   POTASSIUM mmol/L 3.8   < > 4.4   CHLORIDE mmol/L 111*   < > 113*   CO2 mmol/L 21   < > 19*   BUN mg/dL 22   < > 45*   CREATININE mg/dL 1.56*   < > 3.39*   ANION GAP mmol/L 8   < > 7   CALCIUM mg/dL 8.4   < > 8.0*   ALBUMIN g/dL  --   --  3.3*   TOTAL BILIRUBIN mg/dL  --   --  0.86   ALK PHOS U/L  --   --  76   ALT U/L  --   --  51   AST U/L  --   --  58*   GLUCOSE RANDOM mg/dL 94   < > 102    < > = values in this interval not displayed.     Results from last 7 days   Lab Units 04/10/24  0519   INR  1.72*             Results from last 7 days   Lab Units 04/09/24  0527 04/07/24  1442 04/06/24  1820   LACTIC ACID mmol/L  --   --  1.1   PROCALCITONIN ng/ml 6.87* 19.31* 16.26*       Lines/Drains:  Invasive Devices       Peripheral Intravenous Line  Duration             Peripheral IV 04/07/24 Distal;Left;Ventral (anterior) Forearm 3 days                        Imaging: Reviewed radiology reports from this admission including: chest CT scan, abdominal/pelvic CT, and CT head  MRI inpatient order    (Results Pending)       EKG and Other Studies Reviewed on Admission:   EKG: No EKG obtained.    ** Please Note: This note has been constructed using a voice recognition system. **

## 2024-04-10 NOTE — PROGRESS NOTES
Silvio Drew is a 73 y.o. male who is currently ordered Vancomycin IV with management by the Pharmacy Consult service.  Relevant clinical data and objective / subjective history reviewed.  Vancomycin Assessment:  Indication and Goal AUC/Trough: Bacteremia (goal -600, trough >10)  Clinical Status: stable  Micro:     Renal Function:  SCr: 1.56 mg/dL  CrCl: 49 mL/min  Renal replacement: Not on dialysis  Days of Therapy: 5  Current Dose: pulse dosing  Vancomycin Plan:  New Dosing: pulse dosing  Next Level: 4/11 with AM labs  Renal Function Monitoring: Daily BMP and UOP  Pharmacy will continue to follow closely for s/sx of nephrotoxicity, infusion reactions and appropriateness of therapy.  BMP and CBC will be ordered per protocol. We will continue to follow the patient’s culture results and clinical progress daily.    Tristan Alberto, Pharmacist

## 2024-04-10 NOTE — ASSESSMENT & PLAN NOTE
Wt Readings from Last 3 Encounters:   04/10/24 100 kg (220 lb 8 oz)   03/18/24 100 kg (221 lb)   01/11/24 100 kg (221 lb 3.2 oz)     EF of 36% w/ mild-moderate TR (h/o mitral valve annuloplasty noted)  Holding Lasix/Cozaar in the setting of acute kidney injury - holding Toprol-XL in setting of recovering hypotension/shock  Monitor/replete electrolyte deficiencies if present  Resume cardiac agents over the upcoming days if BP allows

## 2024-04-10 NOTE — PLAN OF CARE
Problem: Potential for Falls  Goal: Patient will remain free of falls  Description: INTERVENTIONS:  - Educate patient/family on patient safety including physical limitations  - Instruct patient to call for assistance with activity   - Consult OT/PT to assist with strengthening/mobility   - Keep Call bell within reach  - Keep bed low and locked with side rails adjusted as appropriate  - Keep care items and personal belongings within reach  - Initiate and maintain comfort rounds  - Make Fall Risk Sign visible to staff  - Offer Toileting every  Hours, in advance of need  - Initiate/Maintain alarm  - Obtain necessary fall risk management equipment:   - Apply yellow socks and bracelet for high fall risk patients  - Consider moving patient to room near nurses station  Outcome: Progressing     Problem: Prexisting or High Potential for Compromised Skin Integrity  Goal: Skin integrity is maintained or improved  Description: INTERVENTIONS:  - Identify patients at risk for skin breakdown  - Assess and monitor skin integrity  - Assess and monitor nutrition and hydration status  - Monitor labs   - Assess for incontinence   - Turn and reposition patient  - Assist with mobility/ambulation  - Relieve pressure over bony prominences  - Avoid friction and shearing  - Provide appropriate hygiene as needed including keeping skin clean and dry  - Evaluate need for skin moisturizer/barrier cream  - Collaborate with interdisciplinary team   - Patient/family teaching  - Consider wound care consult   Outcome: Progressing     Problem: PAIN - ADULT  Goal: Verbalizes/displays adequate comfort level or baseline comfort level  Description: Interventions:  - Encourage patient to monitor pain and request assistance  - Assess pain using appropriate pain scale  - Administer analgesics based on type and severity of pain and evaluate response  - Implement non-pharmacological measures as appropriate and evaluate response  - Consider cultural and  social influences on pain and pain management  - Notify physician/advanced practitioner if interventions unsuccessful or patient reports new pain  Outcome: Progressing     Problem: INFECTION - ADULT  Goal: Absence or prevention of progression during hospitalization  Description: INTERVENTIONS:  - Assess and monitor for signs and symptoms of infection  - Monitor lab/diagnostic results  - Monitor all insertion sites, i.e. indwelling lines, tubes, and drains  - Monitor endotracheal if appropriate and nasal secretions for changes in amount and color  - [place] appropriate cooling/warming therapies per order  - Administer medications as ordered  - Instruct and encourage patient and family to use good hand hygiene technique  - Identify and instruct in appropriate isolation precautions for identified infection/condition  Outcome: Progressing  Goal: Absence of fever/infection during neutropenic period  Description: INTERVENTIONS:  - Monitor WBC    Outcome: Progressing     Problem: SAFETY ADULT  Goal: Patient will remain free of falls  Description: INTERVENTIONS:  - Educate patient/family on patient safety including physical limitations  - Instruct patient to call for assistance with activity   - Consult OT/PT to assist with strengthening/mobility   - Keep Call bell within reach  - Keep bed low and locked with side rails adjusted as appropriate  - Keep care items and personal belongings within reach  - Initiate and maintain comfort rounds  - Make Fall Risk Sign visible to staff  - Offer Toileting every  Hours, in advance of need  - Initiate/Maintain alarm  - Obtain necessary fall risk management equipment:   - Apply yellow socks and bracelet for high fall risk patients  - Consider moving patient to room near nurses station  Outcome: Progressing  Goal: Maintain or return to baseline ADL function  Description: INTERVENTIONS:  -  Assess patient's ability to carry out ADLs; assess patient's baseline for ADL function and  identify physical deficits which impact ability to perform ADLs (bathing, care of mouth/teeth, toileting, grooming, dressing, etc.)  - Assess/evaluate cause of self-care deficits   - Assess range of motion  - Assess patient's mobility; develop plan if impaired  - Assess patient's need for assistive devices and provide as appropriate  - Encourage maximum independence but intervene and supervise when necessary  - Involve family in performance of ADLs  - Assess for home care needs following discharge   - Consider OT consult to assist with ADL evaluation and planning for discharge  - Provide patient education as appropriate  Outcome: Progressing  Goal: Maintains/Returns to pre admission functional level  Description: INTERVENTIONS:  - Perform AM-PAC 6 Click Basic Mobility/ Daily Activity assessment daily.  - Set and communicate daily mobility goal to care team and patient/family/caregiver.   - Collaborate with rehabilitation services on mobility goals if consulted  - Perform Range of [place]io times a day.  - Reposition patient every  hours.  - Dangle patient times a day  - Stand patient  times a day  - Ambulate patient  times a day  - Out of bed to chair  times a day   - Out of bed for meals  times a day  - Out of bed for toileting  - Record patient progress and toleration of activity level   Outcome: Progressing     Problem: DISCHARGE PLANNING  Goal: Discharge to home or other facility with appropriate resources  Description: INTERVENTIONS:  - Identify barriers to discharge w/patient and caregiver  - Arrange for needed discharge resources and transportation as appropriate  - Identify discharge learning needs (meds, wound care, etc.)  - Arrange for interpretive services to assist at discharge as needed  - Refer to Case Management Department for coordinating discharge planning if the patient needs post-hospital services based on physician/advanced practitioner order or complex needs related to functional status, cognitive  ability, or social support system  Outcome: Progressing     Problem: Knowledge Deficit  Goal: Patient/family/caregiver demonstrates understanding of disease process, treatment plan, medications, and discharge instructions  Description: Complete learning assessment and assess knowledge base.  Interventions:  - Provide teaching at level of understanding  - Provide teaching via preferred learning methods  Outcome: Progressing     Problem: CARDIOVASCULAR - ADULT  Goal: Maintains optimal cardiac output and hemodynamic stability  Description: INTERVENTIONS:  - Monitor I/O, vital signs and rhythm  - Monitor for S/S and trends of decreased cardiac output  - Administer and titrate ordered vasoactive medications to optimize hemodynamic stability  - Assess quality of pulses, skin color and temperature  - Assess for signs of decreased coronary artery perfusion  - Instruct patient to report change in severity of symptoms  Outcome: Progressing  Goal: Absence of cardiac dysrhythmias or at baseline rhythm  Description: INTERVENTIONS:  - Continuous cardiac monitoring, vital signs, obtain 12 lead EKG if ordered  - Administer antiarrhythmic and heart rate control medications as ordered  - Monitor electrolytes and administer replacement therapy as ordered  Outcome: Progressing     Problem: METABOLIC, FLUID AND ELECTROLYTES - ADULT  Goal: Electrolytes maintained within normal limits  Description: INTERVENTIONS:  - Monitor labs and assess patient for signs and symptoms of electrolyte imbalances  - Administer electrolyte replacement as ordered  - Monitor response to electrolyte replacements, including repeat lab results as appropriate  - Instruct patient on fluid and nutrition as appropriate  Outcome: Progressing  Goal: Fluid balance maintained  Description: INTERVENTIONS:  - Monitor labs   - Monitor I/O and WT  - Instruct patient on fluid and nutrition as appropriate  - Assess for signs & symptoms of volume excess or deficit  Outcome:  Progressing  Goal: Glucose maintained within target range  Description: INTERVENTIONS:  - Monitor Blood Glucose as ordered  - Assess for signs and symptoms of hyperglycemia and hypoglycemia  - Administer ordered medications to maintain glucose within target range  - Assess nutritional intake and initiate nutrition service referral as needed  Outcome: Progressing     Problem: HEMATOLOGIC - ADULT  Goal: Maintains hematologic stability  Description: INTERVENTIONS  - Assess for signs and symptoms of bleeding or hemorrhage  - Monitor labs  - Administer supportive blood products/factors as ordered and appropriate  Outcome: Progressing

## 2024-04-10 NOTE — ASSESSMENT & PLAN NOTE
Lab Results   Component Value Date    EGFR 43 04/10/2024    EGFR 34 04/09/2024    EGFR 26 04/08/2024    CREATININE 1.56 (H) 04/10/2024    CREATININE 1.91 (H) 04/09/2024    CREATININE 2.36 (H) 04/08/2024   Baseline creatinine appears to be 1.4-1.6  VARGHESE on admission was likely due to septic shock  Creatinine has returned to baseline  Monitor BMP daily for now  Renally dose medications  Avoid nephrotoxins if possible

## 2024-04-11 ENCOUNTER — TRANSITIONAL CARE MANAGEMENT (OUTPATIENT)
Dept: FAMILY MEDICINE CLINIC | Facility: CLINIC | Age: 74
End: 2024-04-11

## 2024-04-11 ENCOUNTER — ANESTHESIA (INPATIENT)
Dept: PERIOP | Facility: HOSPITAL | Age: 74
End: 2024-04-11
Payer: COMMERCIAL

## 2024-04-11 ENCOUNTER — ANESTHESIA EVENT (INPATIENT)
Dept: PERIOP | Facility: HOSPITAL | Age: 74
End: 2024-04-11
Payer: COMMERCIAL

## 2024-04-11 LAB
ANION GAP SERPL CALCULATED.3IONS-SCNC: 5 MMOL/L (ref 4–13)
BASOPHILS # BLD AUTO: 0.03 THOUSANDS/ÂΜL (ref 0–0.1)
BASOPHILS NFR BLD AUTO: 1 % (ref 0–1)
BNP SERPL-MCNC: 556 PG/ML (ref 0–100)
BUN SERPL-MCNC: 20 MG/DL (ref 5–25)
CALCIUM SERPL-MCNC: 8.1 MG/DL (ref 8.4–10.2)
CHLORIDE SERPL-SCNC: 111 MMOL/L (ref 96–108)
CO2 SERPL-SCNC: 23 MMOL/L (ref 21–32)
CREAT SERPL-MCNC: 1.51 MG/DL (ref 0.6–1.3)
EOSINOPHIL # BLD AUTO: 0.2 THOUSAND/ÂΜL (ref 0–0.61)
EOSINOPHIL NFR BLD AUTO: 3 % (ref 0–6)
ERYTHROCYTE [DISTWIDTH] IN BLOOD BY AUTOMATED COUNT: 16.4 % (ref 11.6–15.1)
GFR SERPL CREATININE-BSD FRML MDRD: 45 ML/MIN/1.73SQ M
GLUCOSE SERPL-MCNC: 88 MG/DL (ref 65–140)
HCT VFR BLD AUTO: 26.3 % (ref 36.5–49.3)
HGB BLD-MCNC: 7.9 G/DL (ref 12–17)
IMM GRANULOCYTES # BLD AUTO: 0.09 THOUSAND/UL (ref 0–0.2)
IMM GRANULOCYTES NFR BLD AUTO: 2 % (ref 0–2)
INR PPP: 1.57 (ref 0.84–1.19)
LYMPHOCYTES # BLD AUTO: 1.35 THOUSANDS/ÂΜL (ref 0.6–4.47)
LYMPHOCYTES NFR BLD AUTO: 23 % (ref 14–44)
MCH RBC QN AUTO: 26.2 PG (ref 26.8–34.3)
MCHC RBC AUTO-ENTMCNC: 30 G/DL (ref 31.4–37.4)
MCV RBC AUTO: 87 FL (ref 82–98)
MONOCYTES # BLD AUTO: 0.47 THOUSAND/ÂΜL (ref 0.17–1.22)
MONOCYTES NFR BLD AUTO: 8 % (ref 4–12)
NEUTROPHILS # BLD AUTO: 3.8 THOUSANDS/ÂΜL (ref 1.85–7.62)
NEUTS SEG NFR BLD AUTO: 63 % (ref 43–75)
NRBC BLD AUTO-RTO: 0 /100 WBCS
PLATELET # BLD AUTO: 188 THOUSANDS/UL (ref 149–390)
PMV BLD AUTO: 10.9 FL (ref 8.9–12.7)
POTASSIUM SERPL-SCNC: 3.8 MMOL/L (ref 3.5–5.3)
PROTHROMBIN TIME: 18.6 SECONDS (ref 11.6–14.5)
RBC # BLD AUTO: 3.02 MILLION/UL (ref 3.88–5.62)
SODIUM SERPL-SCNC: 139 MMOL/L (ref 135–147)
WBC # BLD AUTO: 5.94 THOUSAND/UL (ref 4.31–10.16)

## 2024-04-11 PROCEDURE — 80048 BASIC METABOLIC PNL TOTAL CA: CPT | Performed by: INTERNAL MEDICINE

## 2024-04-11 PROCEDURE — 85610 PROTHROMBIN TIME: CPT | Performed by: INTERNAL MEDICINE

## 2024-04-11 PROCEDURE — 97163 PT EVAL HIGH COMPLEX 45 MIN: CPT

## 2024-04-11 PROCEDURE — 99232 SBSQ HOSP IP/OBS MODERATE 35: CPT | Performed by: FAMILY MEDICINE

## 2024-04-11 PROCEDURE — 99223 1ST HOSP IP/OBS HIGH 75: CPT | Performed by: INTERNAL MEDICINE

## 2024-04-11 PROCEDURE — 97167 OT EVAL HIGH COMPLEX 60 MIN: CPT

## 2024-04-11 PROCEDURE — 85025 COMPLETE CBC W/AUTO DIFF WBC: CPT | Performed by: INTERNAL MEDICINE

## 2024-04-11 PROCEDURE — 83880 ASSAY OF NATRIURETIC PEPTIDE: CPT | Performed by: NURSE PRACTITIONER

## 2024-04-11 RX ORDER — POTASSIUM CHLORIDE 20 MEQ/1
40 TABLET, EXTENDED RELEASE ORAL ONCE
Status: COMPLETED | OUTPATIENT
Start: 2024-04-11 | End: 2024-04-11

## 2024-04-11 RX ORDER — FUROSEMIDE 10 MG/ML
40 INJECTION INTRAMUSCULAR; INTRAVENOUS ONCE
Status: COMPLETED | OUTPATIENT
Start: 2024-04-11 | End: 2024-04-11

## 2024-04-11 RX ADMIN — AMIODARONE HYDROCHLORIDE 200 MG: 200 TABLET ORAL at 10:00

## 2024-04-11 RX ADMIN — SODIUM CHLORIDE, SODIUM GLUCONATE, SODIUM ACETATE, POTASSIUM CHLORIDE, MAGNESIUM CHLORIDE, SODIUM PHOSPHATE, DIBASIC, AND POTASSIUM PHOSPHATE 50 ML/HR: .53; .5; .37; .037; .03; .012; .00082 INJECTION, SOLUTION INTRAVENOUS at 00:10

## 2024-04-11 RX ADMIN — CEFTRIAXONE SODIUM 2000 MG: 10 INJECTION, POWDER, FOR SOLUTION INTRAVENOUS at 14:53

## 2024-04-11 RX ADMIN — PANTOPRAZOLE SODIUM 20 MG: 20 TABLET, DELAYED RELEASE ORAL at 10:00

## 2024-04-11 RX ADMIN — POTASSIUM CHLORIDE 40 MEQ: 1500 TABLET, EXTENDED RELEASE ORAL at 15:59

## 2024-04-11 RX ADMIN — VANCOMYCIN HYDROCHLORIDE 1000 MG: 1 INJECTION, SOLUTION INTRAVENOUS at 20:17

## 2024-04-11 RX ADMIN — GABAPENTIN 200 MG: 100 CAPSULE ORAL at 15:59

## 2024-04-11 RX ADMIN — SODIUM CHLORIDE, SODIUM GLUCONATE, SODIUM ACETATE, POTASSIUM CHLORIDE, MAGNESIUM CHLORIDE, SODIUM PHOSPHATE, DIBASIC, AND POTASSIUM PHOSPHATE 50 ML/HR: .53; .5; .37; .037; .03; .012; .00082 INJECTION, SOLUTION INTRAVENOUS at 18:34

## 2024-04-11 RX ADMIN — ATORVASTATIN CALCIUM 20 MG: 20 TABLET, FILM COATED ORAL at 15:59

## 2024-04-11 RX ADMIN — GABAPENTIN 200 MG: 100 CAPSULE ORAL at 21:10

## 2024-04-11 RX ADMIN — FUROSEMIDE 40 MG: 10 INJECTION, SOLUTION INTRAMUSCULAR; INTRAVENOUS at 15:59

## 2024-04-11 RX ADMIN — GABAPENTIN 200 MG: 100 CAPSULE ORAL at 10:00

## 2024-04-11 NOTE — CONSULTS
Cardiology   Silvio Drew 73 y.o. male MRN: [id number]  Unit/Bed#: [facility] 831-01 Encounter: [id number]      Reason for Consult / Principal Problem: Preoperative cardiac risk stratification.    Physician Requesting Consult:  Placido Jauregui MD    Outpatient Cardiologist: Dr. Franks    Assessment  1. Iatrogenic volume overload - (received approximately 3.5 L of IV fluid resuscitation on 4/6)  2. Chronic HFrEF  3. Dilated NICM LVEF 36% (diagnosed back in 2011), most recent EF 25 to 30% in 11/2022.  -Mildly volume overloaded on exam.  -BNP ordered/pending.  -TTE 4/7/2024; LVEF 36%, global hypokinesis with regional variability, marked hypokinesis to akinesis of the entire inferior lateral wall.  -[facility] procedure 2011; nonobstructive CAD.  -GDMT; losartan 50 mg daily, and metoprolol succinate 25 mg daily.   -Outpatient diuretic regimen; furosemide 20 mg daily   -Inpatient diuretic regimen; none.  -Baseline dry weight around 216 pounds, SS weight this a.m. 223 pounds.  4. AICD In situ -recent GEN change in 12/2023.  -Device interrogation 12/2023 - Presenting Egm= VS; 0 episodes detected since implant and no therapies delivered; Battery voltage adequate with 13.4 years remaining until PIPE; Available lead measurements stable and adequate - Capture management ON; 1% V-paced   5. Paroxysmal atrial fibrillation/atrial flutter.  -History of inappropriate ICD shocks from A-fib with RVR.  -ECG on admission demonstrated rate controlled atrial fibrillation.  -On oral amiodarone 200 mg daily plus metoprolol succinate 25 mg BID  -On warfarin, INR 1.57.  6. Septic shock  7. Polymicrobial bacteremia.  -Management per the ID service.  -On IV antibiotics.  8. Dental caries w/ possible abscess  -Management per the OMS service.  -On IV antibiotics.  9. CKD stage III  -Baseline creatinine around 1.3-1.6  -Creatinine currently 1.51.    Plan  -Patient denies any specific cardiac symptoms at rest.  More recently he has been experiencing mild dyspnea  with exertion since having been hospitalized.  He states he previously had not been experiencing these symptoms prior to his admission.  He is currently not requiring any supplemental oxygen.  On exam he does appear to be at least mildly volume overloaded with the presence of JVD and bilateral lower extremity edema.  BP/renal function stable.  Will give him a one-time dose of IV Lasix 40 mg and assess his response to this.  As stated above, this does not represent acute CHF but rather an iatrogenic volume overload state secondary to aggressive IV fluid resuscitation which he received earlier this admission.  Other than this, he appears to be compensated from a CHF perspective.  No complaints of any recent anginal or anginal equivalent appearing symptoms.   -Resume warfarin pending OMF clearance.  Reestablish goal INR 2-3.  -Remains in atrial fibrillation, however heart rates are well-controlled.  He appears to have paroxysmal atrial fibrillation as his last device interrogation did not show any evidence of atrial fibrillation.  Continue oral amiodarone 200 mg daily, resume metoprolol succinate 25 mg daily if able (need to confirm his reported allergy).   -IV antibiotics per the ID service.  -Strict I's and O's, daily standing weights, 2 g Na+ diet to LFR.  -Monitor renal function and electrolytes closely.  -No indication for telemetry.      HPI: Silvio Drew 73 y.o. year old male who presents with a medical history of chronic HFrEF, nonischemic cardiomyopathy EF historically 25-35%, AICD in situ, paroxysmal atrial fibrillation/atrial flutter, hypertension, and CKD stage III.  Non-smoker, denies excessive alcohol or recreational/illicit drug use.  He follows routinely with Dr. Golden felix/ [facility].  He presented to the [facility] ED on 4/6/2024 with complaints of generalized weakness fevers/chills and an unfortunate fall at his home residence.  He was hypotensive, laboratory data notable for  nongap metabolic acidosis, elevated BUN/creatinine, leukocytosis, anemia/thrombocytopenia.  CT of the head/abdomen/pelvis no acute findings.  He did meet sepsis criteria and was initiated on IV antibiotics and received IV fluid resuscitation.  He subsequently required the initiation of IV pressors given persistent hypotension.  Fortunately IV pressors have been weaned off.  He was found to have polymicrobial bacteremia (Strep intermedius, GPR) after blood cultures had been drawn.  ID service was consulted for further management regarding IV antibiotic therapy.  Given complaints of dental pain he underwent a CT of the face which demonstrated periapical lucencies in the left maxilla and left mandible corresponding with cavities/possible abscess.  ZULY has been requested by the ID service to evaluate for endocarditis plus or minus cardiac device infection.  He was also evaluated by the [facility] surgical team earlier this morning who recommended operative intervention/teeth extraction given for dental origin of his bacteremia.  Cardiology has been consulted to provide a formal preoperative cardiac restratification.      Family History:   Family History   Problem Relation Age of Onset    Hypertension Mother     Diabetes Mother     No Known Problems Father      Historical Information   Past Medical History:   Diagnosis Date    Anemia 3/12/2013    Atrial fibrillation (HCC)     Coagulopathy (HCC) 4/6/2024    Coronary artery disease     GERD (gastroesophageal reflux disease)     Hyperlipidemia associated with type 2 diabetes mellitus  (HCC) 5/4/2021    Hypertension     Obesity     Thyroid nodule 4/6/2024     Past Surgical History:   Procedure Laterality Date    CARDIAC PACEMAKER PLACEMENT  12/2023    replacement    CORONARY ARTERY BYPASS GRAFT       Social History   Social History     Substance and Sexual Activity   Alcohol Use Not Currently     Social History     Substance and Sexual Activity   Drug Use No     Social History      Tobacco Use   Smoking Status Never   Smokeless Tobacco Never   Tobacco Comments    No passive smoke exposure     Family History:   Family History   Problem Relation Age of Onset    Hypertension Mother     Diabetes Mother     No Known Problems Father        Review of Systems:  Review of Systems   Constitutional:  Negative for chills, fatigue and fever.   Respiratory:  Negative for cough and shortness of breath.    Cardiovascular:  Negative for chest pain, palpitations and leg swelling.        Mild PLATA.   Gastrointestinal:  Negative for abdominal pain.   Neurological:  Negative for dizziness, light-headedness and headaches.   All other systems reviewed and are negative.          Scheduled Meds:  Current Facility-Administered Medications   Medication Dose Route Frequency Provider Last Rate    acetaminophen  650 mg Oral Q6H PRN Manav Guillermo MD      amiodarone  200 mg Oral Daily Manav Guillermo MD      atorvastatin  20 mg Oral Daily With Dinner Manav Guillermo MD      cefTRIAXone  2,000 mg Intravenous Q24H Manav Guillermo MD 2,000 mg (04/11/24 1453)    gabapentin  200 mg Oral TID Manav Guillermo MD      lidocaine  2 patch Topical Daily Manav Guillermo MD      multi-electrolyte  50 mL/hr Intravenous Continuous Manav Guillermo MD 50 mL/hr (04/11/24 0010)    pantoprazole  20 mg Oral Daily Before Breakfast Manav Guillermo MD      polyethylene glycol  17 g Oral Daily Manav Guillermo MD      vancomycin  12.5 mg/kg (Adjusted) Intravenous Q24H Kimberli Lazo DO       Continuous Infusions:multi-electrolyte, 50 mL/hr, Last Rate: 50 mL/hr (04/11/24 0010)      PRN Meds:.  acetaminophen  all current active meds have been reviewed and current meds:   Current Facility-Administered Medications   Medication Dose Route Frequency    acetaminophen (TYLENOL) tablet 650 mg  650 mg Oral Q6H PRN    amiodarone tablet 200 mg  200 mg Oral Daily    atorvastatin (LIPITOR) tablet 20 mg  20 mg Oral Daily With Dinner    cefTRIAXone (ROCEPHIN) 2,000 mg in  "dextrose 5 % 50 mL IVPB  2,000 mg Intravenous Q24H    gabapentin (NEURONTIN) capsule 200 mg  200 mg Oral TID    lidocaine (LIDODERM) 5 % patch 2 patch  2 patch Topical Daily    multi-electrolyte (PLASMALYTE-A/ISOLYTE-S PH 7.4) IV solution  50 mL/hr Intravenous Continuous    pantoprazole (PROTONIX) EC tablet 20 mg  20 mg Oral Daily Before Breakfast    polyethylene glycol (MIRALAX) packet 17 g  17 g Oral Daily    vancomycin (VANCOCIN) IVPB (premix in dextrose) 1,000 mg 200 mL  12.5 mg/kg (Adjusted) Intravenous Q24H       Allergies   Allergen Reactions    Metoprolol Shortness Of Breath     \"50mg\" via [place] ; states \"When I take the 25mg I'm normal.\"       Objective   Vitals: Blood pressure 109/69, pulse 80, temperature 98.6 °F (37 °C), resp. rate 16, height 5' 8\" (1.727 m), weight 101 kg (223 lb 8.7 oz), SpO2 97%., Body mass index is 33.99 kg/m².,   Orthostatic Blood Pressures      Flowsheet Row Most Recent Value   Blood Pressure 109/69 filed at 04/11/2024 1514              Intake/Output Summary (Last 24 hours) at 4/11/2024 1525  Last data filed at 4/11/2024 1453  Gross per 24 hour   Intake 735.83 ml   Output 800 ml   Net -64.17 ml       Invasive Devices       Peripheral Intravenous Line  Duration             Peripheral IV 04/11/24 Dorsal (posterior);Proximal;Right Forearm <1 day                  Physical Exam:  Physical Exam  Vitals and nursing note reviewed.   Constitutional:       General: He is not in acute distress.     Appearance: He is obese. He is not diaphoretic.   HENT:      Head: Normocephalic and atraumatic.   Eyes:      General: No scleral icterus.  Cardiovascular:      Rate and Rhythm: Normal rate. Rhythm irregular.      Pulses: Normal pulses.      Heart sounds: Normal heart sounds.   Pulmonary:      Effort: Pulmonary effort is normal.      Breath sounds: Normal breath sounds. No wheezing or rales.   Abdominal:      Palpations: Abdomen is soft.   Musculoskeletal:      Right lower leg: Edema " present.      Left lower leg: Edema present.      Comments: Mild ankle/pedal edema.    Skin:     General: Skin is warm and dry.      Capillary Refill: Capillary refill takes less than 2 seconds.   Neurological:      General: No focal deficit present.      Mental Status: He is alert and oriented to person, place, and time.   Psychiatric:         Mood and Affect: Mood normal.         Lab Results:   Recent Results (from the past 24 hour(s))   Basic metabolic panel    Collection Time: 04/11/24  5:01 AM   Result Value Ref Range    Sodium 139 135 - 147 mmol/L    Potassium 3.8 3.5 - 5.3 mmol/L    Chloride 111 (H) 96 - 108 mmol/L    CO2 23 21 - 32 mmol/L    ANION GAP 5 4 - 13 mmol/L    BUN 20 5 - 25 mg/dL    Creatinine 1.51 (H) 0.60 - 1.30 mg/dL    Glucose 88 65 - 140 mg/dL    Calcium 8.1 (L) 8.4 - 10.2 mg/dL    eGFR 45 ml/min/1.73sq m   CBC and differential    Collection Time: 04/11/24  5:01 AM   Result Value Ref Range    WBC 5.94 4.31 - 10.16 Thousand/uL    RBC 3.02 (L) 3.88 - 5.62 Million/uL    Hemoglobin 7.9 (L) 12.0 - 17.0 g/dL    Hematocrit 26.3 (L) 36.5 - 49.3 %    MCV 87 82 - 98 fL    MCH 26.2 (L) 26.8 - 34.3 pg    MCHC 30.0 (L) 31.4 - 37.4 g/dL    RDW 16.4 (H) 11.6 - 15.1 %    MPV 10.9 8.9 - 12.7 fL    Platelets 188 149 - 390 Thousands/uL    nRBC 0 /100 WBCs    Segmented % 63 43 - 75 %    Immature Grans % 2 0 - 2 %    Lymphocytes % 23 14 - 44 %    Monocytes % 8 4 - 12 %    Eosinophils Relative 3 0 - 6 %    Basophils Relative 1 0 - 1 %    Absolute Neutrophils 3.80 1.85 - 7.62 Thousands/µL    Absolute Immature Grans 0.09 0.00 - 0.20 Thousand/uL    Absolute Lymphocytes 1.35 0.60 - 4.47 Thousands/µL    Absolute Monocytes 0.47 0.17 - 1.22 Thousand/µL    Eosinophils Absolute 0.20 0.00 - 0.61 Thousand/µL    Basophils Absolute 0.03 0.00 - 0.10 Thousands/µL   Protime-INR    Collection Time: 04/11/24  5:01 AM   Result Value Ref Range    Protime 18.6 (H) 11.6 - 14.5 seconds    INR 1.57 (H) 0.84 - 1.19     Imaging: I have  personally reviewed pertinent reports.   and I have personally reviewed pertinent films in PACS  Code Status: Level 1 full code  Epic/ Allscripts/Care Everywhere records reviewed: Yes    * Please Note: Fluency DirectDictation voice to text software may have been used in the creation of this document. **

## 2024-04-11 NOTE — PLAN OF CARE
Problem: OCCUPATIONAL THERAPY ADULT  Goal: Performs self-care activities at highest level of function for planned discharge setting.  See evaluation for individualized goals.  Description: Treatment Interventions: ADL retraining, Functional transfer training, Endurance training, Cognitive reorientation, Patient/family training, Equipment evaluation/education, Fine motor coordination activities, Compensatory technique education, Continued evaluation, Energy conservation, Activityengagement          See flowsheet documentation for full assessment, interventions and recommendations.   Note: Limitation: Decreased ADL status, Decreased Safe judgement during ADL, Decreased endurance, Decreased self-care trans, Decreased high-level ADLs  Prognosis: Good  Assessment: 73 year old pt seen today for an OT evaluation following [facility] to [facility], transferred from [facility] 2/2 septic shock, polymicrobial bacteremia and dental infections with present symptoms significant for fatigue, weakness, decreased functional mobility, decreased ADL status, pain. Pt has a past medical history of Anemia, Atrial fibrillation (HCC), Coagulopathy ([facility]), Coronary artery disease, GERD (gastroesophageal reflux disease), Hyperlipidemia associated with type 2 diabetes mellitus  ([facility]), Hypertension, Obesity, and Thyroid nodule. Pt primarily Nepali speaking, able to communicate effectively with simple English phrases. Per chart and pt report, pt lives alone in a 2SH with bed/bath upstairs. Pt reports being IND with all ADLs/IADLs PTA with SPC used PRN. Pt has local family that check in on him. Pt very pleasant and cooperative t/o session. Pt completed functional bed mobility with SUP. SUP for functional STS txfs with RW. Min A/CGA for functional mobility to go household distances with RW. Pt was SUP for UB ADLs and mod A for LB ADLs. The patient's raw score on the AM-PAC Daily Activity Inpatient Short Form is 18. A  raw score of greater than or equal to 19 suggests the patient may benefit from discharge to home. Please refer to the recommendation of the Occupational Therapist for safe discharge planning. Pt is functioning below baseline level of function and will continue to benefit from skilled acute OT to promote increased independence and return to PLOF/Pt is functioning at or near baseline level of function and no further skilled OT needs are identified. At this time, current OT recommendation is Level 3 resources upon d/c - HHOT.     Rehab Resource Intensity Level, OT: III (Minimum Resource Intensity)

## 2024-04-11 NOTE — UTILIZATION REVIEW
NOTIFICATION OF ADMISSION DISCHARGE   This is a Notification of Discharge from [facility]. Please be advised that this patient has been discharge from our facility. Below you will find the admission and discharge date and time including the patient’s disposition.   UTILIZATION REVIEW CONTACT:  Jayla Cody  Utilization   Network Utilization Review Department  Phone: [phone] x carefully listen to the prompts. All voicemails are confidential.  Email: [email or web address]     ADMISSION INFORMATION  PRESENTATION DATE: 4/6/2024  5:56 PM  OBERVATION ADMISSION DATE:   INPATIENT ADMISSION DATE: 4/6/24  7:44 PM   DISCHARGE DATE: 4/10/2024  7:41 PM   DISPOSITION:[facility] Utilization Review Department  ATTENTION: Please call with any questions or concerns to [phone] and carefully listen to the prompts so that you are directed to the right person. All voicemails are confidential.   For Discharge needs, contact Care Management DC Support Team at [phone] opt. 2  Send all requests for admission clinical reviews, approved or denied determinations and any other requests to dedicated fax number below belonging to the campus where the patient is receiving treatment. List of dedicated fax numbers for the Facilities:  FACILITY NAME UR FAX NUMBER   ADMISSION DENIALS (Administrative/Medical Necessity) [phone]   DISCHARGE SUPPORT TEAM ([facility]) [phone]   PARENT CHILD HEALTH (Maternity/NICU/Pediatrics) [phone]   [facility] [phone]   [facility] [phone]   [facility] [phone]   [facility] [phone]   [facility] [phone]   [facility] [phone]   [facility] [phone]   [facility] [phone]   [facility]  [facility] [phone]   [facility] [phone]   [facility] [phone]   [facility] [phone]

## 2024-04-11 NOTE — PLAN OF CARE
Problem: PHYSICAL THERAPY ADULT  Goal: Performs mobility at highest level of function for planned discharge setting.  See evaluation for individualized goals.  Description: Treatment/Interventions: Functional transfer training, Elevations, Therapeutic exercise, Bed mobility, Gait training, Spoke to nursing, OT  Equipment Recommended: Walker (pp)       See flowsheet documentation for full assessment, interventions and recommendations.  Note: Prognosis: Good  Problem List: Decreased endurance, Pain  Assessment: Pt is a 73 y.o. male seen for PT evaluation s/p admit to [facility] on 4/10/2024. Pt was admitted with a primary dx of: Streptococcal bacteremia, Dental infection, Anemia, Visual disturbance, Chronic systolic CHF,VARGHESE, Septic shock with PMH sx for HTN, A-fib.  PT now consulted for assessment of mobility and d/c needs. Pt with Activity as tolerated orders.  Pts current clinical presentation is Unstable/ Unpredictable (high complexity) due to Ongoing medical management for primary dx, Increased reliance on more restrictive AD compared to baseline, Decreased activity tolerance compared to baseline. Prior to admission, pt was Independent for mobility and ADLs, using SPC prn. Upon evaluation, pt currently is requiring supervision for bed mobility and transfers, CG A for ambulation as pt requires cueing for safe use of AD during mobility , is somewhat impulsive. Pt presents at PT eval functioning below baseline and currently w/ overall mobility deficits 2* to: decreased endurance, gait deviations, decreased activity tolerance compared to baseline. Pt will continue to benefit from skilled acute PT interventions to address stated impairments; to maximize functional mobility; for ongoing pt training; and DME needs. Pt uses SPC at baseline as prn. At conclusion of PT session pt returned BTB, bed alarm engaged, all needs in reach, and RN notified of session findings/recommendations with phone and call bell  within reach. Pt denies any further questions at this time. The patient's AM-PAC Basic Mobility Inpatient Short Form Raw Score is 18. A Raw score of greater than 16 suggests the patient may benefit from discharge to home. Please also refer to the recommendation of the Physical Therapist for safe discharge planning. Recommend Level 3 upon hospital D/C pending further progress, stair assessment.  Barriers to Discharge:  (Stair trial)     Rehab Resource Intensity Level, PT: III (Minimum Resource Intensity)    See flowsheet documentation for full assessment.

## 2024-04-11 NOTE — CONSULTS
Consultation - Infectious Disease   Silvio Drew 73 y.o. male MRN: [id number]  Unit/Bed#: [facility] 831-01 Encounter: [id number]      IMPRESSION & RECOMMENDATIONS:   Impression/Recommendations:  Septic shock.    At [facility].  Due to bacteremia as below.  No other appreciable source.  Improved.    Continue antibiotics as below  Follow temperatures closely  Recheck WBC in AM to monitor infection  Supportive care as per the primary service    Polymicrobial bacteremia.    Strep intermedius, GPR.  Consider dental origin as below.  Consider endocarditis, cardiac device infection.  Repeat blood cultures 4/8,  TTE (adequate) negative.    Continue ceftriaxone for now  Continue vancomycin pending [facility]  Pharmacy consult for vancomycin dosing  Check ZULY to evaluate for endocarditis, cardiac device infection    Dental caries.    With possible abscess.  Risk factor for bacteremia as above    Extraction pending per OMS    Visual disturbance.  Left eye.  Consider relation to bacteremia as above    Await Ophthalmology consult    Indeterminate liver lesion.  See on CT.  1.5 cm.  Needs MRI to further characterize.    Status post MV annuloplasty.    Dilated cardiomyopathy.    Status post AICD.    CKD.  Baseline Cr 1.5.    Follow creatinine closely and dose-adjust antibiotics as indicated  Recheck BMP in AM    Lumbar radiculopathy.  Noted to be chronic, intermittent problem on chart review.  Unclear relation to bacteremia as above.    Follow up MRI L-spine per SLIM    I discussed with Dr. Jauregui the above plan to continue IV antibiotics and check ZULY.  He agrees with the plan.    Antibiotics:  Ceftriaxone #5  Vancomycin #5    Thank you for this consultation.  We will follow along with you.    HISTORY OF PRESENT ILLNESS:  Reason for Consult: Bactermia    HPI: Silvio Drew is a 73 y.o. male with multiple medical problems who initially presented to [facility] after a fall due to weakness.  Found to have fever and hypotension.  Admitted to the ICU and  "started on antibiotics and pressors.  Found to have bacteremia attributed to dental source given recent dental issues.  Repeat blood cultures and TTE negative.  Transferred to [facility] for OMS evaluation and dental extraction is pending for later today.  We are asked to comment on further evaluation and management.    In performing this consult, I have reviewed prior admission and outpatient visit records in detail.    REVIEW OF SYSTEMS:  As per HPI  A complete system-based review of systems is otherwise negative.    PAST MEDICAL HISTORY:  Past Medical History:   Diagnosis Date    Anemia 3/12/2013    Atrial fibrillation (HCC)     Coagulopathy (HCC) 2024    Coronary artery disease     GERD (gastroesophageal reflux disease)     Hyperlipidemia associated with type 2 diabetes mellitus  (HCC) 2021    Hypertension     Obesity     Thyroid nodule 2024     Past Surgical History:   Procedure Laterality Date    CARDIAC PACEMAKER PLACEMENT  2023    replacement    CORONARY ARTERY BYPASS GRAFT         FAMILY HISTORY:  Non-contributory    SOCIAL HISTORY:  Social History     Substance and Sexual Activity   Alcohol Use Not Currently     Social History     Substance and Sexual Activity   Drug Use No     Social History     Tobacco Use   Smoking Status Never   Smokeless Tobacco Never   Tobacco Comments    No passive smoke exposure       ALLERGIES:  Allergies   Allergen Reactions    Metoprolol Shortness Of Breath     \"50mg\" via Irish ; states \"When I take the 25mg I'm normal.\"       MEDICATIONS:  All current active medications have been reviewed, including antibiotics as outlined above.    PHYSICAL EXAM:  Vitals:  Temp:  [97.5 °F (36.4 °C)-98.5 °F (36.9 °C)] 98.5 °F (36.9 °C)  HR:  [77-90] 77  Resp:  [16-21] 16  BP: (105-138)/(70-80) 105/70  SpO2:  [95 %-97 %] 95 %  Temp (24hrs), Av.8 °F (36.6 °C), Min:97.5 °F (36.4 °C), Max:98.5 °F (36.9 °C)  Current: Temperature: 98.5 °F (36.9 °C)     Physical Exam:  General: "  Well-nourished, well-developed, in no acute distress  Eyes:  Conjunctive clear with no hemorrhages or effusions  Oropharynx:  No ulcers, no lesions  Neck:  Supple, no lymphadenopathy  Lungs:  Normal respiratory excursion, no accessory muscle use  Cardiac:  Regular rate and rhythm, extremities well perfused  Abdomen:  Soft, non-tender, non-distended  Extremities:  No peripheral cyanosis, clubbing, or edema  Skin:  No rashes, no ulcers  Neurological:  Moves all four extremities spontaneously, sensation grossly intact    LABS, IMAGING, & OTHER STUDIES:  Lab Results:  I have personally reviewed pertinent labs.  Results from last 7 days   Lab Units 04/11/24  0501 04/10/24  0519 04/09/24  0527 04/07/24  0521 04/06/24  1820   SODIUM mmol/L 139 140 140   < > 139   POTASSIUM mmol/L 3.8 3.8 3.7   < > 4.4   CHLORIDE mmol/L 111* 111* 114*   < > 113*   CO2 mmol/L 23 21 20*   < > 19*   BUN mg/dL 20 22 29*   < > 45*   CREATININE mg/dL 1.51* 1.56* 1.91*   < > 3.39*   EGFR ml/min/1.73sq m 45 43 34   < > 17   CALCIUM mg/dL 8.1* 8.4 8.2*   < > 8.0*   AST U/L  --   --   --   --  58*   ALT U/L  --   --   --   --  51   ALK PHOS U/L  --   --   --   --  76    < > = values in this interval not displayed.     Results from last 7 days   Lab Units 04/11/24  0501 04/10/24  0519 04/09/24  0527   WBC Thousand/uL 5.94 6.22 5.65   HEMOGLOBIN g/dL 7.9* 8.1* 7.3*   PLATELETS Thousands/uL 188 191 152     Results from last 7 days   Lab Units 04/08/24  1547 04/06/24  2349 04/06/24  1841 04/06/24  1838   BLOOD CULTURE  No Growth at 48 hrs.  No Growth at 48 hrs.  --  Streptococcus intermedius* Streptococcus intermedius*   GRAM STAIN RESULT   --   --  Gram positive cocci in pairs and chains*  Gram positive rods* Gram positive cocci in pairs and chains*  Gram positive rods*   URINE CULTURE   --  No Growth <1000 cfu/mL  --   --    MRSA CULTURE ONLY   --  No Methicillin Resistant Staphlyococcus aureus (MRSA) isolated  --   --        Imaging Studies:   I  have personally reviewed the following radiographic images in PACS:  CT A/P images reviewed small non-enhancing liver lesions

## 2024-04-11 NOTE — PLAN OF CARE
Problem: PAIN - ADULT  Goal: Verbalizes/displays adequate comfort level or baseline comfort level  Description: Interventions:  - Encourage patient to monitor pain and request assistance  - Assess pain using appropriate pain scale  - Administer analgesics based on type and severity of pain and evaluate response  - Implement non-pharmacological measures as appropriate and evaluate response  - Consider cultural and social influences on pain and pain management  - Notify physician/advanced practitioner if interventions unsuccessful or patient reports new pain  Outcome: Progressing     Problem: INFECTION - ADULT  Goal: Absence or prevention of progression during hospitalization  Description: INTERVENTIONS:  - Assess and monitor for signs and symptoms of infection  - Monitor lab/diagnostic results  - Monitor all insertion sites, i.e. indwelling lines, tubes, and drains  - Monitor endotracheal if appropriate and nasal secretions for changes in amount and color  - [place] appropriate cooling/warming therapies per order  - Administer medications as ordered  - Instruct and encourage patient and family to use good hand hygiene technique  - Identify and instruct in appropriate isolation precautions for identified infection/condition  Outcome: Progressing     Problem: SAFETY ADULT  Goal: Maintain or return to baseline ADL function  Description: INTERVENTIONS:  -  Assess patient's ability to carry out ADLs; assess patient's baseline for ADL function and identify physical deficits which impact ability to perform ADLs (bathing, care of mouth/teeth, toileting, grooming, dressing, etc.)  - Assess/evaluate cause of self-care deficits   - Assess range of motion  - Assess patient's mobility; develop plan if impaired  - Assess patient's need for assistive devices and provide as appropriate  - Encourage maximum independence but intervene and supervise when necessary  - Involve family in performance of ADLs  - Assess for home care  needs following discharge   - Consider OT consult to assist with ADL evaluation and planning for discharge  - Provide patient education as appropriate  Outcome: Progressing     Problem: HEMATOLOGIC - ADULT  Goal: Maintains hematologic stability  Description: INTERVENTIONS  - Assess for signs and symptoms of bleeding or hemorrhage  - Monitor labs  - Administer supportive blood products/factors as ordered and appropriate  Outcome: Progressing

## 2024-04-11 NOTE — ASSESSMENT & PLAN NOTE
Wt Readings from Last 3 Encounters:   04/10/24 100 kg (220 lb 8 oz)   03/18/24 100 kg (221 lb)   01/11/24 100 kg (221 lb 3.2 oz)     EF of 36% w/ mild-moderate TR (h/o mitral valve annuloplasty noted)  Holding Lasix/Cozaar in the setting of acute kidney injury - holding Toprol-XL in setting of recovering hypotension/shock  Monitor/replete electrolyte deficiencies if present  Resume cardiac agents over the upcoming days if BP allows  Cardiac consult

## 2024-04-11 NOTE — CONSULTS
Patient Name: Silvio Drew YOB: 1950    Medical Record No.: [id number]     Admit/Registration Date: 4/10/2024  8:05 PM  Date of Consult: 04/11/24      Oral and Maxillofacial Surgery Consult Note    Assessment:  73 y.o. male with CKD 3, A-fib, CHF with AICD/pacer who originally presented with fever and suspected septic shock of unclear etiology.  Initial CT chest/abdomen/pelvis with no apparent infectious findings.  Patient received empiric antibiotics.  He was weaned off pressor support.  Now admission blood cultures have isolated what looks like Streptococcus and GPR . Ct facial bones revealed carious left mandibular teeth and possible abscess. Teeth likely contributing to septic shock.     Plan/Recs:  - Appreciate care from primary team  - Abx as per ID  - Tentatively OR today as add on for extraction of any indicated teeth   - NPO/ IVF  - Cards clearance. Needs to be off Acs 48h prior to procedure    -----------------------------------------    Chief Complaint:  bacteremia    HPI:  73 y.o. male with CKD 3, A-fib, CHF with AICD/pacer who originally presented with fever and suspected septic shock of unclear etiology.  Initial CT chest/abdomen/pelvis with no apparent infectious findings.  Patient received empiric antibiotics.  He was weaned off pressor support.  Now admission blood cultures have isolated what looks like Streptococcus and GPR . He was then transferred to [place] for [facility] for dental extractions. He has had swelling in the left side of his mouth and dental pain for which he sees his dentist who could not perform a tooth extraction yet due to his anticoagulation.        Pain location: Lower left  Pain quality: throbbing  Pain scale: 8/10  Pain radiates to: neck and eye  Pain relieved by: pain meds     Pre-admission records reviewed. PMH/PSH/Meds/Allergies Reviewed.    Past Medical History:   Diagnosis Date    Anemia 3/12/2013    Atrial fibrillation (HCC)     Coagulopathy (HCC)  "4/6/2024    Coronary artery disease     GERD (gastroesophageal reflux disease)     Hyperlipidemia associated with type 2 diabetes mellitus  (HCC) 5/4/2021    Hypertension     Obesity     Thyroid nodule 4/6/2024     Past Surgical History:   Procedure Laterality Date    CARDIAC PACEMAKER PLACEMENT  12/2023    replacement    CORONARY ARTERY BYPASS GRAFT         Allergies   Allergen Reactions    Metoprolol Shortness Of Breath     \"50mg\" via [place] ; states \"When I take the 25mg I'm normal.\"       Social History     Socioeconomic History    Marital status: Single     Spouse name: Not on file    Number of children: Not on file    Years of education: Not on file    Highest education level: Not on file   Occupational History    Occupation: retired   Tobacco Use    Smoking status: Never    Smokeless tobacco: Never    Tobacco comments:     No passive smoke exposure   Vaping Use    Vaping status: Never Used   Substance and Sexual Activity    Alcohol use: Not Currently    Drug use: No    Sexual activity: Not Currently   Other Topics Concern    Not on file   Social History Narrative    Not on file     Social Determinants of Health     Financial Resource Strain: Low Risk  (9/18/2023)    Overall Financial Resource Strain (CARDIA)     Difficulty of Paying Living Expenses: Not hard at all   Food Insecurity: No Food Insecurity (4/8/2024)    Hunger Vital Sign     Worried About Running Out of Food in the Last Year: Never true     Ran Out of Food in the Last Year: Never true   Transportation Needs: No Transportation Needs (4/8/2024)    PRAPARE - Transportation     Lack of Transportation (Medical): No     Lack of Transportation (Non-Medical): No   Physical Activity: Sufficiently Active (9/23/2020)    Exercise Vital Sign     Days of Exercise per Week: 5 days     Minutes of Exercise per Session: 30 min   Stress: No Stress Concern Present (9/23/2020)    [place] [employer] - Occupational Stress Questionnaire "     Feeling of Stress : Not at all   Social Connections: Unknown (9/23/2020)    Social Connection and Isolation Panel [NHANES]     Frequency of Communication with Friends and Family: Patient declined     Frequency of Social Gatherings with Friends and Family: Patient declined     Attends Mu-ism Services: Patient declined     Active Member of Clubs or Organizations: Patient declined     Attends Club or Organization Meetings: Patient declined     Marital Status: Patient declined   Intimate Partner Violence: Not At Risk (12/12/2023)    Received from [facility]    Humiliation, Afraid, Rape, and Kick questionnaire     Fear of Current or Ex-Partner: No     Emotionally Abused: No     Physically Abused: No     Sexually Abused: No   Housing Stability: High Risk (4/8/2024)    Housing Stability Vital Sign     Unable to Pay for Housing in the Last Year: Yes     Number of Places Lived in the Last Year: 1     Unstable Housing in the Last Year: No       Scheduled Medications  Current Facility-Administered Medications   Medication Dose Route Frequency Provider Last Rate    acetaminophen  650 mg Oral Q6H PRN Manav Guillermo MD      amiodarone  200 mg Oral Daily Manav Guillermo MD      atorvastatin  20 mg Oral Daily With Dinner Manav Guillermo MD      cefTRIAXone  2,000 mg Intravenous Q24H Manav Guillermo MD      gabapentin  200 mg Oral TID Manav Guillermo MD      lidocaine  2 patch Topical Daily Manav Guillermo MD      multi-electrolyte  50 mL/hr Intravenous Continuous Manav Guillermo MD 50 mL/hr (04/11/24 0010)    pantoprazole  20 mg Oral Daily Before Breakfast Manav Guillermo MD      polyethylene glycol  17 g Oral Daily Manav Guillermo MD      vancomycin  12.5 mg/kg (Adjusted) Intravenous Q24H Kimberli Lazo DO         PRN Medications    acetaminophen    Medication Infusions  multi-electrolyte, 50 mL/hr, Last Rate: 50 mL/hr (04/11/24 0010)        Review of Systems   Constitutional:  Positive for appetite change.   HENT:   "Positive for dental problem and facial swelling.    All other systems reviewed and are negative.      Vitals:    Temp:  [97.5 °F (36.4 °C)-98.5 °F (36.9 °C)] 98.5 °F (36.9 °C)  HR:  [77-90] 77  Resp:  [16-21] 16  BP: (105-138)/(70-80) 105/70  Wt Readings from Last 1 Encounters:   04/10/24 100 kg (220 lb 8 oz)     Ht Readings from Last 1 Encounters:   04/07/24 5' 8\" (1.727 m)     There is no height or weight on file to calculate BMI.  Respiratory      Lab Data (Last 4 hours)      None           O2/Vent Data (Last 4 hours)      None                  Patient Lines/Drains/Airways Status       Active Airway       None                  I/O:  Current Diet Order:        Diet Orders   (From admission, onward)                 Start     Ordered    04/11/24 0001  Diet NPO  Diet effective midnight        References:    Adult Nutrition Support Algorithm    RD Therapeutic Diet Order Protocol   Question Answer Comment   Diet Type NPO    RD to adjust diet per protocol? Yes        04/10/24 2104                     Intake/Output Summary (Last 24 hours) at 4/11/2024 0808  Last data filed at 4/11/2024 0603  Gross per 24 hour   Intake --   Output 400 ml   Net -400 ml       Labs:  Results from last 7 days   Lab Units 04/11/24  0501 04/10/24  0519 04/09/24  0527   WBC Thousand/uL 5.94 6.22 5.65   HEMOGLOBIN g/dL 7.9* 8.1* 7.3*   HEMATOCRIT % 26.3* 26.4* 24.3*   PLATELETS Thousands/uL 188 191 152     Results from last 7 days   Lab Units 04/11/24  0501 04/10/24  0519 04/09/24  0527   POTASSIUM mmol/L 3.8 3.8 3.7   CHLORIDE mmol/L 111* 111* 114*   CO2 mmol/L 23 21 20*   BUN mg/dL 20 22 29*   CREATININE mg/dL 1.51* 1.56* 1.91*   CALCIUM mg/dL 8.1* 8.4 8.2*     Results from last 7 days   Lab Units 04/11/24  0501 04/10/24  0519 04/09/24  0527 04/07/24  0521 04/06/24  1820   INR  1.57* 1.72* 2.27*   < > 6.04*   PTT seconds  --   --   --   --  96*    < > = values in this interval not displayed.         Pain Management Panel          Latest Ref Rng " & Units 1/18/2023   Pain Management Panel   EXT Creatinine Urine mg/dL 243.0          Imaging:   CT: CT facial bones 4/9  IMPRESSION:     Poor dentition with multiple missing maxillary and mandibular teeth. Small periapical lucencies are identified, one within the left maxilla and one within the left mandible with corresponding cavities.     Osteomalacia of the maxilla and portions of the mandible.     Mucosal thickening of the paranasal sinuses. Suspected 2.2 cm polyp within the inferior midportion of the nasal cavity on the left.      Studies:   Echo: Technically difficult transthoracic echocardiogram study due to body habitus and poor echo penetration.     Markedly dilated left ventricle cavity, moderately reduced left ventricular systolic function.  There is global hypokinesis with regional wall motion variability.  Indeterminate grade of diastolic dysfunction.  Left ventricular ejection fraction is estimated around 36%.  Normal right ventricle size and systolic function.  Severe left atrial cavity enlargement mild right atrial cavity enlargement.  Aortic valve leaflet sclerosis with focal calcification.  Mild aortic valve regurgitation.  Patient is status post mitral valve annuloplasty.  There is mild approaching moderate residual mitral valve regurgitation.  Trace tricuspid valve regurgitation.  No obvious pulmonary hypertension.  No pericardial effusion.  Borderline dilated aortic root.  Ekg: Atrial fibrillation with intermittent PVCs and single paced beat  Non-specific intra-ventricular conduction block  Abnormal ECG  When compared with ECG of 07-APR-2024 05:40,  No significant change was found  Confirmed by Jarrod Champion ([id number]) on 4/7/2024 5:49:11 PM    Physical Exam:   General: Integment: skin warm and dry, patient is WD/WN, in NAD  Neuro Exam: AAO x 3, CN V,VII grossly intact, GCS: 15  Head: Normocephalic, no scalp lacerations or hematoma,    Face: No lacerations/Abrasions/Step Offs    Ears: Pinna wnl  bilaterally, no otorrhea, hearing grossly intact,    Eyes/Periorbital: Extraocular movements intact, intercanthal distance wnl,    Nose: External nose symmetrical/no gross deformity, no nasal crepitus, no nasal septal hematoma, no rhinorrhea, no epistaxis, bilateral nares patent,    Oral Exam:Lips and mucosal surfaces wnl, floor of mouth is soft with no palpable masses, tongue protrusion is midline and has full range of motion, no pharyngeal edema or exudate   Dentalalveolar Exam:  carious and painful #18 and 19 no associated vestibular swelling but increased TTP , MICHAEL 20, lateral excursive movements wnl, Fom soft nontender nonevelated   Lymph/Neck Exam: Neck is soft, trachea is midline, no gross cervical lymphadenopathy bilaterally, TTP to left submandibular area, but no swellings noted. Inferior border of the mandible is palpable   Musculoskeletal: Neck with FROM  Cardiovascular: regular rate and rhythm,    Respiratory: clear to auscultation, no wheezes, rales or rhonchi, symmetric chest rise,    Gastrointestinal: nondistended  Genitourinary: Defer   Extremities: No gross peripheral edema. Negative [place]/Kim signs       Raheem Tillman

## 2024-04-11 NOTE — ASSESSMENT & PLAN NOTE
Of left eye  Due to bacteremia there may be septic emboli to the retina, will consult with ophthalmology for evaluation

## 2024-04-11 NOTE — PHYSICAL THERAPY NOTE
Physical Therapy Evaluation     Patient's Name: Silvio Drew    Admitting Diagnosis  Septic shock (HCC) [A41.9, R65.21]    Problem List  Patient Active Problem List   Diagnosis    Hyperlipidemia    Primary hypertension    Atrial fibrillation (HCC)    Bicytopenia    Atherosclerosis of native artery of both lower extremities with intermittent claudication (HCC)    Hyperlipidemia associated with type 2 diabetes mellitus  (HCC)    Stage 3b chronic kidney disease (HCC)    Septic shock (HCC)    Acute kidney injury superimposed on stage 3 chronic kidney disease (HCC)    Abnormal CT of the abdomen    Thyroid nodule    Coagulopathy (HCC)    Streptococcal bacteremia    Suspected UTI    Chronic systolic CHF (HCC)    Supratherapeutic INR    BRBPR (bright red blood per rectum)    Dental infection    Anemia    Visual disturbance       Past Medical History  Past Medical History:   Diagnosis Date    Anemia 3/12/2013    Atrial fibrillation (HCC)     Coagulopathy (HCC) 4/6/2024    Coronary artery disease     GERD (gastroesophageal reflux disease)     Hyperlipidemia associated with type 2 diabetes mellitus  (HCC) 5/4/2021    Hypertension     Obesity     Thyroid nodule 4/6/2024       Past Surgical History  Past Surgical History:   Procedure Laterality Date    CARDIAC PACEMAKER PLACEMENT  12/2023    replacement    CORONARY ARTERY BYPASS GRAFT            04/11/24 0900   PT Last Visit   PT Visit Date 04/11/24   Note Type   Note type Evaluation   Education   Education Provided Yes   Pain Assessment   Pain Location/Orientation Orientation: Left;Location: Face  (Jaw)   Pain Onset/Description Onset: Ongoing   Patient's Stated Pain Goal No pain   Restrictions/Precautions   Weight Bearing Precautions Per Order No   Other Precautions Chair Alarm;Bed Alarm;Fall Risk;Pain;Multiple lines   Home Living   Type of Home House   Home Layout Two level;1/2 bath on main level;Bed/bath upstairs   Bathroom Shower/Tub Tub/shower unit   Home Equipment Cane    Prior Function   Level of [place] Independent with ADLs;Modified independent with wheelchair;Independent with IADLS   Lives With (S)  Alone   Receives Help From Family  (from previous note, pt has local siblings who he sees daily)   IADLs Independent with driving;Independent with meal prep;Independent with medication management   Falls in the last 6 months 0   Comments Pta was Ind for mobility with cane prn   General   Additional Pertinent History Per MD note, pt presented to [facility] ,admitted to ICU with septic shock. Found to have polymicrobial bacteremia and dental infections, was transferred to [facility] for OMS services.   Family/Caregiver Present No   Cognition   Overall Cognitive Status WFL   Arousal/Participation Alert   Attention Attends with cues to redirect   Following Commands Follows one step commands with increased time or repetition   Comments Pt cooperative during session, able to communicate with simple English phrases   Subjective   Subjective I want to sleep.I did not get any sleep last night   RLE Assessment   RLE Assessment WFL   LLE Assessment   LLE Assessment WFL   Bed Mobility   Supine to Sit 5  Supervision   Additional items Increased time required;Bedrails   Sit to Supine 5  Supervision   Additional items Increased time required   Additional Comments Pt in bed upon arrival.   Transfers   Sit to Stand 5  Supervision   Additional items Increased time required   Stand to Sit 5  Supervision   Additional items Increased time required   Additional Comments w/RW   Ambulation/Elevation   Gait pattern Wide LANETTE;Foward flexed   Gait Assistance   (CG A)   Additional items Verbal cues  (Impulsive)   Assistive Device Rolling walker   Distance 140 ft   Stair Management Assistance Not tested   Ambulation/Elevation Additional Comments Pt  agreeable to ambulate but perseverating on not getting any sleep and wanting to rest. Required VC for use of AD, posture correction is somehat impulsive.    Balance   Static Sitting Good   Dynamic Sitting Fair +   Static Standing Fair +   Dynamic Standing Fair   Ambulatory Fair -   Activity Tolerance   Activity Tolerance Patient limited by fatigue   Medical Staff Made Aware Co-eval with OT 2* medical complexity   Nurse Made Aware RN cleared pt   Assessment   Prognosis Good   Problem List Decreased endurance;Pain   Assessment Pt is a 73 y.o. male seen for PT evaluation s/p admit to [facility] on 4/10/2024. Pt was admitted with a primary dx of: Streptococcal bacteremia, Dental infection, Anemia, Visual disturbance, Chronic systolic CHF,VARGHESE, Septic shock with PMH sx for HTN, A-fib.  PT now consulted for assessment of mobility and d/c needs. Pt with Activity as tolerated orders.  Pts current clinical presentation is Unstable/ Unpredictable (high complexity) due to Ongoing medical management for primary dx, Increased reliance on more restrictive AD compared to baseline, Decreased activity tolerance compared to baseline. Prior to admission, pt was Independent for mobility and ADLs, using SPC prn. Upon evaluation, pt currently is requiring supervision for bed mobility and transfers, CG A for ambulation as pt requires cueing for safe use of AD during mobility , is somewhat impulsive. Pt presents at PT eval functioning below baseline and currently w/ overall mobility deficits 2* to: decreased endurance, gait deviations, decreased activity tolerance compared to baseline. Pt will continue to benefit from skilled acute PT interventions to address stated impairments; to maximize functional mobility; for ongoing pt training; and DME needs. Pt uses SPC at baseline as prn. At conclusion of PT session pt returned BTB, bed alarm engaged, all needs in reach, and RN notified of session findings/recommendations with phone and call bell within reach. Pt denies any further questions at this time. The patient's AM-PAC Basic Mobility Inpatient Short Form Raw Score is 18. A Raw score  of greater than 16 suggests the patient may benefit from discharge to home. Please also refer to the recommendation of the Physical Therapist for safe discharge planning. Recommend Level 3 upon hospital D/C pending further progress, stair assessment.   Barriers to Discharge   (Stair trial)   Goals   Patient Goals to take a nap   STG Expiration Date 04/25/24   PT Treatment Day 0   Plan   Treatment/Interventions Functional transfer training;Elevations;Therapeutic exercise;Bed mobility;Gait training;Spoke to nursing;OT   PT Frequency 1-2x/wk   Discharge Recommendation   Rehab Resource Intensity Level, PT III (Minimum Resource Intensity)   Equipment Recommended Walker  (pp)   AM-PAC Basic Mobility Inpatient   Turning in Flat Bed Without Bedrails 4   Lying on Back to Sitting on Edge of Flat Bed Without Bedrails 3   Moving Bed to Chair 3   Standing Up From Chair Using Arms 3   Walk in Room 3   Climb 3-5 Stairs With Railing 2   Basic Mobility Inpatient Raw Score 18   Basic Mobility Standardized Score 41.05   [facility] Highest Level Of Mobility   JH-HLM Goal 6: Walk 10 steps or more   JH-HLM Achieved 7: Walk 25 feet or more   Modified [place] Scale   Modified [place] Scale 2   End of Consult   Patient Position at End of Consult All needs within reach;Supine;Bed/Chair alarm activated         Magalis Mujica, PT DPT

## 2024-04-11 NOTE — ASSESSMENT & PLAN NOTE
Apical lucencies present on CT scan  OMS consulted  Continue antibiotics as above  Patient scheduled to go to the OR on 4/12

## 2024-04-11 NOTE — CASE MANAGEMENT
Case Management Assessment & Discharge Planning Note    Patient name Silvio Drew  Location [facility]-01 MRN [id number]  : 1950 Date 2024       Current Admission Date: 4/10/2024  Current Admission Diagnosis:Streptococcal bacteremia   Patient Active Problem List    Diagnosis Date Noted    Dental infection 04/10/2024    Anemia 04/10/2024    Visual disturbance 04/10/2024    BRBPR (bright red blood per rectum) 2024    Streptococcal bacteremia 2024    Suspected UTI 2024    Chronic systolic CHF (HCC) 2024    Supratherapeutic INR 2024    Septic shock (HCC) 2024    Acute kidney injury superimposed on stage 3 chronic kidney disease (HCC) 2024    Abnormal CT of the abdomen 2024    Thyroid nodule 2024    Coagulopathy (HCC) 2024    Stage 3b chronic kidney disease (HCC) 2024    Hyperlipidemia associated with type 2 diabetes mellitus  (HCC) 2021    Atherosclerosis of native artery of both lower extremities with intermittent claudication ([facility]) 2019    Bicytopenia 2013    Hyperlipidemia 2012    Primary hypertension 2012    Atrial fibrillation (HCC) 2012      LOS (days): 1  Geometric Mean LOS (GMLOS) (days): 2.9  Days to GMLOS:2.3     OBJECTIVE:    Risk of Unplanned Readmission Score: 20.96         Current admission status: Inpatient       Preferred Pharmacy:   [id number] [address] [address]  Phone: [phone] Fax: [phone]    Primary Care Provider: Mac Stephens MD    Primary Insurance: [employer]  Secondary Insurance: [place] [employer] [facility]    ASSESSMENT:  Active Health Care Proxies    There are no active Health Care Proxies on file.                 Readmission Root Cause  30 Day Readmission: No    Patient Information  Admitted from:: Facility ([facility])  Mental Status: Alert  During Assessment patient  was accompanied by: Brother (Ang Drew (Brother)  [phone])  Assessment information provided by:: Brother (Ang Drew (Brother)  [phone])  Primary Caregiver: Self  Support Systems: Self, Son, Family members  County of Residence: [place]  What city do you live in?: [place]  Type of Current Residence: Other (Comment) (private residence)  Living Arrangements: Lives Alone  Is patient a ?: No    Activities of Daily Living Prior to Admission  Functional Status: Independent  Completes ADLs independently?: Yes  Ambulates independently?: Yes  Does patient use assisted devices?: Yes  Assisted Devices (DME) used: Straight Cane  Does patient currently own DME?: Yes  What DME does the patient currently own?: Straight Cane  Does patient have a history of Outpatient Therapy (PT/OT)?: No  Does the patient have a history of Short-Term Rehab?: No  Does patient have a history of HHC?: No  Does patient currently have HHC?: No         Patient Information Continued  Income Source: Pension/FDC  Does patient have prescription coverage?: Yes  Does patient receive dialysis treatments?: No  Does patient have a history of substance abuse?: No  Does patient have a history of Mental Health Diagnosis?: No         Means of Transportation  Means of Transport to Appts:: Drives Self      Social Determinants of Health (SDOH)      Flowsheet Row Most Recent Value   Housing Stability    In the last 12 months, was there a time when you were not able to pay the mortgage or rent on time? Y  [Family assists]   In the last 12 months, how many places have you lived? 1   In the last 12 months, was there a time when you did not have a steady place to sleep or slept in a shelter (including now)? N   Transportation Needs    In the past 12 months, has lack of transportation kept you from medical appointments or from getting medications? no   In the past 12 months, has lack of transportation kept you from meetings, work, or from getting things  needed for daily living? No   Food Insecurity    Within the past 12 months, you worried that your food would run out before you got the money to buy more. Never true   Within the past 12 months, the food you bought just didn't last and you didn't have money to get more. Never true   Utilities    In the past 12 months has the electric, gas, oil, or water company threatened to shut off services in your home? Yes  [Family assists]            DISCHARGE DETAILS:    Discharge planning discussed with:: Ang Drew (Brother)  [phone]  [place] of Choice: Yes     CM contacted family/caregiver?: Yes (Ang Drew (Brother)  [phone])  Were Treatment Team discharge recommendations reviewed with patient/caregiver?: Yes  Did patient/caregiver verbalize understanding of patient care needs?: Yes  Were patient/caregiver advised of the risks associated with not following Treatment Team discharge recommendations?: Yes    Contacts  Patient Contacts: Ang Drew (Brother)  [phone]  Relationship to Patient:: Family  Contact Method: Phone  Phone Number: [phone]  Reason/Outcome: Discharge Planning, Referral, Emergency Contact, Continuity of Care       Additional Comments: CM spoke with patient's brother   Ang Drew (Brother)  [phone]    to introduce role and complete assessment. Per pt's brother pt ambulates w/ cane/completes ADLS independently at baseline. Pt lives alone in private residence. Pt's brother stated pt has no HX of HHC, PT/OT. Pt's brother reported no D&A or MH DX for pt. CM to follow.     CM reviewed d/c planning process including the following: identifying help at home, patient preference for d/c planning needs, Discharge Lounge, [facility] Meds to Bed program, availability of treatment team to discuss questions or concerns patient and/or family may have regarding understanding medications and recognizing signs and symptoms once discharged.  CM also encouraged patient to follow up with all recommended  appointments after discharge. Patient advised of importance for patient and family to participate in managing patient’s medical well being.

## 2024-04-11 NOTE — PROGRESS NOTES
Silvio Drew is a 73 y.o. male who is currently ordered Vancomycin IV with management by the Pharmacy Consult service.  Relevant clinical data and objective / subjective history reviewed.  Vancomycin Assessment:  Indication and Goal AUC/Trough: Bacteremia (goal -600, trough >10), -600, trough >10  Clinical Status: stable  Micro: ID following     Renal Function:  SCr: 1.51 mg/dL (CKD 3 at baseline)  CrCl: 50.7 mL/min  Renal replacement: Not on dialysis  Days of Therapy: 6  Current Dose: 1000 mg IV q24h  Vancomycin Plan:  New Dosin mg IV q24h  Estimated AUC: 432 mcg*hr/mL  Estimated Trough: 12.7 mcg/mL  Next Level: 4/17 AM labs   Renal Function Monitoring: Daily BMP and UOP  Pharmacy will continue to follow closely for s/sx of nephrotoxicity, infusion reactions and appropriateness of therapy.  BMP and CBC will be ordered per protocol. We will continue to follow the patient’s culture results and clinical progress daily.    Partha Gomez, Pharmacist

## 2024-04-11 NOTE — PHYSICAL THERAPY NOTE
Physical Therapy Evaluation     Patient's Name: Silvio Drew    Admitting Diagnosis  Septic shock (HCC) [A41.9, R65.21]    Problem List  Patient Active Problem List   Diagnosis    Hyperlipidemia    Primary hypertension    Atrial fibrillation (HCC)    Bicytopenia    Atherosclerosis of native artery of both lower extremities with intermittent claudication (HCC)    Hyperlipidemia associated with type 2 diabetes mellitus  (HCC)    Stage 3b chronic kidney disease (HCC)    Septic shock (HCC)    Acute kidney injury superimposed on stage 3 chronic kidney disease (HCC)    Abnormal CT of the abdomen    Thyroid nodule    Coagulopathy (HCC)    Streptococcal bacteremia    Suspected UTI    Chronic systolic CHF (HCC)    Supratherapeutic INR    BRBPR (bright red blood per rectum)    Dental infection    Anemia    Visual disturbance       Past Medical History  Past Medical History:   Diagnosis Date    Anemia 3/12/2013    Atrial fibrillation (HCC)     Coagulopathy (HCC) 4/6/2024    Coronary artery disease     GERD (gastroesophageal reflux disease)     Hyperlipidemia associated with type 2 diabetes mellitus  (HCC) 5/4/2021    Hypertension     Obesity     Thyroid nodule 4/6/2024       Past Surgical History  Past Surgical History:   Procedure Laterality Date    CARDIAC PACEMAKER PLACEMENT  12/2023    replacement    CORONARY ARTERY BYPASS GRAFT            04/11/24 0900   PT Last Visit   PT Visit Date 04/11/24   Note Type   Note type Evaluation   Education   Education Provided Yes   Pain Assessment   Pain Location/Orientation Orientation: Left;Location: Face  (Jaw)   Pain Onset/Description Onset: Ongoing   Patient's Stated Pain Goal No pain   Restrictions/Precautions   Weight Bearing Precautions Per Order No   Other Precautions Chair Alarm;Bed Alarm;Fall Risk;Pain;Multiple lines   Home Living   Type of Home House   Home Layout Two level;1/2 bath on main level;Bed/bath upstairs   Bathroom Shower/Tub Tub/shower unit   Home Equipment Cane    Prior Function   Level of [place] Independent with ADLs;Modified independent with wheelchair;Independent with IADLS   Lives With (S)  Alone   Receives Help From Family  (from previous note, pt has local siblings who he sees daily)   IADLs Independent with driving;Independent with meal prep;Independent with medication management   Falls in the last 6 months 0   Comments Pta was Ind for mobility with cane prn   General   Additional Pertinent History Per MD note, pt presented to [facility] ,admitted to ICU with septic shock. Found to have polymicrobial bacteremia and dental infections, was transferred to [facility] for OMS services.   Family/Caregiver Present No   Cognition   Overall Cognitive Status WFL   Arousal/Participation Alert   Attention Attends with cues to redirect   Following Commands Follows one step commands with increased time or repetition   Comments Pt cooperative during session, able to communicate with simple English phrases   Subjective   Subjective I want to sleep.I did not get any sleep last night   RLE Assessment   RLE Assessment WFL   LLE Assessment   LLE Assessment WFL   Bed Mobility   Supine to Sit 5  Supervision   Additional items Increased time required;Bedrails   Sit to Supine 5  Supervision   Additional items Increased time required   Additional Comments Pt in bed upon arrival.   Transfers   Sit to Stand 5  Supervision   Additional items Increased time required   Stand to Sit 5  Supervision   Additional items Increased time required   Additional Comments w/RW   Ambulation/Elevation   Gait pattern Wide LANETTE;Foward flexed   Gait Assistance   (CG A)   Additional items Verbal cues  (Impulsive)   Assistive Device Rolling walker   Distance 140 ft   Stair Management Assistance Not tested   Ambulation/Elevation Additional Comments Pt  agreeable to ambulate but perseverating on not getting any sleep and wanting to rest. Required VC for use of AD, posture correction is somehat impulsive.    Balance   Static Sitting Good   Dynamic Sitting Fair +   Static Standing Fair +   Dynamic Standing Fair   Ambulatory Fair -   Activity Tolerance   Activity Tolerance Patient limited by fatigue   Medical Staff Made Aware Co-eval with OT 2* medical complexity   Nurse Made Aware RN cleared pt   Assessment   Prognosis Good   Problem List Decreased endurance;Pain   Assessment Pt is a 73 y.o. male seen for PT evaluation s/p admit to [facility] on 4/10/2024. Pt was admitted with a primary dx of: Streptococcal bacteremia, Dental infection, Anemia, Visual disturbance, Chronic systolic CHF,VARGHESE, Septic shock with PMH sx for HTN, A-fib.  PT now consulted for assessment of mobility and d/c needs. Pt with Activity as tolerated orders.  Pts current clinical presentation is Unstable/ Unpredictable (high complexity) due to Ongoing medical management for primary dx, Increased reliance on more restrictive AD compared to baseline, Decreased activity tolerance compared to baseline. Prior to admission, pt was Independent for mobility and ADLs, using SPC prn. Upon evaluation, pt currently is requiring supervision for bed mobility and transfers, CG A for ambulation as pt requires cueing for safe use of AD during mobility , is somewhat impulsive. Pt presents at PT eval functioning below baseline and currently w/ overall mobility deficits 2* to: decreased endurance, gait deviations, decreased activity tolerance compared to baseline. Pt will continue to benefit from skilled acute PT interventions to address stated impairments; to maximize functional mobility; for ongoing pt training; and DME needs. Pt uses SPC at baseline as prn. At conclusion of PT session pt returned BTB, bed alarm engaged, all needs in reach, and RN notified of session findings/recommendations with phone and call bell within reach. Pt denies any further questions at this time. The patient's AM-PAC Basic Mobility Inpatient Short Form Raw Score is 18. A Raw score  of greater than 16 suggests the patient may benefit from discharge to home. Please also refer to the recommendation of the Physical Therapist for safe discharge planning. Recommend Level 3 upon hospital D/C pending further progress, stair assessment.   Barriers to Discharge   (Stair trial)   Goals   Patient Goals to take a nap   STG Expiration Date 04/25/24   Short Term Goal #1 STG 1. Pt will be able to perform bed mobility tasks with Ind in order to improve overall functional mobility and assist in safe d/c. STG 2. Pt with sit EOB for at least 25 minutes at iND level in order to strengthen abdominal musculature and assist in future transfers/ ambulation. STG 3. Pt will be able to perform functional transfer with Mod I in order to improve overall functional mobility and assist in safe d/c. STG 4. Pt will be able to ambulate at least 300 feet with least restrictive device with supervisiion A in order to improve overall functional mobility and assist in safe d/c. STG 5. Pt will improve sitting/standing static/dynamic balance 1/2 grade in order to improve functional mobility and assist in safe d/c. STG 6. Pt will improve LE strength by 1/2 grade in order to improve functional mobility and assist in safe d/c. STG 7. Pt will be able to negotiate at least 12 stairs with least restrictive device with supervision A in order to improve overall functional mobility and assist in safe d/c.   PT Treatment Day 0   Plan   Treatment/Interventions Functional transfer training;Elevations;Therapeutic exercise;Bed mobility;Gait training;Spoke to nursing;OT   PT Frequency 1-2x/wk   Discharge Recommendation   Rehab Resource Intensity Level, PT III (Minimum Resource Intensity)   Equipment Recommended Walker  (pp)   AM-PAC Basic Mobility Inpatient   Turning in Flat Bed Without Bedrails 4   Lying on Back to Sitting on Edge of Flat Bed Without Bedrails 3   Moving Bed to Chair 3   Standing Up From Chair Using Arms 3   Walk in Room 3   Climb  3-5 Stairs With Railing 2   Basic Mobility Inpatient Raw Score 18   Basic Mobility Standardized Score 41.05   [facility] Highest Level Of Mobility   -HL Goal 6: Walk 10 steps or more   -HLM Achieved 7: Walk 25 feet or more   Modified [place] Scale   Modified [place] Scale 2   End of Consult   Patient Position at End of Consult All needs within reach;Supine;Bed/Chair alarm activated         Magalis Mujica, PT DPT

## 2024-04-11 NOTE — PLAN OF CARE
Problem: PAIN - ADULT  Goal: Verbalizes/displays adequate comfort level or baseline comfort level  Description: Interventions:  - Encourage patient to monitor pain and request assistance  - Assess pain using appropriate pain scale  - Administer analgesics based on type and severity of pain and evaluate response  - Implement non-pharmacological measures as appropriate and evaluate response  - Consider cultural and social influences on pain and pain management  - Notify physician/advanced practitioner if interventions unsuccessful or patient reports new pain  Outcome: Progressing     Problem: INFECTION - ADULT  Goal: Absence or prevention of progression during hospitalization  Description: INTERVENTIONS:  - Assess and monitor for signs and symptoms of infection  - Monitor lab/diagnostic results  - Monitor all insertion sites, i.e. indwelling lines, tubes, and drains  - Monitor endotracheal if appropriate and nasal secretions for changes in amount and color  - [place] appropriate cooling/warming therapies per order  - Administer medications as ordered  - Instruct and encourage patient and family to use good hand hygiene technique  - Identify and instruct in appropriate isolation precautions for identified infection/condition  Outcome: Progressing  Goal: Absence of fever/infection during neutropenic period  Description: INTERVENTIONS:  - Monitor WBC    Outcome: Progressing     Problem: SAFETY ADULT  Goal: Maintain or return to baseline ADL function  Description: INTERVENTIONS:  -  Assess patient's ability to carry out ADLs; assess patient's baseline for ADL function and identify physical deficits which impact ability to perform ADLs (bathing, care of mouth/teeth, toileting, grooming, dressing, etc.)  - Assess/evaluate cause of self-care deficits   - Assess range of motion  - Assess patient's mobility; develop plan if impaired  - Assess patient's need for assistive devices and provide as appropriate  - Encourage  maximum independence but intervene and supervise when necessary  - Involve family in performance of ADLs  - Assess for home care needs following discharge   - Consider OT consult to assist with ADL evaluation and planning for discharge  - Provide patient education as appropriate  Outcome: Progressing     Problem: HEMATOLOGIC - ADULT  Goal: Maintains hematologic stability  Description: INTERVENTIONS  - Assess for signs and symptoms of bleeding or hemorrhage  - Monitor labs  - Administer supportive blood products/factors as ordered and appropriate  Outcome: Progressing

## 2024-04-11 NOTE — RESTORATIVE TECHNICIAN NOTE
Restorative Technician Note      Patient Name: Silvio Drew     Restorative Tech Visit Date: 04/11/24  Note Type: Mobility  Patient Position Upon Consult: Supine  Education Provided: Yes  Patient Position at End of Consult: Supine; All needs within reach    Pt declining participation despite education on the benefits 2/2 frustration with OR schedule. Provided emotional support and active listening. RN aware and following up    Becca Sarah  DPT, Restorative Technician

## 2024-04-11 NOTE — OCCUPATIONAL THERAPY NOTE
Occupational Therapy Evaluation     Patient Name: Silvio Drew  Today's Date: 4/11/2024  Problem List  Principal Problem:    Streptococcal bacteremia  Active Problems:    Primary hypertension    Atrial fibrillation (HCC)    Septic shock (HCC)    Acute kidney injury superimposed on stage 3 chronic kidney disease (HCC)    Abnormal CT of the abdomen    Chronic systolic CHF (HCC)    Dental infection    Anemia    Visual disturbance    Past Medical History  Past Medical History:   Diagnosis Date    Anemia 3/12/2013    Atrial fibrillation (HCC)     Coagulopathy (HCC) 4/6/2024    Coronary artery disease     GERD (gastroesophageal reflux disease)     Hyperlipidemia associated with type 2 diabetes mellitus  (HCC) 5/4/2021    Hypertension     Obesity     Thyroid nodule 4/6/2024     Past Surgical History  Past Surgical History:   Procedure Laterality Date    CARDIAC PACEMAKER PLACEMENT  12/2023    replacement    CORONARY ARTERY BYPASS GRAFT               04/11/24 0901   OT Last Visit   OT Visit Date 04/11/24   Note Type   Note type Evaluation   Pain Assessment   Pain Assessment Tool 0-10   Pain Location/Orientation Orientation: Left;Location: Face  (jaw)   Pain Onset/Description Onset: Ongoing   Patient's Stated Pain Goal No pain   Hospital Pain Intervention(s) Repositioned;Ambulation/increased activity;Emotional support   Restrictions/Precautions   Weight Bearing Precautions Per Order No   Other Precautions Chair Alarm;Bed Alarm;Multiple lines;Fall Risk;Pain   Home Living   Type of Home House   Home Layout Two level;Bed/bath upstairs   Bathroom Shower/Tub Tub/shower unit   Bathroom Toilet Standard   Home Equipment Cane   Additional Comments 2SH, alone,   Prior Function   Level of [place] Independent with ADLs;Independent with functional mobility;Independent with IADLS   Lives With (S)  Alone   Receives Help From Family  (From chart, pt has local siblings who he sees daily)   IADLs Independent with driving;Independent  "with meal prep;Independent with medication management   Falls in the last 6 months 0   Vocational Retired   Comments SPC for functional mobility   Lifestyle   Autonomy PTA, was (I) with ADLs and was (I) with IADLs. Patient lives alone in a multi-story home. Bedroom/bathroom on second floor although 1/2 bathroom on main floor.   Reciprocal Relationships brothers/sisters, children   Service to Others Retired   Intrinsic Gratification watching tv   General   Additional Pertinent History Pt primarily Frisian speaking, however able to communicate with therapists effectively in English.   Family/Caregiver Present No   Subjective   Subjective \"I hardly slept at all\"   ADL   Where Assessed Edge of bed   Eating Assistance 7  Independent   Grooming Assistance 5  Supervision/Setup   UB Bathing Assistance 5  Supervision/Setup   LB Bathing Assistance 3  Moderate Assistance   UB Dressing Assistance 5  Supervision/Setup   LB Dressing Assistance 3  Moderate Assistance   Toileting Assistance  5  Supervision/Setup   Functional Assistance 5  Supervision/Setup   Bed Mobility   Supine to Sit 5  Supervision   Additional items Increased time required;Verbal cues   Sit to Supine 5  Supervision   Additional items Increased time required;Verbal cues   Additional Comments Pt supine in bed pre/post session, all needs met, call bell within reach.   Transfers   Sit to Stand 5  Supervision   Additional items Increased time required   Stand to Sit 5  Supervision   Additional items Increased time required   Additional Comments RW   Functional Mobility   Functional Mobility 4  Minimal assistance   Additional Comments CGA for household distances with RW.   Additional items Rolling walker   Balance   Static Sitting Good   Dynamic Sitting Fair +   Static Standing Fair   Dynamic Standing Fair -   Ambulatory Poor +   Activity Tolerance   Activity Tolerance Patient limited by fatigue   Medical Staff Made Aware PT vamshi Blancas 2/2 increased medical " complexity and comorbidities   Nurse Made Aware RN cleared for therapy   RUE Assessment   RUE Assessment WFL   LUE Assessment   LUE Assessment WFL   Hand Function   Gross Motor Coordination Functional   Fine Motor Coordination Functional   Cognition   Overall Cognitive Status WFL   Arousal/Participation Alert;Cooperative   Attention Attends with cues to redirect   Orientation Level Oriented X4   Memory Decreased recall of precautions   Following Commands Follows one step commands with increased time or repetition   Comments Pt cooperative t/o session. Pt mildly perseverating on how loud it was last night and how he didn't get any sleep.   Assessment   Limitation Decreased ADL status;Decreased Safe judgement during ADL;Decreased endurance;Decreased self-care trans;Decreased high-level ADLs   Prognosis Good   Assessment 73 year old pt seen today for an OT evaluation following admission to [facility], transferred from [facility] 2/2 septic shock, polymicrobial bacteremia and dental infections with present symptoms significant for fatigue, weakness, decreased functional mobility, decreased ADL status, pain. Pt has a past medical history of Anemia, Atrial fibrillation (HCC), Coagulopathy ([facility]), Coronary artery disease, GERD (gastroesophageal reflux disease), Hyperlipidemia associated with type 2 diabetes mellitus  (HCC), Hypertension, Obesity, and Thyroid nodule. Pt primarily German speaking, able to communicate effectively with simple English phrases. Per chart and pt report, pt lives alone in a 2SH with bed/bath upstairs. Pt reports being IND with all ADLs/IADLs PTA with SPC used PRN. Pt has local family that check in on him. Pt very pleasant and cooperative t/o session. Pt completed functional bed mobility with SUP. SUP for functional STS txfs with RW. Min A/CGA for functional mobility to go household distances with RW. Pt was SUP for UB ADLs and mod A for LB ADLs. The patient's raw score  on the AM-PAC Daily Activity Inpatient Short Form is 18. A raw score of greater than or equal to 19 suggests the patient may benefit from discharge to home. Please refer to the recommendation of the Occupational Therapist for safe discharge planning. Pt is functioning below baseline level of function and will continue to benefit from skilled acute OT to promote increased independence and return to PLOF/Pt is functioning at or near baseline level of function and no further skilled OT needs are identified. At this time, current OT recommendation is Level 3 resources upon d/c - HHOT.   Goals   Patient Goals to get sleep   LTG Time Frame 10-14   Long Term Goal #1 See below for goals   Plan   Treatment Interventions ADL retraining;Functional transfer training;Endurance training;Cognitive reorientation;Patient/family training;Equipment evaluation/education;Fine motor coordination activities;Compensatory technique education;Continued evaluation;Energy conservation;Activityengagement   Goal Expiration Date 04/25/24   OT Frequency 2-3x/wk   Discharge Recommendation   Rehab Resource Intensity Level, OT III (Minimum Resource Intensity)   AM-PAC Daily Activity Inpatient   Lower Body Dressing 2   Bathing 3   Toileting 3   Upper Body Dressing 3   Grooming 3   Eating 4   Daily Activity Raw Score 18   Daily Activity Standardized Score (Calc for Raw Score >=11) 40.22   AM-PAC Applied Cognition Inpatient   Following a Speech/Presentation 3   Understanding Ordinary Conversation 4   Taking Medications 4   Remembering Where Things Are Placed or Put Away 4   Remembering List of 4-5 Errands 4   Taking Care of Complicated Tasks 3   Applied Cognition Raw Score 22   Applied Cognition Standardized Score 47.83   End of Consult   Education Provided Yes   Patient Position at End of Consult Supine;All needs within reach;Bed/Chair alarm activated   Nurse Communication Nurse aware of consult       Occupational Therapy Goals    Pt will be Mod I with  bed mobility with good sitting balance/tolerance to engage in self care tasks within this plan of care.      Pt will be Mod I for UB dressing and bathing with use of assistive devices PRN within this plan of care.     Pt will be Mod I for LB dressing and bathing with use of assistive devices PRN within this plan of care.     Pt will demonstrate standing tolerance of 2-3 minutes increase independence for toileting ADLs/tasks with use of assistive devices PRN within this plan of care.     Pt will demonstrate good carryover of safety awareness with use of RW during functional tasks within this plan of care.     Pt will demonstrated increased activity tolerance to 30 mins for participation in ADLs and functional tasks within this plan of care.     Pt will complete functional cognitive assessment with good attention/participation to assist with safe d/c planning recommendations.          Ryan Barlow MS, OTR/L

## 2024-04-11 NOTE — CONSULTS
This 73-year-old gentleman with a dental infection on the left and recent discovery of bacteremia complains of decreased vision in the left eye.    On examination, visual acuity without correction at near is 20/OU.  Pupils are equal round and reactive to light with no afferent defect.  Extraocular movements are intact.  Confrontation visual fields are full.  The external examination is unremarkable.  Intraocular pressures are 18 OU.    Both eyes were dilated with Mydriacyl and Jossue-Synephrine at 5:30 PM.    The dilated examination reveals moderate nuclear sclerotic cataracts in both eyes.  The vitreous cavities are clear.  The optic nerves, macula, vessels and periphery are unremarkable.    Impression: Cataracts both eyes.  No evidence of septic emboli.    Plan: Observation.  Please reconsult as needed for change in signs or symptoms.

## 2024-04-11 NOTE — ASSESSMENT & PLAN NOTE
Presented to [facility] and was admitted to ICU with septic shock.   He was found to have polymicrobial bacteremia and dental infections, due to a lack of OMS services he was transferred to [facility].  Continue ceftriaxone and vancomycin  Consult OMS for dental evaluation and extraction if indicated  Check ZULY to evaluate for endocarditis and seeding of the pacemaker.  Infectious disease consulted

## 2024-04-11 NOTE — UTILIZATION REVIEW
Initial Clinical Review    The patient was transferred to [facility] on 4/10/24 from [facility], where care began on 4/6/24.  5 midnights have already been surpassed with active ongoing care.     Admission: Date/Time/Statement:   Admission Orders (From admission, onward)       Ordered        04/10/24 2103  INPATIENT ADMISSION  Once                          Orders Placed This Encounter   Procedures    INPATIENT ADMISSION     Standing Status:   Standing     Number of Occurrences:   1     Order Specific Question:   Level of Care     Answer:   Med Surg [16]     Order Specific Question:   Estimated length of stay     Answer:   More than 2 Midnights     Order Specific Question:   Certification     Answer:   I certify that inpatient services are medically necessary for this patient for a duration of greater than two midnights. See H&P and MD Progress Notes for additional information about the patient's course of treatment.     Initial Presentation: 73 y.o. male transferred to [facility], admitted Inpatient status dt Streptococcal bacteremia.   Presented with septic shock and bacteremia. The patient was admitted to ICU at [facility] and underwent appropriate fluid resuscitation.  Cultures were obtained and he was started on antibiotics.  Ultimately, blood cultures obtained on April 6 were both positive for strep intermedius.  CAT scanning of the abdomen, pelvis, and chest revealed no likely source for the patient's infection.  He was noted to have pyuria but urine culture was negative.  It is suspected that the patient's dental infection is the source of his sepsis.  The patient has a history of atrial fibrillation.  He has been on amiodarone for this.  He is also chronically anticoagulated with warfarin.  At the time of admission, his INR was 6.  Warfarin has been on hold since then.    Due to a lack on OMFS services he was transferred to [facility] for further  management. PMHx:  Afib.  Plan:  med surg, fu on cxs, continue IV ceftriaxone and vanco, OMFS consult, order ZULY. Pt with vision changes, due to bacteremia there may be septic emboli to the retina, will consult with ophthalmology for evaluation. Check anemia studies, daily BMP, hold warfarin, continue to monitor BP and give lasix, losartan and metoprolol. PT OT eval.    Date: 4/12  Day 3: Has surpassed a 2nd midnight with active treatments and services.     4/12 Per OMFS: Ct facial bones revealed carious left mandibular teeth and possible abscess. Teeth likely contributing to septic shock.  Continue IV ABX per ID, keep NPO for possible OR today for extraction of any indicated teeth, Cardiology clearance needed and to be off ACs 48hrs prior to procedure.     Triage Vitals   Temperature Pulse Respirations Blood Pressure SpO2   04/10/24 2011 04/10/24 2011 04/10/24 2011 04/10/24 2011 04/10/24 2011   97.5 °F (36.4 °C) 90 20 138/78 97 %      Temp src Heart Rate Source Patient Position - Orthostatic VS BP Location FiO2 (%)   -- -- -- -- --             Pain Score       04/10/24 2045       No Pain          Wt Readings from Last 1 Encounters:   04/10/24 100 kg (220 lb 8 oz)     Additional Vital Signs:   Date/Time Temp Pulse Resp BP MAP (mmHg) SpO2 O2 Device   04/11/24 07:43:02 98.5 °F (36.9 °C) 77 16 105/70 82 95 % --   04/10/24 22:05:05 97.5 °F (36.4 °C) 87 21 134/80 98 96 % --   04/10/24 2045 -- -- -- -- -- -- None (Room air)   04/10/24 20:11:24 97.5 °F (36.4 °C) 90 20 138/78 98 97 % --     Pertinent Labs/Diagnostic Test Results:   MRI inpatient order    (Results Pending)     Results from last 7 days   Lab Units 04/06/24  1834   SARS-COV-2  Negative     Results from last 7 days   Lab Units 04/11/24  0501 04/10/24  0519 04/09/24  0527 04/08/24  0535 04/07/24  0521   WBC Thousand/uL 5.94 6.22 5.65 7.15 10.85*   HEMOGLOBIN g/dL 7.9* 8.1* 7.3* 7.4* 8.1*   HEMATOCRIT % 26.3* 26.4* 24.3* 24.2* 26.7*   PLATELETS Thousands/uL 188  191 152 132* 129*   TOTAL NEUT ABS Thousands/µL 3.80 4.12 3.97 5.47 9.37*         Results from last 7 days   Lab Units 04/11/24  0501 04/10/24  0519 04/09/24  0527 04/08/24  0535 04/07/24  0521   SODIUM mmol/L 139 140 140 139 142   POTASSIUM mmol/L 3.8 3.8 3.7 3.8 4.6   CHLORIDE mmol/L 111* 111* 114* 113* 115*   CO2 mmol/L 23 21 20* 18* 20*   ANION GAP mmol/L 5 8 6 8 7   BUN mg/dL 20 22 29* 35* 42*   CREATININE mg/dL 1.51* 1.56* 1.91* 2.36* 2.98*   EGFR ml/min/1.73sq m 45 43 34 26 19   CALCIUM mg/dL 8.1* 8.4 8.2* 8.1* 7.9*   MAGNESIUM mg/dL  --   --  2.2  --  2.2   PHOSPHORUS mg/dL  --   --   --   --  3.0     Results from last 7 days   Lab Units 04/06/24  1820   AST U/L 58*   ALT U/L 51   ALK PHOS U/L 76   TOTAL PROTEIN g/dL 6.1*   ALBUMIN g/dL 3.3*   TOTAL BILIRUBIN mg/dL 0.86         Results from last 7 days   Lab Units 04/11/24  0501 04/10/24  0519 04/09/24  0527 04/08/24  0535 04/07/24  0521 04/06/24  1820   GLUCOSE RANDOM mg/dL 88 94 91 90 84 102       Results from last 7 days   Lab Units 04/11/24  0501 04/10/24  0519 04/09/24  0527 04/07/24  0521 04/06/24  1820   PROTIME seconds 18.6* 20.3* 25.2*   < > 53.5*   INR  1.57* 1.72* 2.27*   < > 6.04*   PTT seconds  --   --   --   --  96*    < > = values in this interval not displayed.         Results from last 7 days   Lab Units 04/09/24  0527 04/07/24  1442 04/06/24  1820   PROCALCITONIN ng/ml 6.87* 19.31* 16.26*     Results from last 7 days   Lab Units 04/06/24  1820   LACTIC ACID mmol/L 1.1     Results from last 7 days   Lab Units 04/06/24  1820   LIPASE u/L 25     Results from last 7 days   Lab Units 04/06/24  2349   CLARITY UA  Clear   COLOR UA  Yellow   SPEC GRAV UA  1.045*   PH UA  5.5   GLUCOSE UA mg/dl Negative   KETONES UA mg/dl Negative   BLOOD UA  Small*   PROTEIN UA mg/dl 30 (1+)*   NITRITE UA  Negative   BILIRUBIN UA  Negative   UROBILINOGEN UA (BE) mg/dl <2.0   LEUKOCYTES UA  Moderate*   WBC UA /hpf Innumerable*   RBC UA /hpf 2-4*   BACTERIA UA /hpf  Occasional   EPITHELIAL CELLS WET PREP /hpf Occasional   MUCUS THREADS  Occasional*     Results from last 7 days   Lab Units 04/06/24  1834   INFLUENZA A PCR  Negative   INFLUENZA B PCR  Negative   RSV PCR  Negative     Results from last 7 days   Lab Units 04/08/24  1547 04/06/24  2349 04/06/24  1841 04/06/24  1838   BLOOD CULTURE  No Growth at 48 hrs.  No Growth at 48 hrs.  --  Streptococcus intermedius* Streptococcus intermedius*   GRAM STAIN RESULT   --   --  Gram positive cocci in pairs and chains*  Gram positive rods* Gram positive cocci in pairs and chains*  Gram positive rods*   URINE CULTURE   --  No Growth <1000 cfu/mL  --   --      Results from last 7 days   Lab Units 04/10/24  0519 04/09/24  0527 04/08/24  0535 04/07/24  0538   VANCOMYCIN RM ug/mL 16.3 14.6 16.2 18.1            Past Medical History:   Diagnosis Date    Anemia 3/12/2013    Atrial fibrillation (HCC)     Coagulopathy (HCC) 4/6/2024    Coronary artery disease     GERD (gastroesophageal reflux disease)     Hyperlipidemia associated with type 2 diabetes mellitus  (HCC) 5/4/2021    Hypertension     Obesity     Thyroid nodule 4/6/2024     Present on Admission:   Atrial fibrillation (HCC)   Chronic systolic CHF (HCC)   Primary hypertension   Streptococcal bacteremia   Septic shock (HCC)   Abnormal CT of the abdomen   Acute kidney injury superimposed on stage 3 chronic kidney disease (HCC)      Admitting Diagnosis: Septic shock (HCC) [A41.9, R65.21]  Age/Sex: 73 y.o. male  Admission Orders:  Scheduled Medications:  amiodarone, 200 mg, Oral, Daily  atorvastatin, 20 mg, Oral, Daily With Dinner  cefTRIAXone, 2,000 mg, Intravenous, Q24H  gabapentin, 200 mg, Oral, TID  lidocaine, 2 patch, Topical, Daily  pantoprazole, 20 mg, Oral, Daily Before Breakfast  polyethylene glycol, 17 g, Oral, Daily  vancomycin, 12.5 mg/kg (Adjusted), Intravenous, Q24H      Continuous IV Infusions:  multi-electrolyte, 50 mL/hr, Intravenous, Continuous      PRN  Meds:  acetaminophen, 650 mg, Oral, Q6H PRN    scd    IP CONSULT TO PHARMACY  IP CONSULT TO ORAL AND MAXILLOFACIAL SURGERY  IP CONSULT TO INFECTIOUS DISEASES  IP CONSULT TO CARDIOLOGY  IP CONSULT TO OPHTHALMOLOGY    Network Utilization Review Department  ATTENTION: Please call with any questions or concerns to [phone] and carefully listen to the prompts so that you are directed to the right person. All voicemails are confidential.   For Discharge needs, contact Care Management DC Support Team at [phone] opt. 2  Send all requests for admission clinical reviews, approved or denied determinations and any other requests to dedicated fax number below belonging to the campus where the patient is receiving treatment. List of dedicated fax numbers for the Facilities:  FACILITY NAME UR FAX NUMBER   ADMISSION DENIALS (Administrative/Medical Necessity) [phone]   DISCHARGE SUPPORT TEAM (NETWORK) [phone]   PARENT CHILD HEALTH (Maternity/NICU/Pediatrics) [phone]   [facility] [phone]   [facility] [phone]   [facility] [phone]   [facility] [phone]   [facility] [phone]   [facility] [phone]   [facility] [phone]   [facility] [phone]   [facility] [phone]   [facility] [phone]   [facility] [phone]   [facility] [phone]

## 2024-04-12 ENCOUNTER — APPOINTMENT (INPATIENT)
Dept: NON INVASIVE DIAGNOSTICS | Facility: HOSPITAL | Age: 74
DRG: 158 | End: 2024-04-12
Payer: COMMERCIAL

## 2024-04-12 LAB
ALBUMIN SERPL BCP-MCNC: 3.1 G/DL (ref 3.5–5)
ALP SERPL-CCNC: 104 U/L (ref 34–104)
ALT SERPL W P-5'-P-CCNC: 49 U/L (ref 7–52)
ANION GAP SERPL CALCULATED.3IONS-SCNC: 7 MMOL/L (ref 4–13)
ANION GAP SERPL CALCULATED.3IONS-SCNC: 8 MMOL/L (ref 4–13)
AST SERPL W P-5'-P-CCNC: 49 U/L (ref 13–39)
BACTERIA BLD CULT: ABNORMAL
BASOPHILS # BLD AUTO: 0.03 THOUSANDS/ÂΜL (ref 0–0.1)
BASOPHILS # BLD AUTO: 0.03 THOUSANDS/ÂΜL (ref 0–0.1)
BASOPHILS NFR BLD AUTO: 0 % (ref 0–1)
BASOPHILS NFR BLD AUTO: 0 % (ref 0–1)
BILIRUB SERPL-MCNC: 0.52 MG/DL (ref 0.2–1)
BUN SERPL-MCNC: 15 MG/DL (ref 5–25)
BUN SERPL-MCNC: 15 MG/DL (ref 5–25)
CALCIUM ALBUM COR SERPL-MCNC: 8.7 MG/DL (ref 8.3–10.1)
CALCIUM SERPL-MCNC: 8 MG/DL (ref 8.4–10.2)
CALCIUM SERPL-MCNC: 8 MG/DL (ref 8.4–10.2)
CHLORIDE SERPL-SCNC: 106 MMOL/L (ref 96–108)
CHLORIDE SERPL-SCNC: 107 MMOL/L (ref 96–108)
CO2 SERPL-SCNC: 25 MMOL/L (ref 21–32)
CO2 SERPL-SCNC: 27 MMOL/L (ref 21–32)
CREAT SERPL-MCNC: 1.62 MG/DL (ref 0.6–1.3)
CREAT SERPL-MCNC: 1.64 MG/DL (ref 0.6–1.3)
EOSINOPHIL # BLD AUTO: 0.15 THOUSAND/ÂΜL (ref 0–0.61)
EOSINOPHIL # BLD AUTO: 0.17 THOUSAND/ÂΜL (ref 0–0.61)
EOSINOPHIL NFR BLD AUTO: 2 % (ref 0–6)
EOSINOPHIL NFR BLD AUTO: 2 % (ref 0–6)
ERYTHROCYTE [DISTWIDTH] IN BLOOD BY AUTOMATED COUNT: 16.4 % (ref 11.6–15.1)
ERYTHROCYTE [DISTWIDTH] IN BLOOD BY AUTOMATED COUNT: 16.6 % (ref 11.6–15.1)
GFR SERPL CREATININE-BSD FRML MDRD: 41 ML/MIN/1.73SQ M
GFR SERPL CREATININE-BSD FRML MDRD: 41 ML/MIN/1.73SQ M
GLUCOSE SERPL-MCNC: 83 MG/DL (ref 65–140)
GLUCOSE SERPL-MCNC: 84 MG/DL (ref 65–140)
GRAM STN SPEC: ABNORMAL
HCT VFR BLD AUTO: 27.8 % (ref 36.5–49.3)
HCT VFR BLD AUTO: 28.3 % (ref 36.5–49.3)
HGB BLD-MCNC: 8.3 G/DL (ref 12–17)
HGB BLD-MCNC: 8.4 G/DL (ref 12–17)
IMM GRANULOCYTES # BLD AUTO: 0.09 THOUSAND/UL (ref 0–0.2)
IMM GRANULOCYTES # BLD AUTO: 0.1 THOUSAND/UL (ref 0–0.2)
IMM GRANULOCYTES NFR BLD AUTO: 1 % (ref 0–2)
IMM GRANULOCYTES NFR BLD AUTO: 2 % (ref 0–2)
INR PPP: 1.42 (ref 0.84–1.19)
LYMPHOCYTES # BLD AUTO: 1.58 THOUSANDS/ÂΜL (ref 0.6–4.47)
LYMPHOCYTES # BLD AUTO: 1.7 THOUSANDS/ÂΜL (ref 0.6–4.47)
LYMPHOCYTES NFR BLD AUTO: 23 % (ref 14–44)
LYMPHOCYTES NFR BLD AUTO: 23 % (ref 14–44)
MAGNESIUM SERPL-MCNC: 1.9 MG/DL (ref 1.9–2.7)
MCH RBC QN AUTO: 26 PG (ref 26.8–34.3)
MCH RBC QN AUTO: 26.1 PG (ref 26.8–34.3)
MCHC RBC AUTO-ENTMCNC: 29.7 G/DL (ref 31.4–37.4)
MCHC RBC AUTO-ENTMCNC: 29.9 G/DL (ref 31.4–37.4)
MCV RBC AUTO: 87 FL (ref 82–98)
MCV RBC AUTO: 88 FL (ref 82–98)
MONOCYTES # BLD AUTO: 0.53 THOUSAND/ÂΜL (ref 0.17–1.22)
MONOCYTES # BLD AUTO: 0.59 THOUSAND/ÂΜL (ref 0.17–1.22)
MONOCYTES NFR BLD AUTO: 8 % (ref 4–12)
MONOCYTES NFR BLD AUTO: 8 % (ref 4–12)
NEUTROPHILS # BLD AUTO: 4.37 THOUSANDS/ÂΜL (ref 1.85–7.62)
NEUTROPHILS # BLD AUTO: 4.67 THOUSANDS/ÂΜL (ref 1.85–7.62)
NEUTS SEG NFR BLD AUTO: 65 % (ref 43–75)
NEUTS SEG NFR BLD AUTO: 66 % (ref 43–75)
NRBC BLD AUTO-RTO: 0 /100 WBCS
NRBC BLD AUTO-RTO: 0 /100 WBCS
PLATELET # BLD AUTO: 227 THOUSANDS/UL (ref 149–390)
PLATELET # BLD AUTO: 235 THOUSANDS/UL (ref 149–390)
PMV BLD AUTO: 11 FL (ref 8.9–12.7)
PMV BLD AUTO: 11.1 FL (ref 8.9–12.7)
POTASSIUM SERPL-SCNC: 3.7 MMOL/L (ref 3.5–5.3)
POTASSIUM SERPL-SCNC: 3.8 MMOL/L (ref 3.5–5.3)
PROT SERPL-MCNC: 6.2 G/DL (ref 6.4–8.4)
PROTHROMBIN TIME: 17.1 SECONDS (ref 11.6–14.5)
RBC # BLD AUTO: 3.18 MILLION/UL (ref 3.88–5.62)
RBC # BLD AUTO: 3.23 MILLION/UL (ref 3.88–5.62)
SL CV LV EF: 35
SODIUM SERPL-SCNC: 140 MMOL/L (ref 135–147)
SODIUM SERPL-SCNC: 140 MMOL/L (ref 135–147)
STREPTOCOCCUS DNA BLD POS NAA+NON-PROBE: DETECTED
WBC # BLD AUTO: 6.76 THOUSAND/UL (ref 4.31–10.16)
WBC # BLD AUTO: 7.25 THOUSAND/UL (ref 4.31–10.16)

## 2024-04-12 PROCEDURE — 99232 SBSQ HOSP IP/OBS MODERATE 35: CPT | Performed by: INTERNAL MEDICINE

## 2024-04-12 PROCEDURE — 93321 DOPPLER ECHO F-UP/LMTD STD: CPT | Performed by: INTERNAL MEDICINE

## 2024-04-12 PROCEDURE — B24BZZ4 ULTRASONOGRAPHY OF HEART WITH AORTA, TRANSESOPHAGEAL: ICD-10-PCS | Performed by: INTERNAL MEDICINE

## 2024-04-12 PROCEDURE — 80053 COMPREHEN METABOLIC PANEL: CPT | Performed by: FAMILY MEDICINE

## 2024-04-12 PROCEDURE — 93312 ECHO TRANSESOPHAGEAL: CPT

## 2024-04-12 PROCEDURE — 83735 ASSAY OF MAGNESIUM: CPT | Performed by: FAMILY MEDICINE

## 2024-04-12 PROCEDURE — 85025 COMPLETE CBC W/AUTO DIFF WBC: CPT | Performed by: INTERNAL MEDICINE

## 2024-04-12 PROCEDURE — 99233 SBSQ HOSP IP/OBS HIGH 50: CPT | Performed by: INTERNAL MEDICINE

## 2024-04-12 PROCEDURE — 85610 PROTHROMBIN TIME: CPT | Performed by: INTERNAL MEDICINE

## 2024-04-12 PROCEDURE — 85025 COMPLETE CBC W/AUTO DIFF WBC: CPT | Performed by: FAMILY MEDICINE

## 2024-04-12 PROCEDURE — 80048 BASIC METABOLIC PNL TOTAL CA: CPT | Performed by: INTERNAL MEDICINE

## 2024-04-12 PROCEDURE — 99232 SBSQ HOSP IP/OBS MODERATE 35: CPT | Performed by: FAMILY MEDICINE

## 2024-04-12 PROCEDURE — 93325 DOPPLER ECHO COLOR FLOW MAPG: CPT | Performed by: INTERNAL MEDICINE

## 2024-04-12 PROCEDURE — 93312 ECHO TRANSESOPHAGEAL: CPT | Performed by: INTERNAL MEDICINE

## 2024-04-12 PROCEDURE — 0CDXXZ1 EXTRACTION OF LOWER TOOTH, MULTIPLE, EXTERNAL APPROACH: ICD-10-PCS | Performed by: DENTIST

## 2024-04-12 RX ORDER — FENTANYL CITRATE/PF 50 MCG/ML
25 SYRINGE (ML) INJECTION
Status: DISCONTINUED | OUTPATIENT
Start: 2024-04-12 | End: 2024-04-12 | Stop reason: HOSPADM

## 2024-04-12 RX ORDER — SODIUM CHLORIDE, SODIUM GLUCONATE, SODIUM ACETATE, POTASSIUM CHLORIDE, MAGNESIUM CHLORIDE, SODIUM PHOSPHATE, DIBASIC, AND POTASSIUM PHOSPHATE .53; .5; .37; .037; .03; .012; .00082 G/100ML; G/100ML; G/100ML; G/100ML; G/100ML; G/100ML; G/100ML
1000 INJECTION, SOLUTION INTRAVENOUS ONCE
Status: DISCONTINUED | OUTPATIENT
Start: 2024-04-12 | End: 2024-04-12

## 2024-04-12 RX ORDER — PROPOFOL 10 MG/ML
INJECTION, EMULSION INTRAVENOUS AS NEEDED
Status: DISCONTINUED | OUTPATIENT
Start: 2024-04-12 | End: 2024-04-12

## 2024-04-12 RX ORDER — SUCCINYLCHOLINE/SOD CL,ISO/PF 100 MG/5ML
SYRINGE (ML) INTRAVENOUS AS NEEDED
Status: DISCONTINUED | OUTPATIENT
Start: 2024-04-12 | End: 2024-04-12

## 2024-04-12 RX ORDER — SODIUM CHLORIDE, SODIUM LACTATE, POTASSIUM CHLORIDE, CALCIUM CHLORIDE 600; 310; 30; 20 MG/100ML; MG/100ML; MG/100ML; MG/100ML
INJECTION, SOLUTION INTRAVENOUS CONTINUOUS PRN
Status: DISCONTINUED | OUTPATIENT
Start: 2024-04-12 | End: 2024-04-12

## 2024-04-12 RX ORDER — ETOMIDATE 2 MG/ML
INJECTION INTRAVENOUS AS NEEDED
Status: DISCONTINUED | OUTPATIENT
Start: 2024-04-12 | End: 2024-04-12

## 2024-04-12 RX ORDER — FENTANYL CITRATE 50 UG/ML
INJECTION, SOLUTION INTRAMUSCULAR; INTRAVENOUS AS NEEDED
Status: DISCONTINUED | OUTPATIENT
Start: 2024-04-12 | End: 2024-04-12

## 2024-04-12 RX ORDER — ONDANSETRON 2 MG/ML
INJECTION INTRAMUSCULAR; INTRAVENOUS AS NEEDED
Status: DISCONTINUED | OUTPATIENT
Start: 2024-04-12 | End: 2024-04-12

## 2024-04-12 RX ORDER — LIDOCAINE HYDROCHLORIDE AND EPINEPHRINE 10; 10 MG/ML; UG/ML
INJECTION, SOLUTION INFILTRATION; PERINEURAL AS NEEDED
Status: DISCONTINUED | OUTPATIENT
Start: 2024-04-12 | End: 2024-04-12 | Stop reason: HOSPADM

## 2024-04-12 RX ORDER — LIDOCAINE HYDROCHLORIDE 20 MG/ML
INJECTION, SOLUTION EPIDURAL; INFILTRATION; INTRACAUDAL; PERINEURAL AS NEEDED
Status: DISCONTINUED | OUTPATIENT
Start: 2024-04-12 | End: 2024-04-12

## 2024-04-12 RX ADMIN — NOREPINEPHRINE BITARTRATE 2 MCG/MIN: 1 INJECTION INTRAVENOUS at 13:55

## 2024-04-12 RX ADMIN — NOREPINEPHRINE BITARTRATE 8 MCG: 1 INJECTION INTRAVENOUS at 13:51

## 2024-04-12 RX ADMIN — PANTOPRAZOLE SODIUM 20 MG: 20 TABLET, DELAYED RELEASE ORAL at 06:15

## 2024-04-12 RX ADMIN — AMIODARONE HYDROCHLORIDE 200 MG: 200 TABLET ORAL at 08:33

## 2024-04-12 RX ADMIN — LIDOCAINE HYDROCHLORIDE 100 MG: 20 INJECTION, SOLUTION EPIDURAL; INFILTRATION; INTRACAUDAL; PERINEURAL at 13:51

## 2024-04-12 RX ADMIN — ETOMIDATE 12 MG: 2 INJECTION INTRAVENOUS at 13:51

## 2024-04-12 RX ADMIN — SODIUM CHLORIDE, SODIUM LACTATE, POTASSIUM CHLORIDE, AND CALCIUM CHLORIDE: .6; .31; .03; .02 INJECTION, SOLUTION INTRAVENOUS at 13:44

## 2024-04-12 RX ADMIN — ATORVASTATIN CALCIUM 20 MG: 20 TABLET, FILM COATED ORAL at 17:38

## 2024-04-12 RX ADMIN — SUGAMMADEX 200 MG: 100 INJECTION, SOLUTION INTRAVENOUS at 14:23

## 2024-04-12 RX ADMIN — Medication 100 MG: at 13:51

## 2024-04-12 RX ADMIN — VANCOMYCIN HYDROCHLORIDE 1000 MG: 1 INJECTION, SOLUTION INTRAVENOUS at 19:48

## 2024-04-12 RX ADMIN — GABAPENTIN 200 MG: 100 CAPSULE ORAL at 08:33

## 2024-04-12 RX ADMIN — FENTANYL CITRATE 50 MCG: 50 INJECTION INTRAMUSCULAR; INTRAVENOUS at 13:50

## 2024-04-12 RX ADMIN — GABAPENTIN 200 MG: 100 CAPSULE ORAL at 17:38

## 2024-04-12 RX ADMIN — PROPOFOL 50 MG: 10 INJECTION, EMULSION INTRAVENOUS at 13:51

## 2024-04-12 RX ADMIN — CEFTRIAXONE SODIUM 2000 MG: 10 INJECTION, POWDER, FOR SOLUTION INTRAVENOUS at 14:08

## 2024-04-12 RX ADMIN — GABAPENTIN 200 MG: 100 CAPSULE ORAL at 22:23

## 2024-04-12 RX ADMIN — FENTANYL CITRATE 50 MCG: 50 INJECTION INTRAMUSCULAR; INTRAVENOUS at 14:18

## 2024-04-12 RX ADMIN — NOREPINEPHRINE BITARTRATE 8 MCG: 1 INJECTION INTRAVENOUS at 14:11

## 2024-04-12 RX ADMIN — LIDOCAINE 5% 1 PATCH: 700 PATCH TOPICAL at 05:48

## 2024-04-12 RX ADMIN — ONDANSETRON 4 MG: 2 INJECTION INTRAMUSCULAR; INTRAVENOUS at 13:52

## 2024-04-12 RX ADMIN — SODIUM CHLORIDE, SODIUM GLUCONATE, SODIUM ACETATE, POTASSIUM CHLORIDE, MAGNESIUM CHLORIDE, SODIUM PHOSPHATE, DIBASIC, AND POTASSIUM PHOSPHATE 50 ML/HR: .53; .5; .37; .037; .03; .012; .00082 INJECTION, SOLUTION INTRAVENOUS at 16:01

## 2024-04-12 NOTE — PHYSICAL THERAPY NOTE
Physical Therapy Cancellation Note     04/12/24 3432   PT Last Visit   PT Visit Date 04/12/24   Note Type   Note Type Cancelled Session   Cancel Reasons Patient to operating room     PT will hold today and follow and treat as able.  Magalis Mujica PT DPT

## 2024-04-12 NOTE — QUICK NOTE
Received sepsis alert @ 3079  Patient just came back from the OR with OMS and had a ZULY  I anticipate BP to come back up with IV bolus  Otherwise management remains the same.

## 2024-04-12 NOTE — PROGRESS NOTES
General Cardiology   Progress Note -  Team One   Silvio Drew 73 y.o. male MRN: [id number]    Unit/Bed#: [facility] 826-01 Encounter: [id number]    Assessment  1. Iatrogenic volume overload - (received approximately 3.5 L of IV fluid resuscitation on 4/6)  -Received 1 dose of IV lasix 40 mg x 1 (on 4/11)  2. Chronic HFrEF  3. Dilated NICM LVEF 36% (diagnosed back in 2011), most recent EF 25 to 30% in 11/2022.  -Mildly volume overloaded on exam.  -BNP ordered/pending.  -TTE 4/7/2024; LVEF 36%, global hypokinesis with regional variability, marked hypokinesis to akinesis of the entire inferior lateral wall.  -[facility] procedure 2011; nonobstructive CAD.  -GDMT; losartan 50 mg daily, and metoprolol succinate 25 mg daily.   -Outpatient diuretic regimen; furosemide 20 mg daily   -Inpatient diuretic regimen;Received 1 dose of IV lasix 40 mg x 1 (on 4/11) - see #1  -24 hr I&O balance; inaccurate  -Baseline dry weight around 216 pounds, SS weight on 4/11 223 pounds.  4. AICD In situ -recent GEN change in 12/2023.  -Device interrogation 12/2023 - Presenting Egm= VS; 0 episodes detected since implant and no therapies delivered; Battery voltage adequate with 13.4 years remaining until PIPE; Available lead measurements stable and adequate - Capture management ON; 1% V-paced   5. Paroxysmal atrial fibrillation/atrial flutter.  -History of inappropriate ICD shocks from A-fib with RVR.  -ECG on admission demonstrated rate controlled atrial fibrillation w/pvcs  -On oral amiodarone 200 mg daily plus metoprolol succinate 25 mg BID  -On warfarin, INR 1.42.  6. Septic shock  7. Polymicrobial bacteremia.  -Management per the ID service.  -On IV antibiotics.  8. Dental caries w/ possible abscess  -Management per the OMS service.  -On IV antibiotics.  9. CKD stage III  -Baseline creatinine around 1.3-1.6  -Creatinine currently 1.62     Plan  -Patient denies any specific cardiac symptoms.   -Tentative plan for ZULY today - r/o endocarditis +/- device  infection.   -He reports a significant urinary response over the past 24 hrs after receiving a dose of IV lasix yesterday afternoon -- this is not reflected in the documented I&O charting. His volume status has improved and appears euvolemic. He no longer complains of PLATA. BP borderline low, creat bumped slightly this a.m.  Hold any additional IV diuresis. Restart his home oral lasix 20 mg daily tomorrow pending hemodynamics and stability in renal function.   -Resume warfarin pending OMF clearance.  Reestablish goal INR 2-3.  -Remains in atrial fibrillation, however heart rates are well-controlled.  He appears to have paroxysmal atrial fibrillation as his last device interrogation did not show any evidence of atrial fibrillation.  Continue oral amiodarone 200 mg daily, hold metoprolol succinate for now d/t borderline low BP - anticipate eventual resumption.   -Implementation of additional GDMTs may be limited d/t borderline low BP  -IV antibiotics per the ID service.  -Strict I's and O's, daily standing weights, 2 g Na+ diet to LFR.  -Monitor renal function and electrolytes closely.  -No indication for telemetry.    Subjective  Review of Systems   Constitutional: Negative for chills, fever and malaise/fatigue.   Eyes:  Negative for visual disturbance.   Cardiovascular:  Negative for chest pain, dyspnea on exertion, leg swelling, orthopnea and palpitations.   Respiratory:  Negative for cough and shortness of breath.    Gastrointestinal:  Negative for abdominal pain.   Neurological:  Negative for dizziness, headaches and light-headedness.       Objective:   Physical Exam  Vitals and nursing note reviewed.   Constitutional:       Appearance: He is obese.   HENT:      Head: Normocephalic and atraumatic.   Eyes:      General: No scleral icterus.  Cardiovascular:      Rate and Rhythm: Normal rate. Rhythm irregular.      Pulses: Normal pulses.      Heart sounds: Normal heart sounds.   Pulmonary:      Effort: Pulmonary  "effort is normal.      Breath sounds: Normal breath sounds. No wheezing or rales.   Abdominal:      Palpations: Abdomen is soft.   Musculoskeletal:      Right lower leg: No edema.      Left lower leg: No edema.   Skin:     General: Skin is warm and dry.      Capillary Refill: Capillary refill takes less than 2 seconds.   Neurological:      General: No focal deficit present.      Mental Status: He is alert and oriented to person, place, and time.   Psychiatric:         Mood and Affect: Mood normal.         Vitals: Blood pressure 102/63, pulse 84, temperature 98.5 °F (36.9 °C), temperature source Oral, resp. rate 16, height 5' 8\" (1.727 m), weight 101 kg (223 lb 8.7 oz), SpO2 98%.,     Body mass index is 33.99 kg/m².,   Systolic (24hrs), Av , Min:86 , Max:113     Diastolic (24hrs), Av, Min:53, Max:69      Intake/Output Summary (Last 24 hours) at 2024 1046  Last data filed at 2024 1834  Gross per 24 hour   Intake 1100 ml   Output 400 ml   Net 700 ml     Weight (last 2 days)       Date/Time Weight    24 0600 101 (223.55)            LABORATORY RESULTS      CBC with diff:   Results from last 7 days   Lab Units 24  0609 24  0501 04/10/24  0519 24  0527 24  0535 24  0521 24  0001 24  1820   WBC Thousand/uL 7.25  6.76 5.94 6.22 5.65 7.15 10.85*  --  18.65*   HEMOGLOBIN g/dL 8.3*  8.4* 7.9* 8.1* 7.3* 7.4* 8.1* 8.4* 8.4*   HEMATOCRIT % 27.8*  28.3* 26.3* 26.4* 24.3* 24.2* 26.7* 28.1* 27.7*   MCV fL 87  88 87 84 85 86 87  --  87   PLATELETS Thousands/uL 227  235 188 191 152 132* 129*  --  147*   RBC Million/uL 3.18*  3.23* 3.02* 3.15* 2.85* 2.83* 3.07*  --  3.19*   MCH pg 26.1*  26.0* 26.2* 25.7* 25.6* 26.1* 26.4*  --  26.3*   MCHC g/dL 29.9*  29.7* 30.0* 30.7* 30.0* 30.6* 30.3*  --  30.3*   RDW % 16.4*  16.6* 16.4* 16.5* 16.4* 16.7* 16.7*  --  16.5*   MPV fL 11.0  11.1 10.9 11.0 11.4 11.5 11.0  --  11.4   NRBC AUTO /100 WBCs 0  0 0 0 0 0 0  --  0 " "      CMP:  Results from last 7 days   Lab Units 04/12/24  0609 04/11/24  0501 04/10/24  0519 04/09/24  0527 04/08/24  0535 04/07/24  0521 04/06/24  1820   POTASSIUM mmol/L 3.8  3.7 3.8 3.8 3.7 3.8 4.6 4.4   CHLORIDE mmol/L 107  106 111* 111* 114* 113* 115* 113*   CO2 mmol/L 25 27 23 21 20* 18* 20* 19*   BUN mg/dL 15  15 20 22 29* 35* 42* 45*   CREATININE mg/dL 1.62*  1.64* 1.51* 1.56* 1.91* 2.36* 2.98* 3.39*   CALCIUM mg/dL 8.0*  8.0* 8.1* 8.4 8.2* 8.1* 7.9* 8.0*   AST U/L 49*  --   --   --   --   --  58*   ALT U/L 49  --   --   --   --   --  51   ALK PHOS U/L 104  --   --   --   --   --  76   EGFR ml/min/1.73sq m 41  41 45 43 34 26 19 17       BMP:  Results from last 7 days   Lab Units 04/12/24  0609 04/11/24  0501 04/10/24  0519 04/09/24  0527 04/08/24  0535 04/07/24  0521 04/06/24  1820   POTASSIUM mmol/L 3.8  3.7 3.8 3.8 3.7 3.8 4.6 4.4   CHLORIDE mmol/L 107  106 111* 111* 114* 113* 115* 113*   CO2 mmol/L 25 27 23 21 20* 18* 20* 19*   BUN mg/dL 15  15 20 22 29* 35* 42* 45*   CREATININE mg/dL 1.62*  1.64* 1.51* 1.56* 1.91* 2.36* 2.98* 3.39*   CALCIUM mg/dL 8.0*  8.0* 8.1* 8.4 8.2* 8.1* 7.9* 8.0*       No results found for: \"NTBNP\"     Results from last 7 days   Lab Units 04/12/24  0609 04/09/24  0527 04/07/24  0521   MAGNESIUM mg/dL 1.9 2.2 2.2                   Results from last 7 days   Lab Units 04/12/24  0609 04/11/24  0501 04/10/24  0519 04/09/24  0527 04/08/24  0535 04/07/24  0521 04/06/24  1820   INR  1.42* 1.57* 1.72* 2.27* 3.52* 6.31* 6.04*       Lipid Profile:   No results found for: \"CHOL\"  Lab Results   Component Value Date    HDL 40 05/09/2023    HDL 41 11/14/2022    HDL 40 06/28/2022     Lab Results   Component Value Date    LDLCALC 64 05/09/2023    LDLCALC 54 11/14/2022    LDLCALC 67 06/28/2022     Lab Results   Component Value Date    TRIG 230 (H) 05/09/2023    TRIG 154 (H) 11/14/2022    TRIG 193 (H) 06/28/2022       Cardiac testing:   No results found for this or any previous " visit.    No results found for this or any previous visit.    No results found for this or any previous visit.    No valid procedures specified.  No results found for this or any previous visit.      Meds/Allergies   all current active meds have been reviewed and current meds:   Current Facility-Administered Medications   Medication Dose Route Frequency    acetaminophen (TYLENOL) tablet 650 mg  650 mg Oral Q6H PRN    amiodarone tablet 200 mg  200 mg Oral Daily    atorvastatin (LIPITOR) tablet 20 mg  20 mg Oral Daily With Dinner    cefTRIAXone (ROCEPHIN) 2,000 mg in dextrose 5 % 50 mL IVPB  2,000 mg Intravenous Q24H    gabapentin (NEURONTIN) capsule 200 mg  200 mg Oral TID    lidocaine (LIDODERM) 5 % patch 2 patch  2 patch Topical Daily    multi-electrolyte (PLASMALYTE-A/ISOLYTE-S PH 7.4) IV solution  50 mL/hr Intravenous Continuous    pantoprazole (PROTONIX) EC tablet 20 mg  20 mg Oral Daily Before Breakfast    polyethylene glycol (MIRALAX) packet 17 g  17 g Oral Daily    vancomycin (VANCOCIN) IVPB (premix in dextrose) 1,000 mg 200 mL  12.5 mg/kg (Adjusted) Intravenous Q24H         Counseling / Coordination of Care  Total floor / unit time spent today 20 minutes.  Greater than 50% of total time was spent with the patient and / or family counseling and / or coordination of care.      ** Please Note: Dragon 360 Dictation voice to text software may have been used in the creation of this document. **

## 2024-04-12 NOTE — OP NOTE
OPERATIVE REPORT  PATIENT NAME: Silvio Drew    :  1950  MRN: [id number]  Pt Location:  OR ROOM 05    SURGERY DATE: 2024    Surgeons and Role:  Panel 1:     * Hong Maki DMD - Primary  Panel 2:     * Amilcar Malave MD - Primary     * Hubert Machuca DO - Assisting    Preop Diagnosis:  Dental infection [K04.7]    Post-Op Diagnosis Codes:     * Dental infection [K04.7]    Procedure(s):  Bilateral - EXTRACTION TOOTH #8. 17. 18 SURGICAL  TRANSESOPHAGEAL ECHOCARDIOGRAM (ZULY)    Specimen(s):  No specimen    Estimated Blood Loss:   Minimal    Drains:  No drains    Anesthesia Type:   General    Operative Indications:  Dental infection [K04.7]      Operative Findings:  Carious teeth numbers 8, 17, 18    Complications:   None    Procedure and Technique:  The patient was greeted in the preoperative area. All the risks and benefits of the procedure were once again explained and the risks of sinus communication as well as lower chin and lip numbness were explained in detail all questions were answered. Consent had already been signed. Care was then handed back to the anesthesia team.    The patient was brought into the operating room by the anesthesia team and the patient was placed in a supine position where the patient remained for the rest of the case. Anesthesia was able to establish an orotracheal intubation without any complications. Care was then handed back to the OMFS team.    Patient was draped in sterile manner timeout was performed in which the patient was correctly identified by name medical record number as well as a site of the procedure be performed.     Once a timeout was completed oral cavity was thoroughly suctioned with the Yankauer suction the moist  packing was used it as a throat pack. Patient was given local anesthesia.  The dosages are in the OR record and can be reviewed should this be required.    A periosteal elevator was used to separate the gingiva from the teeth. Full thickness  mucoperiosteal flaps elevated at all extraction sites.  Bone was removed around the teeth.  The teeth were sectioned.  Teeth#8, 17, 18 were then extracted without complication.    Surgical sites were thoroughly curetted, bone filed, and irrigated with sterile saline. Closure with 3-0 chromic gut sutures. Next the oral cavity was thoroughly irrigated with sterile saline and suctioned with the [employer] suction. The moist throat packing was removed and the oropharynx was suctioned.   I was present for the entire procedure.    Patient Disposition:  PACU , hemodynamically stable, and extubated and stable        SIGNATURE: Hong Maki DMD  DATE: April 12, 2024  TIME: 2:29 PM

## 2024-04-12 NOTE — ASSESSMENT & PLAN NOTE
Wt Readings from Last 3 Encounters:   04/11/24 101 kg (223 lb 8.7 oz)   04/10/24 100 kg (220 lb 8 oz)   03/18/24 100 kg (221 lb)     EF of 36% w/ mild-moderate TR (h/o mitral valve annuloplasty noted)  Monitor/replete electrolyte deficiencies if present  Resume cardiac agents over the upcoming days if BP allows  Cardiac consult

## 2024-04-12 NOTE — ASSESSMENT & PLAN NOTE
Presented to [facility] and was admitted to ICU with septic shock.   He was found to have polymicrobial bacteremia and dental infections, due to a lack of OMS services he was transferred to [facility].  Continue ceftriaxone and vancomycin  Consult OMS for dental evaluation and extraction if indicated  Check ZULY to evaluate for endocarditis and seeding of the pacemaker.  Infectious disease consulted

## 2024-04-12 NOTE — ANESTHESIA PREPROCEDURE EVALUATION
Procedure:  EXTRACTION TEETH MULTIPLE, possible incision and drainage head and neck (Bilateral: Mouth)  TRANSESOPHAGEAL ECHOCARDIOGRAM (ZULY) (Esophagus)    Relevant Problems   ANESTHESIA (within normal limits)      CARDIO   (+) Atrial fibrillation (HCC)   (+) Hyperlipidemia   (+) Hyperlipidemia associated with type 2 diabetes mellitus  (HCC)   (+) Primary hypertension      GI/HEPATIC   (+) BRBPR (bright red blood per rectum)      /RENAL   (+) Acute kidney injury superimposed on stage 3 chronic kidney disease (HCC)   (+) Stage 3b chronic kidney disease (HCC)      HEMATOLOGY   (+) Anemia   (+) Coagulopathy (HCC)      NEURO/PSYCH   (+) Atherosclerosis of native artery of both lower extremities with intermittent claudication (HCC)   (+) Visual disturbance      PULMONARY (within normal limits)      Digestive   (+) Dental infection      Surgery/Wound/Pain   (+) Septic shock (HCC)      Cardiovascular/Peripheral Vascular   (+) Chronic systolic CHF (HCC)      S/p ICD, 1% V-paced     TTE 4/7/2024:  Markedly dilated left ventricle cavity, moderately reduced left ventricular systolic function.  There is global hypokinesis with regional wall motion variability.  Indeterminate grade of diastolic dysfunction.  Left ventricular ejection fraction is estimated around 36%.  Normal right ventricle size and systolic function.  Severe left atrial cavity enlargement mild right atrial cavity enlargement.  Aortic valve leaflet sclerosis with focal calcification.  Mild aortic valve regurgitation.  Patient is status post mitral valve annuloplasty.  There is mild approaching moderate residual mitral valve regurgitation.  Trace tricuspid valve regurgitation.  No obvious pulmonary hypertension.  No pericardial effusion.  Borderline dilated aortic root.     Compared to report of [facility] there is overall no significant change.    Physical Exam    Airway  Comment: Mouth opening limited  secondary to jaw pain  Mallampati score: IV  TM Distance: >3 FB  Neck ROM: full     Dental   Comment: Poor dentition, multiple missing and diseased teeth, patient denies loose teeth     Cardiovascular  Cardiovascular exam normal    Pulmonary  Pulmonary exam normal     Other Findings        Anesthesia Plan  ASA Score- 4     Anesthesia Type- general with ASA Monitors.         Additional Monitors:     Airway Plan: ETT.           Plan Factors-    Chart reviewed. EKG reviewed.  Existing labs reviewed. Patient summary reviewed.                  Induction- intravenous.    Postoperative Plan- . Planned trial extubation    Informed Consent- Anesthetic plan and risks discussed with son (consent obtained over telephone from patient's son Ronnell Hein).  I personally reviewed this patient with the CRNA. Discussed and agreed on the Anesthesia Plan with the CRNA..

## 2024-04-12 NOTE — ASSESSMENT & PLAN NOTE
Lab Results   Component Value Date    EGFR 41 04/12/2024    EGFR 41 04/12/2024    EGFR 45 04/11/2024    CREATININE 1.62 (H) 04/12/2024    CREATININE 1.64 (H) 04/12/2024    CREATININE 1.51 (H) 04/11/2024   Baseline creatinine appears to be 1.4-1.6  VARGHESE on admission was likely due to septic shock  Creatinine has returned to baseline  Monitor BMP daily for now  Renally dose medications  Avoid nephrotoxins if possible

## 2024-04-12 NOTE — PLAN OF CARE
Problem: PAIN - ADULT  Goal: Verbalizes/displays adequate comfort level or baseline comfort level  Description: Interventions:  - Encourage patient to monitor pain and request assistance  - Assess pain using appropriate pain scale  - Administer analgesics based on type and severity of pain and evaluate response  - Implement non-pharmacological measures as appropriate and evaluate response  - Consider cultural and social influences on pain and pain management  - Notify physician/advanced practitioner if interventions unsuccessful or patient reports new pain  Outcome: Progressing     Problem: INFECTION - ADULT  Goal: Absence or prevention of progression during hospitalization  Description: INTERVENTIONS:  - Assess and monitor for signs and symptoms of infection  - Monitor lab/diagnostic results  - Monitor all insertion sites, i.e. indwelling lines, tubes, and drains  - Monitor endotracheal if appropriate and nasal secretions for changes in amount and color  - Boon appropriate cooling/warming therapies per order  - Administer medications as ordered  - Instruct and encourage patient and family to use good hand hygiene technique  - Identify and instruct in appropriate isolation precautions for identified infection/condition  Outcome: Progressing  Goal: Absence of fever/infection during neutropenic period  Description: INTERVENTIONS:  - Monitor WBC    Outcome: Progressing

## 2024-04-12 NOTE — PROGRESS NOTES
Silvio Drew is a 73 y.o. male who is currently ordered Vancomycin IV with management by the Pharmacy Consult service.  Relevant clinical data and objective / subjective history reviewed.  Vancomycin Assessment:  Indication and Goal AUC/Trough: Bacteremia (goal -600, trough >10), -600, trough >10  Clinical Status: stable  Micro: ID following     Renal Function:  SCr: 1.62 mg/dL (was 1.51 mg/dL) (CKD 3 at baseline)  CrCl: 47.5 mL/min (was 50.7 mL/min)  Renal replacement: Not on dialysis  Days of Therapy: 7  Current Dose: 1000 mg IV q24h  Vancomycin Plan:  New Dosin mg IV q24h  Estimated AUC: 461 mcg hr/mL  Estimated Trough: 13.9 mcg/mL  Next Level: 4/17 AM labs   Renal Function Monitoring: Daily BMP and UOP  Pharmacy will continue to follow closely for s/sx of nephrotoxicity, infusion reactions and appropriateness of therapy.  BMP and CBC will be ordered per protocol. We will continue to follow the patient’s culture results and clinical progress daily.    Ananda Robin, R.Ph., Pharmacist

## 2024-04-12 NOTE — PROGRESS NOTES
Progress Note - Infectious Disease   Silvio Drew 73 y.o. male MRN: [id number]  [facility] Encounter: [id number]      Impression/Recommendations:  Septic shock.    At [facility].  Due to bacteremia as below.  No other appreciable source.  Improved.     Continue antibiotics as below  Follow temperatures closely  Recheck WBC in AM to monitor infection  Supportive care as per the primary service     Polymicrobial bacteremia.    Strep intermedius, GPR.  Consider dental origin as below.  Consider endocarditis, cardiac device infection.  Repeat blood cultures ,  TTE (adequate) negative.     Continue ceftriaxone for now  Continue vancomycin pending ID of [facility]  Pharmacy consult for vancomycin dosing  Check ZULY to evaluate for endocarditis, cardiac device infection     Dental caries.    With possible abscess.  Risk factor for bacteremia as above     Extraction pending per OMS     Visual disturbance.  Left eye.  Consider relation to bacteremia as above.  No evidence for emboli per Ophtho evaluation.     Indeterminate liver lesion.  See on CT.  1.5 cm.  Needs outpatient MRI to further characterize.     Status post MV annuloplasty.     Dilated cardiomyopathy.     Status post AICD.     CKD.  Baseline Cr 1.5.     Follow creatinine closely and dose-adjust antibiotics as indicated  Recheck BMP in AM     Lumbar radiculopathy.  Noted to be chronic, intermittent problem on chart review.  Unclear relation to bacteremia as above.     Follow up MRI L-spine per SLIM     Antibiotics:  Ceftriaxone #6  Vancomycin #6    Subjective/24 Hour Events:  Patient seen on AM rounds.  Frustrated that he was NPO yesterday and OMS procedure was postponed.  Urinated a lot with lasix.  Low back pain which he states only has had for 1 day due to bed.    Objective:  Vitals:  Temp:  [98.5 °F (36.9 °C)-100.4 °F (38 °C)] 98.5 °F (36.9 °C)  HR:  [64-84] 84  Resp:  [16] 16  BP: ()/(53-69) 102/63  SpO2:  [97 %-99 %] 98 %  Temp (24hrs), Av.2 °F  (37.3 °C), Min:98.5 °F (36.9 °C), Max:100.4 °F (38 °C)  Current: Temperature: 98.5 °F (36.9 °C)    Physical Exam:   General:  No acute distress  Psychiatric:  Awake and alert  Pulmonary:  Normal respiratory excursion without accessory muscle use  Abdomen:  Soft, nontender  Extremities:  No edema  Skin:  No rashes    Lab Results:  I have personally reviewed pertinent labs.  Results from last 7 days   Lab Units 04/12/24  0609 04/11/24  0501 04/10/24  0519 04/07/24  0521 04/06/24  1820   SODIUM mmol/L 140  140 139 140   < > 139   POTASSIUM mmol/L 3.8  3.7 3.8 3.8   < > 4.4   CHLORIDE mmol/L 107  106 111* 111*   < > 113*   CO2 mmol/L 25  27 23 21   < > 19*   BUN mg/dL 15  15 20 22   < > 45*   CREATININE mg/dL 1.62*  1.64* 1.51* 1.56*   < > 3.39*   EGFR ml/min/1.73sq m 41  41 45 43   < > 17   CALCIUM mg/dL 8.0*  8.0* 8.1* 8.4   < > 8.0*   AST U/L 49*  --   --   --  58*   ALT U/L 49  --   --   --  51   ALK PHOS U/L 104  --   --   --  76    < > = values in this interval not displayed.     Results from last 7 days   Lab Units 04/12/24  0609 04/11/24  0501 04/10/24  0519   WBC Thousand/uL 7.25  6.76 5.94 6.22   HEMOGLOBIN g/dL 8.3*  8.4* 7.9* 8.1*   PLATELETS Thousands/uL 227  235 188 191     Results from last 7 days   Lab Units 04/08/24  1547 04/06/24  2349 04/06/24  1841 04/06/24  1838   BLOOD CULTURE  No Growth at 72 hrs.  No Growth at 72 hrs.  --  Streptococcus intermedius* Streptococcus intermedius*   GRAM STAIN RESULT   --   --  Gram positive cocci in pairs and chains*  Gram positive rods* Gram positive cocci in pairs and chains*  Gram positive rods*   URINE CULTURE   --  No Growth <1000 cfu/mL  --   --    MRSA CULTURE ONLY   --  No Methicillin Resistant Staphlyococcus aureus (MRSA) isolated  --   --        Imaging Studies:   I have personally reviewed pertinent imaging study reports and images in PACS.    EKG, Pathology, and Other Studies:   I have personally reviewed pertinent reports.

## 2024-04-12 NOTE — ASSESSMENT & PLAN NOTE
Of left eye  Due to bacteremia there may be septic emboli to the retina, will consult with ophthalmology for evaluation  Appreciate ophthalmology's evaluation and recommendations

## 2024-04-12 NOTE — PLAN OF CARE
Problem: PAIN - ADULT  Goal: Verbalizes/displays adequate comfort level or baseline comfort level  Description: Interventions:  - Encourage patient to monitor pain and request assistance  - Assess pain using appropriate pain scale  - Administer analgesics based on type and severity of pain and evaluate response  - Implement non-pharmacological measures as appropriate and evaluate response  - Consider cultural and social influences on pain and pain management  - Notify physician/advanced practitioner if interventions unsuccessful or patient reports new pain  Outcome: Progressing     Problem: INFECTION - ADULT  Goal: Absence or prevention of progression during hospitalization  Description: INTERVENTIONS:  - Assess and monitor for signs and symptoms of infection  - Monitor lab/diagnostic results  - Monitor all insertion sites, i.e. indwelling lines, tubes, and drains  - Monitor endotracheal if appropriate and nasal secretions for changes in amount and color  - [place] appropriate cooling/warming therapies per order  - Administer medications as ordered  - Instruct and encourage patient and family to use good hand hygiene technique  - Identify and instruct in appropriate isolation precautions for identified infection/condition  Outcome: Progressing  Goal: Absence of fever/infection during neutropenic period  Description: INTERVENTIONS:  - Monitor WBC    Outcome: Progressing     Problem: SAFETY ADULT  Goal: Maintain or return to baseline ADL function  Description: INTERVENTIONS:  -  Assess patient's ability to carry out ADLs; assess patient's baseline for ADL function and identify physical deficits which impact ability to perform ADLs (bathing, care of mouth/teeth, toileting, grooming, dressing, etc.)  - Assess/evaluate cause of self-care deficits   - Assess range of motion  - Assess patient's mobility; develop plan if impaired  - Assess patient's need for assistive devices and provide as appropriate  - Encourage  maximum independence but intervene and supervise when necessary  - Involve family in performance of ADLs  - Assess for home care needs following discharge   - Consider OT consult to assist with ADL evaluation and planning for discharge  - Provide patient education as appropriate  Outcome: Progressing     Problem: HEMATOLOGIC - ADULT  Goal: Maintains hematologic stability  Description: INTERVENTIONS  - Assess for signs and symptoms of bleeding or hemorrhage  - Monitor labs  - Administer supportive blood products/factors as ordered and appropriate  Outcome: Progressing

## 2024-04-12 NOTE — PROGRESS NOTES
[facility]  Progress Note  Name: Silvio Drew I  MRN: [id number]  Unit/Bed#: [facility] 826-01 I Date of Admission: 4/10/2024   Date of Service: 4/12/2024 I Hospital Day: 2    Assessment/Plan   * Streptococcal bacteremia  Assessment & Plan  Presented to [facility] and was admitted to ICU with septic shock.   He was found to have polymicrobial bacteremia and dental infections, due to a lack of OMS services he was transferred to hospitals.  Continue ceftriaxone and vancomycin  Consult OMS for dental evaluation and extraction if indicated  Check ZULY to evaluate for endocarditis and seeding of the pacemaker.  Infectious disease consulted    Visual disturbance  Assessment & Plan  Of left eye  Due to bacteremia there may be septic emboli to the retina, will consult with ophthalmology for evaluation  Appreciate ophthalmology's evaluation and recommendations    Anemia  Assessment & Plan  Likely due to chronic kidney disease, sepsis  Check anemia studies    Dental infection  Assessment & Plan  Apical lucencies present on CT scan  OMS consulted  Continue antibiotics as above  Patient scheduled to go to the OR on 4/12    Chronic systolic CHF (HCC)  Assessment & Plan  Wt Readings from Last 3 Encounters:   04/11/24 101 kg (223 lb 8.7 oz)   04/10/24 100 kg (220 lb 8 oz)   03/18/24 100 kg (221 lb)     EF of 36% w/ mild-moderate TR (h/o mitral valve annuloplasty noted)  Monitor/replete electrolyte deficiencies if present  Resume cardiac agents over the upcoming days if BP allows  Cardiac consult          Abnormal CT of the abdomen  Assessment & Plan  Incidental liver lesion noted   Will need an MRI of the liver when stable as an outpatient    Acute kidney injury superimposed on stage 3 chronic kidney disease (HCC)  Assessment & Plan  Lab Results   Component Value Date    EGFR 41 04/12/2024    EGFR 41 04/12/2024    EGFR 45 04/11/2024    CREATININE 1.62 (H) 04/12/2024    CREATININE 1.64 (H)  04/12/2024    CREATININE 1.51 (H) 04/11/2024   Baseline creatinine appears to be 1.4-1.6  VARGHESE on admission was likely due to septic shock  Creatinine has returned to baseline  Monitor BMP daily for now  Renally dose medications  Avoid nephrotoxins if possible    Septic shock (HCC)  Assessment & Plan  Present on admission at [facility], now resolved  Plan as above    Atrial fibrillation (HCC)  Assessment & Plan  Rate controlled with amiodarone  Warfarin on hold for surgery    Primary hypertension  Assessment & Plan  BP is acceptable  Continue furosemide losartan and metoprolol               VTE Pharmacologic Prophylaxis: VTE Score: 3 Moderate Risk (Score 3-4) - Pharmacological DVT Prophylaxis Contraindicated. Sequential Compression Devices Ordered.    Mobility:   Basic Mobility Inpatient Raw Score: 20  JH-HLM Goal: 6: Walk 10 steps or more  JH-HLM Achieved: 6: Walk 10 steps or more  JH-HLM Goal achieved. Continue to encourage appropriate mobility.    Patient Centered Rounds: I performed bedside rounds with nursing staff today.   Discussions with Specialists or Other Care Team Provider: OMS, ID    Education and Discussions with Family / Patient: Patient declined call to .     Total Time Spent on Date of Encounter in care of patient: 55 mins. This time was spent on one or more of the following: performing physical exam; counseling and coordination of care; obtaining or reviewing history; documenting in the medical record; reviewing/ordering tests, medications or procedures; communicating with other healthcare professionals and discussing with patient's family/caregivers.    Current Length of Stay: 2 day(s)  Current Patient Status: Inpatient   Certification Statement: The patient will continue to require additional inpatient hospital stay due to Bacteriemia, patient in OR with OMS   Discharge Plan: Anticipate discharge in >72 hrs to Pending clinical improvement     Code Status: Level 1 - Full Code    Subjective:    This is a very pleasant 73 y.o. male who was seen today at bedside. Patient has no new complaints. Patient is not in any acute distress.       Objective:     Vitals:   Temp (24hrs), Av.4 °F (36.9 °C), Min:97.2 °F (36.2 °C), Max:100.4 °F (38 °C)    Temp:  [97.2 °F (36.2 °C)-100.4 °F (38 °C)] 97.6 °F (36.4 °C)  HR:  [64-84] 83  Resp:  [15-28] 18  BP: ()/(51-73) 107/73  SpO2:  [97 %-100 %] 97 %  Body mass index is 33.91 kg/m².     Input and Output Summary (last 24 hours):     Intake/Output Summary (Last 24 hours) at 2024 1544  Last data filed at 2024 1439  Gross per 24 hour   Intake 864.17 ml   Output --   Net 864.17 ml       Physical Exam:   Physical Exam  Vitals reviewed.   Constitutional:       Appearance: He is ill-appearing.   HENT:      Head: Normocephalic.      Nose: No congestion.      Mouth/Throat:      Pharynx: No oropharyngeal exudate or posterior oropharyngeal erythema.   Eyes:      Conjunctiva/sclera: Conjunctivae normal.   Cardiovascular:      Rate and Rhythm: Normal rate and regular rhythm.   Pulmonary:      Effort: Pulmonary effort is normal.   Abdominal:      General: Abdomen is flat.      Palpations: Abdomen is soft.   Skin:     General: Skin is warm and dry.   Neurological:      Mental Status: He is alert and oriented to person, place, and time. Mental status is at baseline.          Additional Data:     Labs:  Results from last 7 days   Lab Units 24  0609   WBC Thousand/uL 7.25  6.76   HEMOGLOBIN g/dL 8.3*  8.4*   HEMATOCRIT % 27.8*  28.3*   PLATELETS Thousands/uL 227  235   SEGS PCT % 66  65   LYMPHO PCT % 23  23   MONO PCT % 8  8   EOS PCT % 2  2     Results from last 7 days   Lab Units 24  0609   SODIUM mmol/L 140  140   POTASSIUM mmol/L 3.8  3.7   CHLORIDE mmol/L 107  106   CO2 mmol/L 25  27   BUN mg/dL 15  15   CREATININE mg/dL 1.62*  1.64*   ANION GAP mmol/L 8  7   CALCIUM mg/dL 8.0*  8.0*   ALBUMIN g/dL 3.1*   TOTAL BILIRUBIN mg/dL 0.52   ALK  PHOS U/L 104   ALT U/L 49   AST U/L 49*   GLUCOSE RANDOM mg/dL 83  84     Results from last 7 days   Lab Units 04/12/24  0609   INR  1.42*             Results from last 7 days   Lab Units 04/09/24  0527 04/07/24  1442 04/06/24  1820   LACTIC ACID mmol/L  --   --  1.1   PROCALCITONIN ng/ml 6.87* 19.31* 16.26*       Lines/Drains:  Invasive Devices       Peripheral Intravenous Line  Duration             Peripheral IV 04/11/24 Dorsal (posterior);Proximal;Right Forearm 1 day                          Imaging: No pertinent imaging reviewed.    Recent Cultures (last 7 days):   Results from last 7 days   Lab Units 04/08/24  1547 04/06/24  2349 04/06/24  1841 04/06/24  1838   BLOOD CULTURE  No Growth at 72 hrs.  No Growth at 72 hrs.  --  Streptococcus intermedius*  Anaerobic cocci*  Gram-positive kasandra* Streptococcus intermedius*  Anaerobic cocci*  Gram-positive kasandra*   GRAM STAIN RESULT   --   --  Gram positive cocci in pairs and chains*  Gram positive rods* Gram positive cocci in pairs and chains*  Gram positive rods*   URINE CULTURE   --  No Growth <1000 cfu/mL  --   --        Last 24 Hours Medication List:   Current Facility-Administered Medications   Medication Dose Route Frequency Provider Last Rate    acetaminophen  650 mg Oral Q6H PRN Hong Glynn, DMD      amiodarone  200 mg Oral Daily Hong Glynn, DMD      atorvastatin  20 mg Oral Daily With Dinner Hong Glynn, DMD      cefTRIAXone  2,000 mg Intravenous Q24H Hong Glynn, DMD 2,000 mg (04/11/24 1453)    gabapentin  200 mg Oral TID Hong Glynn, DMD      lidocaine  2 patch Topical Daily Hong Glynn, DMD      multi-electrolyte  50 mL/hr Intravenous Continuous Hong Glynn, DMD 50 mL/hr (04/11/24 1834)    pantoprazole  20 mg Oral Daily Before Breakfast Hong Glynn, DMD      polyethylene glycol  17 g Oral Daily Hong Glynn, DMD      vancomycin  12.5 mg/kg (Adjusted) Intravenous Q24H Hong Glynn, DMD 1,000 mg (04/11/24 2017)        Today, Patient Was Seen By:  Placido Jauregui MD    **Please Note: This note may have been constructed using a voice recognition system.**

## 2024-04-12 NOTE — ANESTHESIA POSTPROCEDURE EVALUATION
Post-Op Assessment Note    CV Status:  Stable  Pain Score: 0    Pain management: adequate       Mental Status:  Sleepy   Hydration Status:  Stable   PONV Controlled:  None   Airway Patency:  Patent     Post Op Vitals Reviewed: Yes    No anethesia notable event occurred.    Staff: Anesthesiologist, CRNA               BP   90/51   Temp (!) 97.2 °F (36.2 °C) (04/12/24 1437)    Pulse 83 (04/12/24 1437)   Resp   20   SpO2   99

## 2024-04-13 LAB
ALBUMIN SERPL BCP-MCNC: 3 G/DL (ref 3.5–5)
ALP SERPL-CCNC: 101 U/L (ref 34–104)
ALT SERPL W P-5'-P-CCNC: 46 U/L (ref 7–52)
ANION GAP SERPL CALCULATED.3IONS-SCNC: 5 MMOL/L (ref 4–13)
ANION GAP SERPL CALCULATED.3IONS-SCNC: 5 MMOL/L (ref 4–13)
AST SERPL W P-5'-P-CCNC: 46 U/L (ref 13–39)
BACTERIA BLD CULT: NORMAL
BACTERIA BLD CULT: NORMAL
BASOPHILS # BLD AUTO: 0.03 THOUSANDS/ÂΜL (ref 0–0.1)
BASOPHILS # BLD AUTO: 0.03 THOUSANDS/ÂΜL (ref 0–0.1)
BASOPHILS NFR BLD AUTO: 0 % (ref 0–1)
BASOPHILS NFR BLD AUTO: 0 % (ref 0–1)
BILIRUB SERPL-MCNC: 0.47 MG/DL (ref 0.2–1)
BUN SERPL-MCNC: 14 MG/DL (ref 5–25)
BUN SERPL-MCNC: 14 MG/DL (ref 5–25)
CALCIUM ALBUM COR SERPL-MCNC: 8.8 MG/DL (ref 8.3–10.1)
CALCIUM SERPL-MCNC: 8 MG/DL (ref 8.4–10.2)
CALCIUM SERPL-MCNC: 8.1 MG/DL (ref 8.4–10.2)
CHLORIDE SERPL-SCNC: 106 MMOL/L (ref 96–108)
CHLORIDE SERPL-SCNC: 107 MMOL/L (ref 96–108)
CO2 SERPL-SCNC: 27 MMOL/L (ref 21–32)
CO2 SERPL-SCNC: 27 MMOL/L (ref 21–32)
CREAT SERPL-MCNC: 1.45 MG/DL (ref 0.6–1.3)
CREAT SERPL-MCNC: 1.45 MG/DL (ref 0.6–1.3)
EOSINOPHIL # BLD AUTO: 0.14 THOUSAND/ÂΜL (ref 0–0.61)
EOSINOPHIL # BLD AUTO: 0.16 THOUSAND/ÂΜL (ref 0–0.61)
EOSINOPHIL NFR BLD AUTO: 2 % (ref 0–6)
EOSINOPHIL NFR BLD AUTO: 2 % (ref 0–6)
ERYTHROCYTE [DISTWIDTH] IN BLOOD BY AUTOMATED COUNT: 16.4 % (ref 11.6–15.1)
ERYTHROCYTE [DISTWIDTH] IN BLOOD BY AUTOMATED COUNT: 16.5 % (ref 11.6–15.1)
GFR SERPL CREATININE-BSD FRML MDRD: 47 ML/MIN/1.73SQ M
GFR SERPL CREATININE-BSD FRML MDRD: 47 ML/MIN/1.73SQ M
GLUCOSE SERPL-MCNC: 129 MG/DL (ref 65–140)
GLUCOSE SERPL-MCNC: 78 MG/DL (ref 65–140)
GLUCOSE SERPL-MCNC: 81 MG/DL (ref 65–140)
HCT VFR BLD AUTO: 25.9 % (ref 36.5–49.3)
HCT VFR BLD AUTO: 25.9 % (ref 36.5–49.3)
HGB BLD-MCNC: 7.6 G/DL (ref 12–17)
HGB BLD-MCNC: 7.7 G/DL (ref 12–17)
IMM GRANULOCYTES # BLD AUTO: 0.11 THOUSAND/UL (ref 0–0.2)
IMM GRANULOCYTES # BLD AUTO: 0.11 THOUSAND/UL (ref 0–0.2)
IMM GRANULOCYTES NFR BLD AUTO: 1 % (ref 0–2)
IMM GRANULOCYTES NFR BLD AUTO: 1 % (ref 0–2)
INR PPP: 1.39 (ref 0.84–1.19)
LYMPHOCYTES # BLD AUTO: 1.43 THOUSANDS/ÂΜL (ref 0.6–4.47)
LYMPHOCYTES # BLD AUTO: 1.45 THOUSANDS/ÂΜL (ref 0.6–4.47)
LYMPHOCYTES NFR BLD AUTO: 18 % (ref 14–44)
LYMPHOCYTES NFR BLD AUTO: 19 % (ref 14–44)
MCH RBC QN AUTO: 25.5 PG (ref 26.8–34.3)
MCH RBC QN AUTO: 26.1 PG (ref 26.8–34.3)
MCHC RBC AUTO-ENTMCNC: 29.3 G/DL (ref 31.4–37.4)
MCHC RBC AUTO-ENTMCNC: 29.7 G/DL (ref 31.4–37.4)
MCV RBC AUTO: 87 FL (ref 82–98)
MCV RBC AUTO: 88 FL (ref 82–98)
MONOCYTES # BLD AUTO: 0.67 THOUSAND/ÂΜL (ref 0.17–1.22)
MONOCYTES # BLD AUTO: 0.71 THOUSAND/ÂΜL (ref 0.17–1.22)
MONOCYTES NFR BLD AUTO: 9 % (ref 4–12)
MONOCYTES NFR BLD AUTO: 9 % (ref 4–12)
NEUTROPHILS # BLD AUTO: 5.3 THOUSANDS/ÂΜL (ref 1.85–7.62)
NEUTROPHILS # BLD AUTO: 5.45 THOUSANDS/ÂΜL (ref 1.85–7.62)
NEUTS SEG NFR BLD AUTO: 69 % (ref 43–75)
NEUTS SEG NFR BLD AUTO: 70 % (ref 43–75)
NRBC BLD AUTO-RTO: 0 /100 WBCS
NRBC BLD AUTO-RTO: 0 /100 WBCS
PLATELET # BLD AUTO: 225 THOUSANDS/UL (ref 149–390)
PLATELET # BLD AUTO: 232 THOUSANDS/UL (ref 149–390)
PMV BLD AUTO: 10.7 FL (ref 8.9–12.7)
PMV BLD AUTO: 10.8 FL (ref 8.9–12.7)
POTASSIUM SERPL-SCNC: 4.3 MMOL/L (ref 3.5–5.3)
POTASSIUM SERPL-SCNC: 4.4 MMOL/L (ref 3.5–5.3)
PROT SERPL-MCNC: 5.9 G/DL (ref 6.4–8.4)
PROTHROMBIN TIME: 16.8 SECONDS (ref 11.6–14.5)
RBC # BLD AUTO: 2.95 MILLION/UL (ref 3.88–5.62)
RBC # BLD AUTO: 2.98 MILLION/UL (ref 3.88–5.62)
SODIUM SERPL-SCNC: 138 MMOL/L (ref 135–147)
SODIUM SERPL-SCNC: 139 MMOL/L (ref 135–147)
WBC # BLD AUTO: 7.72 THOUSAND/UL (ref 4.31–10.16)
WBC # BLD AUTO: 7.87 THOUSAND/UL (ref 4.31–10.16)

## 2024-04-13 PROCEDURE — 99233 SBSQ HOSP IP/OBS HIGH 50: CPT | Performed by: INTERNAL MEDICINE

## 2024-04-13 PROCEDURE — 85610 PROTHROMBIN TIME: CPT | Performed by: DENTIST

## 2024-04-13 PROCEDURE — 80053 COMPREHEN METABOLIC PANEL: CPT | Performed by: DENTIST

## 2024-04-13 PROCEDURE — 99232 SBSQ HOSP IP/OBS MODERATE 35: CPT | Performed by: FAMILY MEDICINE

## 2024-04-13 PROCEDURE — 97116 GAIT TRAINING THERAPY: CPT

## 2024-04-13 PROCEDURE — 80048 BASIC METABOLIC PNL TOTAL CA: CPT | Performed by: DENTIST

## 2024-04-13 PROCEDURE — 82948 REAGENT STRIP/BLOOD GLUCOSE: CPT

## 2024-04-13 PROCEDURE — 85025 COMPLETE CBC W/AUTO DIFF WBC: CPT | Performed by: DENTIST

## 2024-04-13 RX ORDER — WARFARIN SODIUM 5 MG/1
5 TABLET ORAL
Status: DISCONTINUED | OUTPATIENT
Start: 2024-04-13 | End: 2024-04-15 | Stop reason: HOSPADM

## 2024-04-13 RX ADMIN — GABAPENTIN 200 MG: 100 CAPSULE ORAL at 17:27

## 2024-04-13 RX ADMIN — ACETAMINOPHEN 650 MG: 325 TABLET, FILM COATED ORAL at 17:27

## 2024-04-13 RX ADMIN — CEFTRIAXONE SODIUM 2000 MG: 10 INJECTION, POWDER, FOR SOLUTION INTRAVENOUS at 14:35

## 2024-04-13 RX ADMIN — GABAPENTIN 200 MG: 100 CAPSULE ORAL at 23:20

## 2024-04-13 RX ADMIN — SODIUM CHLORIDE, SODIUM GLUCONATE, SODIUM ACETATE, POTASSIUM CHLORIDE, MAGNESIUM CHLORIDE, SODIUM PHOSPHATE, DIBASIC, AND POTASSIUM PHOSPHATE 50 ML/HR: .53; .5; .37; .037; .03; .012; .00082 INJECTION, SOLUTION INTRAVENOUS at 11:52

## 2024-04-13 RX ADMIN — ACETAMINOPHEN 650 MG: 325 TABLET, FILM COATED ORAL at 05:34

## 2024-04-13 RX ADMIN — PANTOPRAZOLE SODIUM 20 MG: 20 TABLET, DELAYED RELEASE ORAL at 05:35

## 2024-04-13 RX ADMIN — ATORVASTATIN CALCIUM 20 MG: 20 TABLET, FILM COATED ORAL at 17:27

## 2024-04-13 RX ADMIN — AMIODARONE HYDROCHLORIDE 200 MG: 200 TABLET ORAL at 08:45

## 2024-04-13 RX ADMIN — WARFARIN SODIUM 5 MG: 5 TABLET ORAL at 17:27

## 2024-04-13 RX ADMIN — POLYETHYLENE GLYCOL 3350 17 G: 17 POWDER, FOR SOLUTION ORAL at 08:45

## 2024-04-13 RX ADMIN — GABAPENTIN 200 MG: 100 CAPSULE ORAL at 08:45

## 2024-04-13 RX ADMIN — LIDOCAINE 5% 2 PATCH: 700 PATCH TOPICAL at 05:26

## 2024-04-13 NOTE — PROGRESS NOTES
Silvio Drew is a 73 y.o. male who is currently ordered Vancomycin IV with management by the Pharmacy Consult service.  Relevant clinical data and objective / subjective history reviewed.  Vancomycin Assessment:  Indication and Goal AUC/Trough: Bacteremia (goal -600, trough >10), -600, trough >10  Clinical Status: stable  Micro:     Renal Function:  SCr: 1.45 mg/dL  CrCl: 53 mL/min  Renal replacement: Not on dialysis  Days of Therapy: 8  Current Dose: 1000mg iv q24h  Vancomycin Plan:  New Dosing: continue 1000mg iv q24h  Estimated AUC: 412 mcg*hr/mL  Estimated Trough: 12.1 mcg/mL  Next Level: 4/17/24 @0600  Renal Function Monitoring: Daily BMP and UOP  Pharmacy will continue to follow closely for s/sx of nephrotoxicity, infusion reactions and appropriateness of therapy.  BMP and CBC will be ordered per protocol. We will continue to follow the patient’s culture results and clinical progress daily.    Randy Pulliam, Pharmacist

## 2024-04-13 NOTE — ASSESSMENT & PLAN NOTE
Wt Readings from Last 3 Encounters:   04/13/24 104 kg (229 lb 4.5 oz)   04/10/24 100 kg (220 lb 8 oz)   03/18/24 100 kg (221 lb)     EF of 36% w/ mild-moderate TR (h/o mitral valve annuloplasty noted)  Monitor/replete electrolyte deficiencies if present  Resume cardiac agents over the upcoming days if BP allows  Cardiac consult

## 2024-04-13 NOTE — ASSESSMENT & PLAN NOTE
Rate controlled with amiodarone  Cleared to restart anticoagulation OMS  Restart Coumadin at 5 mg daily, goal INR between 2-3  Follow-up daily INR

## 2024-04-13 NOTE — ASSESSMENT & PLAN NOTE
Lab Results   Component Value Date    EGFR 47 04/13/2024    EGFR 47 04/13/2024    EGFR 41 04/12/2024    EGFR 41 04/12/2024    CREATININE 1.45 (H) 04/13/2024    CREATININE 1.45 (H) 04/13/2024    CREATININE 1.62 (H) 04/12/2024    CREATININE 1.64 (H) 04/12/2024   Baseline creatinine appears to be 1.4-1.6  VARGHESE on admission was likely due to septic shock  Creatinine has returned to baseline  Monitor BMP daily for now  Renally dose medications  Avoid nephrotoxins if possible

## 2024-04-13 NOTE — PROGRESS NOTES
Progress Note - Infectious Disease   Silvio Drew 73 y.o. male MRN: [id number]  Unit/Bed#: [facility] 826-01 Encounter: [id number]      Impression/Recommendations:  Septic shock.    At [facility].  Due to bacteremia as below.  No other appreciable source.  Improved.     Continue antibiotics as below  Follow temperatures closely  Recheck WBC in AM to monitor infection  Supportive care as per the primary service     Polymicrobial bacteremia.    Strep intermedius, anaerobic GPC.  Suspect dental origin as below.  Consider endocarditis, cardiac device infection.  Repeat blood cultures 4/8,  TTE/ZULY negative.     Continue ceftriaxone for 4 weeks total through 5/4/24  D/C vancomycin  Monitor closely while on antibiotics for toxicities including diarrhea, rash, cytopenias, or kidney injury  Check weekly CBC-diff, CMP while on IV antibiotics to monitor for toxicities.  OK from ID for PICC at any time  D/C planning for infusion center versus STR pending PT eval  Outpatient follow-up with ID 2 weeks after D/C  D/C PICC after last dose IV antibiotics.     Dental caries.    With possible abscess.  Risk factor for bacteremia as above.  Status post dental extractions 4/12.     Visual disturbance.  Left eye.  Consider relation to bacteremia as above.  No evidence for emboli per Ophtho evaluation.     Indeterminate liver lesion.  See on CT.  1.5 cm.  Needs outpatient MRI to further characterize.     Status post MV annuloplasty.     Dilated cardiomyopathy.     Status post AICD.     CKD.  Baseline Cr 1.5.  No dose adjustment for ceftriaxone indicated.     Lumbar radiculopathy.  Noted to be chronic, intermittent problem on chart review.  Unclear relation to bacteremia as above.     Follow up MRI L-spine per SLIM     I discussed with Dr. Jauregui the above plan to continue IV antibiotics and plan for D/C, STR versus infusion center.  He agrees with the plan.    Antibiotics:  Ceftriaxone #7  Vancomycin #7    Subjective/24 Hour Events:  Patient seen on  AM rounds.  Doing OK.  Was a bit dizzy when ambulating to the bathroom.  No fevers or diarrhea.    Objective:  Vitals:  Temp:  [97.2 °F (36.2 °C)-100.2 °F (37.9 °C)] 100.2 °F (37.9 °C)  HR:  [75-83] 76  Resp:  [15-28] 17  BP: ()/(51-74) 126/65  SpO2:  [92 %-100 %] 92 %  Temp (24hrs), Av.5 °F (36.9 °C), Min:97.2 °F (36.2 °C), Max:100.2 °F (37.9 °C)  Current: Temperature: 100.2 °F (37.9 °C)    Physical Exam:   General:  No acute distress  Psychiatric:  Awake and alert  Pulmonary:  Normal respiratory excursion without accessory muscle use  Abdomen:  Soft, nontender  Extremities:  No edema  Skin:  No rashes    Lab Results:  I have personally reviewed pertinent labs.  Results from last 7 days   Lab Units 24  0624  0501 24  0521 24  1820   SODIUM mmol/L 138  139 140  140 139   < > 139   POTASSIUM mmol/L 4.3  4.4 3.8  3.7 3.8   < > 4.4   CHLORIDE mmol/L 106  107 107  106 111*   < > 113*   CO2 mmol/L 27  27 25  27 23   < > 19*   BUN mg/dL 14  14 15  15 20   < > 45*   CREATININE mg/dL 1.45*  1.45* 1.62*  1.64* 1.51*   < > 3.39*   EGFR ml/min/1.73sq m 47  47 41  41 45   < > 17   CALCIUM mg/dL 8.0*  8.1* 8.0*  8.0* 8.1*   < > 8.0*   AST U/L 46* 49*  --   --  58*   ALT U/L 46 49  --   --  51   ALK PHOS U/L 101 104  --   --  76    < > = values in this interval not displayed.     Results from last 7 days   Lab Units 24  0524  0609 24  0501   WBC Thousand/uL 7.72  7.87 7.25  6.76 5.94   HEMOGLOBIN g/dL 7.7*  7.6* 8.3*  8.4* 7.9*   PLATELETS Thousands/uL 225  232 227  235 188     Results from last 7 days   Lab Units 24  1547 24  2349 24  1841 24  1838   BLOOD CULTURE  No Growth After 4 Days.  No Growth After 4 Days.  --  Streptococcus intermedius*  Anaerobic cocci*  Gram-positive kasandra* Streptococcus intermedius*  Anaerobic cocci*  Gram-positive kasandra*   GRAM STAIN RESULT   --   --  Gram positive cocci in pairs  and chains*  Gram positive rods* Gram positive cocci in pairs and chains*  Gram positive rods*   URINE CULTURE   --  No Growth <1000 cfu/mL  --   --    MRSA CULTURE ONLY   --  No Methicillin Resistant Staphlyococcus aureus (MRSA) isolated  --   --        Imaging Studies:   I have personally reviewed pertinent imaging study reports and images in PACS.    EKG, Pathology, and Other Studies:   I have personally reviewed pertinent reports.

## 2024-04-13 NOTE — CONSULTS
Silvio Drew is a 73 y.o. male who was receiving Vancomycin IV with management by the Pharmacy Consult service for treatment of Bacteremia (goal -600, trough >10)  .       The patient's Vancomycin therapy has been completed / discontinued. Thank you for allowing us to take part in this patient's care. Pharmacy will sign-off now; please call or re-consult if there are any questions.        Randy Pulliam, PharmD. Inpatient [facility] [place]

## 2024-04-13 NOTE — ASSESSMENT & PLAN NOTE
Of left eye  No concern for septic emboli to the retina per ophthalmology  Appreciate ophthalmology's evaluation and recommendations

## 2024-04-13 NOTE — PROGRESS NOTES
[facility]  Progress Note  Name: Silvio Drew I  MRN: [id number]  Unit/Bed#: [facility]PHP 826-01 I Date of Admission: 4/10/2024   Date of Service: 4/13/2024 I Hospital Day: 3    Assessment/Plan   * Streptococcal bacteremia  Assessment & Plan  Presented to [facility] and was admitted to ICU with septic shock.   He was found to have polymicrobial bacteremia and dental infections, due to a lack of OMS services he was transferred to [facility].  Continue ceftriaxone and vancomycin  Status post extraction biomass  Post ZULY by cardiology  Infectious disease consulted    Visual disturbance  Assessment & Plan  Of left eye  No concern for septic emboli to the retina per ophthalmology  Appreciate ophthalmology's evaluation and recommendations    Anemia  Assessment & Plan  Likely due to chronic kidney disease, sepsis  Check anemia studies    Dental infection  Assessment & Plan  Apical lucencies present on CT scan  OMS consulted  Continue antibiotics as above  Patient is status post extraction on 4/12    Chronic systolic CHF (HCC)  Assessment & Plan  Wt Readings from Last 3 Encounters:   04/13/24 104 kg (229 lb 4.5 oz)   04/10/24 100 kg (220 lb 8 oz)   03/18/24 100 kg (221 lb)     EF of 36% w/ mild-moderate TR (h/o mitral valve annuloplasty noted)  Monitor/replete electrolyte deficiencies if present  Resume cardiac agents over the upcoming days if BP allows  Cardiac consult          Abnormal CT of the abdomen  Assessment & Plan  Incidental liver lesion noted   Will need an MRI of the liver when stable as an outpatient    Acute kidney injury superimposed on stage 3 chronic kidney disease (HCC)  Assessment & Plan  Lab Results   Component Value Date    EGFR 47 04/13/2024    EGFR 47 04/13/2024    EGFR 41 04/12/2024    EGFR 41 04/12/2024    CREATININE 1.45 (H) 04/13/2024    CREATININE 1.45 (H) 04/13/2024    CREATININE 1.62 (H) 04/12/2024    CREATININE 1.64 (H) 04/12/2024   Baseline creatinine appears  to be 1.4-1.6  VARGHESE on admission was likely due to septic shock  Creatinine has returned to baseline  Monitor BMP daily for now  Renally dose medications  Avoid nephrotoxins if possible    Septic shock (HCC)  Assessment & Plan  Present on admission at [facility], now resolved  Plan as above    Atrial fibrillation (HCC)  Assessment & Plan  Rate controlled with amiodarone  Cleared to restart anticoagulation OMS  Restart Coumadin at 5 mg daily, goal INR between 2-3  Follow-up daily INR    Primary hypertension  Assessment & Plan  BP is acceptable                 VTE Pharmacologic Prophylaxis: VTE Score: 3 Moderate Risk (Score 3-4) - Pharmacological DVT Prophylaxis Ordered: warfarin (Coumadin).    Mobility:   Basic Mobility Inpatient Raw Score: 20  JH-HLM Goal: 6: Walk 10 steps or more  JH-HLM Achieved: 6: Walk 10 steps or more  JH-HLM Goal achieved. Continue to encourage appropriate mobility.    Patient Centered Rounds: I performed bedside rounds with nursing staff today.   Discussions with Specialists or Other Care Team Provider: Reviewed notes from home meds and cardiology    Education and Discussions with Family / Patient: Patient declined call to .     Total Time Spent on Date of Encounter in care of patient: 55 mins. This time was spent on one or more of the following: performing physical exam; counseling and coordination of care; obtaining or reviewing history; documenting in the medical record; reviewing/ordering tests, medications or procedures; communicating with other healthcare professionals and discussing with patient's family/caregivers.    Current Length of Stay: 3 day(s)  Current Patient Status: Inpatient   Certification Statement: The patient will continue to require additional inpatient hospital stay due to IV antibiotics due to sepsis  Discharge Plan: Anticipate discharge in 24-48 hrs to rehab facility.    Code Status: Level 1 - Full Code    Subjective:   This is a very pleasant 73-year-old  gentleman who was seen evaluated today at bedside.    Objective:     Vitals:   Temp (24hrs), Av.8 °F (37.1 °C), Min:97.7 °F (36.5 °C), Max:100.2 °F (37.9 °C)    Temp:  [97.7 °F (36.5 °C)-100.2 °F (37.9 °C)] 98.8 °F (37.1 °C)  HR:  [64-83] 73  Resp:  [16-20] 20  BP: (107-128)/(61-74) 118/64  SpO2:  [92 %-100 %] 97 %  Body mass index is 34.86 kg/m².     Input and Output Summary (last 24 hours):     Intake/Output Summary (Last 24 hours) at 2024 1509  Last data filed at 2024 1152  Gross per 24 hour   Intake 550 ml   Output 600 ml   Net -50 ml       Physical Exam:   Physical Exam  Vitals reviewed.   Constitutional:       Appearance: He is ill-appearing.   HENT:      Head: Normocephalic.      Nose: No congestion.      Mouth/Throat:      Pharynx: No oropharyngeal exudate or posterior oropharyngeal erythema.   Eyes:      Conjunctiva/sclera: Conjunctivae normal.   Cardiovascular:      Rate and Rhythm: Normal rate and regular rhythm.   Pulmonary:      Effort: Pulmonary effort is normal.   Abdominal:      General: Abdomen is flat.      Palpations: Abdomen is soft.   Skin:     General: Skin is warm and dry.   Neurological:      Mental Status: He is alert and oriented to person, place, and time. Mental status is at baseline.          Additional Data:     Labs:  Results from last 7 days   Lab Units 24  0547   WBC Thousand/uL 7.72  7.87   HEMOGLOBIN g/dL 7.7*  7.6*   HEMATOCRIT % 25.9*  25.9*   PLATELETS Thousands/uL 225  232   SEGS PCT % 69  70   LYMPHO PCT % 19  18   MONO PCT % 9  9   EOS PCT % 2  2     Results from last 7 days   Lab Units 24  0547   SODIUM mmol/L 138  139   POTASSIUM mmol/L 4.3  4.4   CHLORIDE mmol/L 106  107   CO2 mmol/L 27  27   BUN mg/dL 14  14   CREATININE mg/dL 1.45*  1.45*   ANION GAP mmol/L 5  5   CALCIUM mg/dL 8.0*  8.1*   ALBUMIN g/dL 3.0*   TOTAL BILIRUBIN mg/dL 0.47   ALK PHOS U/L 101   ALT U/L 46   AST U/L 46*   GLUCOSE RANDOM mg/dL 78  81     Results from  last 7 days   Lab Units 04/13/24  0547   INR  1.39*     Results from last 7 days   Lab Units 04/13/24  1503   POC GLUCOSE mg/dl 129         Results from last 7 days   Lab Units 04/09/24  0527 04/07/24  1442 04/06/24  1820   LACTIC ACID mmol/L  --   --  1.1   PROCALCITONIN ng/ml 6.87* 19.31* 16.26*       Lines/Drains:  Invasive Devices       Peripheral Intravenous Line  Duration             Peripheral IV 04/12/24 Right;Ventral (anterior) Forearm <1 day                          Imaging: Reviewed radiology reports from this admission including: procedure reports    Recent Cultures (last 7 days):   Results from last 7 days   Lab Units 04/08/24  1547 04/06/24  2349 04/06/24  1841 04/06/24  1838   BLOOD CULTURE  No Growth After 4 Days.  No Growth After 4 Days.  --  Streptococcus intermedius*  Anaerobic cocci*  Gram-positive kasandra* Streptococcus intermedius*  Anaerobic cocci*  Gram-positive kasandra*   GRAM STAIN RESULT   --   --  Gram positive cocci in pairs and chains*  Gram positive rods* Gram positive cocci in pairs and chains*  Gram positive rods*   URINE CULTURE   --  No Growth <1000 cfu/mL  --   --        Last 24 Hours Medication List:   Current Facility-Administered Medications   Medication Dose Route Frequency Provider Last Rate    acetaminophen  650 mg Oral Q6H PRN Hong Glynn, DMD      amiodarone  200 mg Oral Daily Hong Glynn, DMD      atorvastatin  20 mg Oral Daily With Dinner Hong Glynn, DMD      cefTRIAXone  2,000 mg Intravenous Q24H Hong Glynn, DMD 2,000 mg (04/13/24 1435)    gabapentin  200 mg Oral TID Hong Glynn, DMD      lidocaine  2 patch Topical Daily Hong Glynn, DMD      multi-electrolyte  50 mL/hr Intravenous Continuous Hong Glynn, DMD 50 mL/hr (04/13/24 1152)    pantoprazole  20 mg Oral Daily Before Breakfast Hong Glynn, DMD      polyethylene glycol  17 g Oral Daily Hong Glynn, DMD      warfarin  5 mg Oral Daily (warfarin) Placido Jauregui MD          Today, Patient Was Seen By:  Placido Jauregui MD    **Please Note: This note may have been constructed using a voice recognition system.**

## 2024-04-13 NOTE — ASSESSMENT & PLAN NOTE
Apical lucencies present on CT scan  OMS consulted  Continue antibiotics as above  Patient is status post extraction on 4/12

## 2024-04-13 NOTE — PLAN OF CARE
Problem: PHYSICAL THERAPY ADULT  Goal: Performs mobility at highest level of function for planned discharge setting.  See evaluation for individualized goals.  Description: Treatment/Interventions: Functional transfer training, Elevations, Therapeutic exercise, Bed mobility, Gait training, Spoke to nursing, OT  Equipment Recommended: Walker (pp)       See flowsheet documentation for full assessment, interventions and recommendations.  Outcome: Progressing  Note: Prognosis: Good  Problem List: Decreased endurance, Pain  Assessment: Patient lying supine in bed, agreeable to try and do something but reports lack of sleep last night and dizziness all day today. He does require increase time for all mobility. He was able to sit EOB x10 minutes in attempt to clear dizziness this visit. He did ambulate with slow gait into hallway, but notes not feeling well enough to continue. Nursing aware. Will continue to follow as appropriate to maximize functional independence.  Barriers to Discharge:  (Stair trial)  Barriers to Discharge Comments: stair trial nv  Rehab Resource Intensity Level, PT: III (Minimum Resource Intensity)    See flowsheet documentation for full assessment.         63.5

## 2024-04-13 NOTE — PLAN OF CARE
Problem: PAIN - ADULT  Goal: Verbalizes/displays adequate comfort level or baseline comfort level  Description: Interventions:  - Encourage patient to monitor pain and request assistance  - Assess pain using appropriate pain scale  - Administer analgesics based on type and severity of pain and evaluate response  - Implement non-pharmacological measures as appropriate and evaluate response  - Consider cultural and social influences on pain and pain management  - Notify physician/advanced practitioner if interventions unsuccessful or patient reports new pain  Outcome: Progressing     Problem: INFECTION - ADULT  Goal: Absence or prevention of progression during hospitalization  Description: INTERVENTIONS:  - Assess and monitor for signs and symptoms of infection  - Monitor lab/diagnostic results  - Monitor all insertion sites, i.e. indwelling lines, tubes, and drains  - Monitor endotracheal if appropriate and nasal secretions for changes in amount and color  - [place] appropriate cooling/warming therapies per order  - Administer medications as ordered  - Instruct and encourage patient and family to use good hand hygiene technique  - Identify and instruct in appropriate isolation precautions for identified infection/condition  Outcome: Progressing  Goal: Absence of fever/infection during neutropenic period  Description: INTERVENTIONS:  - Monitor WBC    Outcome: Progressing     Problem: SAFETY ADULT  Goal: Maintain or return to baseline ADL function  Description: INTERVENTIONS:  -  Assess patient's ability to carry out ADLs; assess patient's baseline for ADL function and identify physical deficits which impact ability to perform ADLs (bathing, care of mouth/teeth, toileting, grooming, dressing, etc.)  - Assess/evaluate cause of self-care deficits   - Assess range of motion  - Assess patient's mobility; develop plan if impaired  - Assess patient's need for assistive devices and provide as appropriate  - Encourage  maximum independence but intervene and supervise when necessary  - Involve family in performance of ADLs  - Assess for home care needs following discharge   - Consider OT consult to assist with ADL evaluation and planning for discharge  - Provide patient education as appropriate  Outcome: Progressing     Problem: HEMATOLOGIC - ADULT  Goal: Maintains hematologic stability  Description: INTERVENTIONS  - Assess for signs and symptoms of bleeding or hemorrhage  - Monitor labs  - Administer supportive blood products/factors as ordered and appropriate  Outcome: Progressing

## 2024-04-13 NOTE — ASSESSMENT & PLAN NOTE
Presented to [facility] and was admitted to ICU with septic shock.   He was found to have polymicrobial bacteremia and dental infections, due to a lack of OMS services he was transferred to [facility].  Continue ceftriaxone and vancomycin  Status post extraction biomass  Post ZULY by cardiology  Infectious disease consulted

## 2024-04-13 NOTE — PLAN OF CARE
Problem: PAIN - ADULT  Goal: Verbalizes/displays adequate comfort level or baseline comfort level  Description: Interventions:  - Encourage patient to monitor pain and request assistance  - Assess pain using appropriate pain scale  - Administer analgesics based on type and severity of pain and evaluate response  - Implement non-pharmacological measures as appropriate and evaluate response  - Consider cultural and social influences on pain and pain management  - Notify physician/advanced practitioner if interventions unsuccessful or patient reports new pain  Outcome: Progressing     Problem: INFECTION - ADULT  Goal: Absence or prevention of progression during hospitalization  Description: INTERVENTIONS:  - Assess and monitor for signs and symptoms of infection  - Monitor lab/diagnostic results  - Monitor all insertion sites, i.e. indwelling lines, tubes, and drains  - Monitor endotracheal if appropriate and nasal secretions for changes in amount and color  - [place] appropriate cooling/warming therapies per order  - Administer medications as ordered  - Instruct and encourage patient and family to use good hand hygiene technique  - Identify and instruct in appropriate isolation precautions for identified infection/condition  Outcome: Progressing  Goal: Absence of fever/infection during neutropenic period  Description: INTERVENTIONS:  - Monitor WBC    Outcome: Progressing

## 2024-04-13 NOTE — PHYSICAL THERAPY NOTE
Physical Therapy Progress Note      04/13/24 1156   PT Last Visit   PT Visit Date 04/13/24   Pain Assessment   Pain Assessment Tool 0-10   Pain Score No Pain   Pain Location/Orientation   (c/o dizziness since this morning)   Hospital Pain Intervention(s) Ambulation/increased activity;Repositioned   Restrictions/Precautions   Weight Bearing Precautions Per Order No   Other Precautions Bed Alarm;Multiple lines;Fall Risk   General   Chart Reviewed Yes   Family/Caregiver Present No   Cognition   Arousal/Participation Alert;Cooperative   Comments Patient is pleasant and ccoperative despite not feeling well today   Subjective   Subjective I think I am dizzy because I did not sleep last night   Bed Mobility   Supine to Sit 5  Supervision   Additional items Increased time required   Sit to Supine 5  Supervision   Additional items Increased time required   Transfers   Sit to Stand 5  Supervision  (min A at times due to dizziness)   Additional items Increased time required   Stand to Sit 5  Supervision   Additional items Increased time required   Additional Comments Sat EOB x10 minutes to attempt to clear dizziness   Ambulation/Elevation   Gait pattern Excessively slow   Gait Assistance   (min A today due to dizziness)   Additional items Verbal cues;Assist x 1   Assistive Device Rolling walker   Distance 15 feet x2   Stair Management Assistance Not tested   Ambulation/Elevation Additional Comments Patient was agreeable to try and walk despite being dizzy but once ambulating did not feel well enough to continue this visit.   Balance   Static Sitting Good   Dynamic Sitting Fair +   Static Standing Fair   Dynamic Standing Fair -   Endurance Deficit   Endurance Deficit Yes   Activity Tolerance   Nurse Made Aware Appropriate to see per RN   Assessment   Prognosis Good   Problem List Decreased endurance;Pain   Assessment Patient lying supine in bed, agreeable to try and do something but reports lack of sleep last night and  dizziness all day today. He does require increase time for all mobility. He was able to sit EOB x10 minutes in attempt to clear dizziness this visit. He did ambulate with slow gait into hallway, but notes not feeling well enough to continue. Nursing aware. Will continue to follow as appropriate to maximize functional independence.   Barriers to Discharge Comments stair trial nv   Goals   Patient Goals To rest   STG Expiration Date 04/25/24   PT Treatment Day 1   Plan   Treatment/Interventions Functional transfer training;Endurance training;Bed mobility;Gait training;Spoke to nursing   PT Frequency 1-2x/wk   Discharge Recommendation   Rehab Resource Intensity Level, PT III (Minimum Resource Intensity)   Equipment Recommended Walker   AM-PAC Basic Mobility Inpatient   Turning in Flat Bed Without Bedrails 4   Lying on Back to Sitting on Edge of Flat Bed Without Bedrails 4   Moving Bed to Chair 3   Standing Up From Chair Using Arms 3   Walk in Room 3   Climb 3-5 Stairs With Railing 3   Basic Mobility Inpatient Raw Score 20   Basic Mobility Standardized Score 43.99   [facility] Highest Level Of Mobility   JH-HLM Goal 6: Walk 10 steps or more   JH-HLM Achieved 6: Walk 10 steps or more         Josselin Blanco, PTA

## 2024-04-14 LAB
ALBUMIN SERPL BCP-MCNC: 2.8 G/DL (ref 3.5–5)
ALP SERPL-CCNC: 96 U/L (ref 34–104)
ALT SERPL W P-5'-P-CCNC: 41 U/L (ref 7–52)
ANION GAP SERPL CALCULATED.3IONS-SCNC: 8 MMOL/L (ref 4–13)
AST SERPL W P-5'-P-CCNC: 41 U/L (ref 13–39)
BASOPHILS # BLD AUTO: 0.02 THOUSANDS/ÂΜL (ref 0–0.1)
BASOPHILS NFR BLD AUTO: 0 % (ref 0–1)
BILIRUB SERPL-MCNC: 0.42 MG/DL (ref 0.2–1)
BUN SERPL-MCNC: 13 MG/DL (ref 5–25)
CALCIUM ALBUM COR SERPL-MCNC: 8.7 MG/DL (ref 8.3–10.1)
CALCIUM SERPL-MCNC: 7.7 MG/DL (ref 8.4–10.2)
CHLORIDE SERPL-SCNC: 107 MMOL/L (ref 96–108)
CO2 SERPL-SCNC: 27 MMOL/L (ref 21–32)
CREAT SERPL-MCNC: 1.36 MG/DL (ref 0.6–1.3)
EOSINOPHIL # BLD AUTO: 0.16 THOUSAND/ÂΜL (ref 0–0.61)
EOSINOPHIL NFR BLD AUTO: 3 % (ref 0–6)
ERYTHROCYTE [DISTWIDTH] IN BLOOD BY AUTOMATED COUNT: 16.5 % (ref 11.6–15.1)
GFR SERPL CREATININE-BSD FRML MDRD: 51 ML/MIN/1.73SQ M
GLUCOSE SERPL-MCNC: 92 MG/DL (ref 65–140)
HCT VFR BLD AUTO: 24.4 % (ref 36.5–49.3)
HGB BLD-MCNC: 7.3 G/DL (ref 12–17)
IMM GRANULOCYTES # BLD AUTO: 0.09 THOUSAND/UL (ref 0–0.2)
IMM GRANULOCYTES NFR BLD AUTO: 2 % (ref 0–2)
INR PPP: 1.44 (ref 0.84–1.19)
LYMPHOCYTES # BLD AUTO: 1.06 THOUSANDS/ÂΜL (ref 0.6–4.47)
LYMPHOCYTES NFR BLD AUTO: 18 % (ref 14–44)
MCH RBC QN AUTO: 26.3 PG (ref 26.8–34.3)
MCHC RBC AUTO-ENTMCNC: 29.9 G/DL (ref 31.4–37.4)
MCV RBC AUTO: 88 FL (ref 82–98)
MONOCYTES # BLD AUTO: 0.54 THOUSAND/ÂΜL (ref 0.17–1.22)
MONOCYTES NFR BLD AUTO: 9 % (ref 4–12)
NEUTROPHILS # BLD AUTO: 4.17 THOUSANDS/ÂΜL (ref 1.85–7.62)
NEUTS SEG NFR BLD AUTO: 68 % (ref 43–75)
NRBC BLD AUTO-RTO: 0 /100 WBCS
PLATELET # BLD AUTO: 201 THOUSANDS/UL (ref 149–390)
PMV BLD AUTO: 10.9 FL (ref 8.9–12.7)
POTASSIUM SERPL-SCNC: 4 MMOL/L (ref 3.5–5.3)
PROT SERPL-MCNC: 5.4 G/DL (ref 6.4–8.4)
PROTHROMBIN TIME: 17.3 SECONDS (ref 11.6–14.5)
RBC # BLD AUTO: 2.78 MILLION/UL (ref 3.88–5.62)
SODIUM SERPL-SCNC: 142 MMOL/L (ref 135–147)
WBC # BLD AUTO: 6.04 THOUSAND/UL (ref 4.31–10.16)

## 2024-04-14 PROCEDURE — 80053 COMPREHEN METABOLIC PANEL: CPT | Performed by: FAMILY MEDICINE

## 2024-04-14 PROCEDURE — 85025 COMPLETE CBC W/AUTO DIFF WBC: CPT | Performed by: FAMILY MEDICINE

## 2024-04-14 PROCEDURE — 99232 SBSQ HOSP IP/OBS MODERATE 35: CPT | Performed by: FAMILY MEDICINE

## 2024-04-14 PROCEDURE — 85610 PROTHROMBIN TIME: CPT | Performed by: FAMILY MEDICINE

## 2024-04-14 RX ADMIN — PANTOPRAZOLE SODIUM 20 MG: 20 TABLET, DELAYED RELEASE ORAL at 06:16

## 2024-04-14 RX ADMIN — ACETAMINOPHEN 650 MG: 325 TABLET, FILM COATED ORAL at 19:49

## 2024-04-14 RX ADMIN — CEFTRIAXONE SODIUM 2000 MG: 10 INJECTION, POWDER, FOR SOLUTION INTRAVENOUS at 14:33

## 2024-04-14 RX ADMIN — ATORVASTATIN CALCIUM 20 MG: 20 TABLET, FILM COATED ORAL at 17:11

## 2024-04-14 RX ADMIN — GABAPENTIN 200 MG: 100 CAPSULE ORAL at 20:35

## 2024-04-14 RX ADMIN — LIDOCAINE 5% 2 PATCH: 700 PATCH TOPICAL at 06:16

## 2024-04-14 RX ADMIN — GABAPENTIN 200 MG: 100 CAPSULE ORAL at 17:11

## 2024-04-14 RX ADMIN — WARFARIN SODIUM 5 MG: 5 TABLET ORAL at 17:11

## 2024-04-14 RX ADMIN — SODIUM CHLORIDE, SODIUM GLUCONATE, SODIUM ACETATE, POTASSIUM CHLORIDE, MAGNESIUM CHLORIDE, SODIUM PHOSPHATE, DIBASIC, AND POTASSIUM PHOSPHATE 50 ML/HR: .53; .5; .37; .037; .03; .012; .00082 INJECTION, SOLUTION INTRAVENOUS at 08:31

## 2024-04-14 RX ADMIN — GABAPENTIN 200 MG: 100 CAPSULE ORAL at 08:31

## 2024-04-14 RX ADMIN — AMIODARONE HYDROCHLORIDE 200 MG: 200 TABLET ORAL at 08:31

## 2024-04-14 NOTE — ASSESSMENT & PLAN NOTE
Lab Results   Component Value Date    EGFR 51 04/14/2024    EGFR 47 04/13/2024    EGFR 47 04/13/2024    CREATININE 1.36 (H) 04/14/2024    CREATININE 1.45 (H) 04/13/2024    CREATININE 1.45 (H) 04/13/2024   Baseline creatinine appears to be 1.4-1.6  VARGHESE on admission was likely due to septic shock  Creatinine has returned to baseline  Monitor BMP daily for now  Renally dose medications  Avoid nephrotoxins if possible

## 2024-04-14 NOTE — ASSESSMENT & PLAN NOTE
Presented to [facility] and was admitted to ICU with septic shock.   He was found to have polymicrobial bacteremia and dental infections, due to a lack of OMS services he was transferred to [facility].  Continue ceftriaxone for total of 4 weeks through May 4  Will order PICC line placement  Post ZULY by cardiology  No vegetations noted  Infectious disease consulted

## 2024-04-14 NOTE — PLAN OF CARE
Problem: PAIN - ADULT  Goal: Verbalizes/displays adequate comfort level or baseline comfort level  Description: Interventions:  - Encourage patient to monitor pain and request assistance  - Assess pain using appropriate pain scale  - Administer analgesics based on type and severity of pain and evaluate response  - Implement non-pharmacological measures as appropriate and evaluate response  - Consider cultural and social influences on pain and pain management  - Notify physician/advanced practitioner if interventions unsuccessful or patient reports new pain  Outcome: Progressing     Problem: INFECTION - ADULT  Goal: Absence or prevention of progression during hospitalization  Description: INTERVENTIONS:  - Assess and monitor for signs and symptoms of infection  - Monitor lab/diagnostic results  - Monitor all insertion sites, i.e. indwelling lines, tubes, and drains  - Monitor endotracheal if appropriate and nasal secretions for changes in amount and color  - [place] appropriate cooling/warming therapies per order  - Administer medications as ordered  - Instruct and encourage patient and family to use good hand hygiene technique  - Identify and instruct in appropriate isolation precautions for identified infection/condition  Outcome: Progressing  Goal: Absence of fever/infection during neutropenic period  Description: INTERVENTIONS:  - Monitor WBC    Outcome: Progressing

## 2024-04-14 NOTE — PROGRESS NOTES
[facility]  Progress Note  Name: Silvio Drew I  MRN: [id number]  Unit/Bed#: [facility] 826-01 I Date of Admission: 4/10/2024   Date of Service: 4/14/2024 I Hospital Day: 4    Assessment/Plan   * Streptococcal bacteremia  Assessment & Plan  Presented to [facility] and was admitted to ICU with septic shock.   He was found to have polymicrobial bacteremia and dental infections, due to a lack of OMS services he was transferred to [facility].  Continue ceftriaxone for total of 4 weeks through May 4  Will order PICC line placement  Post ZULY by cardiology  No vegetations noted  Infectious disease consulted    Visual disturbance  Assessment & Plan  Of left eye  No concern for septic emboli to the retina per ophthalmology  Appreciate ophthalmology's evaluation and recommendations    Anemia  Assessment & Plan  Likely due to chronic kidney disease, sepsis  Check anemia studies    Dental infection  Assessment & Plan  Apical lucencies present on CT scan  OMS consulted  Continue antibiotics as above  Patient is status post extraction on 4/12    Chronic systolic CHF (HCC)  Assessment & Plan  Wt Readings from Last 3 Encounters:   04/14/24 104 kg (229 lb 14.4 oz)   04/10/24 100 kg (220 lb 8 oz)   03/18/24 100 kg (221 lb)     EF of 36% w/ mild-moderate TR (h/o mitral valve annuloplasty noted)  Monitor/replete electrolyte deficiencies if present  Resume cardiac agents over the upcoming days if BP allows  Cardiac consult          Abnormal CT of the abdomen  Assessment & Plan  Incidental liver lesion noted   Will need an MRI of the liver when stable as an outpatient    Acute kidney injury superimposed on stage 3 chronic kidney disease (HCC)  Assessment & Plan  Lab Results   Component Value Date    EGFR 51 04/14/2024    EGFR 47 04/13/2024    EGFR 47 04/13/2024    CREATININE 1.36 (H) 04/14/2024    CREATININE 1.45 (H) 04/13/2024    CREATININE 1.45 (H) 04/13/2024   Baseline creatinine appears to be  1.4-1.6  VARGHESE on admission was likely due to septic shock  Creatinine has returned to baseline  Monitor BMP daily for now  Renally dose medications  Avoid nephrotoxins if possible    Septic shock (HCC)  Assessment & Plan  Present on admission at [facility], now resolved  Plan as above    Atrial fibrillation (HCC)  Assessment & Plan  Rate controlled with amiodarone  Cleared to restart anticoagulation OMS  Restart Coumadin at 5 mg daily, goal INR between 2-3  Follow-up daily INR    Primary hypertension  Assessment & Plan  BP is acceptable                 VTE Pharmacologic Prophylaxis: VTE Score: 3 Moderate Risk (Score 3-4) - Pharmacological DVT Prophylaxis Ordered: warfarin (Coumadin).    Mobility:   Basic Mobility Inpatient Raw Score: 20  JH-HLM Goal: 6: Walk 10 steps or more  JH-HLM Achieved: 6: Walk 10 steps or more  JH-HLM Goal achieved. Continue to encourage appropriate mobility.    Patient Centered Rounds: I performed bedside rounds with nursing staff today.   Discussions with Specialists or Other Care Team Provider: ID    Education and Discussions with Family / Patient: Patient declined call to .     Total Time Spent on Date of Encounter in care of patient: 50 mins. This time was spent on one or more of the following: performing physical exam; counseling and coordination of care; obtaining or reviewing history; documenting in the medical record; reviewing/ordering tests, medications or procedures; communicating with other healthcare professionals and discussing with patient's family/caregivers.    Current Length of Stay: 4 day(s)  Current Patient Status: Inpatient   Certification Statement: The patient will continue to require additional inpatient hospital stay due to sepsis  Discharge Plan: Anticipate discharge in 24-48 hrs to rehab facility.    Code Status: Level 1 - Full Code    Subjective:   This is a very pleasant 73-year-old gentleman who was seen evaluated today at bedside.  Patient feels  significantly better today.    Objective:     Vitals:   Temp (24hrs), Av.6 °F (37 °C), Min:98.2 °F (36.8 °C), Max:98.8 °F (37.1 °C)    Temp:  [98.2 °F (36.8 °C)-98.8 °F (37.1 °C)] 98.2 °F (36.8 °C)  HR:  [64-81] 81  Resp:  [16-20] 16  BP: (115-118)/(64-68) 118/68  SpO2:  [79 %-97 %] 96 %  Body mass index is 34.96 kg/m².     Input and Output Summary (last 24 hours):     Intake/Output Summary (Last 24 hours) at 2024 1341  Last data filed at 2024 1300  Gross per 24 hour   Intake 1707.5 ml   Output 800 ml   Net 907.5 ml       Physical Exam:   Physical Exam  Vitals reviewed.   HENT:      Head: Normocephalic.      Nose: No congestion.      Mouth/Throat:      Pharynx: No oropharyngeal exudate or posterior oropharyngeal erythema.   Eyes:      Conjunctiva/sclera: Conjunctivae normal.   Cardiovascular:      Rate and Rhythm: Normal rate and regular rhythm.   Pulmonary:      Effort: Pulmonary effort is normal.   Abdominal:      General: Abdomen is flat.      Palpations: Abdomen is soft.   Skin:     General: Skin is warm and dry.   Neurological:      Mental Status: He is alert and oriented to person, place, and time. Mental status is at baseline.          Additional Data:     Labs:  Results from last 7 days   Lab Units 24  0541   WBC Thousand/uL 6.04   HEMOGLOBIN g/dL 7.3*   HEMATOCRIT % 24.4*   PLATELETS Thousands/uL 201   SEGS PCT % 68   LYMPHO PCT % 18   MONO PCT % 9   EOS PCT % 3     Results from last 7 days   Lab Units 24  0541   SODIUM mmol/L 142   POTASSIUM mmol/L 4.0   CHLORIDE mmol/L 107   CO2 mmol/L 27   BUN mg/dL 13   CREATININE mg/dL 1.36*   ANION GAP mmol/L 8   CALCIUM mg/dL 7.7*   ALBUMIN g/dL 2.8*   TOTAL BILIRUBIN mg/dL 0.42   ALK PHOS U/L 96   ALT U/L 41   AST U/L 41*   GLUCOSE RANDOM mg/dL 92     Results from last 7 days   Lab Units 24  0642   INR  1.44*     Results from last 7 days   Lab Units 24  1503   POC GLUCOSE mg/dl 129         Results from last 7 days   Lab Units  04/09/24  0527 04/07/24  1442   PROCALCITONIN ng/ml 6.87* 19.31*       Lines/Drains:  Invasive Devices       Peripheral Intravenous Line  Duration             Peripheral IV 04/12/24 Right;Ventral (anterior) Forearm 1 day                          Imaging: No pertinent imaging reviewed.    Recent Cultures (last 7 days):   Results from last 7 days   Lab Units 04/08/24  1547   BLOOD CULTURE  No Growth After 5 Days.  No Growth After 5 Days.       Last 24 Hours Medication List:   Current Facility-Administered Medications   Medication Dose Route Frequency Provider Last Rate    acetaminophen  650 mg Oral Q6H PRN Hong Glynn, DMD      amiodarone  200 mg Oral Daily Hong Glynn, DMD      atorvastatin  20 mg Oral Daily With Dinner Hong Glynn, DMD      cefTRIAXone  2,000 mg Intravenous Q24H Hong Glynn, DMD 2,000 mg (04/13/24 1435)    gabapentin  200 mg Oral TID Hong Glynn, DMD      lidocaine  2 patch Topical Daily Hong Glynn, DMD      pantoprazole  20 mg Oral Daily Before Breakfast Hong Glynn, DMD      polyethylene glycol  17 g Oral Daily Hong Glynn, DMD      warfarin  5 mg Oral Daily (warfarin) Placido Jauregui MD          Today, Patient Was Seen By: Placido Jauregui MD    **Please Note: This note may have been constructed using a voice recognition system.**

## 2024-04-14 NOTE — ASSESSMENT & PLAN NOTE
Wt Readings from Last 3 Encounters:   04/14/24 104 kg (229 lb 14.4 oz)   04/10/24 100 kg (220 lb 8 oz)   03/18/24 100 kg (221 lb)     EF of 36% w/ mild-moderate TR (h/o mitral valve annuloplasty noted)  Monitor/replete electrolyte deficiencies if present  Resume cardiac agents over the upcoming days if BP allows  Cardiac consult

## 2024-04-14 NOTE — PLAN OF CARE
Problem: PAIN - ADULT  Goal: Verbalizes/displays adequate comfort level or baseline comfort level  Description: Interventions:  - Encourage patient to monitor pain and request assistance  - Assess pain using appropriate pain scale  - Administer analgesics based on type and severity of pain and evaluate response  - Implement non-pharmacological measures as appropriate and evaluate response  - Consider cultural and social influences on pain and pain management  - Notify physician/advanced practitioner if interventions unsuccessful or patient reports new pain  Outcome: Progressing     Problem: INFECTION - ADULT  Goal: Absence or prevention of progression during hospitalization  Description: INTERVENTIONS:  - Assess and monitor for signs and symptoms of infection  - Monitor lab/diagnostic results  - Monitor all insertion sites, i.e. indwelling lines, tubes, and drains  - Monitor endotracheal if appropriate and nasal secretions for changes in amount and color  - [place] appropriate cooling/warming therapies per order  - Administer medications as ordered  - Instruct and encourage patient and family to use good hand hygiene technique  - Identify and instruct in appropriate isolation precautions for identified infection/condition  Outcome: Progressing  Goal: Absence of fever/infection during neutropenic period  Description: INTERVENTIONS:  - Monitor WBC    Outcome: Progressing     Problem: SAFETY ADULT  Goal: Maintain or return to baseline ADL function  Description: INTERVENTIONS:  -  Assess patient's ability to carry out ADLs; assess patient's baseline for ADL function and identify physical deficits which impact ability to perform ADLs (bathing, care of mouth/teeth, toileting, grooming, dressing, etc.)  - Assess/evaluate cause of self-care deficits   - Assess range of motion  - Assess patient's mobility; develop plan if impaired  - Assess patient's need for assistive devices and provide as appropriate  - Encourage  maximum independence but intervene and supervise when necessary  - Involve family in performance of ADLs  - Assess for home care needs following discharge   - Consider OT consult to assist with ADL evaluation and planning for discharge  - Provide patient education as appropriate  Outcome: Progressing     Problem: HEMATOLOGIC - ADULT  Goal: Maintains hematologic stability  Description: INTERVENTIONS  - Assess for signs and symptoms of bleeding or hemorrhage  - Monitor labs  - Administer supportive blood products/factors as ordered and appropriate  Outcome: Progressing

## 2024-04-15 ENCOUNTER — HOME HEALTH ADMISSION (OUTPATIENT)
Dept: HOME HEALTH SERVICES | Facility: HOME HEALTHCARE | Age: 74
End: 2024-04-15
Payer: COMMERCIAL

## 2024-04-15 VITALS
RESPIRATION RATE: 16 BRPM | BODY MASS INDEX: 34.74 KG/M2 | SYSTOLIC BLOOD PRESSURE: 119 MMHG | DIASTOLIC BLOOD PRESSURE: 69 MMHG | TEMPERATURE: 97.7 F | HEART RATE: 85 BPM | HEIGHT: 68 IN | WEIGHT: 229.2 LBS | OXYGEN SATURATION: 96 %

## 2024-04-15 LAB
ALBUMIN SERPL BCP-MCNC: 3.1 G/DL (ref 3.5–5)
ALP SERPL-CCNC: 106 U/L (ref 34–104)
ALT SERPL W P-5'-P-CCNC: 44 U/L (ref 7–52)
ANION GAP SERPL CALCULATED.3IONS-SCNC: 6 MMOL/L (ref 4–13)
AST SERPL W P-5'-P-CCNC: 63 U/L (ref 13–39)
BASOPHILS # BLD AUTO: 0.03 THOUSANDS/ÂΜL (ref 0–0.1)
BASOPHILS NFR BLD AUTO: 1 % (ref 0–1)
BILIRUB SERPL-MCNC: 0.44 MG/DL (ref 0.2–1)
BUN SERPL-MCNC: 14 MG/DL (ref 5–25)
CALCIUM ALBUM COR SERPL-MCNC: 8.9 MG/DL (ref 8.3–10.1)
CALCIUM SERPL-MCNC: 8.2 MG/DL (ref 8.4–10.2)
CHLORIDE SERPL-SCNC: 106 MMOL/L (ref 96–108)
CO2 SERPL-SCNC: 30 MMOL/L (ref 21–32)
CREAT SERPL-MCNC: 1.36 MG/DL (ref 0.6–1.3)
EOSINOPHIL # BLD AUTO: 0.17 THOUSAND/ÂΜL (ref 0–0.61)
EOSINOPHIL NFR BLD AUTO: 3 % (ref 0–6)
ERYTHROCYTE [DISTWIDTH] IN BLOOD BY AUTOMATED COUNT: 16.6 % (ref 11.6–15.1)
GFR SERPL CREATININE-BSD FRML MDRD: 51 ML/MIN/1.73SQ M
GLUCOSE SERPL-MCNC: 89 MG/DL (ref 65–140)
HCT VFR BLD AUTO: 27.1 % (ref 36.5–49.3)
HGB BLD-MCNC: 7.9 G/DL (ref 12–17)
IMM GRANULOCYTES # BLD AUTO: 0.08 THOUSAND/UL (ref 0–0.2)
IMM GRANULOCYTES NFR BLD AUTO: 2 % (ref 0–2)
INR PPP: 1.37 (ref 0.84–1.19)
LYMPHOCYTES # BLD AUTO: 1.17 THOUSANDS/ÂΜL (ref 0.6–4.47)
LYMPHOCYTES NFR BLD AUTO: 22 % (ref 14–44)
MCH RBC QN AUTO: 25.9 PG (ref 26.8–34.3)
MCHC RBC AUTO-ENTMCNC: 29.2 G/DL (ref 31.4–37.4)
MCV RBC AUTO: 89 FL (ref 82–98)
MONOCYTES # BLD AUTO: 0.52 THOUSAND/ÂΜL (ref 0.17–1.22)
MONOCYTES NFR BLD AUTO: 10 % (ref 4–12)
NEUTROPHILS # BLD AUTO: 3.25 THOUSANDS/ÂΜL (ref 1.85–7.62)
NEUTS SEG NFR BLD AUTO: 62 % (ref 43–75)
NRBC BLD AUTO-RTO: 0 /100 WBCS
PLATELET # BLD AUTO: 227 THOUSANDS/UL (ref 149–390)
PMV BLD AUTO: 10.8 FL (ref 8.9–12.7)
POTASSIUM SERPL-SCNC: 4.3 MMOL/L (ref 3.5–5.3)
PROT SERPL-MCNC: 6.2 G/DL (ref 6.4–8.4)
PROTHROMBIN TIME: 16.7 SECONDS (ref 11.6–14.5)
RBC # BLD AUTO: 3.05 MILLION/UL (ref 3.88–5.62)
SODIUM SERPL-SCNC: 142 MMOL/L (ref 135–147)
WBC # BLD AUTO: 5.22 THOUSAND/UL (ref 4.31–10.16)

## 2024-04-15 PROCEDURE — C1751 CATH, INF, PER/CENT/MIDLINE: HCPCS

## 2024-04-15 PROCEDURE — 85025 COMPLETE CBC W/AUTO DIFF WBC: CPT | Performed by: FAMILY MEDICINE

## 2024-04-15 PROCEDURE — 85610 PROTHROMBIN TIME: CPT | Performed by: FAMILY MEDICINE

## 2024-04-15 PROCEDURE — 05HB33Z INSERTION OF INFUSION DEVICE INTO RIGHT BASILIC VEIN, PERCUTANEOUS APPROACH: ICD-10-PCS | Performed by: FAMILY MEDICINE

## 2024-04-15 PROCEDURE — 99239 HOSP IP/OBS DSCHRG MGMT >30: CPT | Performed by: FAMILY MEDICINE

## 2024-04-15 PROCEDURE — 80053 COMPREHEN METABOLIC PANEL: CPT | Performed by: FAMILY MEDICINE

## 2024-04-15 PROCEDURE — 97530 THERAPEUTIC ACTIVITIES: CPT

## 2024-04-15 PROCEDURE — 99233 SBSQ HOSP IP/OBS HIGH 50: CPT | Performed by: INTERNAL MEDICINE

## 2024-04-15 PROCEDURE — 36569 INSJ PICC 5 YR+ W/O IMAGING: CPT

## 2024-04-15 PROCEDURE — 99232 SBSQ HOSP IP/OBS MODERATE 35: CPT | Performed by: INTERNAL MEDICINE

## 2024-04-15 PROCEDURE — 97116 GAIT TRAINING THERAPY: CPT

## 2024-04-15 RX ORDER — LOSARTAN POTASSIUM 50 MG/1
50 TABLET ORAL DAILY
Status: DISCONTINUED | OUTPATIENT
Start: 2024-04-16 | End: 2024-04-15 | Stop reason: HOSPADM

## 2024-04-15 RX ORDER — METOPROLOL SUCCINATE 25 MG/1
25 TABLET, EXTENDED RELEASE ORAL DAILY
Status: DISCONTINUED | OUTPATIENT
Start: 2024-04-15 | End: 2024-04-15 | Stop reason: HOSPADM

## 2024-04-15 RX ORDER — FUROSEMIDE 20 MG/1
20 TABLET ORAL DAILY
Status: DISCONTINUED | OUTPATIENT
Start: 2024-04-15 | End: 2024-04-15 | Stop reason: HOSPADM

## 2024-04-15 RX ADMIN — LIDOCAINE 5% 2 PATCH: 700 PATCH TOPICAL at 06:15

## 2024-04-15 RX ADMIN — PANTOPRAZOLE SODIUM 20 MG: 20 TABLET, DELAYED RELEASE ORAL at 06:15

## 2024-04-15 RX ADMIN — AMIODARONE HYDROCHLORIDE 200 MG: 200 TABLET ORAL at 09:15

## 2024-04-15 RX ADMIN — METOPROLOL SUCCINATE 25 MG: 25 TABLET, FILM COATED, EXTENDED RELEASE ORAL at 11:40

## 2024-04-15 RX ADMIN — FUROSEMIDE 20 MG: 20 TABLET ORAL at 11:40

## 2024-04-15 RX ADMIN — GABAPENTIN 200 MG: 100 CAPSULE ORAL at 09:16

## 2024-04-15 RX ADMIN — CEFTRIAXONE SODIUM 2000 MG: 10 INJECTION, POWDER, FOR SOLUTION INTRAVENOUS at 13:59

## 2024-04-15 NOTE — ASSESSMENT & PLAN NOTE
Rate controlled with amiodarone  Cleared to restart anticoagulation OMS  Restart Coumadin at 5 mg daily, goal INR between 2-3  Follow-up daily INR  INR not at goal patient is resumed back to his normal dose  Patient will need to follow-up with an INR in 48 hours post discharge

## 2024-04-15 NOTE — PROGRESS NOTES
Progress Note - Infectious Disease   Silvio Drew 73 y.o. male MRN: [id number]  Unit/Bed#: [facility] 826-01 Encounter: [id number]      Impression/Recommendations:  Septic shock.    At [facility].  Due to bacteremia as below.  No other appreciable source.  Improved.     Continue antibiotics as below  Follow temperatures closely  Recheck WBC in AM to monitor infection  Supportive care as per the primary service     Polymicrobial bacteremia.    Strep intermedius, anaerobic GPC.  Suspect dental origin as below.  Consider endocarditis, cardiac device infection.  Repeat blood cultures 4/8,  TTE/ZULY negative.     Continue ceftriaxone for 4 weeks total through 5/4/24  Monitor closely while on antibiotics for toxicities including diarrhea, rash, cytopenias, or kidney injury  Check weekly CBC-diff, CMP while on IV antibiotics to monitor for toxicities.  OK from ID for PICC at any time  D/C planning for STR  Outpatient follow-up with ID 2 weeks after D/C  D/C PICC after last dose IV antibiotics  Check surveillance cultures 2 weeks after D/C given ICD     Dental caries.    With possible abscess.  Risk factor for bacteremia as above.  Status post dental extractions 4/12.     Visual disturbance.  Left eye.  Consider relation to bacteremia as above.  No evidence for emboli per Ophtho evaluation.     Indeterminate liver lesion.  See on CT.  1.5 cm.  Needs outpatient MRI to further characterize.     Status post MV annuloplasty.     Dilated cardiomyopathy.     Status post AICD.     CKD.  Baseline Cr 1.5.  No dose adjustment for ceftriaxone indicated.     Lumbar radiculopathy.  Noted to be chronic, intermittent problem on chart review.  Unclear relation to bacteremia as above.  No pain currently.  Low suspicion for OM.    D/C MRI    I discussed with Dr. Jauregui the above plan to continue IV antibiotics, D/C MRI, and plan for D/C to STR.  He agrees with the plan.     Antibiotics:  Ceftriaxone #9    Subjective/24 Hour Events:  Patient seen on AM  rounds.  Concerned about MRI and PPM.  Has no back pain at all.  No other complatints.    Objective:  Vitals:  Temp:  [97.7 °F (36.5 °C)-98.8 °F (37.1 °C)] 97.7 °F (36.5 °C)  HR:  [67-79] 79  Resp:  [16-20] 16  BP: (114-122)/(64-70) 122/70  SpO2:  [94 %-98 %] 98 %  Temp (24hrs), Av.4 °F (36.9 °C), Min:97.7 °F (36.5 °C), Max:98.8 °F (37.1 °C)  Current: Temperature: 97.7 °F (36.5 °C)    Physical Exam:   General:  No acute distress  Psychiatric:  Awake and alert  Pulmonary:  Normal respiratory excursion without accessory muscle use  Abdomen:  Soft, nontender  Extremities:  No edema  Skin:  No rashes    Lab Results:  I have personally reviewed pertinent labs.  Results from last 7 days   Lab Units 04/15/24  0624  0541 24  0547   SODIUM mmol/L 142 142 138  139   POTASSIUM mmol/L 4.3 4.0 4.3  4.4   CHLORIDE mmol/L 106 107 106  107   CO2 mmol/L 30 27 27  27   BUN mg/dL 14 13 14  14   CREATININE mg/dL 1.36* 1.36* 1.45*  1.45*   EGFR ml/min/1.73sq m 51 51 47  47   CALCIUM mg/dL 8.2* 7.7* 8.0*  8.1*   AST U/L 63* 41* 46*   ALT U/L 44 41 46   ALK PHOS U/L 106* 96 101     Results from last 7 days   Lab Units 04/15/24  0628 24  0541 24  0547   WBC Thousand/uL 5.22 6.04 7.72  7.87   HEMOGLOBIN g/dL 7.9* 7.3* 7.7*  7.6*   PLATELETS Thousands/uL 227 201 225  232     Results from last 7 days   Lab Units 24  1547   BLOOD CULTURE  No Growth After 5 Days.  No Growth After 5 Days.       Imaging Studies:   I have personally reviewed pertinent imaging study reports and images in PACS.    EKG, Pathology, and Other Studies:   I have personally reviewed pertinent reports.

## 2024-04-15 NOTE — PROGRESS NOTES
"General Cardiology   Progress Note -  [facility] [employer]   Silvio Drew 73 y.o. male MRN: [id number]    Unit/Bed#: [facility] 826-01 Encounter: [id number]    Assessment/ Plan    Chronic HFrEF   Received intermittent IV diuretics due to iatrogenic volume load  Now appears euvolemic  He takes furosemide 20 mg PO daily at home for maintenance diuretic. Recommend resume now that creatinine at baseline     2. NICM   With MDT single chamber ICD   On metoprolol XL 25 mg PO daily and losartan 50 mg PO daily for GDMT. Recommend resume     3. Paroxysmal atrial fibrillation/flutter  On amiodarone 200 mg PO daily   Recommend resume metoprolol XL   On coumadin for OAC   INR 1.37    4. Polymicrobial bacteremia   ZULY 2024 negative for vegetation   ID following and continuing ceftriaxone for 4 weeks total through 2024    5. CKD stage III   Baseline creatinine 1.4-1.6  Creatinine 1.36 today    Recommend he follows up with his primary cardiologist: Dr. Franks.       Subjective  Patient resting in bed. He offers no complaints. He is laying flat without complaint of SOB. No chest pain or palpitations.     Review of Systems   Constitutional: Negative for chills and fever.   HENT:  Negative for congestion.    Cardiovascular:  Negative for chest pain, orthopnea and palpitations.   Respiratory:  Negative for shortness of breath.    Musculoskeletal:  Negative for falls.   Gastrointestinal:  Negative for bloating.   Neurological:  Negative for dizziness and light-headedness.   Psychiatric/Behavioral:  Negative for altered mental status.    All other systems reviewed and are negative.      Objective:   Vitals: Blood pressure 122/70, pulse 79, temperature 97.7 °F (36.5 °C), resp. rate 16, height 5' 8\" (1.727 m), weight 104 kg (229 lb 3.2 oz), SpO2 98%.,       Body mass index is 34.85 kg/m².,     Systolic (24hrs), Av , Min:114 , Max:122     Diastolic (24hrs), Av, Min:64, Max:70          Intake/Output Summary (Last 24 hours) at 4/15/2024 " 0931  Last data filed at 4/15/2024 0908  Gross per 24 hour   Intake 1075 ml   Output 1210 ml   Net -135 ml     Weight (last 2 days)       Date/Time Weight    04/15/24 0600 104 (229.2)    04/14/24 0600 104 (229.9)    04/13/24 0548 104 (229.28)          Telemetry Review: No telemetry     Physical Exam  Constitutional:       General: He is not in acute distress.     Appearance: He is obese.   HENT:      Head: Normocephalic.      Mouth/Throat:      Mouth: Mucous membranes are moist.   Cardiovascular:      Rate and Rhythm: Normal rate. Rhythm irregular.      Pulses: Normal pulses.   Pulmonary:      Effort: Pulmonary effort is normal. No respiratory distress.      Breath sounds: Normal breath sounds.   Abdominal:      General: Bowel sounds are normal.      Palpations: Abdomen is soft.   Musculoskeletal:         General: No swelling. Normal range of motion.      Cervical back: Neck supple.   Skin:     General: Skin is warm and dry.      Capillary Refill: Capillary refill takes less than 2 seconds.   Neurological:      Mental Status: He is alert and oriented to person, place, and time.   Psychiatric:         Mood and Affect: Mood normal.         LABORATORY RESULTS      CBC with diff:   Results from last 7 days   Lab Units 04/15/24  0628 04/14/24  0541 04/13/24  0547 04/12/24  0609 04/11/24  0501 04/10/24  0519 04/09/24  0527   WBC Thousand/uL 5.22 6.04 7.72  7.87 7.25  6.76 5.94 6.22 5.65   HEMOGLOBIN g/dL 7.9* 7.3* 7.7*  7.6* 8.3*  8.4* 7.9* 8.1* 7.3*   HEMATOCRIT % 27.1* 24.4* 25.9*  25.9* 27.8*  28.3* 26.3* 26.4* 24.3*   MCV fL 89 88 88  87 87  88 87 84 85   PLATELETS Thousands/uL 227 201 225  232 227  235 188 191 152   RBC Million/uL 3.05* 2.78* 2.95*  2.98* 3.18*  3.23* 3.02* 3.15* 2.85*   MCH pg 25.9* 26.3* 26.1*  25.5* 26.1*  26.0* 26.2* 25.7* 25.6*   MCHC g/dL 29.2* 29.9* 29.7*  29.3* 29.9*  29.7* 30.0* 30.7* 30.0*   RDW % 16.6* 16.5* 16.5*  16.4* 16.4*  16.6* 16.4* 16.5* 16.4*   MPV fL 10.8 10.9  "10.8  10.7 11.0  11.1 10.9 11.0 11.4   NRBC AUTO /100 WBCs 0 0 0  0 0  0 0 0 0       CMP:  Results from last 7 days   Lab Units 04/15/24  0628 04/14/24  0541 04/13/24  0547 04/12/24  0609 04/11/24  0501 04/10/24  0519 04/09/24  0527   POTASSIUM mmol/L 4.3 4.0 4.3  4.4 3.8  3.7 3.8 3.8 3.7   CHLORIDE mmol/L 106 107 106  107 107  106 111* 111* 114*   CO2 mmol/L 30 27 27  27 25  27 23 21 20*   BUN mg/dL 14 13 14  14 15  15 20 22 29*   CREATININE mg/dL 1.36* 1.36* 1.45*  1.45* 1.62*  1.64* 1.51* 1.56* 1.91*   CALCIUM mg/dL 8.2* 7.7* 8.0*  8.1* 8.0*  8.0* 8.1* 8.4 8.2*   AST U/L 63* 41* 46* 49*  --   --   --    ALT U/L 44 41 46 49  --   --   --    ALK PHOS U/L 106* 96 101 104  --   --   --    EGFR ml/min/1.73sq m 51 51 47  47 41  41 45 43 34       BMP:  Results from last 7 days   Lab Units 04/15/24  0628 04/14/24  0541 04/13/24  0547 04/12/24  0609 04/11/24  0501 04/10/24  0519 04/09/24  0527   POTASSIUM mmol/L 4.3 4.0 4.3  4.4 3.8  3.7 3.8 3.8 3.7   CHLORIDE mmol/L 106 107 106  107 107  106 111* 111* 114*   CO2 mmol/L 30 27 27  27 25  27 23 21 20*   BUN mg/dL 14 13 14  14 15  15 20 22 29*   CREATININE mg/dL 1.36* 1.36* 1.45*  1.45* 1.62*  1.64* 1.51* 1.56* 1.91*   CALCIUM mg/dL 8.2* 7.7* 8.0*  8.1* 8.0*  8.0* 8.1* 8.4 8.2*       No results found for: \"NTBNP\"          Results from last 7 days   Lab Units 04/12/24  0609 04/09/24  0527   MAGNESIUM mg/dL 1.9 2.2                     Results from last 7 days   Lab Units 04/15/24  0628 04/14/24  0642 04/13/24  0547 04/12/24  0609 04/11/24  0501 04/10/24  0519 04/09/24  0527   INR  1.37* 1.44* 1.39* 1.42* 1.57* 1.72* 2.27*       Lipid Profile:   No results found for: \"CHOL\"  Lab Results   Component Value Date    HDL 40 05/09/2023    HDL 41 11/14/2022    HDL 40 06/28/2022     Lab Results   Component Value Date    LDLCALC 64 05/09/2023    LDLCALC 54 11/14/2022    LDLCALC 67 06/28/2022     Lab Results   Component Value Date    TRIG 230 (H) 05/09/2023 "    TRIG 154 (H) 11/14/2022    TRIG 193 (H) 06/28/2022       Cardiac testing:   No results found for this or any previous visit.    No results found for this or any previous visit.    No results found for this or any previous visit.    No valid procedures specified.  No results found for this or any previous visit.        Meds/Allergies   all current active meds have been reviewed and current meds:   Current Facility-Administered Medications   Medication Dose Route Frequency    acetaminophen (TYLENOL) tablet 650 mg  650 mg Oral Q6H PRN    amiodarone tablet 200 mg  200 mg Oral Daily    atorvastatin (LIPITOR) tablet 20 mg  20 mg Oral Daily With Dinner    cefTRIAXone (ROCEPHIN) 2,000 mg in dextrose 5 % 50 mL IVPB  2,000 mg Intravenous Q24H    gabapentin (NEURONTIN) capsule 200 mg  200 mg Oral TID    lidocaine (LIDODERM) 5 % patch 2 patch  2 patch Topical Daily    pantoprazole (PROTONIX) EC tablet 20 mg  20 mg Oral Daily Before Breakfast    polyethylene glycol (MIRALAX) packet 17 g  17 g Oral Daily    warfarin (COUMADIN) tablet 5 mg  5 mg Oral Daily (warfarin)     Medications Prior to Admission   Medication    allopurinol (ZYLOPRIM) 100 mg tablet    amiodarone 200 mg tablet    atorvastatin (LIPITOR) 20 mg tablet    b complex-C-E-zinc (STRESS FORMULA/ZINC) tablet    bisacodyl (DULCOLAX) 5 mg EC tablet    Diclofenac Sodium (VOLTAREN) 1 %    diflunisal (DOLOBID) 500 mg tablet    ergocalciferol (VITAMIN D2) 50,000 units    furosemide (Lasix) 20 mg tablet    gabapentin (Neurontin) 100 mg capsule    ketoconazole (NIZORAL) 2 % shampoo    lidocaine (LMX) 4 % cream    linaCLOtide 290 MCG CAPS    loratadine (Claritin) 10 mg tablet    losartan (COZAAR) 50 mg tablet    methocarbamol (ROBAXIN) 500 mg tablet    methylPREDNISolone 4 MG tablet therapy pack    metoprolol succinate (Toprol XL) 25 mg 24 hr tablet    nitrofurantoin (MACROBID) 100 mg capsule    pantoprazole (PROTONIX) 20 mg tablet    polyethylene glycol (GLYCOLAX) 17  GM/SCOOP powder    warfarin (COUMADIN) 5 mg tablet       Counseling / Coordination of Care  Total floor / unit time spent today 20 minutes.  Greater than 50% of total time was spent with the patient and / or family counseling and / or coordination of care.      ** Please Note: Dragon 360 Dictation voice to text software may have been used in the creation of this document. **

## 2024-04-15 NOTE — PHYSICAL THERAPY NOTE
PHYSICAL THERAPY NOTE          Patient Name: Silvio Drew  Today's Date: 4/15/2024     04/15/24 1205   PT Last Visit   PT Visit Date 04/15/24   Note Type   Note Type Treatment   Education   Education Provided Yes   Pain Assessment   Pain Assessment Tool 0-10   Pain Score No Pain   Restrictions/Precautions   Weight Bearing Precautions Per Order No   Other Precautions Multiple lines   General   Chart Reviewed Yes   Family/Caregiver Present No   Cognition   Overall Cognitive Status WFL   Arousal/Participation Alert;Responsive   Attention Within functional limits   Following Commands Follows one step commands without difficulty   Comments Pt cooperative during session   Subjective   Subjective Agreeable to mobilize   Bed Mobility   Supine to Sit Unable to assess   Sit to Supine Unable to assess   Additional Comments Pt noted to be seated EOB upon arrival. Pt verbalized just ambulating back from bathroom   Transfers   Sit to Stand 5  Supervision   Additional items Increased time required   Stand to Sit 5  Supervision   Additional Comments no AD   Ambulation/Elevation   Gait pattern Wide LANETTE;Forward Flexion   Gait Assistance 5  Supervision   Assistive Device Rolling walker   Distance 20 ft + 175 ft   Stair Management Assistance 4  Minimal assist   Additional items Assist x 1;Verbal cues   Stair Management Technique Nonreciprocal;One rail R   Number of Stairs 3   Ambulation/Elevation Additional Comments Pt limited in mobility 2* fatigue. Pt demonstrates improvement in ambulation , tolerates increased distance with RW. Pt does require assist for stairs at this time, difficulty 2* fatigue and geneleralized weakness. CM updated.   Balance   Static Sitting Good   Dynamic Sitting Good   Static Standing Fair +   Dynamic Standing Fair   Ambulatory Fair   Endurance Deficit   Endurance Deficit Yes   Activity Tolerance   Activity Tolerance Patient  limited by fatigue   Medical Staff Made Aware CM updated   Nurse Made Aware RN cleared pt   Assessment   Prognosis Good   Problem List Decreased endurance;Decreased mobility   Assessment Pt seen today for treatment session . Pt cooperative and very pleasant. Pt demonstrates improvement in ambulation tolerance, increased distance with less assistance this session. Attempted stairs with HR, pt fatigues quick and requires assist x 1 for safely manuevering 3 stairs with 1 HR. Pt has FFOS to bed/bath at home. Pt will benefit from continued PT services while in hospital to work towards mobility goals/stair negotiation. If pt were to go home at this time, he would need assist X 1 for stair negotiation and recommended to have a FFSU. CM updated. Post dc recommendation at this time changed to Level 2 , pending further mobility progress/stair assessment .   Barriers to Discharge Inaccessible home environment  (Pt has FFOS to bed/bath)   Barriers to Discharge Comments Pt would either have to have FFSU post dc with Assist x 1 for stairs or may need Rehab . Pt required assist for safe stair negotiation at this time.   Goals   Patient Goals to go home   PT Treatment Day 1   Plan   Treatment/Interventions Functional transfer training;Elevations;Bed mobility;Gait training;Spoke to case management;Spoke to nursing;OT   Progress Progressing toward goals   PT Frequency 3-5x/wk   Discharge Recommendation   Rehab Resource Intensity Level, PT II (Moderate Resource Intensity)  (pending progress/stairs)   Equipment Recommended Walker   AM-PAC Basic Mobility Inpatient   Turning in Flat Bed Without Bedrails 4   Lying on Back to Sitting on Edge of Flat Bed Without Bedrails 3   Moving Bed to Chair 3   Standing Up From Chair Using Arms 3   Walk in Room 3   Climb 3-5 Stairs With Railing 3   Basic Mobility Inpatient Raw Score 19   Basic Mobility Standardized Score 42.48   [facility] Highest Level Of Mobility   [facility] Goal 6: Walk 10 steps or  more   JH-HLM Achieved 7: Walk 25 feet or more   Education   Education Provided Mobility training   Patient Reinforcement needed   End of Consult   Patient Position at End of Consult Seated edge of bed   Magalis Mujica PT DPT

## 2024-04-15 NOTE — ASSESSMENT & PLAN NOTE
Lab Results   Component Value Date    EGFR 51 04/15/2024    EGFR 51 04/14/2024    EGFR 47 04/13/2024    EGFR 47 04/13/2024    CREATININE 1.36 (H) 04/15/2024    CREATININE 1.36 (H) 04/14/2024    CREATININE 1.45 (H) 04/13/2024    CREATININE 1.45 (H) 04/13/2024   Baseline creatinine appears to be 1.4-1.6  VARGHESE on admission was likely due to septic shock  Creatinine has returned to baseline  Monitor BMP daily for now  Renally dose medications  Avoid nephrotoxins if possible

## 2024-04-15 NOTE — CASE MANAGEMENT
Case Management Discharge Planning Note    Patient name Silvio Drew  Location [facility] 826/[facility] 826-01 MRN [id number]  : 1950 Date 4/15/2024       Current Admission Date: 4/10/2024  Current Admission Diagnosis:Streptococcal bacteremia   Patient Active Problem List    Diagnosis Date Noted    Dental infection 04/10/2024    Anemia 04/10/2024    Visual disturbance 04/10/2024    BRBPR (bright red blood per rectum) 2024    Streptococcal bacteremia 2024    Suspected UTI 2024    Chronic systolic CHF (HCC) 2024    Supratherapeutic INR 2024    Septic shock (HCC) 2024    Acute kidney injury superimposed on stage 3 chronic kidney disease (HCC) 2024    Abnormal CT of the abdomen 2024    Thyroid nodule 2024    Coagulopathy (HCC) 2024    Stage 3b chronic kidney disease (HCC) 2024    Hyperlipidemia associated with type 2 diabetes mellitus  (HCC) 2021    Atherosclerosis of native artery of both lower extremities with intermittent claudication ([facility]) 2019    Bicytopenia 2013    Hyperlipidemia 2012    Primary hypertension 2012    Atrial fibrillation (HCC) 2012      LOS (days): 5  Geometric Mean LOS (GMLOS) (days): 2.9  Days to GMLOS:-1.7     OBJECTIVE:  Risk of Unplanned Readmission Score: 21.72         Current admission status: Inpatient   Preferred Pharmacy:   [id number]  [place] [id number] [facility]  [address] [facility] [address]  Phone: [phone] Fax: [phone]    Primary Care Provider: Mac Stephens MD    Primary Insurance: BUCKY MORE  Secondary Insurance: [place] [employer] [facility]    DISCHARGE DETAILS:        Pt requires IV ABX through   AIDIN referral placed to [facility] as well as HHC to assist in ABX administratio  Pt to get PICC placed today 4/15  Will follow

## 2024-04-15 NOTE — CASE MANAGEMENT
Case Management Discharge Planning Note    Patient name Silivo rDew  Location [facility] 826/[facility] 826-01 MRN [id number]  : 1950 Date 4/15/2024       Current Admission Date: 4/10/2024  Current Admission Diagnosis:Streptococcal bacteremia   Patient Active Problem List    Diagnosis Date Noted    Dental infection 04/10/2024    Anemia 04/10/2024    Visual disturbance 04/10/2024    BRBPR (bright red blood per rectum) 2024    Streptococcal bacteremia 2024    Suspected UTI 2024    Chronic systolic CHF (HCC) 2024    Supratherapeutic INR 2024    Septic shock (HCC) 2024    Acute kidney injury superimposed on stage 3 chronic kidney disease (HCC) 2024    Abnormal CT of the abdomen 2024    Thyroid nodule 2024    Coagulopathy (HCC) 2024    Stage 3b chronic kidney disease (HCC) 2024    Hyperlipidemia associated with type 2 diabetes mellitus  (HCC) 2021    Atherosclerosis of native artery of both lower extremities with intermittent claudication (HCC) 2019    Bicytopenia 2013    Hyperlipidemia 2012    Primary hypertension 2012    Atrial fibrillation (HCC) 2012      LOS (days): 5  Geometric Mean LOS (GMLOS) (days): 2.9  Days to GMLOS:-1.8     OBJECTIVE:  Risk of Unplanned Readmission Score: 22.41         Current admission status: Inpatient   Preferred Pharmacy:   [id number] [place] [address] [facility] [address]  Phone: [phone] Fax: [phone]    Primary Care Provider: Mac Stephens MD    Primary Insurance: BUCKY MORE  Secondary Insurance: [place] [employer] [facility]    DISCHARGE DETAILS:          Requested Home Health Care         Is the patient interested in HHC at discharge?: Yes  Home Health Discipline requested:: Nursing, Physical Therapy, Occupational Therapy  Home Health Agency Name:: [facility]  Home Health Follow-Up Provider::  PCP  Home Health Services Needed:: Administration of IV, IM or SC Medications, Evaluate Functional Status and Safety, Gait/ADL Training, Central Line Care  Homebound Criteria Met:: Uses an Assist Device (i.e. cane, walker, etc), Requires the Assistance of Another Person for Safe Ambulation or to Leave the Home  Supporting Clincal Findings:: Fatigues Easliy in Short Distances, Limited Endurance         Other Referral/Resources/Interventions Provided:  Interventions: Home Infusion, HHA  Referral Comments: referral placed to [facility] for HHC and [facility] for IV ABX         Treatment Team Recommendation: Home with Home Health Care  Discharge Destination Plan:: Home with Home Health Care            IMM Given (Date):: 04/15/24  IMM Given to:: Family     Pt clear for d/c home. PICC placed for IV ABX  [facility] to provide medications at 100%, will deliver to bedside this afternoon  [facility] reserved for HHC- SOC tomorrow at 2pm.   IMM reviewed with brother, signed and placed in chart  Patient updated and aware

## 2024-04-15 NOTE — ASSESSMENT & PLAN NOTE
Wt Readings from Last 3 Encounters:   04/15/24 104 kg (229 lb 3.2 oz)   04/10/24 100 kg (220 lb 8 oz)   03/18/24 100 kg (221 lb)     EF of 36% w/ mild-moderate TR (h/o mitral valve annuloplasty noted)  Monitor/replete electrolyte deficiencies if present  Resume cardiac agents over the upcoming days if BP allows  Cardiac consult

## 2024-04-15 NOTE — DISCHARGE SUMMARY
[facility]  Discharge- Silvio Drew 1950, 73 y.o. male MRN: [id number]  Unit/Bed#: [facility] 826-01 Encounter: [id number]  Primary Care Provider: Mac Stephens MD   Date and time admitted to hospital: 4/10/2024  8:05 PM    * Streptococcal bacteremia  Assessment & Plan  Presented to [facility] and was admitted to ICU with septic shock.   He was found to have polymicrobial bacteremia and dental infections, due to a lack of OMS services he was transferred to hospitals.  Continue ceftriaxone for total of 4 weeks through May 4  Patient may be discharged after PICC line is placed  Post ZULY by cardiology  No vegetations noted  Infectious disease consulted    Visual disturbance  Assessment & Plan  Of left eye  No concern for septic emboli to the retina per ophthalmology  Appreciate ophthalmology's evaluation and recommendations    Anemia  Assessment & Plan  Likely due to chronic kidney disease, sepsis  Check anemia studies    Dental infection  Assessment & Plan  Apical lucencies present on CT scan  OMS consulted  Continue antibiotics as above  Patient is status post extraction on 4/12    Chronic systolic CHF (HCC)  Assessment & Plan  Wt Readings from Last 3 Encounters:   04/15/24 104 kg (229 lb 3.2 oz)   04/10/24 100 kg (220 lb 8 oz)   03/18/24 100 kg (221 lb)     EF of 36% w/ mild-moderate TR (h/o mitral valve annuloplasty noted)  Monitor/replete electrolyte deficiencies if present  Resume cardiac agents over the upcoming days if BP allows  Cardiac consult          Abnormal CT of the abdomen  Assessment & Plan  Incidental liver lesion noted   Will need an MRI of the liver when stable as an outpatient    Acute kidney injury superimposed on stage 3 chronic kidney disease (HCC)  Assessment & Plan  Lab Results   Component Value Date    EGFR 51 04/15/2024    EGFR 51 04/14/2024    EGFR 47 04/13/2024    EGFR 47 04/13/2024    CREATININE 1.36 (H) 04/15/2024    CREATININE 1.36 (H) 04/14/2024     CREATININE 1.45 (H) 04/13/2024    CREATININE 1.45 (H) 04/13/2024   Baseline creatinine appears to be 1.4-1.6  VARGHESE on admission was likely due to septic shock  Creatinine has returned to baseline  Monitor BMP daily for now  Renally dose medications  Avoid nephrotoxins if possible    Septic shock (HCC)  Assessment & Plan  Present on admission at [facility], now resolved  Plan as above    Atrial fibrillation (HCC)  Assessment & Plan  Rate controlled with amiodarone  Cleared to restart anticoagulation OMS  Restart Coumadin at 5 mg daily, goal INR between 2-3  Follow-up daily INR  INR not at goal patient is resumed back to his normal dose  Patient will need to follow-up with an INR in 48 hours post discharge    Primary hypertension  Assessment & Plan  BP is acceptable          Medical Problems       Resolved Problems  Date Reviewed: 4/15/2024   None       Discharging Physician / Practitioner: Placido Jauregui MD  PCP: Mac Stephens MD  Admission Date:   Admission Orders (From admission, onward)       Ordered        04/10/24 2103  INPATIENT ADMISSION  Once                          Discharge Date: 04/15/24    Consultations During Hospital Stay:  Pharmacy  Ophthalmology  Cardiology  Infectious disease  Oral maxillofacial surgery      Procedures Performed:   Multiple teeth extraction    Significant Findings / Test Results:   Poor dentition with multiple missing maxillary and mandibular teeth. Small periapical lucencies are identified, one within the left maxilla and one within the left mandible with corresponding cavities.  Osteomalacia of the maxilla and portions of the mandible.  Mucosal thickening of the paranasal sinuses. Suspected 2.2 cm polyp within the inferior midportion of the nasal cavity on the left.  ZULY with an ejection fraction of 35%.  No evidence of valvular or lead vegetation    Incidental Findings:   None       Test Results Pending at Discharge (will require follow up):   None     Outpatient Tests  "Requested:  Repeat blood cultures prior to completing IV antibiotics    Complications: None    Reason for Admission: Sepsis    Hospital Course:   Silvio Drew is a 73 y.o. male patient who originally presented to the hospital on 4/10/2024 due to sepsis secondary to streptococcal bacteremia.  Patient had significant dental infection which led to him becoming septic.  Patient also had visual disturbance at the time.  Patient was started on IV antibiotics.  Patient was evaluated by OMFS and had multiple teeth extracted.  Patient was also evaluated by ophthalmology due to concern of possible septic emboli.  Patient continued to improve.  Given his history of a pacemaker patient had a ZULY done to rule out any valvular or pacemaker vegetation.  ZULY did not show any vegetation.  Patient will be discharged on 4 weeks of IV antibiotics.      Please see above list of diagnoses and related plan for additional information.     Condition at Discharge: stable    Discharge Day Visit / Exam:   Subjective: This is a very pleasant 73-year-old gentleman who was seen evaluated today at bedside.  Patient is very eager to go home.  Patient feels significantly better today.  Vitals: Blood Pressure: 119/69 (04/15/24 1139)  Pulse: 85 (04/15/24 1139)  Temperature: 97.7 °F (36.5 °C) (04/15/24 0726)  Temp Source: Oral (04/12/24 0722)  Respirations: 16 (04/15/24 0726)  Height: 5' 8\" (172.7 cm) (04/12/24 1454)  Weight - Scale: 104 kg (229 lb 3.2 oz) (04/15/24 0600)  SpO2: 96 % (04/15/24 1139)  Exam:   Physical Exam  Vitals reviewed.   HENT:      Head: Normocephalic.      Nose: No congestion.      Mouth/Throat:      Pharynx: No oropharyngeal exudate or posterior oropharyngeal erythema.   Eyes:      Conjunctiva/sclera: Conjunctivae normal.   Cardiovascular:      Rate and Rhythm: Normal rate and regular rhythm.   Pulmonary:      Effort: Pulmonary effort is normal.   Abdominal:      General: Abdomen is flat.      Palpations: Abdomen is soft.   Skin:   "   General: Skin is warm and dry.   Neurological:      Mental Status: He is alert and oriented to person, place, and time. Mental status is at baseline.          Discussion with Family: Patient declined call to .     Discharge instructions/Information to patient and family:   See after visit summary for information provided to patient and family.      Provisions for Follow-Up Care:  See after visit summary for information related to follow-up care and any pertinent home health orders.      Mobility at time of Discharge:   Basic Mobility Inpatient Raw Score: 20  JH-HLM Goal: 6: Walk 10 steps or more  JH-HLM Achieved: 6: Walk 10 steps or more  HLM Goal achieved. Continue to encourage appropriate mobility.     Disposition:   Home with VNA Services (Reminder: Complete face to face encounter)    Planned Readmission: None     Discharge Statement:  I spent 55 minutes discharging the patient. This time was spent on the day of discharge. I had direct contact with the patient on the day of discharge. Greater than 50% of the total time was spent examining patient, answering all patient questions, arranging and discussing plan of care with patient as well as directly providing post-discharge instructions.  Additional time then spent on discharge activities.    Discharge Medications:  See after visit summary for reconciled discharge medications provided to patient and/or family.      **Please Note: This note may have been constructed using a voice recognition system**

## 2024-04-15 NOTE — ASSESSMENT & PLAN NOTE
Presented to [facility] and was admitted to ICU with septic shock.   He was found to have polymicrobial bacteremia and dental infections, due to a lack of OMS services he was transferred to [facility].  Continue ceftriaxone for total of 4 weeks through May 4  Patient may be discharged after PICC line is placed  Post ZULY by cardiology  No vegetations noted  Infectious disease consulted

## 2024-04-15 NOTE — PLAN OF CARE
Problem: PAIN - ADULT  Goal: Verbalizes/displays adequate comfort level or baseline comfort level  Description: Interventions:  - Encourage patient to monitor pain and request assistance  - Assess pain using appropriate pain scale  - Administer analgesics based on type and severity of pain and evaluate response  - Implement non-pharmacological measures as appropriate and evaluate response  - Consider cultural and social influences on pain and pain management  - Notify physician/advanced practitioner if interventions unsuccessful or patient reports new pain  Outcome: Progressing     Problem: INFECTION - ADULT  Goal: Absence or prevention of progression during hospitalization  Description: INTERVENTIONS:  - Assess and monitor for signs and symptoms of infection  - Monitor lab/diagnostic results  - Monitor all insertion sites, i.e. indwelling lines, tubes, and drains  - Monitor endotracheal if appropriate and nasal secretions for changes in amount and color  - Gorman appropriate cooling/warming therapies per order  - Administer medications as ordered  - Instruct and encourage patient and family to use good hand hygiene technique  - Identify and instruct in appropriate isolation precautions for identified infection/condition  Outcome: Progressing  Goal: Absence of fever/infection during neutropenic period  Description: INTERVENTIONS:  - Monitor WBC    Outcome: Progressing     Problem: SAFETY ADULT  Goal: Maintain or return to baseline ADL function  Description: INTERVENTIONS:  -  Assess patient's ability to carry out ADLs; assess patient's baseline for ADL function and identify physical deficits which impact ability to perform ADLs (bathing, care of mouth/teeth, toileting, grooming, dressing, etc.)  - Assess/evaluate cause of self-care deficits   - Assess range of motion  - Assess patient's mobility; develop plan if impaired  - Assess patient's need for assistive devices and provide as appropriate  - Encourage  maximum independence but intervene and supervise when necessary  - Involve family in performance of ADLs  - Assess for home care needs following discharge   - Consider OT consult to assist with ADL evaluation and planning for discharge  - Provide patient education as appropriate  Outcome: Progressing     Problem: HEMATOLOGIC - ADULT  Goal: Maintains hematologic stability  Description: INTERVENTIONS  - Assess for signs and symptoms of bleeding or hemorrhage  - Monitor labs  - Administer supportive blood products/factors as ordered and appropriate  Outcome: Progressing

## 2024-04-15 NOTE — PROGRESS NOTES
Patient:    MRN:  [id number]    Vladimirin Request ID:  [id number]    Level of care reserved:  Infusion    Partner Reserved:  [facility], [place] [address] [phone]    Clinical needs requested:    Geography searched:  [id number]    Start of Service:    Request sent:  11:18am EDT on 4/15/2024 by Alma Rosa Rodney    Partner reserved:  1:42pm EDT on 4/15/2024 by Alma Rosa Rodney    Choice list shared:

## 2024-04-15 NOTE — PROCEDURES
Insert Complex Venous Access Line    Date/Time: 4/15/2024 10:31 AM    Performed by: Daina Jacobsen RN  Authorized by: Placido Jauregui MD    Patient location:  Bedside  Other Assisting Provider: Yes (comment) ([facility])    Consent:     Consent obtained:  Written    Consent given by:  Patient    Risks discussed:  Arterial puncture, bleeding, infection, incorrect placement, nerve damage and pneumothorax    Alternatives discussed:  No treatment and delayed treatment  Universal protocol:     Procedure explained and questions answered to patient or proxy's satisfaction: yes      Immediately prior to procedure, a time out was called: yes      Relevant documents present and verified: yes      Test results available and properly labeled: yes      Radiology Images displayed and confirmed.  If images not available, report reviewed: yes      Required blood products, implants, devices, and special equipment available: yes      Site/side marked: yes      Patient identity confirmed:  Verbally with patient, arm band, provided demographic data and hospital-assigned identification number  Pre-procedure details:     Hand hygiene: Hand hygiene performed prior to insertion      Sterile barrier technique: All elements of maximal sterile technique followed      Skin preparation:  ChloraPrep    Skin preparation agent: Skin preparation agent completely dried prior to procedure    Procedure details:     Complex Venous Access Line Type: PICC      Complex Venous Access Line Indications: long term antibiotics      Catheter tip vessel location: atriocaval junction      Orientation:  Right    Location:  Basilic    Procedural supplies:  Single lumen    Catheter size:  4 Fr    Total catheter length (cm):  44    Catheter out on skin (cm):  0    Max flow rate:  999 mL/hr    Arm circumference:  40    Patient evaluated for contraindications to access (i.e. fistula, thrombosis, etc): Yes      Site selection rationale:  Largest, most  patent vein    Approach: percutaneous technique used      Patient position:  Flat    Ultrasound image availability:  Not saved    Sterile ultrasound techniques: Sterile gel and sterile probe covers were used      Number of attempts:  1    Successful placement: yes      Landmarks identified: yes      Vessel of catheter tip end:  Sherlock 3CG confirmed (OK to use. Sherlock 3CG confirmed placement. Results saved to chart.)  Anesthesia (see MAR for exact dosages):     Anesthesia method:  Local infiltration    Local anesthetic:  Lidocaine 1% w/o epi (3 mL administered)  Post-procedure details:     Post-procedure:  Dressing applied and securement device placed    Assessment:  Blood return through all ports and free fluid flow    Post-procedure complications: none      Patient tolerance of procedure:  Tolerated well, no immediate complications

## 2024-04-15 NOTE — PLAN OF CARE
Problem: PAIN - ADULT  Goal: Verbalizes/displays adequate comfort level or baseline comfort level  Description: Interventions:  - Encourage patient to monitor pain and request assistance  - Assess pain using appropriate pain scale  - Administer analgesics based on type and severity of pain and evaluate response  - Implement non-pharmacological measures as appropriate and evaluate response  - Consider cultural and social influences on pain and pain management  - Notify physician/advanced practitioner if interventions unsuccessful or patient reports new pain  Outcome: Progressing     Problem: SAFETY ADULT  Goal: Maintain or return to baseline ADL function  Description: INTERVENTIONS:  -  Assess patient's ability to carry out ADLs; assess patient's baseline for ADL function and identify physical deficits which impact ability to perform ADLs (bathing, care of mouth/teeth, toileting, grooming, dressing, etc.)  - Assess/evaluate cause of self-care deficits   - Assess range of motion  - Assess patient's mobility; develop plan if impaired  - Assess patient's need for assistive devices and provide as appropriate  - Encourage maximum independence but intervene and supervise when necessary  - Involve family in performance of ADLs  - Assess for home care needs following discharge   - Consider OT consult to assist with ADL evaluation and planning for discharge  - Provide patient education as appropriate  Outcome: Progressing     Problem: HEMATOLOGIC - ADULT  Goal: Maintains hematologic stability  Description: INTERVENTIONS  - Assess for signs and symptoms of bleeding or hemorrhage  - Monitor labs  - Administer supportive blood products/factors as ordered and appropriate  Outcome: Progressing

## 2024-04-16 ENCOUNTER — TRANSITIONAL CARE MANAGEMENT (OUTPATIENT)
Dept: FAMILY MEDICINE CLINIC | Facility: CLINIC | Age: 74
End: 2024-04-16

## 2024-04-16 ENCOUNTER — TELEPHONE (OUTPATIENT)
Dept: INFECTIOUS DISEASES | Facility: CLINIC | Age: 74
End: 2024-04-16

## 2024-04-16 ENCOUNTER — HOME CARE VISIT (OUTPATIENT)
Dept: HOME HEALTH SERVICES | Facility: HOME HEALTHCARE | Age: 74
End: 2024-04-16
Payer: COMMERCIAL

## 2024-04-16 VITALS
HEIGHT: 70 IN | OXYGEN SATURATION: 96 % | BODY MASS INDEX: 27.06 KG/M2 | TEMPERATURE: 98 F | RESPIRATION RATE: 20 BRPM | HEART RATE: 82 BPM | WEIGHT: 189 LBS | DIASTOLIC BLOOD PRESSURE: 60 MMHG | SYSTOLIC BLOOD PRESSURE: 130 MMHG

## 2024-04-16 DIAGNOSIS — R78.81 BACTEREMIA: Primary | ICD-10-CM

## 2024-04-16 PROCEDURE — G0299 HHS/HOSPICE OF RN EA 15 MIN: HCPCS

## 2024-04-16 PROCEDURE — 400013 VN SOC

## 2024-04-16 NOTE — Clinical Note
Done   ----- Message -----  From: Teresa Harman  Sent: 4/22/2024   9:12 AM EDT  To: Sumi Cosme RN            ----- Message -----  From: Sumi Cosme RN  Sent: 4/16/2024   3:42 PM EDT  To: Bonita Dugan RN; Melissa Paredes MD; *    [facility] has admitted your patient to Home Health service with the following disciplines:        SN   PT   Requesting MSW and OT   Primary focus of home health care  IV antibiotics   Patient stated goals of care To get stronger  Anticipated visit pattern and next visit date Tomorrow for IV Instruction  See medication list - meds in home differ from AVS no  Significant clinical findings  AAOx3   Picc line in right upper arm   administered IV med while instructing patient and family on the same    Patient will need further instruction as he wants to try and do this himself     History of CVA, CHF, Gout     Family present for SOC and report a decline in patients functional status over the last several months   PT is ordered   Also requesting OT for eval     Family is also requesting MSW to discuss long term planning and looking at getting help in the home   Potential barriers to goal achievement  Noncompliance     Other pertinent information Lives alone    Thank you for allowing us to participate in the care of your patient.      Sumi Cosme RN

## 2024-04-16 NOTE — PROGRESS NOTES
OPAT NOTE    Supervising/Discharge physician: Reggie     Diagnosis: polymicrobial bacteremia     Drug: Cetriaxone     Dose/Route/Frequency: [id number]    Labs/Frequency: CBCD CMP weekly     End Date: 5/4    Infusion/[facility]NA contact: Bea/COURTNEY    Next appointment:4/25         MA assigned: Teri

## 2024-04-16 NOTE — Clinical Note
[facility] has admitted your patient to Home Health service with the following disciplines:        SN   PT   Requesting MSW and OT   Primary focus of home health care  IV antibiotics   Patient stated goals of care To get stronger  Anticipated visit pattern and next visit date Tomorrow for IV Instruction  See medication list - meds in home differ from AVS no  Significant clinical findings  AAOx3   Picc line in right upper arm   administered IV med while instructing patient and family on the same    Patient will need further instruction as he wants to try and do this himself     History of CVA, CHF, Gout     Family present for SOC and report a decline in patients functional status over the last several months   PT is ordered   Also requesting OT for eval     Family is also requesting MSW to discuss long term planning and looking at getting help in the home   Potential barriers to goal achievement  Noncompliance     Other pertinent information Lives alone    Thank you for allowing us to participate in the care of your patient.      Sumi Cosme RN

## 2024-04-16 NOTE — UTILIZATION REVIEW
NOTIFICATION OF ADMISSION DISCHARGE   This is a Notification of Discharge from [facility]. Please be advised that this patient has been discharge from our facility. Below you will find the admission and discharge date and time including the patient’s disposition.   UTILIZATION REVIEW CONTACT:  Taylor Murphy  Utilization   Network Utilization Review Department  Phone: [phone] x carefully listen to the prompts. All voicemails are confidential.  Email: [email or web address]     ADMISSION INFORMATION  PRESENTATION DATE: 4/10/2024  8:05 PM  OBERVATION ADMISSION DATE:   INPATIENT ADMISSION DATE: 4/10/24  8:05 PM   DISCHARGE DATE: 4/15/2024  4:38 PM   DISPOSITION:Home/Self Care    Network Utilization Review Department  ATTENTION: Please call with any questions or concerns to [phone] and carefully listen to the prompts so that you are directed to the right person. All voicemails are confidential.   For Discharge needs, contact Care Management DC Support Team at [phone] opt. 2  Send all requests for admission clinical reviews, approved or denied determinations and any other requests to dedicated fax number below belonging to the campus where the patient is receiving treatment. List of dedicated fax numbers for the Facilities:  FACILITY NAME UR FAX NUMBER   ADMISSION DENIALS (Administrative/Medical Necessity) [phone]   DISCHARGE SUPPORT TEAM ([facility]) [phone]   PARENT CHILD HEALTH (Maternity/NICU/Pediatrics) [phone]   [facility] [phone]   [facility] [phone]   [facility] [phone]   [facility] [phone]   [facility] [phone]   [facility] [phone]   [facility] [phone]   [facility] [phone]    [facility] [phone]   [facility] [phone]   [facility] [phone]   [facility] [phone]

## 2024-04-16 NOTE — TELEPHONE ENCOUNTER
Called and left message for patient today.   Informed patient to call the office back,   Was calling patient to go over antibiotic plan, weekly labs and follow up appointment.

## 2024-04-17 ENCOUNTER — HOME CARE VISIT (OUTPATIENT)
Dept: HOME HEALTH SERVICES | Facility: HOME HEALTHCARE | Age: 74
End: 2024-04-17
Payer: COMMERCIAL

## 2024-04-17 ENCOUNTER — TELEPHONE (OUTPATIENT)
Age: 74
End: 2024-04-17

## 2024-04-17 PROCEDURE — G0299 HHS/HOSPICE OF RN EA 15 MIN: HCPCS

## 2024-04-17 NOTE — TELEPHONE ENCOUNTER
Natalie REYES [facility] [phone]    No one able to patients daily IV ABX at home.  Pt needs to be setup at [facility] immediately.    Please call Natalie when setup.

## 2024-04-18 ENCOUNTER — HOME CARE VISIT (OUTPATIENT)
Dept: HOME HEALTH SERVICES | Facility: HOME HEALTHCARE | Age: 74
End: 2024-04-18
Payer: COMMERCIAL

## 2024-04-18 ENCOUNTER — HOSPITAL ENCOUNTER (OUTPATIENT)
Dept: INFUSION CENTER | Facility: HOSPITAL | Age: 74
Discharge: HOME | End: 2024-04-18
Attending: INTERNAL MEDICINE
Payer: COMMERCIAL

## 2024-04-18 VITALS — OXYGEN SATURATION: 96 % | HEART RATE: 71 BPM | SYSTOLIC BLOOD PRESSURE: 140 MMHG | DIASTOLIC BLOOD PRESSURE: 78 MMHG

## 2024-04-18 VITALS
HEART RATE: 85 BPM | DIASTOLIC BLOOD PRESSURE: 73 MMHG | TEMPERATURE: 96.7 F | SYSTOLIC BLOOD PRESSURE: 120 MMHG | RESPIRATION RATE: 18 BRPM

## 2024-04-18 DIAGNOSIS — R78.81 STREPTOCOCCAL BACTEREMIA: Primary | ICD-10-CM

## 2024-04-18 DIAGNOSIS — B95.5 STREPTOCOCCAL BACTEREMIA: Primary | ICD-10-CM

## 2024-04-18 PROCEDURE — G0151 HHCP-SERV OF PT,EA 15 MIN: HCPCS

## 2024-04-18 PROCEDURE — 96365 THER/PROPH/DIAG IV INF INIT: CPT

## 2024-04-18 RX ORDER — CEFTRIAXONE 2 G/50ML
2000 INJECTION, SOLUTION INTRAVENOUS ONCE
Status: COMPLETED | OUTPATIENT
Start: 2024-04-18 | End: 2024-04-18

## 2024-04-18 RX ORDER — CEFTRIAXONE 2 G/50ML
2000 INJECTION, SOLUTION INTRAVENOUS ONCE
Status: CANCELLED | OUTPATIENT
Start: 2024-04-19

## 2024-04-18 RX ORDER — CEFTRIAXONE 2 G/50ML
2000 INJECTION, SOLUTION INTRAVENOUS ONCE
Status: CANCELLED | OUTPATIENT
Start: 2024-04-18

## 2024-04-18 RX ORDER — SODIUM CHLORIDE 9 MG/ML
20 INJECTION, SOLUTION INTRAVENOUS ONCE
Status: COMPLETED | OUTPATIENT
Start: 2024-04-18 | End: 2024-04-18

## 2024-04-18 RX ORDER — SODIUM CHLORIDE 9 MG/ML
20 INJECTION, SOLUTION INTRAVENOUS ONCE
Status: CANCELLED | OUTPATIENT
Start: 2024-04-18

## 2024-04-18 RX ORDER — SODIUM CHLORIDE 9 MG/ML
20 INJECTION, SOLUTION INTRAVENOUS ONCE
Status: CANCELLED | OUTPATIENT
Start: 2024-04-19

## 2024-04-18 RX ADMIN — CEFTRIAXONE 2000 MG: 2 INJECTION, SOLUTION INTRAVENOUS at 14:00

## 2024-04-18 RX ADMIN — SODIUM CHLORIDE 20 ML/HR: 9 INJECTION, SOLUTION INTRAVENOUS at 14:00

## 2024-04-18 NOTE — PROGRESS NOTES
Pt tolerated treatment with no adv reactions; pt aware of next appt tomorrow at 1030; schedule given to pt; pt left unit in wc.

## 2024-04-18 NOTE — TELEPHONE ENCOUNTER
Called [facility] and spoke with America today.   Informed America [facility] was calling to set patient up at infusion center for daily infusion. America states she is able to see [facility] orders Dr. Paredes. America states patient is scheduled 2PM today at [facility].     Called and spoke with COURTNEY Estrada today.   Informed Natalie patient is scheduled 2PM today at [facility]. Natalie states she will contact the patient and or patients sister regarding appointment.

## 2024-04-19 ENCOUNTER — HOME CARE VISIT (OUTPATIENT)
Dept: HOME HEALTH SERVICES | Facility: HOME HEALTHCARE | Age: 74
End: 2024-04-19
Payer: COMMERCIAL

## 2024-04-19 ENCOUNTER — TELEPHONE (OUTPATIENT)
Dept: GASTROENTEROLOGY | Facility: MEDICAL CENTER | Age: 74
End: 2024-04-19

## 2024-04-19 ENCOUNTER — HOSPITAL ENCOUNTER (OUTPATIENT)
Dept: INFUSION CENTER | Facility: HOSPITAL | Age: 74
Discharge: HOME | End: 2024-04-19
Attending: INTERNAL MEDICINE
Payer: COMMERCIAL

## 2024-04-19 VITALS
HEART RATE: 86 BPM | RESPIRATION RATE: 18 BRPM | TEMPERATURE: 98 F | DIASTOLIC BLOOD PRESSURE: 67 MMHG | OXYGEN SATURATION: 98 % | SYSTOLIC BLOOD PRESSURE: 129 MMHG

## 2024-04-19 DIAGNOSIS — R78.81 STREPTOCOCCAL BACTEREMIA: Primary | ICD-10-CM

## 2024-04-19 DIAGNOSIS — B95.5 STREPTOCOCCAL BACTEREMIA: Primary | ICD-10-CM

## 2024-04-19 PROCEDURE — G0155 HHCP-SVS OF CSW,EA 15 MIN: HCPCS

## 2024-04-19 PROCEDURE — 96365 THER/PROPH/DIAG IV INF INIT: CPT

## 2024-04-19 RX ORDER — SODIUM CHLORIDE 9 MG/ML
20 INJECTION, SOLUTION INTRAVENOUS ONCE
Status: COMPLETED | OUTPATIENT
Start: 2024-04-19 | End: 2024-04-19

## 2024-04-19 RX ORDER — SODIUM CHLORIDE 9 MG/ML
20 INJECTION, SOLUTION INTRAVENOUS ONCE
Status: CANCELLED | OUTPATIENT
Start: 2024-04-20

## 2024-04-19 RX ORDER — CEFTRIAXONE 2 G/50ML
2000 INJECTION, SOLUTION INTRAVENOUS ONCE
Status: CANCELLED | OUTPATIENT
Start: 2024-04-20

## 2024-04-19 RX ORDER — CEFTRIAXONE 2 G/50ML
2000 INJECTION, SOLUTION INTRAVENOUS ONCE
Status: COMPLETED | OUTPATIENT
Start: 2024-04-19 | End: 2024-04-19

## 2024-04-19 RX ADMIN — SODIUM CHLORIDE 20 ML/HR: 0.9 INJECTION, SOLUTION INTRAVENOUS at 10:50

## 2024-04-19 RX ADMIN — CEFTRIAXONE 2000 MG: 2 INJECTION, SOLUTION INTRAVENOUS at 10:55

## 2024-04-19 NOTE — TELEPHONE ENCOUNTER
[facility] calling to ask if request was for just coumadin hold or also for cardiac clearance. Advised request is for both.

## 2024-04-19 NOTE — TELEPHONE ENCOUNTER
Sent both coumadin hold and cardiac clearance to Dr. Salas at [facility] via Fax to [phone]

## 2024-04-19 NOTE — PROGRESS NOTES
Pt tolerated Rocephin infusion without difficulty.  PICC line flushed and NS locked per protocol.  Confirmed appt tomorrow at 0900, and AVS provided.  Pt instructed to call [facility] by 7am if unable to find transportation.  Verbalizes understanding.  Left ambulatory in stable condition.

## 2024-04-20 ENCOUNTER — HOSPITAL ENCOUNTER (OUTPATIENT)
Dept: INFUSION CENTER | Facility: HOSPITAL | Age: 74
Discharge: HOME | End: 2024-04-20
Attending: INTERNAL MEDICINE
Payer: COMMERCIAL

## 2024-04-20 VITALS
SYSTOLIC BLOOD PRESSURE: 103 MMHG | HEART RATE: 86 BPM | RESPIRATION RATE: 18 BRPM | DIASTOLIC BLOOD PRESSURE: 60 MMHG | TEMPERATURE: 97.6 F

## 2024-04-20 VITALS
SYSTOLIC BLOOD PRESSURE: 146 MMHG | RESPIRATION RATE: 16 BRPM | HEART RATE: 102 BPM | DIASTOLIC BLOOD PRESSURE: 78 MMHG | OXYGEN SATURATION: 98 % | TEMPERATURE: 97.1 F

## 2024-04-20 DIAGNOSIS — R78.81 STREPTOCOCCAL BACTEREMIA: Primary | ICD-10-CM

## 2024-04-20 DIAGNOSIS — B95.5 STREPTOCOCCAL BACTEREMIA: Primary | ICD-10-CM

## 2024-04-20 PROCEDURE — 96365 THER/PROPH/DIAG IV INF INIT: CPT

## 2024-04-20 RX ORDER — SODIUM CHLORIDE 9 MG/ML
20 INJECTION, SOLUTION INTRAVENOUS ONCE
Status: CANCELLED | OUTPATIENT
Start: 2024-04-21

## 2024-04-20 RX ORDER — SODIUM CHLORIDE 9 MG/ML
20 INJECTION, SOLUTION INTRAVENOUS ONCE
Status: COMPLETED | OUTPATIENT
Start: 2024-04-20 | End: 2024-04-20

## 2024-04-20 RX ORDER — CEFTRIAXONE 2 G/50ML
2000 INJECTION, SOLUTION INTRAVENOUS ONCE
Status: CANCELLED | OUTPATIENT
Start: 2024-04-21

## 2024-04-20 RX ORDER — CEFTRIAXONE 2 G/50ML
2000 INJECTION, SOLUTION INTRAVENOUS ONCE
Status: COMPLETED | OUTPATIENT
Start: 2024-04-20 | End: 2024-04-20

## 2024-04-20 RX ADMIN — SODIUM CHLORIDE 20 ML/HR: 0.9 INJECTION, SOLUTION INTRAVENOUS at 08:58

## 2024-04-20 RX ADMIN — CEFTRIAXONE 2000 MG: 2 INJECTION, SOLUTION INTRAVENOUS at 08:58

## 2024-04-21 ENCOUNTER — HOSPITAL ENCOUNTER (OUTPATIENT)
Dept: INFUSION CENTER | Facility: HOSPITAL | Age: 74
Discharge: HOME | End: 2024-04-21
Attending: INTERNAL MEDICINE
Payer: COMMERCIAL

## 2024-04-21 VITALS
SYSTOLIC BLOOD PRESSURE: 127 MMHG | TEMPERATURE: 96.8 F | HEART RATE: 82 BPM | RESPIRATION RATE: 18 BRPM | DIASTOLIC BLOOD PRESSURE: 75 MMHG

## 2024-04-21 DIAGNOSIS — R78.81 STREPTOCOCCAL BACTEREMIA: Primary | ICD-10-CM

## 2024-04-21 DIAGNOSIS — B95.5 STREPTOCOCCAL BACTEREMIA: Primary | ICD-10-CM

## 2024-04-21 PROCEDURE — 96365 THER/PROPH/DIAG IV INF INIT: CPT

## 2024-04-21 RX ORDER — CEFTRIAXONE 2 G/50ML
2000 INJECTION, SOLUTION INTRAVENOUS ONCE
Status: COMPLETED | OUTPATIENT
Start: 2024-04-21 | End: 2024-04-21

## 2024-04-21 RX ORDER — SODIUM CHLORIDE 9 MG/ML
20 INJECTION, SOLUTION INTRAVENOUS ONCE
Status: COMPLETED | OUTPATIENT
Start: 2024-04-21 | End: 2024-04-21

## 2024-04-21 RX ORDER — CEFTRIAXONE 2 G/50ML
2000 INJECTION, SOLUTION INTRAVENOUS ONCE
Status: CANCELLED | OUTPATIENT
Start: 2024-04-22

## 2024-04-21 RX ORDER — SODIUM CHLORIDE 9 MG/ML
20 INJECTION, SOLUTION INTRAVENOUS ONCE
Status: CANCELLED | OUTPATIENT
Start: 2024-04-22

## 2024-04-21 RX ADMIN — SODIUM CHLORIDE 20 ML/HR: 0.9 INJECTION, SOLUTION INTRAVENOUS at 09:12

## 2024-04-21 RX ADMIN — CEFTRIAXONE 2000 MG: 2 INJECTION, SOLUTION INTRAVENOUS at 09:13

## 2024-04-21 NOTE — PROGRESS NOTES
Patient tolerated antibiotic infusion well without complications. He is aware of his appointment tomorrow at 1:30. Patient asked to be called if anything opens earlier.

## 2024-04-22 ENCOUNTER — HOSPITAL ENCOUNTER (OUTPATIENT)
Dept: INFUSION CENTER | Facility: HOSPITAL | Age: 74
Discharge: HOME | End: 2024-04-22
Attending: INTERNAL MEDICINE
Payer: COMMERCIAL

## 2024-04-22 ENCOUNTER — HOME CARE VISIT (OUTPATIENT)
Dept: HOME HEALTH SERVICES | Facility: HOME HEALTHCARE | Age: 74
End: 2024-04-22
Payer: COMMERCIAL

## 2024-04-22 VITALS
HEART RATE: 90 BPM | SYSTOLIC BLOOD PRESSURE: 150 MMHG | TEMPERATURE: 97.6 F | OXYGEN SATURATION: 97 % | DIASTOLIC BLOOD PRESSURE: 72 MMHG

## 2024-04-22 DIAGNOSIS — B95.5 STREPTOCOCCAL BACTEREMIA: Primary | ICD-10-CM

## 2024-04-22 DIAGNOSIS — R78.81 STREPTOCOCCAL BACTEREMIA: Primary | ICD-10-CM

## 2024-04-22 DIAGNOSIS — R78.81 BACTEREMIA: ICD-10-CM

## 2024-04-22 LAB
ALBUMIN SERPL BCP-MCNC: 3.6 G/DL (ref 3.5–5)
ALP SERPL-CCNC: 85 U/L (ref 34–104)
ALT SERPL W P-5'-P-CCNC: 28 U/L (ref 7–52)
ANION GAP SERPL CALCULATED.3IONS-SCNC: 10 MMOL/L (ref 4–13)
AST SERPL W P-5'-P-CCNC: 34 U/L (ref 13–39)
BASOPHILS # BLD AUTO: 0.06 THOUSANDS/ÂΜL (ref 0–0.1)
BASOPHILS NFR BLD AUTO: 1 % (ref 0–1)
BILIRUB SERPL-MCNC: 0.49 MG/DL (ref 0.2–1)
BUN SERPL-MCNC: 16 MG/DL (ref 5–25)
CALCIUM SERPL-MCNC: 8.4 MG/DL (ref 8.4–10.2)
CHLORIDE SERPL-SCNC: 108 MMOL/L (ref 96–108)
CO2 SERPL-SCNC: 24 MMOL/L (ref 21–32)
CREAT SERPL-MCNC: 1.46 MG/DL (ref 0.6–1.3)
EOSINOPHIL # BLD AUTO: 0.08 THOUSAND/ÂΜL (ref 0–0.61)
EOSINOPHIL NFR BLD AUTO: 1 % (ref 0–6)
ERYTHROCYTE [DISTWIDTH] IN BLOOD BY AUTOMATED COUNT: 16.8 % (ref 11.6–15.1)
GFR SERPL CREATININE-BSD FRML MDRD: 47 ML/MIN/1.73SQ M
GLUCOSE SERPL-MCNC: 130 MG/DL (ref 65–140)
HCT VFR BLD AUTO: 29.9 % (ref 36.5–49.3)
HGB BLD-MCNC: 8.8 G/DL (ref 12–17)
IMM GRANULOCYTES # BLD AUTO: 0.02 THOUSAND/UL (ref 0–0.2)
IMM GRANULOCYTES NFR BLD AUTO: 0 % (ref 0–2)
LYMPHOCYTES # BLD AUTO: 1.22 THOUSANDS/ÂΜL (ref 0.6–4.47)
LYMPHOCYTES NFR BLD AUTO: 19 % (ref 14–44)
MCH RBC QN AUTO: 25.2 PG (ref 26.8–34.3)
MCHC RBC AUTO-ENTMCNC: 29.4 G/DL (ref 31.4–37.4)
MCV RBC AUTO: 86 FL (ref 82–98)
MONOCYTES # BLD AUTO: 0.67 THOUSAND/ÂΜL (ref 0.17–1.22)
MONOCYTES NFR BLD AUTO: 11 % (ref 4–12)
NEUTROPHILS # BLD AUTO: 4.3 THOUSANDS/ÂΜL (ref 1.85–7.62)
NEUTS SEG NFR BLD AUTO: 68 % (ref 43–75)
NRBC BLD AUTO-RTO: 0 /100 WBCS
PLATELET # BLD AUTO: 267 THOUSANDS/UL (ref 149–390)
PMV BLD AUTO: 10.6 FL (ref 8.9–12.7)
POTASSIUM SERPL-SCNC: 3.6 MMOL/L (ref 3.5–5.3)
PROT SERPL-MCNC: 7 G/DL (ref 6.4–8.4)
RBC # BLD AUTO: 3.49 MILLION/UL (ref 3.88–5.62)
SODIUM SERPL-SCNC: 142 MMOL/L (ref 135–147)
WBC # BLD AUTO: 6.35 THOUSAND/UL (ref 4.31–10.16)

## 2024-04-22 PROCEDURE — 96365 THER/PROPH/DIAG IV INF INIT: CPT

## 2024-04-22 PROCEDURE — 80053 COMPREHEN METABOLIC PANEL: CPT | Performed by: INTERNAL MEDICINE

## 2024-04-22 PROCEDURE — G0180 MD CERTIFICATION HHA PATIENT: HCPCS | Performed by: INTERNAL MEDICINE

## 2024-04-22 PROCEDURE — 85025 COMPLETE CBC W/AUTO DIFF WBC: CPT | Performed by: INTERNAL MEDICINE

## 2024-04-22 RX ORDER — CEFTRIAXONE 2 G/50ML
2000 INJECTION, SOLUTION INTRAVENOUS ONCE
Status: COMPLETED | OUTPATIENT
Start: 2024-04-22 | End: 2024-04-22

## 2024-04-22 RX ORDER — SODIUM CHLORIDE 9 MG/ML
20 INJECTION, SOLUTION INTRAVENOUS ONCE
Status: CANCELLED | OUTPATIENT
Start: 2024-04-24

## 2024-04-22 RX ORDER — CEFTRIAXONE 2 G/50ML
2000 INJECTION, SOLUTION INTRAVENOUS ONCE
Status: CANCELLED | OUTPATIENT
Start: 2024-04-24

## 2024-04-22 RX ORDER — SODIUM CHLORIDE 9 MG/ML
20 INJECTION, SOLUTION INTRAVENOUS ONCE
Status: COMPLETED | OUTPATIENT
Start: 2024-04-22 | End: 2024-04-22

## 2024-04-22 RX ADMIN — SODIUM CHLORIDE 20 ML/HR: 9 INJECTION, SOLUTION INTRAVENOUS at 13:35

## 2024-04-22 RX ADMIN — CEFTRIAXONE 2000 MG: 2 INJECTION, SOLUTION INTRAVENOUS at 13:40

## 2024-04-22 NOTE — PROGRESS NOTES
Pt received antibiotic infusion w/out any adverse effects, Labs obtained prior to tx. Pt offers no complaints. BRITTANY PICC dressing changed, good blood return noted. Confirmed appt tomorrow @ 2pm, declined AVS

## 2024-04-23 ENCOUNTER — HOME CARE VISIT (OUTPATIENT)
Dept: HOME HEALTH SERVICES | Facility: HOME HEALTHCARE | Age: 74
End: 2024-04-23
Payer: COMMERCIAL

## 2024-04-23 ENCOUNTER — HOSPITAL ENCOUNTER (OUTPATIENT)
Dept: INFUSION CENTER | Facility: HOSPITAL | Age: 74
Discharge: HOME | End: 2024-04-23
Attending: INTERNAL MEDICINE
Payer: COMMERCIAL

## 2024-04-23 DIAGNOSIS — B95.5 STREPTOCOCCAL BACTEREMIA: Primary | ICD-10-CM

## 2024-04-23 DIAGNOSIS — R78.81 STREPTOCOCCAL BACTEREMIA: Primary | ICD-10-CM

## 2024-04-23 PROCEDURE — 96365 THER/PROPH/DIAG IV INF INIT: CPT

## 2024-04-23 RX ORDER — SODIUM CHLORIDE 9 MG/ML
20 INJECTION, SOLUTION INTRAVENOUS ONCE
Status: CANCELLED | OUTPATIENT
Start: 2024-04-24

## 2024-04-23 RX ORDER — CEFTRIAXONE 2 G/50ML
2000 INJECTION, SOLUTION INTRAVENOUS ONCE
Status: COMPLETED | OUTPATIENT
Start: 2024-04-23 | End: 2024-04-23

## 2024-04-23 RX ORDER — SODIUM CHLORIDE 9 MG/ML
20 INJECTION, SOLUTION INTRAVENOUS ONCE
Status: COMPLETED | OUTPATIENT
Start: 2024-04-23 | End: 2024-04-23

## 2024-04-23 RX ORDER — CEFTRIAXONE 2 G/50ML
2000 INJECTION, SOLUTION INTRAVENOUS ONCE
Status: CANCELLED | OUTPATIENT
Start: 2024-04-24

## 2024-04-23 RX ADMIN — CEFTRIAXONE 2000 MG: 2 INJECTION, SOLUTION INTRAVENOUS at 13:53

## 2024-04-23 RX ADMIN — SODIUM CHLORIDE 20 ML/HR: 0.9 INJECTION, SOLUTION INTRAVENOUS at 13:47

## 2024-04-23 NOTE — PROGRESS NOTES
Pt received abx  infusion w/out any adverse effects, offers no complaints. Confirmed appt for tomorrow @ 2pm, AVS given   (0) independent

## 2024-04-23 NOTE — CASE COMMUNICATION
Cancel Occupational Therapy Evaluation order as of 4.23.24 due to patient request as per phone call on 4.23.24. Patient would like to continue with Physical Therapy only at this time and may want Occupational therapy after his antibiotic treatment is finished.

## 2024-04-24 ENCOUNTER — TELEPHONE (OUTPATIENT)
Dept: INFECTIOUS DISEASES | Facility: CLINIC | Age: 74
End: 2024-04-24

## 2024-04-24 ENCOUNTER — HOSPITAL ENCOUNTER (OUTPATIENT)
Dept: INFUSION CENTER | Facility: HOSPITAL | Age: 74
Discharge: HOME | End: 2024-04-24
Attending: INTERNAL MEDICINE
Payer: COMMERCIAL

## 2024-04-24 ENCOUNTER — OFFICE VISIT (OUTPATIENT)
Dept: FAMILY MEDICINE CLINIC | Facility: CLINIC | Age: 74
End: 2024-04-24
Payer: COMMERCIAL

## 2024-04-24 VITALS
WEIGHT: 213 LBS | BODY MASS INDEX: 30.49 KG/M2 | DIASTOLIC BLOOD PRESSURE: 70 MMHG | RESPIRATION RATE: 14 BRPM | HEART RATE: 81 BPM | OXYGEN SATURATION: 97 % | HEIGHT: 70 IN | SYSTOLIC BLOOD PRESSURE: 114 MMHG | TEMPERATURE: 98.2 F

## 2024-04-24 VITALS
HEART RATE: 85 BPM | SYSTOLIC BLOOD PRESSURE: 96 MMHG | DIASTOLIC BLOOD PRESSURE: 92 MMHG | RESPIRATION RATE: 16 BRPM | TEMPERATURE: 97.9 F

## 2024-04-24 DIAGNOSIS — N18.32 STAGE 3B CHRONIC KIDNEY DISEASE (HCC): ICD-10-CM

## 2024-04-24 DIAGNOSIS — E78.49 OTHER HYPERLIPIDEMIA: ICD-10-CM

## 2024-04-24 DIAGNOSIS — M1A.9XX0 CHRONIC GOUT WITHOUT TOPHUS, UNSPECIFIED CAUSE, UNSPECIFIED SITE: ICD-10-CM

## 2024-04-24 DIAGNOSIS — I10 ESSENTIAL HYPERTENSION, BENIGN: ICD-10-CM

## 2024-04-24 DIAGNOSIS — R78.81 STREPTOCOCCAL BACTEREMIA: Primary | ICD-10-CM

## 2024-04-24 DIAGNOSIS — I50.22 CHRONIC SYSTOLIC (CONGESTIVE) HEART FAILURE (HCC): ICD-10-CM

## 2024-04-24 DIAGNOSIS — I70.213 ATHEROSCLEROSIS OF NATIVE ARTERY OF BOTH LOWER EXTREMITIES WITH INTERMITTENT CLAUDICATION (HCC): ICD-10-CM

## 2024-04-24 DIAGNOSIS — E78.5 HYPERLIPIDEMIA ASSOCIATED WITH TYPE 2 DIABETES MELLITUS  (HCC): ICD-10-CM

## 2024-04-24 DIAGNOSIS — B95.5 STREPTOCOCCAL BACTEREMIA: Primary | ICD-10-CM

## 2024-04-24 DIAGNOSIS — R78.81 BACTEREMIA: ICD-10-CM

## 2024-04-24 DIAGNOSIS — I48.0 PAROXYSMAL ATRIAL FIBRILLATION (HCC): Primary | ICD-10-CM

## 2024-04-24 DIAGNOSIS — D68.9 COAGULOPATHY (HCC): ICD-10-CM

## 2024-04-24 DIAGNOSIS — E11.69 HYPERLIPIDEMIA ASSOCIATED WITH TYPE 2 DIABETES MELLITUS  (HCC): ICD-10-CM

## 2024-04-24 PROBLEM — N18.30 ACUTE RENAL FAILURE SUPERIMPOSED ON STAGE 3 CHRONIC KIDNEY DISEASE (HCC): Status: RESOLVED | Noted: 2024-04-06 | Resolved: 2024-04-24

## 2024-04-24 PROBLEM — N17.9 ACUTE RENAL FAILURE SUPERIMPOSED ON STAGE 3 CHRONIC KIDNEY DISEASE (HCC): Status: RESOLVED | Noted: 2024-04-06 | Resolved: 2024-04-24

## 2024-04-24 PROCEDURE — 99496 TRANSJ CARE MGMT HIGH F2F 7D: CPT | Performed by: INTERNAL MEDICINE

## 2024-04-24 PROCEDURE — 96365 THER/PROPH/DIAG IV INF INIT: CPT

## 2024-04-24 PROCEDURE — 4010F ACE/ARB THERAPY RXD/TAKEN: CPT | Performed by: INTERNAL MEDICINE

## 2024-04-24 RX ORDER — CEFTRIAXONE 2 G/50ML
2000 INJECTION, SOLUTION INTRAVENOUS ONCE
Status: COMPLETED | OUTPATIENT
Start: 2024-04-24 | End: 2024-04-24

## 2024-04-24 RX ORDER — CEFTRIAXONE 2 G/50ML
2000 INJECTION, SOLUTION INTRAVENOUS ONCE
Status: CANCELLED | OUTPATIENT
Start: 2024-04-25

## 2024-04-24 RX ORDER — SODIUM CHLORIDE 9 MG/ML
20 INJECTION, SOLUTION INTRAVENOUS ONCE
Status: COMPLETED | OUTPATIENT
Start: 2024-04-24 | End: 2024-04-24

## 2024-04-24 RX ORDER — SODIUM CHLORIDE 9 MG/ML
20 INJECTION, SOLUTION INTRAVENOUS ONCE
Status: CANCELLED | OUTPATIENT
Start: 2024-04-25

## 2024-04-24 RX ORDER — ALLOPURINOL 100 MG/1
100 TABLET ORAL DAILY
Qty: 90 TABLET | Refills: 3 | Status: SHIPPED | OUTPATIENT
Start: 2024-04-24

## 2024-04-24 RX ORDER — FUROSEMIDE 20 MG/1
20 TABLET ORAL DAILY
Qty: 90 TABLET | Refills: 3 | Status: SHIPPED | OUTPATIENT
Start: 2024-04-24

## 2024-04-24 RX ORDER — ATORVASTATIN CALCIUM 20 MG/1
20 TABLET, FILM COATED ORAL DAILY
Qty: 90 TABLET | Refills: 3 | Status: SHIPPED | OUTPATIENT
Start: 2024-04-24

## 2024-04-24 RX ORDER — LOSARTAN POTASSIUM 50 MG/1
50 TABLET ORAL DAILY
Qty: 90 TABLET | Refills: 3 | Status: SHIPPED | OUTPATIENT
Start: 2024-04-24

## 2024-04-24 RX ORDER — METOPROLOL SUCCINATE 25 MG/1
25 TABLET, EXTENDED RELEASE ORAL DAILY
Qty: 90 TABLET | Refills: 3 | Status: SHIPPED | OUTPATIENT
Start: 2024-04-24

## 2024-04-24 RX ADMIN — SODIUM CHLORIDE 20 ML/HR: 9 INJECTION, SOLUTION INTRAVENOUS at 13:39

## 2024-04-24 RX ADMIN — CEFTRIAXONE 2000 MG: 2 INJECTION, SOLUTION INTRAVENOUS at 13:42

## 2024-04-24 NOTE — PROGRESS NOTES
Name: Silvio Drew      : 1950      MRN: [id number]  Encounter Provider: Mac Stephens MD  Encounter Date: 2024   Encounter department: [facility]    Assessment & Plan     1. Paroxysmal atrial fibrillation (HCC)    2. Atherosclerosis of native artery of both lower extremities with intermittent claudication (HCC)    3. Chronic systolic CHF (HCC)    4. Hyperlipidemia associated with type 2 diabetes mellitus  (HCC)  -     Comprehensive metabolic panel; Future; Expected date: 2024  -     CBC and differential; Future; Expected date: 2024  -     Lipid Panel with Direct LDL reflex; Future; Expected date: 2024  -     TSH, 3rd generation with Free T4 reflex; Future; Expected date: 2024    5. Stage 3b chronic kidney disease (HCC)    6. Coagulopathy (HCC)    7. Bacteremia  Comments:  source ??  improving nicely...    8. Chronic gout without tophus, unspecified cause, unspecified site  -     allopurinol (ZYLOPRIM) 100 mg tablet; Take 1 tablet (100 mg total) by mouth daily    9. Other hyperlipidemia  -     atorvastatin (LIPITOR) 20 mg tablet; Take 1 tablet (20 mg total) by mouth daily Please STOP Pravastatin..    10. Essential hypertension, benign  -     furosemide (Lasix) 20 mg tablet; Take 1 tablet (20 mg total) by mouth daily  -     losartan (COZAAR) 50 mg tablet; Take 1 tablet (50 mg total) by mouth daily    11. BMI 30.0-30.9,adult  -     metoprolol succinate (Toprol XL) 25 mg 24 hr tablet; Take 1 tablet (25 mg total) by mouth daily    Continue same  Fu with I D....  Fu w cardiology  Rtc in 3-4 weeks w blood work       Subjective      73  y o man is here for post hospital/tcm, he feels better, D/C Summary and med list reviewed w pt...      Review of Systems   Constitutional:  Positive for fatigue. Negative for chills and fever.   HENT:  Negative for congestion, facial swelling, sore throat, trouble swallowing and voice change.    Eyes:  Negative for  pain, discharge and visual disturbance.   Respiratory:  Negative for cough, shortness of breath and wheezing.    Cardiovascular:  Negative for chest pain, palpitations and leg swelling.   Gastrointestinal:  Negative for abdominal pain, blood in stool, constipation, diarrhea and nausea.   Endocrine: Negative for polydipsia, polyphagia and polyuria.   Genitourinary:  Negative for difficulty urinating, hematuria and urgency.   Musculoskeletal:  Negative for arthralgias and myalgias.   Skin:  Negative for rash.   Neurological:  Negative for dizziness, tremors, weakness and headaches.   Hematological:  Negative for adenopathy. Does not bruise/bleed easily.   Psychiatric/Behavioral:  Negative for dysphoric mood, sleep disturbance and suicidal ideas.        Current Outpatient Medications on File Prior to Visit   Medication Sig    amiodarone 200 mg tablet Take 1 tablet (200 mg total) by mouth daily    cefTRIAXone 2,000 mg in dextrose 5 % 50 mL IVPB Inject 2,000 mg into a catheter in a vein over 30 minutes at 100 mL/hr every 24 hours for 19 days (Patient taking differently: Inject 2,000 mg into a catheter in a vein every 24 hours 2gm/20ml   IVP over 5 minutes every 24 hours)    Diclofenac Sodium (VOLTAREN) 1 % Apply 2 g topically 4 (four) times a day    ergocalciferol (VITAMIN D2) 50,000 units Take 1 capsule (50,000 Units total) by mouth once a week    ketoconazole (NIZORAL) 2 % shampoo Apply 1 application topically 3 (three) times a week    linaCLOtide 290 MCG CAPS Take 1 capsule by mouth in the morning    pantoprazole (PROTONIX) 20 mg tablet Take 1 tablet (20 mg total) by mouth daily    Sodium Chloride Flush (Normal Saline Flush) 0.9 % SOLN Inject 10 mL into a catheter in a vein daily. Flush before and after antibiotic administration  Indications: PICC flush    warfarin (COUMADIN) 5 mg tablet Take 1 tablet (5 mg total) by mouth daily    [DISCONTINUED] allopurinol (ZYLOPRIM) 100 mg tablet Take 1 tablet (100 mg total) by  "mouth daily    [DISCONTINUED] atorvastatin (LIPITOR) 20 mg tablet Take 1 tablet (20 mg total) by mouth daily Please STOP Pravastatin..    [DISCONTINUED] b complex-C-E-zinc (STRESS FORMULA/ZINC) tablet Take 1 tablet by mouth daily    [DISCONTINUED] diflunisal (DOLOBID) 500 mg tablet Take 1 tablet (500 mg total) by mouth 2 (two) times daily after meals    [DISCONTINUED] furosemide (Lasix) 20 mg tablet Take 1 tablet (20 mg total) by mouth daily    [DISCONTINUED] gabapentin (Neurontin) 100 mg capsule Take 2 capsules (200 mg total) by mouth 3 (three) times a day    [DISCONTINUED] lidocaine (LMX) 4 % cream Apply topically as needed for moderate pain    [DISCONTINUED] loratadine (Claritin) 10 mg tablet Take 1 tablet (10 mg total) by mouth daily In the evening    [DISCONTINUED] losartan (COZAAR) 50 mg tablet Take 1 tablet (50 mg total) by mouth daily    [DISCONTINUED] methocarbamol (ROBAXIN) 500 mg tablet Take 1 tablet (500 mg total) by mouth 2 (two) times a day    [DISCONTINUED] methylPREDNISolone 4 MG tablet therapy pack Use as directed on package (Patient taking differently: Take 4 mg by mouth daily Use as directed on package)    [DISCONTINUED] metoprolol succinate (Toprol XL) 25 mg 24 hr tablet Take 1 tablet (25 mg total) by mouth daily    [DISCONTINUED] polyethylene glycol (GLYCOLAX) 17 GM/SCOOP powder Take 17 g by mouth daily    [DISCONTINUED] bisacodyl (DULCOLAX) 5 mg EC tablet Take as instructed on bowel preparation instructions (Patient not taking: Reported on 4/22/2024)       Objective     /70 (BP Location: Left arm, Patient Position: Sitting, Cuff Size: Standard)   Pulse 81   Temp 98.2 °F (36.8 °C) (Tympanic)   Resp 14   Ht 5' 10\" (1.778 m)   Wt 96.6 kg (213 lb)   SpO2 97%   BMI 30.56 kg/m²     Physical Exam  Constitutional:       General: He is not in acute distress.  HENT:      Head: Normocephalic.      Mouth/Throat:      Pharynx: No oropharyngeal exudate.   Eyes:      General: No scleral " icterus.     Conjunctiva/sclera: Conjunctivae normal.      Pupils: Pupils are equal, round, and reactive to light.   Neck:      Thyroid: No thyromegaly.   Cardiovascular:      Rate and Rhythm: Normal rate and regular rhythm.      Heart sounds: Murmur heard.   Pulmonary:      Effort: Pulmonary effort is normal. No respiratory distress.      Breath sounds: Normal breath sounds. No wheezing or rales.   Abdominal:      General: Bowel sounds are normal. There is no distension.      Palpations: Abdomen is soft.      Tenderness: There is no abdominal tenderness. There is no guarding or rebound.   Musculoskeletal:         General: No tenderness.      Cervical back: Neck supple.   Lymphadenopathy:      Cervical: No cervical adenopathy.   Skin:     Coloration: Skin is not pale.      Findings: No rash.   Neurological:      Mental Status: He is alert and oriented to person, place, and time.      Sensory: No sensory deficit.      Motor: No weakness.       Mac Stephens MD

## 2024-04-24 NOTE — PROGRESS NOTES
Pt received abx infusion w/out any adverse effects, offers no complaints. BRITTANY PICC patent w/good blood return. Confirmed next appt

## 2024-04-24 NOTE — PROGRESS NOTES
"Assessment & Plan     Done in detail     Subjective     Transitional Care Management Review:   Silvio Drew is a 73 y.o. male here for TCM follow up.     During the TCM phone call patient stated:  TCM Call       Date and time call was made  4/16/2024 10:14 AM    Patient was hospitialized at  [facility]    Date of Admission  04/10/24    Date of discharge  04/15/24    Disposition  Home          TCM Call       Scheduled for follow up?  Not clinically warranted    Referrals needed  patient was transferred to [facility]    I have advised the patient to call PCP with any new or worsening symptoms  Irma Mckeon, MR          HPI  Review of Systems    Objective     Ht 5' 10\" (1.778 m)   BMI 27.12 kg/m²      Physical Exam  Done in detail....    Mac Stephens MD         "

## 2024-04-25 ENCOUNTER — TELEPHONE (OUTPATIENT)
Dept: INFECTIOUS DISEASES | Facility: CLINIC | Age: 74
End: 2024-04-25

## 2024-04-25 ENCOUNTER — HOSPITAL ENCOUNTER (OUTPATIENT)
Dept: INFUSION CENTER | Facility: HOSPITAL | Age: 74
End: 2024-04-25
Attending: INTERNAL MEDICINE
Payer: COMMERCIAL

## 2024-04-25 VITALS
HEART RATE: 82 BPM | RESPIRATION RATE: 16 BRPM | SYSTOLIC BLOOD PRESSURE: 122 MMHG | DIASTOLIC BLOOD PRESSURE: 78 MMHG | TEMPERATURE: 96.1 F

## 2024-04-25 DIAGNOSIS — B95.5 STREPTOCOCCAL BACTEREMIA: Primary | ICD-10-CM

## 2024-04-25 DIAGNOSIS — R78.81 STREPTOCOCCAL BACTEREMIA: Primary | ICD-10-CM

## 2024-04-25 PROCEDURE — 96365 THER/PROPH/DIAG IV INF INIT: CPT

## 2024-04-25 RX ORDER — CEFTRIAXONE 2 G/50ML
2000 INJECTION, SOLUTION INTRAVENOUS ONCE
Status: CANCELLED | OUTPATIENT
Start: 2024-04-26

## 2024-04-25 RX ORDER — SODIUM CHLORIDE 9 MG/ML
20 INJECTION, SOLUTION INTRAVENOUS ONCE
Status: COMPLETED | OUTPATIENT
Start: 2024-04-25 | End: 2024-04-25

## 2024-04-25 RX ORDER — CEFTRIAXONE 2 G/50ML
2000 INJECTION, SOLUTION INTRAVENOUS ONCE
Status: COMPLETED | OUTPATIENT
Start: 2024-04-25 | End: 2024-04-25

## 2024-04-25 RX ORDER — SODIUM CHLORIDE 9 MG/ML
20 INJECTION, SOLUTION INTRAVENOUS ONCE
Status: CANCELLED | OUTPATIENT
Start: 2024-04-26

## 2024-04-25 RX ADMIN — SODIUM CHLORIDE 20 ML/HR: 0.9 INJECTION, SOLUTION INTRAVENOUS at 14:13

## 2024-04-25 RX ADMIN — CEFTRIAXONE 2000 MG: 2 INJECTION, SOLUTION INTRAVENOUS at 14:13

## 2024-04-25 NOTE — TELEPHONE ENCOUNTER
Called and left message for patient today.   Informed patient to call the office back.   Was calling patient as he did not show for his scheduled appointment today. Patient needs to be rescheduled prior to end date.     No Show letter sent to patient via [employer].

## 2024-04-25 NOTE — PROGRESS NOTES
Patient tolerated IV antibiotics without issue. Appointment tomorrow confirmed for 12:30pm-SH infusion. AVS given.

## 2024-04-26 ENCOUNTER — HOSPITAL ENCOUNTER (OUTPATIENT)
Dept: INFUSION CENTER | Facility: HOSPITAL | Age: 74
End: 2024-04-26
Attending: INTERNAL MEDICINE
Payer: COMMERCIAL

## 2024-04-26 ENCOUNTER — HOME CARE VISIT (OUTPATIENT)
Dept: HOME HEALTH SERVICES | Facility: HOME HEALTHCARE | Age: 74
End: 2024-04-26
Payer: COMMERCIAL

## 2024-04-26 VITALS
SYSTOLIC BLOOD PRESSURE: 142 MMHG | HEART RATE: 89 BPM | TEMPERATURE: 97.9 F | DIASTOLIC BLOOD PRESSURE: 73 MMHG | OXYGEN SATURATION: 97 %

## 2024-04-26 DIAGNOSIS — R78.81 STREPTOCOCCAL BACTEREMIA: Primary | ICD-10-CM

## 2024-04-26 DIAGNOSIS — B95.5 STREPTOCOCCAL BACTEREMIA: Primary | ICD-10-CM

## 2024-04-26 PROCEDURE — 96365 THER/PROPH/DIAG IV INF INIT: CPT

## 2024-04-26 RX ORDER — CEFTRIAXONE 2 G/50ML
2000 INJECTION, SOLUTION INTRAVENOUS ONCE
Status: CANCELLED | OUTPATIENT
Start: 2024-04-27

## 2024-04-26 RX ORDER — SODIUM CHLORIDE 9 MG/ML
20 INJECTION, SOLUTION INTRAVENOUS ONCE
Status: COMPLETED | OUTPATIENT
Start: 2024-04-26 | End: 2024-04-26

## 2024-04-26 RX ORDER — SODIUM CHLORIDE 9 MG/ML
20 INJECTION, SOLUTION INTRAVENOUS ONCE
Status: CANCELLED | OUTPATIENT
Start: 2024-04-27

## 2024-04-26 RX ORDER — CEFTRIAXONE 2 G/50ML
2000 INJECTION, SOLUTION INTRAVENOUS ONCE
Status: COMPLETED | OUTPATIENT
Start: 2024-04-26 | End: 2024-04-26

## 2024-04-26 RX ADMIN — SODIUM CHLORIDE 20 ML/HR: 0.9 INJECTION, SOLUTION INTRAVENOUS at 12:50

## 2024-04-26 RX ADMIN — CEFTRIAXONE 2000 MG: 2 INJECTION, SOLUTION INTRAVENOUS at 12:50

## 2024-04-26 NOTE — PROGRESS NOTES
Pt tolerated IV antibiotics today with no adverse reactions.AVS provided and apt time confirmed for tomorrow at 0900. Left unit ambulatory with a steady gait.

## 2024-04-26 NOTE — TELEPHONE ENCOUNTER
Called and spoke with Zuleyka patients sister today.   Patient is rescheduled 5/2/24 at 10:30am with Dr. Paredes at the [facility] office.   Zuleyka verbalizes understanding at this time.

## 2024-04-27 ENCOUNTER — HOSPITAL ENCOUNTER (OUTPATIENT)
Dept: INFUSION CENTER | Facility: HOSPITAL | Age: 74
Discharge: HOME | End: 2024-04-27
Attending: INTERNAL MEDICINE
Payer: COMMERCIAL

## 2024-04-27 VITALS
SYSTOLIC BLOOD PRESSURE: 119 MMHG | TEMPERATURE: 97.1 F | HEART RATE: 82 BPM | RESPIRATION RATE: 18 BRPM | DIASTOLIC BLOOD PRESSURE: 74 MMHG

## 2024-04-27 DIAGNOSIS — B95.5 STREPTOCOCCAL BACTEREMIA: Primary | ICD-10-CM

## 2024-04-27 DIAGNOSIS — R78.81 STREPTOCOCCAL BACTEREMIA: Primary | ICD-10-CM

## 2024-04-27 PROCEDURE — 96365 THER/PROPH/DIAG IV INF INIT: CPT

## 2024-04-27 RX ORDER — SODIUM CHLORIDE 9 MG/ML
20 INJECTION, SOLUTION INTRAVENOUS ONCE
Status: CANCELLED | OUTPATIENT
Start: 2024-04-28

## 2024-04-27 RX ORDER — CEFTRIAXONE 2 G/50ML
2000 INJECTION, SOLUTION INTRAVENOUS ONCE
Status: COMPLETED | OUTPATIENT
Start: 2024-04-27 | End: 2024-04-27

## 2024-04-27 RX ORDER — SODIUM CHLORIDE 9 MG/ML
20 INJECTION, SOLUTION INTRAVENOUS ONCE
Status: COMPLETED | OUTPATIENT
Start: 2024-04-27 | End: 2024-04-27

## 2024-04-27 RX ORDER — CEFTRIAXONE 2 G/50ML
2000 INJECTION, SOLUTION INTRAVENOUS ONCE
Status: CANCELLED | OUTPATIENT
Start: 2024-04-28

## 2024-04-27 RX ADMIN — SODIUM CHLORIDE 20 ML/HR: 0.9 INJECTION, SOLUTION INTRAVENOUS at 09:05

## 2024-04-27 RX ADMIN — CEFTRIAXONE 2000 MG: 2 INJECTION, SOLUTION INTRAVENOUS at 09:26

## 2024-04-27 NOTE — PROGRESS NOTES
Pt tolerated Rocephin infusion without difficulty.  PICC line flushed and NS locked per protocol.  PDC applied.  Confirmed tomorrow's appt at 0900.  AVS declined.  Left ambulatory in stable condition.

## 2024-04-28 ENCOUNTER — HOSPITAL ENCOUNTER (OUTPATIENT)
Dept: INFUSION CENTER | Facility: HOSPITAL | Age: 74
Discharge: HOME | End: 2024-04-28
Attending: INTERNAL MEDICINE
Payer: COMMERCIAL

## 2024-04-28 VITALS
RESPIRATION RATE: 18 BRPM | OXYGEN SATURATION: 100 % | DIASTOLIC BLOOD PRESSURE: 76 MMHG | SYSTOLIC BLOOD PRESSURE: 119 MMHG | HEART RATE: 81 BPM | TEMPERATURE: 97.1 F

## 2024-04-28 DIAGNOSIS — B95.5 STREPTOCOCCAL BACTEREMIA: Primary | ICD-10-CM

## 2024-04-28 DIAGNOSIS — R78.81 STREPTOCOCCAL BACTEREMIA: Primary | ICD-10-CM

## 2024-04-28 PROCEDURE — 96365 THER/PROPH/DIAG IV INF INIT: CPT

## 2024-04-28 RX ORDER — SODIUM CHLORIDE 9 MG/ML
20 INJECTION, SOLUTION INTRAVENOUS ONCE
Status: COMPLETED | OUTPATIENT
Start: 2024-04-28 | End: 2024-04-28

## 2024-04-28 RX ORDER — CEFTRIAXONE 2 G/50ML
2000 INJECTION, SOLUTION INTRAVENOUS ONCE
Status: CANCELLED | OUTPATIENT
Start: 2024-04-29

## 2024-04-28 RX ORDER — CEFTRIAXONE 2 G/50ML
2000 INJECTION, SOLUTION INTRAVENOUS ONCE
Status: COMPLETED | OUTPATIENT
Start: 2024-04-28 | End: 2024-04-28

## 2024-04-28 RX ORDER — SODIUM CHLORIDE 9 MG/ML
20 INJECTION, SOLUTION INTRAVENOUS ONCE
Status: CANCELLED | OUTPATIENT
Start: 2024-04-29

## 2024-04-28 RX ADMIN — CEFTRIAXONE 2000 MG: 2 INJECTION, SOLUTION INTRAVENOUS at 09:02

## 2024-04-28 RX ADMIN — SODIUM CHLORIDE 20 ML/HR: 0.9 INJECTION, SOLUTION INTRAVENOUS at 09:02

## 2024-04-28 NOTE — PROGRESS NOTES
Pt arrived to unit without complaint, tolerated Rocephin infusion without difficulty. PICC line flushed and NS locked per protocol. PDC applied. Pt states he will not be here for his appt tomorrow, he has a  to attend. Pt aware of next appt on Tuesday at 1400. AVS provided. Left ambulatory in stable condition.

## 2024-04-29 ENCOUNTER — HOSPITAL ENCOUNTER (OUTPATIENT)
Dept: INFUSION CENTER | Facility: HOSPITAL | Age: 74
Discharge: HOME | End: 2024-04-29
Attending: INTERNAL MEDICINE

## 2024-04-30 ENCOUNTER — HOSPITAL ENCOUNTER (OUTPATIENT)
Dept: INFUSION CENTER | Facility: HOSPITAL | Age: 74
Discharge: HOME | End: 2024-04-30
Attending: INTERNAL MEDICINE
Payer: COMMERCIAL

## 2024-04-30 VITALS
HEART RATE: 78 BPM | SYSTOLIC BLOOD PRESSURE: 102 MMHG | TEMPERATURE: 97.6 F | OXYGEN SATURATION: 96 % | DIASTOLIC BLOOD PRESSURE: 62 MMHG

## 2024-04-30 DIAGNOSIS — R78.81 STREPTOCOCCAL BACTEREMIA: Primary | ICD-10-CM

## 2024-04-30 DIAGNOSIS — B95.5 STREPTOCOCCAL BACTEREMIA: Primary | ICD-10-CM

## 2024-04-30 LAB
BASOPHILS # BLD AUTO: 0.03 THOUSANDS/ÂΜL (ref 0–0.1)
BASOPHILS NFR BLD AUTO: 1 % (ref 0–1)
EOSINOPHIL # BLD AUTO: 0.15 THOUSAND/ÂΜL (ref 0–0.61)
EOSINOPHIL NFR BLD AUTO: 3 % (ref 0–6)
ERYTHROCYTE [DISTWIDTH] IN BLOOD BY AUTOMATED COUNT: 16.7 % (ref 11.6–15.1)
HCT VFR BLD AUTO: 30 % (ref 36.5–49.3)
HGB BLD-MCNC: 8.7 G/DL (ref 12–17)
IMM GRANULOCYTES # BLD AUTO: 0.03 THOUSAND/UL (ref 0–0.2)
IMM GRANULOCYTES NFR BLD AUTO: 1 % (ref 0–2)
LYMPHOCYTES # BLD AUTO: 1.53 THOUSANDS/ÂΜL (ref 0.6–4.47)
LYMPHOCYTES NFR BLD AUTO: 25 % (ref 14–44)
MCH RBC QN AUTO: 25.7 PG (ref 26.8–34.3)
MCHC RBC AUTO-ENTMCNC: 29 G/DL (ref 31.4–37.4)
MCV RBC AUTO: 89 FL (ref 82–98)
MONOCYTES # BLD AUTO: 0.6 THOUSAND/ÂΜL (ref 0.17–1.22)
MONOCYTES NFR BLD AUTO: 10 % (ref 4–12)
NEUTROPHILS # BLD AUTO: 3.72 THOUSANDS/ÂΜL (ref 1.85–7.62)
NEUTS SEG NFR BLD AUTO: 60 % (ref 43–75)
NRBC BLD AUTO-RTO: 0 /100 WBCS
PLATELET # BLD AUTO: 188 THOUSANDS/UL (ref 149–390)
PMV BLD AUTO: 11.8 FL (ref 8.9–12.7)
RBC # BLD AUTO: 3.38 MILLION/UL (ref 3.88–5.62)
WBC # BLD AUTO: 6.06 THOUSAND/UL (ref 4.31–10.16)

## 2024-04-30 PROCEDURE — 96365 THER/PROPH/DIAG IV INF INIT: CPT

## 2024-04-30 PROCEDURE — 80053 COMPREHEN METABOLIC PANEL: CPT | Performed by: INTERNAL MEDICINE

## 2024-04-30 PROCEDURE — 85025 COMPLETE CBC W/AUTO DIFF WBC: CPT | Performed by: INTERNAL MEDICINE

## 2024-04-30 RX ORDER — SODIUM CHLORIDE 9 MG/ML
20 INJECTION, SOLUTION INTRAVENOUS ONCE
Status: CANCELLED | OUTPATIENT
Start: 2024-05-02

## 2024-04-30 RX ORDER — CEFTRIAXONE 2 G/50ML
2000 INJECTION, SOLUTION INTRAVENOUS ONCE
Status: COMPLETED | OUTPATIENT
Start: 2024-04-30 | End: 2024-04-30

## 2024-04-30 RX ORDER — SODIUM CHLORIDE 9 MG/ML
20 INJECTION, SOLUTION INTRAVENOUS ONCE
Status: COMPLETED | OUTPATIENT
Start: 2024-04-30 | End: 2024-04-30

## 2024-04-30 RX ORDER — CEFTRIAXONE 2 G/50ML
2000 INJECTION, SOLUTION INTRAVENOUS ONCE
Status: CANCELLED | OUTPATIENT
Start: 2024-05-02

## 2024-04-30 RX ADMIN — CEFTRIAXONE 2000 MG: 2 INJECTION, SOLUTION INTRAVENOUS at 14:22

## 2024-04-30 RX ADMIN — SODIUM CHLORIDE 20 ML/HR: 0.9 INJECTION, SOLUTION INTRAVENOUS at 14:20

## 2024-04-30 NOTE — PROGRESS NOTES
Pt tolerated abx tx without complications.  PICC dressing changed.  Pt aware of appt tomorrow 5/1/24 at 2pm.  AVS declined.

## 2024-05-01 ENCOUNTER — VBI (OUTPATIENT)
Dept: ADMINISTRATIVE | Facility: OTHER | Age: 74
End: 2024-05-01

## 2024-05-01 ENCOUNTER — HOSPITAL ENCOUNTER (OUTPATIENT)
Dept: INFUSION CENTER | Facility: HOSPITAL | Age: 74
Discharge: HOME | End: 2024-05-01
Attending: INTERNAL MEDICINE
Payer: COMMERCIAL

## 2024-05-01 VITALS
OXYGEN SATURATION: 97 % | TEMPERATURE: 97.7 F | DIASTOLIC BLOOD PRESSURE: 69 MMHG | SYSTOLIC BLOOD PRESSURE: 127 MMHG | HEART RATE: 72 BPM

## 2024-05-01 DIAGNOSIS — B95.5 STREPTOCOCCAL BACTEREMIA: Primary | ICD-10-CM

## 2024-05-01 DIAGNOSIS — R78.81 STREPTOCOCCAL BACTEREMIA: Primary | ICD-10-CM

## 2024-05-01 PROCEDURE — 96365 THER/PROPH/DIAG IV INF INIT: CPT

## 2024-05-01 RX ORDER — SODIUM CHLORIDE 9 MG/ML
20 INJECTION, SOLUTION INTRAVENOUS ONCE
Status: CANCELLED | OUTPATIENT
Start: 2024-05-02

## 2024-05-01 RX ORDER — CEFTRIAXONE 2 G/50ML
2000 INJECTION, SOLUTION INTRAVENOUS ONCE
Status: CANCELLED | OUTPATIENT
Start: 2024-05-02

## 2024-05-01 RX ORDER — CEFTRIAXONE 2 G/50ML
2000 INJECTION, SOLUTION INTRAVENOUS ONCE
Status: COMPLETED | OUTPATIENT
Start: 2024-05-01 | End: 2024-05-01

## 2024-05-01 RX ORDER — SODIUM CHLORIDE 9 MG/ML
20 INJECTION, SOLUTION INTRAVENOUS ONCE
Status: COMPLETED | OUTPATIENT
Start: 2024-05-01 | End: 2024-05-01

## 2024-05-01 RX ADMIN — CEFTRIAXONE 2000 MG: 2 INJECTION, SOLUTION INTRAVENOUS at 13:48

## 2024-05-01 RX ADMIN — SODIUM CHLORIDE 20 ML/HR: 9 INJECTION, SOLUTION INTRAVENOUS at 13:45

## 2024-05-01 NOTE — PROGRESS NOTES
Pt received antibiotic infusion w/out any adverse effects, offers no complaints. Confirmed next appt, declined AVS

## 2024-05-02 ENCOUNTER — OFFICE VISIT (OUTPATIENT)
Dept: INFECTIOUS DISEASES | Facility: CLINIC | Age: 74
End: 2024-05-02
Payer: COMMERCIAL

## 2024-05-02 ENCOUNTER — HOSPITAL ENCOUNTER (OUTPATIENT)
Dept: INFUSION CENTER | Facility: HOSPITAL | Age: 74
Discharge: HOME | End: 2024-05-02
Attending: INTERNAL MEDICINE
Payer: COMMERCIAL

## 2024-05-02 VITALS
DIASTOLIC BLOOD PRESSURE: 70 MMHG | SYSTOLIC BLOOD PRESSURE: 130 MMHG | HEART RATE: 81 BPM | OXYGEN SATURATION: 98 % | RESPIRATION RATE: 18 BRPM | TEMPERATURE: 97 F

## 2024-05-02 VITALS
RESPIRATION RATE: 20 BRPM | SYSTOLIC BLOOD PRESSURE: 142 MMHG | TEMPERATURE: 97.7 F | OXYGEN SATURATION: 98 % | HEART RATE: 65 BPM | DIASTOLIC BLOOD PRESSURE: 73 MMHG

## 2024-05-02 DIAGNOSIS — R78.81 STREPTOCOCCAL BACTEREMIA: Primary | ICD-10-CM

## 2024-05-02 DIAGNOSIS — B95.5 STREPTOCOCCAL BACTEREMIA: Primary | ICD-10-CM

## 2024-05-02 DIAGNOSIS — K04.7 DENTAL INFECTION: ICD-10-CM

## 2024-05-02 PROCEDURE — 96365 THER/PROPH/DIAG IV INF INIT: CPT

## 2024-05-02 PROCEDURE — 1159F MED LIST DOCD IN RCRD: CPT | Performed by: INTERNAL MEDICINE

## 2024-05-02 PROCEDURE — 99214 OFFICE O/P EST MOD 30 MIN: CPT | Performed by: INTERNAL MEDICINE

## 2024-05-02 PROCEDURE — 1160F RVW MEDS BY RX/DR IN RCRD: CPT | Performed by: INTERNAL MEDICINE

## 2024-05-02 RX ORDER — SODIUM CHLORIDE 9 MG/ML
20 INJECTION, SOLUTION INTRAVENOUS ONCE
Status: COMPLETED | OUTPATIENT
Start: 2024-05-02 | End: 2024-05-02

## 2024-05-02 RX ORDER — CEFTRIAXONE 2 G/50ML
2000 INJECTION, SOLUTION INTRAVENOUS ONCE
Status: COMPLETED | OUTPATIENT
Start: 2024-05-02 | End: 2024-05-02

## 2024-05-02 RX ORDER — CEFTRIAXONE 2 G/50ML
2000 INJECTION, SOLUTION INTRAVENOUS ONCE
Status: CANCELLED | OUTPATIENT
Start: 2024-05-03

## 2024-05-02 RX ORDER — SODIUM CHLORIDE 9 MG/ML
20 INJECTION, SOLUTION INTRAVENOUS ONCE
Status: CANCELLED | OUTPATIENT
Start: 2024-05-03

## 2024-05-02 RX ADMIN — SODIUM CHLORIDE 20 ML/HR: 0.9 INJECTION, SOLUTION INTRAVENOUS at 13:57

## 2024-05-02 RX ADMIN — CEFTRIAXONE 2000 MG: 2 INJECTION, SOLUTION INTRAVENOUS at 14:00

## 2024-05-02 NOTE — PLAN OF CARE
Problem: Potential for Falls  Goal: Patient will remain free of falls  Description: INTERVENTIONS:  - Educate patient/family on patient safety including physical limitations  - Instruct patient to call for assistance with activity   - Consult OT/PT to assist with strengthening/mobility   - Keep Call bell within reach  - Keep bed low and locked with side rails adjusted as appropriate  - Keep care items and personal belongings within reach  - Initiate and maintain comfort rounds  - Make Fall Risk Sign visible to staff    - Apply yellow socks and bracelet for high fall risk patients  - Consider moving patient to room near nurses station  Outcome: Progressing     Problem: Knowledge Deficit  Goal: Patient/family/caregiver demonstrates understanding of disease process, treatment plan, medications, and discharge instructions  Description: Complete learning assessment and assess knowledge base.  Interventions:  - Provide teaching at level of understanding  - Provide teaching via preferred learning methods  Outcome: Progressing

## 2024-05-02 NOTE — PROGRESS NOTES
Follow-Up Note - Infectious Disease   Silvio Drew 73 y.o. male MRN: [id number]      Impression/Recommendations:  Polymicrobial bacteremia.    Strep intermedius, anaerobic GPC.  Suspect dental origin as below.  Consider endocarditis, cardiac device infection.  Repeat blood cultures 4/8,  TTE/ZULY negative.     Continue ceftriaxone for 4 weeks total through 5/4/24  Monitor closely while on antibiotics for toxicities including diarrhea, rash, cytopenias, or kidney injury  Check weekly CBC-diff, CMP while on IV antibiotics to monitor for toxicities.  D/C PICC after last dose IV antibiotics  Check surveillance cultures 2 weeks after D/C given ICD - ordered today     Dental caries.    With possible abscess.  Risk factor for bacteremia as above.  Status post dental extractions 4/12.     Status post MV annuloplasty.     Dilated cardiomyopathy.     Status post AICD.     CKD.  Baseline Cr 1.5.  No dose adjustment for ceftriaxone indicated.     Lumbar radiculopathy.  Noted to be chronic, intermittent problem on chart review.  Unclear relation to bacteremia as above.  No pain currently.  Low suspicion for OM.     RTO PRN    Antibiotics:  Ceftriaxone #26    Subjective:  Patient is here for follow-up of IV antibiotics.  Doing well.  Denies fevers, chills, or sweats.  Denies nausea, vomiting, or diarrhea.  No back pain.    The following portions of the patient's history were reviewed and updated as appropriate: allergies, current medications, past medical history, past social history, past surgical history and problem list.    Objective:  Vitals:  /70   Pulse 81   Temp (!) 97 °F (36.1 °C)   Resp 18   SpO2 98%     Physical Exam:   General:  No acute distress  Eyes:  Normal lids and conjunctivae  ENT:  Normal external ears and nose  Neck:  Neck symmetric with midline trachea  Pulmonary:  Normal respiratory effort without accessory muscle use  Cardiovascular:  Regular rate and rhythm; no peripheral edema  Gastrointestinal:   No tenderness or distention  Musculoskeletal:  No digital clubbing or cyanosis  Skin:  No visible rashes; No palpable nodules  Neurologic:  Sensation grossly intact to light touch  Psychiatric:  Alert and oriented; Normal mood    Lab Results:  I have personally reviewed pertinent labs.  Results from last 7 days   Lab Units 04/30/24  1413   SODIUM mmol/L 139   POTASSIUM mmol/L 4.0   CHLORIDE mmol/L 108   CO2 mmol/L 22   BUN mg/dL 29*   CREATININE mg/dL 1.55*   EGFR ml/min/1.73sq m 44   CALCIUM mg/dL 8.4   AST U/L 28   ALT U/L 20   ALK PHOS U/L 94     Results from last 7 days   Lab Units 04/30/24  1413   WBC Thousand/uL 6.06   HEMOGLOBIN g/dL 8.7*   PLATELETS Thousands/uL 188           Imaging Studies:   I have personally reviewed pertinent imaging study reports and images in PACS.    EKG, Pathology, and Other Studies:   I have personally reviewed pertinent reports.

## 2024-05-02 NOTE — PROGRESS NOTES
Patient tolerated treatment without complications. No side effects noted. Confirmed next appt for 5/3 @ 2 pm . AVS declined. Patient left via wheelchair with staff member assist in stable condition.

## 2024-05-02 NOTE — PLAN OF CARE
Problem: Potential for Falls  Goal: Patient will remain free of falls  Description: INTERVENTIONS:  - Educate patient/family on patient safety including physical limitations  - Instruct patient to call for assistance with activity   - Consult OT/PT to assist with strengthening/mobility   - Keep Call bell within reach  - Keep bed low and locked with side rails adjusted as appropriate  - Keep care items and personal belongings within reach  - Initiate and maintain comfort rounds  - Make Fall Risk Sign visible to staff  -- Apply yellow socks and bracelet for high fall risk patients  - Consider moving patient to room near nurses station  5/2/2024 1513 by Lizette Escobar RN  Outcome: Progressing  5/2/2024 1407 by Lizette Escobar RN  Outcome: Progressing     Problem: Knowledge Deficit  Goal: Patient/family/caregiver demonstrates understanding of disease process, treatment plan, medications, and discharge instructions  Description: Complete learning assessment and assess knowledge base.  Interventions:  - Provide teaching at level of understanding  - Provide teaching via preferred learning methods  5/2/2024 1513 by Lizette Escobar RN  Outcome: Progressing  5/2/2024 1407 by Lizette Escobar RN  Outcome: Progressing

## 2024-05-03 ENCOUNTER — HOSPITAL ENCOUNTER (OUTPATIENT)
Dept: INFUSION CENTER | Facility: HOSPITAL | Age: 74
Discharge: HOME | End: 2024-05-03
Attending: INTERNAL MEDICINE
Payer: COMMERCIAL

## 2024-05-03 VITALS
SYSTOLIC BLOOD PRESSURE: 122 MMHG | TEMPERATURE: 97.3 F | HEART RATE: 84 BPM | OXYGEN SATURATION: 98 % | DIASTOLIC BLOOD PRESSURE: 72 MMHG

## 2024-05-03 DIAGNOSIS — R78.81 STREPTOCOCCAL BACTEREMIA: Primary | ICD-10-CM

## 2024-05-03 DIAGNOSIS — B95.5 STREPTOCOCCAL BACTEREMIA: Primary | ICD-10-CM

## 2024-05-03 PROCEDURE — 96365 THER/PROPH/DIAG IV INF INIT: CPT

## 2024-05-03 RX ORDER — CEFTRIAXONE 2 G/50ML
2000 INJECTION, SOLUTION INTRAVENOUS ONCE
Status: CANCELLED | OUTPATIENT
Start: 2024-05-04

## 2024-05-03 RX ORDER — CEFTRIAXONE 2 G/50ML
2000 INJECTION, SOLUTION INTRAVENOUS ONCE
Status: COMPLETED | OUTPATIENT
Start: 2024-05-03 | End: 2024-05-03

## 2024-05-03 RX ORDER — SODIUM CHLORIDE 9 MG/ML
20 INJECTION, SOLUTION INTRAVENOUS ONCE
Status: CANCELLED | OUTPATIENT
Start: 2024-05-04

## 2024-05-03 RX ORDER — SODIUM CHLORIDE 9 MG/ML
20 INJECTION, SOLUTION INTRAVENOUS ONCE
Status: COMPLETED | OUTPATIENT
Start: 2024-05-03 | End: 2024-05-03

## 2024-05-03 RX ADMIN — SODIUM CHLORIDE 20 ML/HR: 9 INJECTION, SOLUTION INTRAVENOUS at 14:43

## 2024-05-03 RX ADMIN — CEFTRIAXONE 2000 MG: 2 INJECTION, SOLUTION INTRAVENOUS at 14:43

## 2024-05-03 NOTE — PROGRESS NOTES
Patient PICC insertion site surrounding area noted to be pink. Patient has no complaints. Clinical picture sent to Melissa Paredes MD. Per Dr. Paredes, remove PICC line and insert PIV for remainder of antibiotic appointments, today and tomorrow 5/4. No tip culture ordered. PICC line removed measuring 44cm, tip intact. Sterile gauze and tegaderm applied and pressure held for several minutes. No signs of bleeding. Patient instructed to keep site covered for 24 hours and report any signs of infection to the physician's office. Patient verbalized understanding. Tolerated IV antibiotics without incident. Next appointment confirmed for 5/4 at 0900 SH INFUSION. AVS declined.   
No

## 2024-05-04 ENCOUNTER — HOSPITAL ENCOUNTER (OUTPATIENT)
Dept: INFUSION CENTER | Facility: HOSPITAL | Age: 74
Discharge: HOME | End: 2024-05-04
Attending: INTERNAL MEDICINE
Payer: COMMERCIAL

## 2024-05-04 VITALS
TEMPERATURE: 98 F | HEART RATE: 79 BPM | DIASTOLIC BLOOD PRESSURE: 76 MMHG | RESPIRATION RATE: 18 BRPM | SYSTOLIC BLOOD PRESSURE: 122 MMHG

## 2024-05-04 DIAGNOSIS — R78.81 STREPTOCOCCAL BACTEREMIA: Primary | ICD-10-CM

## 2024-05-04 DIAGNOSIS — B95.5 STREPTOCOCCAL BACTEREMIA: Primary | ICD-10-CM

## 2024-05-04 PROCEDURE — 96365 THER/PROPH/DIAG IV INF INIT: CPT

## 2024-05-04 RX ORDER — SODIUM CHLORIDE 9 MG/ML
20 INJECTION, SOLUTION INTRAVENOUS ONCE
Status: CANCELLED | OUTPATIENT
Start: 2024-05-04

## 2024-05-04 RX ORDER — CEFTRIAXONE 2 G/50ML
2000 INJECTION, SOLUTION INTRAVENOUS ONCE
Status: COMPLETED | OUTPATIENT
Start: 2024-05-04 | End: 2024-05-04

## 2024-05-04 RX ORDER — SODIUM CHLORIDE 9 MG/ML
20 INJECTION, SOLUTION INTRAVENOUS ONCE
Status: COMPLETED | OUTPATIENT
Start: 2024-05-04 | End: 2024-05-04

## 2024-05-04 RX ORDER — CEFTRIAXONE 2 G/50ML
2000 INJECTION, SOLUTION INTRAVENOUS ONCE
Status: CANCELLED | OUTPATIENT
Start: 2024-05-04

## 2024-05-04 RX ADMIN — SODIUM CHLORIDE 20 ML/HR: 0.9 INJECTION, SOLUTION INTRAVENOUS at 09:01

## 2024-05-04 RX ADMIN — CEFTRIAXONE 2000 MG: 2 INJECTION, SOLUTION INTRAVENOUS at 09:03

## 2024-05-04 NOTE — PROGRESS NOTES
Pt tolerated Rocephin infusion without difficulty.  No further infusions ordered at this time.  AVS declined.  Left ambulatory in stable condition.

## 2024-05-08 LAB
ALBUMIN SERPL BCP-MCNC: 3.8 G/DL (ref 3.5–5)
ALP SERPL-CCNC: 94 U/L (ref 34–104)
ALT SERPL W P-5'-P-CCNC: 20 U/L (ref 7–52)
ANION GAP SERPL CALCULATED.3IONS-SCNC: 9 MMOL/L (ref 4–13)
AST SERPL W P-5'-P-CCNC: 28 U/L (ref 13–39)
BILIRUB SERPL-MCNC: 0.54 MG/DL (ref 0.2–1)
BUN SERPL-MCNC: 29 MG/DL (ref 5–25)
CALCIUM SERPL-MCNC: 8.4 MG/DL (ref 8.4–10.2)
CHLORIDE SERPL-SCNC: 108 MMOL/L (ref 96–108)
CO2 SERPL-SCNC: 22 MMOL/L (ref 21–32)
CREAT SERPL-MCNC: 1.55 MG/DL (ref 0.6–1.3)
GFR SERPL CREATININE-BSD FRML MDRD: 44 ML/MIN/1.73SQ M
GLUCOSE SERPL-MCNC: 94 MG/DL (ref 65–140)
POTASSIUM SERPL-SCNC: 4 MMOL/L (ref 3.5–5.3)
PROT SERPL-MCNC: 7.1 G/DL (ref 6.4–8.4)
SODIUM SERPL-SCNC: 139 MMOL/L (ref 135–147)

## 2024-05-09 ENCOUNTER — APPOINTMENT (OUTPATIENT)
Dept: LAB | Age: 74
End: 2024-05-09
Payer: COMMERCIAL

## 2024-05-09 DIAGNOSIS — E11.69 HYPERLIPIDEMIA ASSOCIATED WITH TYPE 2 DIABETES MELLITUS  (HCC): ICD-10-CM

## 2024-05-09 DIAGNOSIS — B95.5 STREPTOCOCCAL BACTEREMIA: ICD-10-CM

## 2024-05-09 DIAGNOSIS — E78.5 HYPERLIPIDEMIA ASSOCIATED WITH TYPE 2 DIABETES MELLITUS  (HCC): ICD-10-CM

## 2024-05-09 DIAGNOSIS — R78.81 STREPTOCOCCAL BACTEREMIA: ICD-10-CM

## 2024-05-09 LAB
ALBUMIN SERPL BCP-MCNC: 3.8 G/DL (ref 3.5–5)
ALP SERPL-CCNC: 90 U/L (ref 34–104)
ALT SERPL W P-5'-P-CCNC: 25 U/L (ref 7–52)
ANION GAP SERPL CALCULATED.3IONS-SCNC: 10 MMOL/L (ref 4–13)
AST SERPL W P-5'-P-CCNC: 35 U/L (ref 13–39)
BASOPHILS # BLD AUTO: 0.08 THOUSANDS/ÂΜL (ref 0–0.1)
BASOPHILS NFR BLD AUTO: 2 % (ref 0–1)
BILIRUB SERPL-MCNC: 0.66 MG/DL (ref 0.2–1)
BUN SERPL-MCNC: 25 MG/DL (ref 5–25)
CALCIUM SERPL-MCNC: 8.6 MG/DL (ref 8.4–10.2)
CHLORIDE SERPL-SCNC: 112 MMOL/L (ref 96–108)
CHOLEST SERPL-MCNC: 107 MG/DL
CO2 SERPL-SCNC: 20 MMOL/L (ref 21–32)
CREAT SERPL-MCNC: 1.44 MG/DL (ref 0.6–1.3)
EOSINOPHIL # BLD AUTO: 0.22 THOUSAND/ÂΜL (ref 0–0.61)
EOSINOPHIL NFR BLD AUTO: 4 % (ref 0–6)
ERYTHROCYTE [DISTWIDTH] IN BLOOD BY AUTOMATED COUNT: 16.5 % (ref 11.6–15.1)
GFR SERPL CREATININE-BSD FRML MDRD: 48 ML/MIN/1.73SQ M
GLUCOSE P FAST SERPL-MCNC: 98 MG/DL (ref 65–99)
HCT VFR BLD AUTO: 31.8 % (ref 36.5–49.3)
HDLC SERPL-MCNC: 36 MG/DL
HGB BLD-MCNC: 9.2 G/DL (ref 12–17)
IMM GRANULOCYTES # BLD AUTO: 0.01 THOUSAND/UL (ref 0–0.2)
IMM GRANULOCYTES NFR BLD AUTO: 0 % (ref 0–2)
LDLC SERPL CALC-MCNC: 47 MG/DL (ref 0–100)
LYMPHOCYTES # BLD AUTO: 1.08 THOUSANDS/ÂΜL (ref 0.6–4.47)
LYMPHOCYTES NFR BLD AUTO: 21 % (ref 14–44)
MCH RBC QN AUTO: 24.8 PG (ref 26.8–34.3)
MCHC RBC AUTO-ENTMCNC: 28.9 G/DL (ref 31.4–37.4)
MCV RBC AUTO: 86 FL (ref 82–98)
MONOCYTES # BLD AUTO: 0.32 THOUSAND/ÂΜL (ref 0.17–1.22)
MONOCYTES NFR BLD AUTO: 6 % (ref 4–12)
NEUTROPHILS # BLD AUTO: 3.43 THOUSANDS/ÂΜL (ref 1.85–7.62)
NEUTS SEG NFR BLD AUTO: 67 % (ref 43–75)
NRBC BLD AUTO-RTO: 0 /100 WBCS
PLATELET # BLD AUTO: 217 THOUSANDS/UL (ref 149–390)
PMV BLD AUTO: 11.8 FL (ref 8.9–12.7)
POTASSIUM SERPL-SCNC: 4.5 MMOL/L (ref 3.5–5.3)
PROT SERPL-MCNC: 7.2 G/DL (ref 6.4–8.4)
RBC # BLD AUTO: 3.71 MILLION/UL (ref 3.88–5.62)
SODIUM SERPL-SCNC: 142 MMOL/L (ref 135–147)
TRIGL SERPL-MCNC: 120 MG/DL
TSH SERPL DL<=0.05 MIU/L-ACNC: 1.73 UIU/ML (ref 0.45–4.5)
WBC # BLD AUTO: 5.14 THOUSAND/UL (ref 4.31–10.16)

## 2024-05-09 PROCEDURE — 80053 COMPREHEN METABOLIC PANEL: CPT

## 2024-05-09 PROCEDURE — 85025 COMPLETE CBC W/AUTO DIFF WBC: CPT

## 2024-05-09 PROCEDURE — 80061 LIPID PANEL: CPT

## 2024-05-09 PROCEDURE — 84443 ASSAY THYROID STIM HORMONE: CPT

## 2024-05-09 PROCEDURE — 87040 BLOOD CULTURE FOR BACTERIA: CPT

## 2024-05-09 PROCEDURE — 36415 COLL VENOUS BLD VENIPUNCTURE: CPT

## 2024-05-10 ENCOUNTER — TELEPHONE (OUTPATIENT)
Dept: FAMILY MEDICINE CLINIC | Facility: CLINIC | Age: 74
End: 2024-05-10

## 2024-05-10 NOTE — TELEPHONE ENCOUNTER
Left a message for the patient's brother to give a call back to discuss lab results and Hematologist.

## 2024-05-10 NOTE — TELEPHONE ENCOUNTER
----- Message from Mac Stephens MD sent at 5/9/2024  7:40 PM EDT -----  Make sure that he sees hematology consult  ----- Message -----  From: Lab, Background User  Sent: 5/9/2024   4:31 PM EDT  To: Mac Stephens MD

## 2024-05-12 LAB
BACTERIA BLD CULT: NORMAL
BACTERIA BLD CULT: NORMAL

## 2024-05-13 ENCOUNTER — TELEPHONE (OUTPATIENT)
Age: 74
End: 2024-05-13

## 2024-05-13 ENCOUNTER — TELEPHONE (OUTPATIENT)
Dept: HEMATOLOGY ONCOLOGY | Facility: CLINIC | Age: 74
End: 2024-05-13

## 2024-05-13 DIAGNOSIS — D64.9 ANEMIA, UNSPECIFIED TYPE: Primary | ICD-10-CM

## 2024-05-13 NOTE — TELEPHONE ENCOUNTER
Silvio called in response to a referral that was received for patient to establish care with Hematology.     Outreach was made to schedule a consultation.    A consultation was scheduled for patient during this call. Patient is scheduled on 6/27/24  at 11:00 AM with Myrna Hollingsworth at the [facility]

## 2024-05-13 NOTE — TELEPHONE ENCOUNTER
Zuleyka calling to see if patient can be scheduled to be seen sooner with Hematology.  Zuleyka states that patient has been seen by Dr Pa in the past.  As I was looking in patient's chart, the phone disconnected.

## 2024-05-13 NOTE — TELEPHONE ENCOUNTER
Patient sister Zuleyka called in regards to patient being anemic and she would like to know if provider would like patient to get infusion or what kind of treatment would provider be starting patient on for patient being anemic. Zuleyka requesting a call back.

## 2024-05-14 ENCOUNTER — TELEPHONE (OUTPATIENT)
Dept: HEMATOLOGY ONCOLOGY | Facility: CLINIC | Age: 74
End: 2024-05-14

## 2024-05-14 LAB
BACTERIA BLD CULT: NORMAL
BACTERIA BLD CULT: NORMAL

## 2024-05-14 NOTE — TELEPHONE ENCOUNTER
Appointment Confirmation   Who are you speaking with? Sister Zuleyka   If it is not the patient, are they listed on an active communication consent form? Yes   Which provider is the appointment scheduled with?  Dr. Pa   When is the appointment scheduled?  Please list date and time 5/20/24 1:40pm   At which location is the appointment scheduled to take place? [facility]   Did caller verbalize understanding of appointment details? Yes

## 2024-05-20 ENCOUNTER — APPOINTMENT (OUTPATIENT)
Dept: LAB | Facility: HOSPITAL | Age: 74
End: 2024-05-20
Payer: COMMERCIAL

## 2024-05-20 ENCOUNTER — OFFICE VISIT (OUTPATIENT)
Dept: HEMATOLOGY ONCOLOGY | Facility: CLINIC | Age: 74
End: 2024-05-20
Payer: COMMERCIAL

## 2024-05-20 VITALS
TEMPERATURE: 97.3 F | RESPIRATION RATE: 18 BRPM | BODY MASS INDEX: 30.51 KG/M2 | HEART RATE: 91 BPM | WEIGHT: 206 LBS | DIASTOLIC BLOOD PRESSURE: 70 MMHG | SYSTOLIC BLOOD PRESSURE: 118 MMHG | HEIGHT: 69 IN | OXYGEN SATURATION: 99 %

## 2024-05-20 DIAGNOSIS — K21.9 GASTROESOPHAGEAL REFLUX DISEASE WITHOUT ESOPHAGITIS: ICD-10-CM

## 2024-05-20 DIAGNOSIS — D64.9 ANEMIA, UNSPECIFIED TYPE: ICD-10-CM

## 2024-05-20 DIAGNOSIS — E53.8 VITAMIN B12 DEFICIENCY: ICD-10-CM

## 2024-05-20 LAB
ALBUMIN SERPL BCP-MCNC: 4.1 G/DL (ref 3.5–5)
ALP SERPL-CCNC: 84 U/L (ref 34–104)
ALT SERPL W P-5'-P-CCNC: 23 U/L (ref 7–52)
ANION GAP SERPL CALCULATED.3IONS-SCNC: 8 MMOL/L (ref 4–13)
ANISOCYTOSIS BLD QL SMEAR: PRESENT
AST SERPL W P-5'-P-CCNC: 26 U/L (ref 13–39)
BASOPHILS # BLD MANUAL: 0.08 THOUSAND/UL (ref 0–0.1)
BASOPHILS NFR MAR MANUAL: 1 % (ref 0–1)
BILIRUB SERPL-MCNC: 0.64 MG/DL (ref 0.2–1)
BLD SMEAR INTERP: NORMAL
BUN SERPL-MCNC: 31 MG/DL (ref 5–25)
CALCIUM SERPL-MCNC: 9.2 MG/DL (ref 8.4–10.2)
CHLORIDE SERPL-SCNC: 112 MMOL/L (ref 96–108)
CO2 SERPL-SCNC: 20 MMOL/L (ref 21–32)
CREAT SERPL-MCNC: 1.66 MG/DL (ref 0.6–1.3)
CRP SERPL QL: 11.4 MG/L
DAT POLY-SP REAG RBC QL: NEGATIVE
EOSINOPHIL # BLD MANUAL: 0.15 THOUSAND/UL (ref 0–0.4)
EOSINOPHIL NFR BLD MANUAL: 2 % (ref 0–6)
ERYTHROCYTE [DISTWIDTH] IN BLOOD BY AUTOMATED COUNT: 16.2 % (ref 11.6–15.1)
ERYTHROCYTE [SEDIMENTATION RATE] IN BLOOD: 107 MM/HOUR (ref 0–19)
FERRITIN SERPL-MCNC: 15 NG/ML (ref 24–336)
FOLATE SERPL-MCNC: 12.3 NG/ML
GFR SERPL CREATININE-BSD FRML MDRD: 40 ML/MIN/1.73SQ M
GLUCOSE P FAST SERPL-MCNC: 104 MG/DL (ref 65–99)
HCT VFR BLD AUTO: 32.9 % (ref 36.5–49.3)
HGB BLD-MCNC: 9.7 G/DL (ref 12–17)
IGA SERPL-MCNC: 266 MG/DL (ref 66–433)
IGG SERPL-MCNC: 1347 MG/DL (ref 635–1741)
IGM SERPL-MCNC: 87 MG/DL (ref 45–281)
IRON SATN MFR SERPL: 9 % (ref 15–50)
IRON SERPL-MCNC: 32 UG/DL (ref 50–212)
LDH SERPL-CCNC: 136 U/L (ref 140–271)
LG PLATELETS BLD QL SMEAR: PRESENT
LYMPHOCYTES # BLD AUTO: 1.82 THOUSAND/UL (ref 0.6–4.47)
LYMPHOCYTES # BLD AUTO: 24 % (ref 14–44)
MAGNESIUM SERPL-MCNC: 2.1 MG/DL (ref 1.9–2.7)
MCH RBC QN AUTO: 25.3 PG (ref 26.8–34.3)
MCHC RBC AUTO-ENTMCNC: 29.5 G/DL (ref 31.4–37.4)
MCV RBC AUTO: 86 FL (ref 82–98)
MONOCYTES # BLD AUTO: 0.53 THOUSAND/UL (ref 0–1.22)
MONOCYTES NFR BLD: 7 % (ref 4–12)
NEUTROPHILS # BLD MANUAL: 5 THOUSAND/UL (ref 1.85–7.62)
NEUTS SEG NFR BLD AUTO: 66 % (ref 43–75)
PLATELET # BLD AUTO: 230 THOUSANDS/UL (ref 149–390)
PLATELET BLD QL SMEAR: ADEQUATE
PMV BLD AUTO: 10.5 FL (ref 8.9–12.7)
POTASSIUM SERPL-SCNC: 4.4 MMOL/L (ref 3.5–5.3)
PROT SERPL-MCNC: 7.7 G/DL (ref 6.4–8.4)
RBC # BLD AUTO: 3.84 MILLION/UL (ref 3.88–5.62)
RBC MORPH BLD: PRESENT
RETICS # AUTO: NORMAL 10*3/UL (ref 14356–105094)
RETICS # CALC: 1.57 % (ref 0.37–1.87)
SODIUM SERPL-SCNC: 140 MMOL/L (ref 135–147)
TIBC SERPL-MCNC: 359 UG/DL (ref 250–450)
UIBC SERPL-MCNC: 327 UG/DL (ref 155–355)
VIT B12 SERPL-MCNC: 762 PG/ML (ref 180–914)
WBC # BLD AUTO: 7.57 THOUSAND/UL (ref 4.31–10.16)

## 2024-05-20 PROCEDURE — 83615 LACTATE (LD) (LDH) ENZYME: CPT

## 2024-05-20 PROCEDURE — 82728 ASSAY OF FERRITIN: CPT

## 2024-05-20 PROCEDURE — 83550 IRON BINDING TEST: CPT

## 2024-05-20 PROCEDURE — 85027 COMPLETE CBC AUTOMATED: CPT

## 2024-05-20 PROCEDURE — 86880 COOMBS TEST DIRECT: CPT

## 2024-05-20 PROCEDURE — 86140 C-REACTIVE PROTEIN: CPT

## 2024-05-20 PROCEDURE — 86334 IMMUNOFIX E-PHORESIS SERUM: CPT

## 2024-05-20 PROCEDURE — 84165 PROTEIN E-PHORESIS SERUM: CPT

## 2024-05-20 PROCEDURE — 83521 IG LIGHT CHAINS FREE EACH: CPT

## 2024-05-20 PROCEDURE — 85045 AUTOMATED RETICULOCYTE COUNT: CPT

## 2024-05-20 PROCEDURE — 83735 ASSAY OF MAGNESIUM: CPT

## 2024-05-20 PROCEDURE — 82746 ASSAY OF FOLIC ACID SERUM: CPT

## 2024-05-20 PROCEDURE — 99204 OFFICE O/P NEW MOD 45 MIN: CPT | Performed by: INTERNAL MEDICINE

## 2024-05-20 PROCEDURE — 85007 BL SMEAR W/DIFF WBC COUNT: CPT

## 2024-05-20 PROCEDURE — 82784 ASSAY IGA/IGD/IGG/IGM EACH: CPT

## 2024-05-20 PROCEDURE — 85652 RBC SED RATE AUTOMATED: CPT

## 2024-05-20 PROCEDURE — 36415 COLL VENOUS BLD VENIPUNCTURE: CPT

## 2024-05-20 PROCEDURE — 82668 ASSAY OF ERYTHROPOIETIN: CPT

## 2024-05-20 PROCEDURE — 80053 COMPREHEN METABOLIC PANEL: CPT

## 2024-05-20 PROCEDURE — 83010 ASSAY OF HAPTOGLOBIN QUANT: CPT

## 2024-05-20 PROCEDURE — 82607 VITAMIN B-12: CPT

## 2024-05-20 PROCEDURE — 83540 ASSAY OF IRON: CPT

## 2024-05-20 RX ORDER — PANTOPRAZOLE SODIUM 20 MG/1
20 TABLET, DELAYED RELEASE ORAL DAILY
Qty: 90 TABLET | Refills: 1 | Status: SHIPPED | OUTPATIENT
Start: 2024-05-20

## 2024-05-20 NOTE — PROGRESS NOTES
Hematology/Oncology Outpatient consult  Silvio Drew 73 y.o. male 1950 [id number]    Date:  5/20/2024    Assessment and Plan:  1. Anemia, unspecified type  The patient seems to have chronic anemia which got worse during the time of the bacteremia.  He completed 4 weeks worth of IV antibiotics on 5/4/2024.  The patient was told that we will pursue extensive workup to better understand the other etiologies of her is anemia that we will act accordingly.  He stated that he cannot get blood work more than once every 2 months according to his insurance.  I did ask him to consider getting the blood work as soon as possible and then follow-up with us for further recommendation.  - CBC and differential; Future  - Comprehensive metabolic panel; Future  - C-reactive protein; Future  - Ferritin; Future  - Folate; Future  - Iron Panel (Includes Ferritin, Iron Sat%, Iron, and TIBC); Future  - Magnesium; Future  - Vitamin B12; Future  - Sedimentation rate, automated; Future  - Occult Blood, Fecal Immunochemical; Future  - LD,Blood; Future  - Haptoglobin; Future  - Hemolysis Smear; Future  - Erythropoietin; Future  - Direct antiglobulin test; Future  - Reticulocytes; Future  - IgG, IgA, IgM; Future  - Immunoglobulin free LT chains blood; Future  - Protein electrophoresis, serum; Future  - CBC and differential; Future  - Comprehensive metabolic panel; Future  - Magnesium; Future  - Iron Panel (Includes Ferritin, Iron Sat%, Iron, and TIBC); Future  - Ferritin; Future  - Vitamin B12; Future    2. Vitamin B12 deficiency  Level will be checked for completeness sake.  - Folate; Future  - Vitamin B12; Future  - CBC and differential; Future  - Comprehensive metabolic panel; Future  - Magnesium; Future  - Iron Panel (Includes Ferritin, Iron Sat%, Iron, and TIBC); Future  - Ferritin; Future  - Vitamin B12; Future      HPI:  This is a 73-year-old male with multiple comorbid conditions including history of atrial fibrillation on chronic  anticoagulation, coronary artery disease, status post cardiac pacemaker placement, hypertension, gastroesophageal reflux disease, etc.  The patient apparently had multifactorial bacteremia just recently and was on IV antibiotics under the supervision of the ID team.  He completed 4 weeks worth of ceftriaxone on 5/4/2024.  He was found to have low hemoglobin level which is trending up according to the most recent blood work on 5/9/2024.  The hemoglobin at that time was 9.2 with MCV of 86.  White cells and platelets were within normal range.  Creatinine 1.4 with normal calcium and liver enzymes.  TSH 1.7        Interval history:  The patient denied any obvious bleeding from any sites.  He seems to be symptomatic with minimal activities including shortness of breath.  ROS: Review of Systems   Constitutional:  Positive for fatigue. Negative for chills and fever.   HENT:  Negative for ear pain and sore throat.    Eyes:  Negative for pain and visual disturbance.   Respiratory:  Positive for shortness of breath. Negative for cough.    Cardiovascular:  Negative for chest pain and palpitations.   Gastrointestinal:  Negative for abdominal pain and vomiting.   Genitourinary:  Negative for dysuria and hematuria.   Musculoskeletal:  Negative for arthralgias and back pain.   Skin:  Negative for color change and rash.   Neurological:  Negative for seizures and syncope.   All other systems reviewed and are negative.      Past Medical History:   Diagnosis Date    Anemia 3/12/2013    Atrial fibrillation (HCC)     Coagulopathy (HCC) 4/6/2024    Coronary artery disease     GERD (gastroesophageal reflux disease)     Hyperlipidemia associated with type 2 diabetes mellitus  (HCC) 5/4/2021    Hypertension     Obesity     Thyroid nodule 4/6/2024       Past Surgical History:   Procedure Laterality Date    CARDIAC PACEMAKER PLACEMENT  12/2023    replacement    CORONARY ARTERY BYPASS GRAFT      MULTIPLE TOOTH EXTRACTIONS Bilateral 4/12/2024     Procedure: EXTRACTION TOOTH #8, 17, 18 SURGICAL;  Surgeon: Hong Maki DMD;  Location: [facility];  Service: Maxillofacial       Social History     Socioeconomic History    Marital status: Single     Spouse name: None    Number of children: None    Years of education: None    Highest education level: None   Occupational History    Occupation: retired   Tobacco Use    Smoking status: Never    Smokeless tobacco: Never    Tobacco comments:     No passive smoke exposure   Vaping Use    Vaping status: Never Used   Substance and Sexual Activity    Alcohol use: Not Currently    Drug use: No    Sexual activity: Not Currently   Other Topics Concern    None   Social History Narrative    None     Social Determinants of Health     Financial Resource Strain: Low Risk  (9/18/2023)    Overall Financial Resource Strain (CARDIA)     Difficulty of Paying Living Expenses: Not hard at all   Food Insecurity: No Food Insecurity (4/11/2024)    Hunger Vital Sign     Worried About Running Out of Food in the Last Year: Never true     Ran Out of Food in the Last Year: Never true   Transportation Needs: No Transportation Needs (4/11/2024)    PRAPARE - Transportation     Lack of Transportation (Medical): No     Lack of Transportation (Non-Medical): No   Physical Activity: Sufficiently Active (9/23/2020)    Exercise Vital Sign     Days of Exercise per Week: 5 days     Minutes of Exercise per Session: 30 min   Stress: No Stress Concern Present (9/23/2020)    [employer] - Occupational Stress Questionnaire     Feeling of Stress : Not at all   Social Connections: Unknown (9/23/2020)    Social Connection and Isolation Panel [NHANES]     Frequency of Communication with Friends and Family: Patient declined     Frequency of Social Gatherings with Friends and Family: Patient declined     Attends Mosque Services: Patient declined     Active Member of Clubs or Organizations: Patient declined     Attends Club or  "Organization Meetings: Patient declined     Marital Status: Patient declined   Intimate Partner Violence: Not At Risk (12/12/2023)    [facility]    Humiliation, Afraid, Rape, and Kick questionnaire     Fear of Current or Ex-Partner: No     Emotionally Abused: No     Physically Abused: No     Sexually Abused: No   Housing Stability: High Risk (4/11/2024)    Housing Stability Vital Sign     Unable to Pay for Housing in the Last Year: Yes     Number of Places Lived in the Last Year: 1     Unstable Housing in the Last Year: No       Family History   Problem Relation Age of Onset    Hypertension Mother     Diabetes Mother     No Known Problems Father        Allergies   Allergen Reactions    Metoprolol Shortness Of Breath     \"50mg\" via [place] ; states \"When I take the 25mg I'm normal.\"         Current Outpatient Medications:     allopurinol (ZYLOPRIM) 100 mg tablet, Take 1 tablet (100 mg total) by mouth daily, Disp: 90 tablet, Rfl: 3    amiodarone 200 mg tablet, Take 1 tablet (200 mg total) by mouth daily, Disp: 90 tablet, Rfl: 3    atorvastatin (LIPITOR) 20 mg tablet, Take 1 tablet (20 mg total) by mouth daily Please STOP Pravastatin.., Disp: 90 tablet, Rfl: 3    Diclofenac Sodium (VOLTAREN) 1 %, Apply 2 g topically 4 (four) times a day, Disp: 200 g, Rfl: 2    ergocalciferol (VITAMIN D2) 50,000 units, Take 1 capsule (50,000 Units total) by mouth once a week, Disp: 13 capsule, Rfl: 1    furosemide (Lasix) 20 mg tablet, Take 1 tablet (20 mg total) by mouth daily, Disp: 90 tablet, Rfl: 3    ketoconazole (NIZORAL) 2 % shampoo, Apply 1 application topically 3 (three) times a week, Disp: 120 mL, Rfl: 2    linaCLOtide 290 MCG CAPS, Take 1 capsule by mouth in the morning, Disp: 90 capsule, Rfl: 3    losartan (COZAAR) 50 mg tablet, Take 1 tablet (50 mg total) by mouth daily, Disp: 90 tablet, Rfl: 3    metoprolol succinate (Toprol XL) 25 mg 24 hr tablet, " "Take 1 tablet (25 mg total) by mouth daily, Disp: 90 tablet, Rfl: 3    pantoprazole (PROTONIX) 20 mg tablet, Take 1 tablet (20 mg total) by mouth daily, Disp: 90 tablet, Rfl: 3    Sodium Chloride Flush (Normal Saline Flush) 0.9 % SOLN, Inject 10 mL into a catheter in a vein daily. Flush before and after antibiotic administration  Indications: PICC flush, Disp: , Rfl:     warfarin (COUMADIN) 5 mg tablet, Take 1 tablet (5 mg total) by mouth daily, Disp: 90 tablet, Rfl: 3      Physical Exam:  /70 (BP Location: Left arm, Patient Position: Sitting, Cuff Size: Adult)   Pulse 91   Temp (!) 97.3 °F (36.3 °C)   Resp 18   Ht 5' 9\" (1.753 m)   Wt 93.4 kg (206 lb)   SpO2 99%   BMI 30.42 kg/m²     Physical Exam  Constitutional:       Appearance: He is well-developed.   HENT:      Head: Normocephalic and atraumatic.      Nose: Nose normal.   Eyes:      General: No scleral icterus.        Right eye: No discharge.         Left eye: No discharge.      Conjunctiva/sclera: Conjunctivae normal.      Pupils: Pupils are equal, round, and reactive to light.   Neck:      Thyroid: No thyromegaly.      Trachea: No tracheal deviation.   Cardiovascular:      Rate and Rhythm: Normal rate and regular rhythm.      Heart sounds: Murmur heard.      No friction rub.   Pulmonary:      Effort: Pulmonary effort is normal. No respiratory distress.      Breath sounds: Normal breath sounds. No wheezing or rales.   Chest:      Chest wall: No tenderness.   Abdominal:      General: There is no distension.      Palpations: Abdomen is soft. There is no hepatomegaly or splenomegaly.      Tenderness: There is no abdominal tenderness. There is no guarding or rebound.   Musculoskeletal:         General: No tenderness or deformity. Normal range of motion.      Cervical back: Normal range of motion and neck supple.   Lymphadenopathy:      Cervical: No cervical adenopathy.   Skin:     General: Skin is warm and dry.      Coloration: Skin is not pale.      " Findings: No erythema or rash.   Neurological:      Mental Status: He is alert and oriented to person, place, and time.      Cranial Nerves: No cranial nerve deficit.      Coordination: Coordination normal.      Deep Tendon Reflexes: Reflexes are normal and symmetric.   Psychiatric:         Behavior: Behavior normal.         Thought Content: Thought content normal.         Judgment: Judgment normal.           Labs:  Lab Results   Component Value Date    WBC 5.14 05/09/2024    HGB 9.2 (L) 05/09/2024    HCT 31.8 (L) 05/09/2024    MCV 86 05/09/2024     05/09/2024     Lab Results   Component Value Date    K 4.5 05/09/2024     (H) 05/09/2024    CO2 20 (L) 05/09/2024    BUN 25 05/09/2024    CREATININE 1.44 (H) 05/09/2024    GLUF 98 05/09/2024    CALCIUM 8.6 05/09/2024    CORRECTEDCA 8.9 04/15/2024    AST 35 05/09/2024    ALT 25 05/09/2024    ALKPHOS 90 05/09/2024    EGFR 48 05/09/2024       Patient voiced understanding and agreement in the above discussion. Aware to contact our office with questions/symptoms in the interim.

## 2024-05-20 NOTE — TELEPHONE ENCOUNTER
Patients sister called for the linzess as well however there is no updated communication form in chart so I was unable to tell her that patient has refills at the pharmacy.    Reason for call:   [x] Refill   [] Prior Auth  [] Other:     Office:   [x] PCP/Provider -   [] Specialty/Provider -     Medication:   Protonix 20mg- take 1 tablet by mouth daily      [facility]    Does the patient have enough for 3 days?   [] Yes   [x] No - Send as HP to POD

## 2024-05-21 LAB
ALBUMIN SERPL ELPH-MCNC: 3.92 G/DL (ref 3.2–5.1)
ALBUMIN SERPL ELPH-MCNC: 53 % (ref 48–70)
ALPHA1 GLOB SERPL ELPH-MCNC: 0.36 G/DL (ref 0.15–0.47)
ALPHA1 GLOB SERPL ELPH-MCNC: 4.8 % (ref 1.8–7)
ALPHA2 GLOB SERPL ELPH-MCNC: 0.85 G/DL (ref 0.42–1.04)
ALPHA2 GLOB SERPL ELPH-MCNC: 11.5 % (ref 5.9–14.9)
BETA GLOB ABNORMAL SERPL ELPH-MCNC: 0.48 G/DL (ref 0.31–0.57)
BETA1 GLOB SERPL ELPH-MCNC: 6.5 % (ref 4.7–7.7)
BETA2 GLOB SERPL ELPH-MCNC: 4.9 % (ref 3.1–7.9)
BETA2+GAMMA GLOB SERPL ELPH-MCNC: 0.36 G/DL (ref 0.2–0.58)
GAMMA GLOB ABNORMAL SERPL ELPH-MCNC: 1.43 G/DL (ref 0.4–1.66)
GAMMA GLOB SERPL ELPH-MCNC: 19.3 % (ref 6.9–22.3)
IGG/ALB SER: 1.13 {RATIO} (ref 1.1–1.8)
INTERPRETATION UR IFE-IMP: NORMAL
PROT PATTERN SERPL ELPH-IMP: NORMAL
PROT SERPL-MCNC: 7.4 G/DL (ref 6.4–8.2)

## 2024-05-21 PROCEDURE — 86334 IMMUNOFIX E-PHORESIS SERUM: CPT | Performed by: PATHOLOGY

## 2024-05-21 PROCEDURE — 84165 PROTEIN E-PHORESIS SERUM: CPT | Performed by: PATHOLOGY

## 2024-05-22 ENCOUNTER — TELEPHONE (OUTPATIENT)
Dept: HEMATOLOGY ONCOLOGY | Facility: CLINIC | Age: 74
End: 2024-05-22

## 2024-05-22 DIAGNOSIS — D50.9 IRON DEFICIENCY ANEMIA, UNSPECIFIED IRON DEFICIENCY ANEMIA TYPE: Primary | ICD-10-CM

## 2024-05-22 LAB
EPO SERPL-ACNC: 45.1 MIU/ML (ref 2.6–18.5)
KAPPA LC FREE SER-MCNC: 93.5 MG/L (ref 3.3–19.4)
KAPPA LC FREE/LAMBDA FREE SER: 2.04 {RATIO} (ref 0.26–1.65)
LAMBDA LC FREE SERPL-MCNC: 45.9 MG/L (ref 5.7–26.3)

## 2024-05-22 RX ORDER — SODIUM CHLORIDE 9 MG/ML
20 INJECTION, SOLUTION INTRAVENOUS ONCE
OUTPATIENT
Start: 2024-06-04

## 2024-05-22 NOTE — TELEPHONE ENCOUNTER
----- Message from Adebayo Pa MD sent at 5/21/2024 12:11 PM EDT -----  Venofer IV x 5.  He needs also stool testing for occult blood if it was not done.      Attempted to phone patient with above recommendations.  Left voice message with above information.  Will have schedulers reach out to patient to schedule

## 2024-05-23 LAB — HAPTOGLOB SERPL-MCNC: 122 MG/DL (ref 34–355)

## 2024-05-24 ENCOUNTER — TELEPHONE (OUTPATIENT)
Dept: INFUSION CENTER | Facility: HOSPITAL | Age: 74
End: 2024-05-24

## 2024-05-28 ENCOUNTER — TELEPHONE (OUTPATIENT)
Dept: GASTROENTEROLOGY | Facility: MEDICAL CENTER | Age: 74
End: 2024-05-28

## 2024-05-28 NOTE — TELEPHONE ENCOUNTER
[facility]  #[id number]      Called patient to reschedule procedure due to not obtaining medication hold and cardiac clearance as he missed his appointment with that dept.      Left message requesting call back to reschedule at [phone]

## 2024-05-31 ENCOUNTER — TELEPHONE (OUTPATIENT)
Age: 74
End: 2024-05-31

## 2024-06-04 ENCOUNTER — HOSPITAL ENCOUNTER (OUTPATIENT)
Dept: INFUSION CENTER | Facility: HOSPITAL | Age: 74
Discharge: HOME | End: 2024-06-04
Attending: INTERNAL MEDICINE
Payer: COMMERCIAL

## 2024-06-04 VITALS
HEART RATE: 68 BPM | TEMPERATURE: 97.1 F | DIASTOLIC BLOOD PRESSURE: 71 MMHG | RESPIRATION RATE: 18 BRPM | SYSTOLIC BLOOD PRESSURE: 126 MMHG

## 2024-06-04 DIAGNOSIS — D50.9 IRON DEFICIENCY ANEMIA, UNSPECIFIED IRON DEFICIENCY ANEMIA TYPE: Primary | ICD-10-CM

## 2024-06-04 PROCEDURE — 96365 THER/PROPH/DIAG IV INF INIT: CPT

## 2024-06-04 RX ORDER — SODIUM CHLORIDE 9 MG/ML
20 INJECTION, SOLUTION INTRAVENOUS ONCE
Status: COMPLETED | OUTPATIENT
Start: 2024-06-04 | End: 2024-06-04

## 2024-06-04 RX ORDER — SODIUM CHLORIDE 9 MG/ML
20 INJECTION, SOLUTION INTRAVENOUS ONCE
OUTPATIENT
Start: 2024-06-11

## 2024-06-04 RX ADMIN — IRON SUCROSE 200 MG: 20 INJECTION, SOLUTION INTRAVENOUS at 12:14

## 2024-06-04 RX ADMIN — SODIUM CHLORIDE 20 ML/HR: 9 INJECTION, SOLUTION INTRAVENOUS at 12:14

## 2024-06-04 NOTE — PROGRESS NOTES
Patient tolerated IV venofer without incident. Next appointment confirmed for June 11th at [address] [place] [address] center. AVS declined.

## 2024-06-10 ENCOUNTER — OFFICE VISIT (OUTPATIENT)
Dept: FAMILY MEDICINE CLINIC | Facility: CLINIC | Age: 74
End: 2024-06-10
Payer: COMMERCIAL

## 2024-06-10 VITALS
OXYGEN SATURATION: 98 % | SYSTOLIC BLOOD PRESSURE: 130 MMHG | TEMPERATURE: 98 F | HEART RATE: 74 BPM | RESPIRATION RATE: 14 BRPM | WEIGHT: 210 LBS | DIASTOLIC BLOOD PRESSURE: 70 MMHG | HEIGHT: 69 IN | BODY MASS INDEX: 31.1 KG/M2

## 2024-06-10 DIAGNOSIS — E78.5 HYPERLIPIDEMIA ASSOCIATED WITH TYPE 2 DIABETES MELLITUS  (HCC): ICD-10-CM

## 2024-06-10 DIAGNOSIS — I48.0 PAROXYSMAL ATRIAL FIBRILLATION (HCC): Primary | ICD-10-CM

## 2024-06-10 DIAGNOSIS — D68.9 COAGULOPATHY (HCC): ICD-10-CM

## 2024-06-10 DIAGNOSIS — I50.22 CHRONIC SYSTOLIC (CONGESTIVE) HEART FAILURE (HCC): ICD-10-CM

## 2024-06-10 DIAGNOSIS — I70.213 ATHEROSCLEROSIS OF NATIVE ARTERY OF BOTH LOWER EXTREMITIES WITH INTERMITTENT CLAUDICATION (HCC): ICD-10-CM

## 2024-06-10 DIAGNOSIS — N18.32 STAGE 3B CHRONIC KIDNEY DISEASE (HCC): ICD-10-CM

## 2024-06-10 DIAGNOSIS — E11.69 HYPERLIPIDEMIA ASSOCIATED WITH TYPE 2 DIABETES MELLITUS  (HCC): ICD-10-CM

## 2024-06-10 PROBLEM — R65.21 SEPTIC SHOCK (HCC): Status: RESOLVED | Noted: 2024-04-06 | Resolved: 2024-06-10

## 2024-06-10 PROBLEM — A41.9 SEPTIC SHOCK (HCC): Status: RESOLVED | Noted: 2024-04-06 | Resolved: 2024-06-10

## 2024-06-10 LAB — SL AMB POCT HEMOGLOBIN AIC: 5.6 (ref ?–6.5)

## 2024-06-10 PROCEDURE — 83036 HEMOGLOBIN GLYCOSYLATED A1C: CPT | Performed by: INTERNAL MEDICINE

## 2024-06-10 PROCEDURE — 99214 OFFICE O/P EST MOD 30 MIN: CPT | Performed by: INTERNAL MEDICINE

## 2024-06-10 NOTE — PROGRESS NOTES
Ambulatory Visit  Name: Silvio Drew      : 1950      MRN: [id number]  Encounter Provider: Mac Stephens MD  Encounter Date: 6/10/2024   Encounter department: [facility]    Assessment & Plan   1. Paroxysmal atrial fibrillation (HCC)  Comments:  stable  continue same  RTC in 3 mos w Blood work  FU w cardiology  Orders:  -     UA (URINE) with reflex to Scope; Future  -     Magnesium; Future  2. Atherosclerosis of native artery of both lower extremities with intermittent claudication (HCC)  Comments:  stable  continue same  FU w Cardiology  RTC in 3 mos w Blood work  Orders:  -     UA (URINE) with reflex to Scope; Future  -     Magnesium; Future  3. Chronic systolic CHF (HCC)  Comments:  as above...  Orders:  -     UA (URINE) with reflex to Scope; Future  -     Magnesium; Future  4. Hyperlipidemia associated with type 2 diabetes mellitus  (HCC)  Comments:  continue same  Life style mod  RTC in 3 mos w Blood work  Orders:  -     POCT hemoglobin A1c  -     Comprehensive metabolic panel; Future; Expected date: 06/10/2024  -     CBC and differential; Future; Expected date: 06/10/2024  -     Lipid Panel with Direct LDL reflex; Future; Expected date: 06/10/2024  -     TSH, 3rd generation with Free T4 reflex; Future; Expected date: 06/10/2024  -     UA (URINE) with reflex to Scope; Future  -     Magnesium; Future  5. Stage 3b chronic kidney disease (HCC)  Comments:  stable  continue same  FU w Nephrology  6. Coagulopathy (HCC)  Comments:  stable  FU w Hematology  Life style mod  RTC in 3 mos w Blood work       History of Present Illness     73 Y O Man is here for Regular check up, He feels better, recent blood work and med list reviewed,...        Review of Systems   Constitutional:  Positive for fatigue. Negative for chills and fever.   HENT:  Negative for congestion, facial swelling, sore throat, trouble swallowing and voice change.    Eyes:  Negative for pain, discharge and  "visual disturbance.   Respiratory:  Negative for cough, shortness of breath and wheezing.    Cardiovascular:  Negative for chest pain, palpitations and leg swelling.   Gastrointestinal:  Negative for abdominal pain, blood in stool, constipation, diarrhea and nausea.   Endocrine: Negative for polydipsia, polyphagia and polyuria.   Genitourinary:  Negative for difficulty urinating, hematuria and urgency.   Musculoskeletal:  Negative for arthralgias and myalgias.   Skin:  Negative for rash.   Neurological:  Negative for dizziness, tremors, weakness and headaches.   Hematological:  Negative for adenopathy. Does not bruise/bleed easily.   Psychiatric/Behavioral:  Negative for dysphoric mood, sleep disturbance and suicidal ideas.        Objective     /70 (BP Location: Left arm, Patient Position: Sitting, Cuff Size: Standard)   Pulse 74   Temp 98 °F (36.7 °C) (Tympanic)   Resp 14   Ht 5' 9\" (1.753 m)   Wt 95.3 kg (210 lb)   SpO2 98%   BMI 31.01 kg/m²     Physical Exam  Vitals and nursing note reviewed.   Constitutional:       General: He is not in acute distress.     Appearance: He is well-developed.   HENT:      Head: Normocephalic and atraumatic.      Right Ear: External ear normal.      Left Ear: External ear normal.   Eyes:      Extraocular Movements: EOM normal.      Conjunctiva/sclera: Conjunctivae normal.      Pupils: Pupils are equal, round, and reactive to light.   Neck:      Thyroid: No thyromegaly.      Trachea: No tracheal deviation.   Cardiovascular:      Rate and Rhythm: Normal rate and regular rhythm.      Heart sounds: Murmur heard.      No friction rub.   Pulmonary:      Effort: Pulmonary effort is normal. No respiratory distress.      Breath sounds: Normal breath sounds. No wheezing.   Abdominal:      General: Bowel sounds are normal. There is no distension.      Palpations: Abdomen is soft.      Tenderness: There is no abdominal tenderness.   Musculoskeletal:         General: No swelling, " deformity or edema. Normal range of motion.      Cervical back: Neck supple.   Skin:     General: Skin is warm and dry.      Capillary Refill: Capillary refill takes less than 2 seconds.      Findings: No erythema or rash.   Neurological:      Mental Status: He is alert and oriented to person, place, and time.      Cranial Nerves: No cranial nerve deficit.      Coordination: Coordination normal.      Deep Tendon Reflexes: Reflexes normal.   Psychiatric:         Mood and Affect: Mood and affect and mood normal.         Behavior: Behavior normal.       Administrative Statements

## 2024-06-11 ENCOUNTER — HOSPITAL ENCOUNTER (OUTPATIENT)
Dept: INFUSION CENTER | Facility: HOSPITAL | Age: 74
Discharge: HOME | End: 2024-06-11
Attending: INTERNAL MEDICINE
Payer: COMMERCIAL

## 2024-06-11 VITALS
HEART RATE: 53 BPM | TEMPERATURE: 97.3 F | DIASTOLIC BLOOD PRESSURE: 59 MMHG | RESPIRATION RATE: 20 BRPM | SYSTOLIC BLOOD PRESSURE: 110 MMHG

## 2024-06-11 DIAGNOSIS — D50.9 IRON DEFICIENCY ANEMIA, UNSPECIFIED IRON DEFICIENCY ANEMIA TYPE: Primary | ICD-10-CM

## 2024-06-11 PROCEDURE — 96365 THER/PROPH/DIAG IV INF INIT: CPT

## 2024-06-11 RX ORDER — SODIUM CHLORIDE 9 MG/ML
20 INJECTION, SOLUTION INTRAVENOUS ONCE
Status: COMPLETED | OUTPATIENT
Start: 2024-06-11 | End: 2024-06-11

## 2024-06-11 RX ORDER — SODIUM CHLORIDE 9 MG/ML
20 INJECTION, SOLUTION INTRAVENOUS ONCE
Status: CANCELLED | OUTPATIENT
Start: 2024-06-18

## 2024-06-11 RX ADMIN — IRON SUCROSE 200 MG: 20 INJECTION, SOLUTION INTRAVENOUS at 09:22

## 2024-06-11 RX ADMIN — SODIUM CHLORIDE 20 ML/HR: 0.9 INJECTION, SOLUTION INTRAVENOUS at 09:18

## 2024-06-11 NOTE — PROGRESS NOTES
Pt tolerated venofer infusion without difficulty.  No s/s reaction noted.  Confirmed next appt on 6/18 at 0930.  AVS declined.  Left ambulatory in stable condition.

## 2024-06-18 ENCOUNTER — HOSPITAL ENCOUNTER (OUTPATIENT)
Dept: INFUSION CENTER | Facility: HOSPITAL | Age: 74
Discharge: HOME | End: 2024-06-18
Attending: INTERNAL MEDICINE
Payer: COMMERCIAL

## 2024-06-18 VITALS
HEART RATE: 91 BPM | SYSTOLIC BLOOD PRESSURE: 123 MMHG | RESPIRATION RATE: 18 BRPM | DIASTOLIC BLOOD PRESSURE: 80 MMHG | TEMPERATURE: 97.6 F

## 2024-06-18 DIAGNOSIS — D50.9 IRON DEFICIENCY ANEMIA, UNSPECIFIED IRON DEFICIENCY ANEMIA TYPE: Primary | ICD-10-CM

## 2024-06-18 RX ORDER — SODIUM CHLORIDE 9 MG/ML
20 INJECTION, SOLUTION INTRAVENOUS ONCE
Status: CANCELLED | OUTPATIENT
Start: 2024-06-25

## 2024-06-18 RX ORDER — SODIUM CHLORIDE 9 MG/ML
20 INJECTION, SOLUTION INTRAVENOUS ONCE
Status: COMPLETED | OUTPATIENT
Start: 2024-06-18 | End: 2024-06-18

## 2024-06-18 RX ADMIN — IRON SUCROSE 200 MG: 20 INJECTION, SOLUTION INTRAVENOUS at 09:09

## 2024-06-18 RX ADMIN — SODIUM CHLORIDE 20 ML/HR: 9 INJECTION, SOLUTION INTRAVENOUS at 09:09

## 2024-06-18 NOTE — PROGRESS NOTES
Pt received venofer infusion w/out any adverse effects, offers no complaints. Scheduled next appt on 6/25, pt aware

## 2024-06-25 ENCOUNTER — HOSPITAL ENCOUNTER (OUTPATIENT)
Dept: INFUSION CENTER | Facility: HOSPITAL | Age: 74
Discharge: HOME | End: 2024-06-25
Attending: INTERNAL MEDICINE
Payer: COMMERCIAL

## 2024-06-25 VITALS
RESPIRATION RATE: 20 BRPM | SYSTOLIC BLOOD PRESSURE: 127 MMHG | DIASTOLIC BLOOD PRESSURE: 72 MMHG | HEART RATE: 87 BPM | TEMPERATURE: 97.3 F

## 2024-06-25 DIAGNOSIS — D50.9 IRON DEFICIENCY ANEMIA, UNSPECIFIED IRON DEFICIENCY ANEMIA TYPE: Primary | ICD-10-CM

## 2024-06-25 PROCEDURE — 96365 THER/PROPH/DIAG IV INF INIT: CPT

## 2024-06-25 RX ORDER — SODIUM CHLORIDE 9 MG/ML
20 INJECTION, SOLUTION INTRAVENOUS ONCE
Status: CANCELLED | OUTPATIENT
Start: 2024-07-02

## 2024-06-25 RX ORDER — SODIUM CHLORIDE 9 MG/ML
20 INJECTION, SOLUTION INTRAVENOUS ONCE
Status: COMPLETED | OUTPATIENT
Start: 2024-06-25 | End: 2024-06-25

## 2024-06-25 RX ADMIN — SODIUM CHLORIDE 20 ML/HR: 9 INJECTION, SOLUTION INTRAVENOUS at 14:27

## 2024-06-25 RX ADMIN — IRON SUCROSE 200 MG: 20 INJECTION, SOLUTION INTRAVENOUS at 14:28

## 2024-06-25 NOTE — PROGRESS NOTES
Silvio Drwe  tolerated treatment well with no complications.      Silvio Drew is aware of future appt on 7/2/24 at 930.     AVS printed and given to Silvio Drew:  No (Declined by Silvio Drew)      
152.1

## 2024-07-02 ENCOUNTER — HOSPITAL ENCOUNTER (OUTPATIENT)
Dept: INFUSION CENTER | Facility: HOSPITAL | Age: 74
Discharge: HOME | End: 2024-07-02
Attending: INTERNAL MEDICINE
Payer: COMMERCIAL

## 2024-07-02 VITALS
DIASTOLIC BLOOD PRESSURE: 68 MMHG | RESPIRATION RATE: 18 BRPM | HEART RATE: 82 BPM | SYSTOLIC BLOOD PRESSURE: 128 MMHG | TEMPERATURE: 97.3 F

## 2024-07-02 DIAGNOSIS — D50.9 IRON DEFICIENCY ANEMIA, UNSPECIFIED IRON DEFICIENCY ANEMIA TYPE: Primary | ICD-10-CM

## 2024-07-02 PROCEDURE — 96365 THER/PROPH/DIAG IV INF INIT: CPT

## 2024-07-02 RX ORDER — SODIUM CHLORIDE 9 MG/ML
20 INJECTION, SOLUTION INTRAVENOUS ONCE
Status: CANCELLED | OUTPATIENT
Start: 2024-07-02

## 2024-07-02 RX ORDER — SODIUM CHLORIDE 9 MG/ML
20 INJECTION, SOLUTION INTRAVENOUS ONCE
Status: COMPLETED | OUTPATIENT
Start: 2024-07-02 | End: 2024-07-02

## 2024-07-02 RX ADMIN — SODIUM CHLORIDE 20 ML/HR: 9 INJECTION, SOLUTION INTRAVENOUS at 09:15

## 2024-07-02 RX ADMIN — IRON SUCROSE 200 MG: 20 INJECTION, SOLUTION INTRAVENOUS at 09:15

## 2024-07-02 NOTE — PROGRESS NOTES
Pt received venofer infusion w/out any adverse effects, offers no complaints. Completed tx, no further appts scheduled

## 2024-07-22 ENCOUNTER — APPOINTMENT (OUTPATIENT)
Dept: LAB | Age: 74
End: 2024-07-22
Payer: COMMERCIAL

## 2024-07-22 DIAGNOSIS — I70.213 ATHEROSCLEROSIS OF NATIVE ARTERY OF BOTH LOWER EXTREMITIES WITH INTERMITTENT CLAUDICATION (HCC): ICD-10-CM

## 2024-07-22 DIAGNOSIS — E78.5 HYPERLIPIDEMIA ASSOCIATED WITH TYPE 2 DIABETES MELLITUS  (HCC): ICD-10-CM

## 2024-07-22 DIAGNOSIS — I48.0 PAROXYSMAL ATRIAL FIBRILLATION (HCC): ICD-10-CM

## 2024-07-22 DIAGNOSIS — E11.69 HYPERLIPIDEMIA ASSOCIATED WITH TYPE 2 DIABETES MELLITUS  (HCC): ICD-10-CM

## 2024-07-22 DIAGNOSIS — I50.22 CHRONIC SYSTOLIC (CONGESTIVE) HEART FAILURE (HCC): ICD-10-CM

## 2024-07-22 LAB
ALBUMIN SERPL BCG-MCNC: 4 G/DL (ref 3.5–5)
ALP SERPL-CCNC: 89 U/L (ref 34–104)
ALT SERPL W P-5'-P-CCNC: 55 U/L (ref 7–52)
ANION GAP SERPL CALCULATED.3IONS-SCNC: 10 MMOL/L (ref 4–13)
AST SERPL W P-5'-P-CCNC: 49 U/L (ref 13–39)
BACTERIA UR QL AUTO: ABNORMAL /HPF
BASOPHILS # BLD AUTO: 0.05 THOUSANDS/ÂΜL (ref 0–0.1)
BASOPHILS NFR BLD AUTO: 1 % (ref 0–1)
BILIRUB SERPL-MCNC: 0.59 MG/DL (ref 0.2–1)
BILIRUB UR QL STRIP: NEGATIVE
BUN SERPL-MCNC: 26 MG/DL (ref 5–25)
CALCIUM SERPL-MCNC: 8.9 MG/DL (ref 8.4–10.2)
CHLORIDE SERPL-SCNC: 113 MMOL/L (ref 96–108)
CHOLEST SERPL-MCNC: 143 MG/DL
CLARITY UR: ABNORMAL
CO2 SERPL-SCNC: 19 MMOL/L (ref 21–32)
COLOR UR: YELLOW
CREAT SERPL-MCNC: 1.57 MG/DL (ref 0.6–1.3)
EOSINOPHIL # BLD AUTO: 0.23 THOUSAND/ÂΜL (ref 0–0.61)
EOSINOPHIL NFR BLD AUTO: 3 % (ref 0–6)
ERYTHROCYTE [DISTWIDTH] IN BLOOD BY AUTOMATED COUNT: 21.2 % (ref 11.6–15.1)
GFR SERPL CREATININE-BSD FRML MDRD: 43 ML/MIN/1.73SQ M
GLUCOSE P FAST SERPL-MCNC: 98 MG/DL (ref 65–99)
GLUCOSE UR STRIP-MCNC: NEGATIVE MG/DL
HCT VFR BLD AUTO: 39.2 % (ref 36.5–49.3)
HDLC SERPL-MCNC: 41 MG/DL
HGB BLD-MCNC: 11.7 G/DL (ref 12–17)
HGB UR QL STRIP.AUTO: ABNORMAL
HYALINE CASTS #/AREA URNS LPF: ABNORMAL /LPF
IMM GRANULOCYTES # BLD AUTO: 0.03 THOUSAND/UL (ref 0–0.2)
IMM GRANULOCYTES NFR BLD AUTO: 0 % (ref 0–2)
KETONES UR STRIP-MCNC: NEGATIVE MG/DL
LDLC SERPL CALC-MCNC: 64 MG/DL (ref 0–100)
LEUKOCYTE ESTERASE UR QL STRIP: ABNORMAL
LYMPHOCYTES # BLD AUTO: 2.03 THOUSANDS/ÂΜL (ref 0.6–4.47)
LYMPHOCYTES NFR BLD AUTO: 29 % (ref 14–44)
MAGNESIUM SERPL-MCNC: 2.2 MG/DL (ref 1.9–2.7)
MCH RBC QN AUTO: 27 PG (ref 26.8–34.3)
MCHC RBC AUTO-ENTMCNC: 29.8 G/DL (ref 31.4–37.4)
MCV RBC AUTO: 90 FL (ref 82–98)
MONOCYTES # BLD AUTO: 0.45 THOUSAND/ÂΜL (ref 0.17–1.22)
MONOCYTES NFR BLD AUTO: 6 % (ref 4–12)
NEUTROPHILS # BLD AUTO: 4.22 THOUSANDS/ÂΜL (ref 1.85–7.62)
NEUTS SEG NFR BLD AUTO: 61 % (ref 43–75)
NITRITE UR QL STRIP: NEGATIVE
NON-SQ EPI CELLS URNS QL MICRO: ABNORMAL /HPF
NRBC BLD AUTO-RTO: 0 /100 WBCS
PH UR STRIP.AUTO: 6 [PH]
PLATELET # BLD AUTO: 182 THOUSANDS/UL (ref 149–390)
PMV BLD AUTO: 11.9 FL (ref 8.9–12.7)
POTASSIUM SERPL-SCNC: 4.4 MMOL/L (ref 3.5–5.3)
PROT SERPL-MCNC: 7.1 G/DL (ref 6.4–8.4)
PROT UR STRIP-MCNC: ABNORMAL MG/DL
RBC # BLD AUTO: 4.34 MILLION/UL (ref 3.88–5.62)
RBC #/AREA URNS AUTO: ABNORMAL /HPF
SODIUM SERPL-SCNC: 142 MMOL/L (ref 135–147)
SP GR UR STRIP.AUTO: 1.02 (ref 1–1.03)
TRANS CELLS #/AREA URNS HPF: PRESENT /[HPF]
TRIGL SERPL-MCNC: 188 MG/DL
TSH SERPL DL<=0.05 MIU/L-ACNC: 2.85 UIU/ML (ref 0.45–4.5)
UROBILINOGEN UR STRIP-ACNC: <2 MG/DL
WBC # BLD AUTO: 7.01 THOUSAND/UL (ref 4.31–10.16)
WBC #/AREA URNS AUTO: ABNORMAL /HPF

## 2024-07-22 PROCEDURE — 84443 ASSAY THYROID STIM HORMONE: CPT

## 2024-07-22 PROCEDURE — 80053 COMPREHEN METABOLIC PANEL: CPT

## 2024-07-22 PROCEDURE — 83735 ASSAY OF MAGNESIUM: CPT

## 2024-07-22 PROCEDURE — 81001 URINALYSIS AUTO W/SCOPE: CPT

## 2024-07-22 PROCEDURE — 85025 COMPLETE CBC W/AUTO DIFF WBC: CPT

## 2024-07-22 PROCEDURE — 80061 LIPID PANEL: CPT

## 2024-07-22 PROCEDURE — 36415 COLL VENOUS BLD VENIPUNCTURE: CPT

## 2024-07-23 ENCOUNTER — TELEPHONE (OUTPATIENT)
Dept: FAMILY MEDICINE CLINIC | Facility: CLINIC | Age: 74
End: 2024-07-23

## 2024-07-23 DIAGNOSIS — R31.29 HEMATURIA, MICROSCOPIC: Primary | ICD-10-CM

## 2024-07-23 DIAGNOSIS — R80.9 PROTEINURIA, UNSPECIFIED TYPE: ICD-10-CM

## 2024-07-23 NOTE — TELEPHONE ENCOUNTER
----- Message from Mac Stephens MD sent at 7/23/2024  8:54 AM EDT -----  Repeat clean catch ua, fu w urology and nephrology  ----- Message -----  From: Lab, Background User  Sent: 7/22/2024   4:33 PM EDT  To: Mac Stephens MD

## 2024-07-23 NOTE — TELEPHONE ENCOUNTER
Called and spoke with pts sister let her know pt needs repeat ua in office scheduled for nurse visit 7/31 at 9am also advised her that he needs to follow up with urologist and nephrologist and both numbers were given to her as well.

## 2024-07-31 ENCOUNTER — CLINICAL SUPPORT (OUTPATIENT)
Dept: FAMILY MEDICINE CLINIC | Facility: CLINIC | Age: 74
End: 2024-07-31
Payer: COMMERCIAL

## 2024-07-31 DIAGNOSIS — R80.9 PROTEINURIA, UNSPECIFIED TYPE: Primary | ICD-10-CM

## 2024-07-31 LAB
SL AMB  POCT GLUCOSE, UA: ABNORMAL
SL AMB LEUKOCYTE ESTERASE,UA: 70
SL AMB POCT BILIRUBIN,UA: ABNORMAL
SL AMB POCT BLOOD,UA: ABNORMAL
SL AMB POCT CLARITY,UA: CLEAR
SL AMB POCT COLOR,UA: YELLOW
SL AMB POCT KETONES,UA: ABNORMAL
SL AMB POCT NITRITE,UA: ABNORMAL
SL AMB POCT PH,UA: 6
SL AMB POCT SPECIFIC GRAVITY,UA: 1.01
SL AMB POCT URINE PROTEIN: ABNORMAL
SL AMB POCT UROBILINOGEN: 0.2

## 2024-07-31 PROCEDURE — 81002 URINALYSIS NONAUTO W/O SCOPE: CPT

## 2024-08-19 ENCOUNTER — OFFICE VISIT (OUTPATIENT)
Dept: HEMATOLOGY ONCOLOGY | Facility: CLINIC | Age: 74
End: 2024-08-19
Payer: COMMERCIAL

## 2024-08-19 VITALS
HEIGHT: 68 IN | OXYGEN SATURATION: 98 % | RESPIRATION RATE: 18 BRPM | WEIGHT: 212 LBS | HEART RATE: 71 BPM | SYSTOLIC BLOOD PRESSURE: 110 MMHG | BODY MASS INDEX: 32.13 KG/M2 | TEMPERATURE: 97.4 F | DIASTOLIC BLOOD PRESSURE: 66 MMHG

## 2024-08-19 DIAGNOSIS — D50.9 IRON DEFICIENCY ANEMIA, UNSPECIFIED IRON DEFICIENCY ANEMIA TYPE: Primary | ICD-10-CM

## 2024-08-19 DIAGNOSIS — D64.9 ANEMIA, UNSPECIFIED TYPE: ICD-10-CM

## 2024-08-19 DIAGNOSIS — K76.9 LIVER LESION: ICD-10-CM

## 2024-08-19 PROCEDURE — 99214 OFFICE O/P EST MOD 30 MIN: CPT | Performed by: INTERNAL MEDICINE

## 2024-08-19 PROCEDURE — G2211 COMPLEX E/M VISIT ADD ON: HCPCS | Performed by: INTERNAL MEDICINE

## 2024-08-19 NOTE — PROGRESS NOTES
Hematology/Oncology Outpatient Follow-up  Silvio Drew 74 y.o. male 1950 [id number]    Date:  8/19/2024    Assessment and Plan:  1. Iron deficiency anemia, unspecified iron deficiency anemia type  The patient was found to have significant iron deficiency which was corrected with IV Venofer x 5 doses.  We will recheck his iron panel prior to his next visit to see if he would benefit from more iron IV treatment.  The patient was urged to go to the GI team for further evaluation with at least a colonoscopy.  He seems to be in agreement with this recommendation.  - CBC and differential; Future  - Comprehensive metabolic panel; Future  - Ferritin; Future  - C-reactive protein; Future  - Iron Panel (Includes Ferritin, Iron Sat%, Iron, and TIBC); Future  - Occult Blood, Fecal Immunochemical; Future  - Magnesium; Future  - Ambulatory referral to Gastroenterology; Future    2. Anemia, unspecified type  Multiple etiology including anemia of chronic disease, anemia of chronic kidney dysfunction and iron deficiency.  - CBC and differential; Future  - Comprehensive metabolic panel; Future  - Ferritin; Future  - C-reactive protein; Future  - Iron Panel (Includes Ferritin, Iron Sat%, Iron, and TIBC); Future  - Occult Blood, Fecal Immunochemical; Future  - Magnesium; Future  - Ambulatory referral to Gastroenterology; Future    3. Liver lesion  After reviewing his recent imaging, he was found to have a liver lesion on the right lobe on the CT scan from 4/6/2024.  This does not seem to be compatible with simple cyst.  An MRI was recommended which will be ordered.  He does have a pacemaker which may or may not be compatible with MRI.  - MRI abdomen w wo contrast; Future      HPI:  This is a 74-year-old male with multiple comorbid conditions including history of atrial fibrillation on chronic anticoagulation, coronary artery disease, status post cardiac pacemaker placement, hypertension, gastroesophageal reflux disease, etc.  The  patient apparently had multifactorial bacteremia just recently and was on IV antibiotics under the supervision of the ID team.  He completed 4 weeks worth of ceftriaxone on 5/4/2024.  He was found to have low hemoglobin level which is trending up according to the most recent blood work on 5/9/2024.  The hemoglobin at that time was 9.2 with MCV of 86.  White cells and platelets were within normal range.  Creatinine 1.4 with normal calcium and liver enzymes.  TSH 1.7          Interval history:  The patient came today for follow-up visit.  He did receive 5 doses of Venofer IV to correct his iron deficiency around May/June of this year.  Recent available blood work from 7/22/2024 showed hemoglobin of 11.7 with normal MCV.  White cells and platelets were within normal range with normal white cell differential.  Creatinine 1.5 with slightly elevated liver enzymes.  ROS: Review of Systems   Constitutional:  Negative for chills and fever.   HENT:  Negative for ear pain and sore throat.    Eyes:  Negative for pain and visual disturbance.   Respiratory:  Positive for cough. Negative for shortness of breath.    Cardiovascular:  Negative for chest pain and palpitations.   Gastrointestinal:  Negative for abdominal pain and vomiting.   Genitourinary:  Negative for dysuria and hematuria.   Musculoskeletal:  Positive for arthralgias. Negative for back pain.   Skin:  Negative for color change and rash.   Neurological:  Negative for seizures and syncope.   All other systems reviewed and are negative.      Past Medical History:   Diagnosis Date    Anemia 3/12/2013    Atrial fibrillation (HCC)     Coagulopathy (HCC) 4/6/2024    Coronary artery disease     GERD (gastroesophageal reflux disease)     Hyperlipidemia associated with type 2 diabetes mellitus  (HCC) 5/4/2021    Hypertension     Obesity     Thyroid nodule 4/6/2024       Past Surgical History:   Procedure Laterality Date    CARDIAC PACEMAKER PLACEMENT  12/2023    replacement     CORONARY ARTERY BYPASS GRAFT      MULTIPLE TOOTH EXTRACTIONS Bilateral 4/12/2024    Procedure: EXTRACTION TOOTH #8, 17, 18 SURGICAL;  Surgeon: Hong Maki DMD;  Location: [facility];  Service: Maxillofacial       Social History     Socioeconomic History    Marital status: Single     Spouse name: None    Number of children: None    Years of education: None    Highest education level: None   Occupational History    Occupation: retired   Tobacco Use    Smoking status: Never    Smokeless tobacco: Never    Tobacco comments:     No passive smoke exposure   Vaping Use    Vaping status: Never Used   Substance and Sexual Activity    Alcohol use: Not Currently    Drug use: No    Sexual activity: Not Currently   Other Topics Concern    None   Social History Narrative    None     Social Determinants of Health     Financial Resource Strain: Low Risk  (9/18/2023)    Overall Financial Resource Strain (CARDIA)     Difficulty of Paying Living Expenses: Not hard at all   Food Insecurity: No Food Insecurity (4/11/2024)    Hunger Vital Sign     Worried About Running Out of Food in the Last Year: Never true     Ran Out of Food in the Last Year: Never true   Transportation Needs: No Transportation Needs (4/11/2024)    PRAPARE - Transportation     Lack of Transportation (Medical): No     Lack of Transportation (Non-Medical): No   Physical Activity: Sufficiently Active (9/23/2020)    Exercise Vital Sign     Days of Exercise per Week: 5 days     Minutes of Exercise per Session: 30 min   Stress: No Stress Concern Present (9/23/2020)    [employer] [place] [employer] - Occupational Stress Questionnaire     Feeling of Stress : Not at all   Social Connections: Unknown (9/23/2020)    Social Connection and Isolation Panel [NHANES]     Frequency of Communication with Friends and Family: Patient declined     Frequency of Social Gatherings with Friends and Family: Patient declined     Attends Jain Services: Patient declined      "Active Member of Clubs or Organizations: Patient declined     Attends Club or Organization Meetings: Patient declined     Marital Status: Patient declined   Intimate Partner Violence: Not At Risk (12/12/2023)    [facility]    Humiliation, Afraid, Rape, and Kick questionnaire     Fear of Current or Ex-Partner: No     Emotionally Abused: No     Physically Abused: No     Sexually Abused: No   Housing Stability: High Risk (4/11/2024)    Housing Stability Vital Sign     Unable to Pay for Housing in the Last Year: Yes     Number of Times Moved in the Last Year: 1     Homeless in the Last Year: No       Family History   Problem Relation Age of Onset    Hypertension Mother     Diabetes Mother     No Known Problems Father        Allergies   Allergen Reactions    Metoprolol Shortness Of Breath     \"50mg\" via [place] ; states \"When I take the 25mg I'm normal.\"         Current Outpatient Medications:     allopurinol (ZYLOPRIM) 100 mg tablet, Take 1 tablet (100 mg total) by mouth daily, Disp: 90 tablet, Rfl: 3    amiodarone 200 mg tablet, Take 1 tablet (200 mg total) by mouth daily, Disp: 90 tablet, Rfl: 3    atorvastatin (LIPITOR) 20 mg tablet, Take 1 tablet (20 mg total) by mouth daily Please STOP Pravastatin.., Disp: 90 tablet, Rfl: 3    Diclofenac Sodium (VOLTAREN) 1 %, Apply 2 g topically 4 (four) times a day, Disp: 200 g, Rfl: 2    ergocalciferol (VITAMIN D2) 50,000 units, Take 1 capsule (50,000 Units total) by mouth once a week, Disp: 13 capsule, Rfl: 1    furosemide (Lasix) 20 mg tablet, Take 1 tablet (20 mg total) by mouth daily, Disp: 90 tablet, Rfl: 3    ketoconazole (NIZORAL) 2 % shampoo, Apply 1 application topically 3 (three) times a week, Disp: 120 mL, Rfl: 2    linaCLOtide 290 MCG CAPS, Take 1 capsule by mouth in the morning, Disp: 90 capsule, Rfl: 3    losartan (COZAAR) 50 mg tablet, Take 1 tablet (50 mg total) by mouth daily, Disp: 90 " "tablet, Rfl: 3    metoprolol succinate (Toprol XL) 25 mg 24 hr tablet, Take 1 tablet (25 mg total) by mouth daily, Disp: 90 tablet, Rfl: 3    pantoprazole (PROTONIX) 20 mg tablet, Take 1 tablet (20 mg total) by mouth daily, Disp: 90 tablet, Rfl: 1    Sodium Chloride Flush (Normal Saline Flush) 0.9 % SOLN, Inject 10 mL into a catheter in a vein daily. Flush before and after antibiotic administration  Indications: PICC flush, Disp: , Rfl:     warfarin (COUMADIN) 5 mg tablet, Take 1 tablet (5 mg total) by mouth daily, Disp: 90 tablet, Rfl: 3      Physical Exam:  /66 (BP Location: Left arm, Patient Position: Sitting, Cuff Size: Adult)   Pulse 71   Temp (!) 97.4 °F (36.3 °C)   Resp 18   Ht 5' 8\" (1.727 m)   Wt 96.2 kg (212 lb)   SpO2 98%   BMI 32.23 kg/m²     Physical Exam  Constitutional:       Appearance: He is well-developed.   HENT:      Head: Normocephalic and atraumatic.      Nose: Nose normal.   Eyes:      General: No scleral icterus.        Right eye: No discharge.         Left eye: No discharge.      Conjunctiva/sclera: Conjunctivae normal.      Pupils: Pupils are equal, round, and reactive to light.   Neck:      Thyroid: No thyromegaly.      Trachea: No tracheal deviation.   Cardiovascular:      Rate and Rhythm: Normal rate and regular rhythm.      Heart sounds: Normal heart sounds. No murmur heard.     No friction rub.   Pulmonary:      Effort: Pulmonary effort is normal. No respiratory distress.      Breath sounds: Normal breath sounds. No wheezing or rales.   Chest:      Chest wall: No tenderness.   Abdominal:      General: There is no distension.      Palpations: Abdomen is soft. There is no hepatomegaly or splenomegaly.      Tenderness: There is no abdominal tenderness. There is no guarding or rebound.   Musculoskeletal:         General: No tenderness or deformity. Normal range of motion.      Cervical back: Normal range of motion and neck supple.   Lymphadenopathy:      Cervical: No cervical " adenopathy.   Skin:     General: Skin is warm and dry.      Coloration: Skin is not pale.      Findings: No erythema or rash.   Neurological:      Mental Status: He is alert and oriented to person, place, and time.      Cranial Nerves: No cranial nerve deficit.      Coordination: Coordination normal.      Deep Tendon Reflexes: Reflexes are normal and symmetric.   Psychiatric:         Behavior: Behavior normal.         Thought Content: Thought content normal.         Judgment: Judgment normal.           Labs:  Lab Results   Component Value Date    WBC 7.01 07/22/2024    HGB 11.7 (L) 07/22/2024    HCT 39.2 07/22/2024    MCV 90 07/22/2024     07/22/2024     Lab Results   Component Value Date    K 4.4 07/22/2024     (H) 07/22/2024    CO2 19 (L) 07/22/2024    BUN 26 (H) 07/22/2024    CREATININE 1.57 (H) 07/22/2024    GLUF 98 07/22/2024    CALCIUM 8.9 07/22/2024    CORRECTEDCA 8.9 04/15/2024    AST 49 (H) 07/22/2024    ALT 55 (H) 07/22/2024    ALKPHOS 89 07/22/2024    EGFR 43 07/22/2024       Patient voiced understanding and agreement in the above discussion. Aware to contact our office with questions/symptoms in the interim.

## 2024-08-22 ENCOUNTER — HOSPITAL ENCOUNTER (OUTPATIENT)
Dept: RADIOLOGY | Facility: HOSPITAL | Age: 74
Discharge: HOME | End: 2024-08-22

## 2024-08-22 DIAGNOSIS — K76.9 LIVER LESION: ICD-10-CM

## 2024-09-03 ENCOUNTER — HOSPITAL ENCOUNTER (OUTPATIENT)
Dept: MRI IMAGING | Facility: HOSPITAL | Age: 74
Discharge: HOME | End: 2024-09-03
Payer: COMMERCIAL

## 2024-09-03 DIAGNOSIS — K76.9 LIVER LESION: ICD-10-CM

## 2024-09-03 PROCEDURE — A9585 GADOBUTROL INJECTION: HCPCS | Performed by: INTERNAL MEDICINE

## 2024-09-03 PROCEDURE — 74183 MRI ABD W/O CNTR FLWD CNTR: CPT

## 2024-09-03 RX ORDER — GADOBUTROL 604.72 MG/ML
9 INJECTION INTRAVENOUS
Status: COMPLETED | OUTPATIENT
Start: 2024-09-03 | End: 2024-09-03

## 2024-09-03 RX ADMIN — GADOBUTROL 9 ML: 604.72 INJECTION INTRAVENOUS at 11:34

## 2024-09-03 NOTE — NURSING NOTE
Pts ICD placed in MRI safe mode for scheduled MRI.  HR pacing at 85 bpm. Vital signs monitored and remained stable throughout MRI.  Once test was completed, pts ICD was returned to prior settings.  Pt tolerated procedure well and offered no complaints upon departure from MRI department.

## 2024-09-12 ENCOUNTER — TELEPHONE (OUTPATIENT)
Age: 74
End: 2024-09-12

## 2024-09-12 ENCOUNTER — OFFICE VISIT (OUTPATIENT)
Dept: FAMILY MEDICINE CLINIC | Facility: CLINIC | Age: 74
End: 2024-09-12
Payer: COMMERCIAL

## 2024-09-12 VITALS
SYSTOLIC BLOOD PRESSURE: 132 MMHG | WEIGHT: 213 LBS | HEART RATE: 66 BPM | BODY MASS INDEX: 32.28 KG/M2 | TEMPERATURE: 98.2 F | OXYGEN SATURATION: 99 % | RESPIRATION RATE: 16 BRPM | HEIGHT: 68 IN | DIASTOLIC BLOOD PRESSURE: 82 MMHG

## 2024-09-12 DIAGNOSIS — E78.5 HYPERLIPIDEMIA ASSOCIATED WITH TYPE 2 DIABETES MELLITUS  (HCC): ICD-10-CM
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
## 2024-09-12 DIAGNOSIS — M1A.9XX0 CHRONIC GOUT WITHOUT TOPHUS, UNSPECIFIED CAUSE, UNSPECIFIED SITE: ICD-10-CM

## 2024-09-12 DIAGNOSIS — K86.2 PANCREATIC CYST: ICD-10-CM

## 2024-09-12 DIAGNOSIS — Z12.11 SCREEN FOR COLON CANCER: ICD-10-CM

## 2024-09-12 DIAGNOSIS — M81.8 IDIOPATHIC OSTEOPOROSIS: ICD-10-CM

## 2024-09-12 DIAGNOSIS — E11.69 HYPERLIPIDEMIA ASSOCIATED WITH TYPE 2 DIABETES MELLITUS  (HCC): ICD-10-CM

## 2024-09-12 DIAGNOSIS — N18.32 STAGE 3B CHRONIC KIDNEY DISEASE (HCC): ICD-10-CM

## 2024-09-12 DIAGNOSIS — M54.50 ACUTE BILATERAL LOW BACK PAIN WITHOUT SCIATICA: ICD-10-CM

## 2024-09-12 DIAGNOSIS — I10 ESSENTIAL HYPERTENSION, BENIGN: ICD-10-CM

## 2024-09-12 DIAGNOSIS — N40.0 ENLARGED PROSTATE: ICD-10-CM

## 2024-09-12 DIAGNOSIS — K21.9 GASTROESOPHAGEAL REFLUX DISEASE WITHOUT ESOPHAGITIS: ICD-10-CM

## 2024-09-12 DIAGNOSIS — R73.09 ELEVATED HEMOGLOBIN A1C: ICD-10-CM

## 2024-09-12 LAB — SL AMB POCT HEMOGLOBIN AIC: 5.8 (ref ?–6.5)

## 2024-09-12 PROCEDURE — 96372 THER/PROPH/DIAG INJ SC/IM: CPT

## 2024-09-12 PROCEDURE — 83036 HEMOGLOBIN GLYCOSYLATED A1C: CPT | Performed by: INTERNAL MEDICINE

## 2024-09-12 PROCEDURE — 99214 OFFICE O/P EST MOD 30 MIN: CPT | Performed by: INTERNAL MEDICINE

## 2024-09-12 RX ORDER — PANTOPRAZOLE SODIUM 20 MG/1
20 TABLET, DELAYED RELEASE ORAL DAILY
Qty: 90 TABLET | Refills: 1 | Status: SHIPPED | OUTPATIENT
Start: 2024-09-12

## 2024-09-12 RX ORDER — AMIODARONE HYDROCHLORIDE 200 MG/1
200 TABLET ORAL DAILY
Qty: 90 TABLET | Refills: 3 | Status: SHIPPED | OUTPATIENT
Start: 2024-09-12

## 2024-09-12 RX ORDER — ALLOPURINOL 100 MG/1
100 TABLET ORAL DAILY
Qty: 90 TABLET | Refills: 3 | Status: SHIPPED | OUTPATIENT
Start: 2024-09-12

## 2024-09-12 RX ORDER — LIDOCAINE 50 MG/G
1 PATCH TOPICAL DAILY
Qty: 15 PATCH | Refills: 0 | Status: SHIPPED | OUTPATIENT
Start: 2024-09-12 | End: 2024-09-27

## 2024-09-12 RX ORDER — KETOROLAC TROMETHAMINE 30 MG/ML
30 INJECTION, SOLUTION INTRAMUSCULAR; INTRAVENOUS ONCE
Status: COMPLETED | OUTPATIENT
Start: 2024-09-12 | End: 2024-09-12

## 2024-09-12 RX ADMIN — KETOROLAC TROMETHAMINE 30 MG: 30 INJECTION, SOLUTION INTRAMUSCULAR; INTRAVENOUS at 12:43

## 2024-09-12 NOTE — PATIENT INSTRUCTIONS

## 2024-09-12 NOTE — PROGRESS NOTES
Ambulatory Visit  Name: Silvio Drew      : 1950      MRN: [id number]  Encounter Provider: Mac Stephens MD  Encounter Date: 2024   Encounter department: [facility]    Assessment & Plan  Chronic gout without tophus, unspecified cause, unspecified site    Orders:    Uric acid; Future    allopurinol (ZYLOPRIM) 100 mg tablet; Take 1 tablet (100 mg total) by mouth daily    Stage 3b chronic kidney disease (HCC)  Lab Results   Component Value Date    EGFR 43 2024    EGFR 40 2024    EGFR 48 2024    CREATININE 1.57 (H) 2024    CREATININE 1.66 (H) 2024    CREATININE 1.44 (H) 2024       Orders:    Comprehensive metabolic panel; Future    Amiodarone level; Future    Hyperlipidemia associated with type 2 diabetes mellitus  (HCC)    Lab Results   Component Value Date    HGBA1C 5.8 2024       Orders:    Lipid Panel with Direct LDL reflex; Future    Amiodarone level; Future    Elevated hemoglobin A1c    Orders:    POCT hemoglobin A1c    Comprehensive metabolic panel; Future    CBC and differential; Future    Lipid Panel with Direct LDL reflex; Future    TSH, 3rd generation with Free T4 reflex; Future    Enlarged prostate    Orders:    UA (URINE) with reflex to Scope; Future    PSA Total, Diagnostic; Future    Magnesium; Future    Idiopathic osteoporosis    Orders:    DXA bone density spine hip and pelvis; Future    Gastroesophageal reflux disease without esophagitis    Orders:    pantoprazole (PROTONIX) 20 mg tablet; Take 1 tablet (20 mg total) by mouth daily    Essential hypertension, benign    Orders:    Amiodarone level; Future    amiodarone 200 mg tablet; Take 1 tablet (200 mg total) by mouth daily    Acute bilateral low back pain without sciatica    Orders:    Diclofenac Sodium (VOLTAREN) 1 %; Apply 2 g topically 4 (four) times a day    ketorolac (TORADOL) injection 30 mg    lidocaine (Lidoderm) 5 %; Apply 1 patch topically over 12  hours daily for 15 days Remove & Discard patch within 12 hours or as directed by MD    Screen for colon cancer    Orders:    Ambulatory referral to Gastroenterology; Future    Fall, initial encounter    Orders:    Amiodarone level; Future    XR ribs left w pa chest min 3 views; Future    XR hip/pelv 2-3 vws left if performed; Future    XR spine thoracic 2 vw; Future    XR spine lumbar minimum 4 views non injury; Future    ketorolac (TORADOL) injection 30 mg    lidocaine (Lidoderm) 5 %; Apply 1 patch topically over 12 hours daily for 15 days Remove & Discard patch within 12 hours or as directed by MD    Pancreatic cyst         Moist Heat  Home P,T  STAT; X rays  Life style Mod  RTC in 3mos w Blood work       History of Present Illness     74 y O man is here for Regular check Up, he fell two days ago, hurt left side, recent Blood work and med list reviewed,..          Review of Systems   Constitutional:  Negative for chills, fatigue and fever.   HENT:  Negative for congestion, facial swelling, sore throat, trouble swallowing and voice change.    Eyes:  Negative for pain, discharge and visual disturbance.   Respiratory:  Negative for cough, shortness of breath and wheezing.    Cardiovascular:  Negative for chest pain, palpitations and leg swelling.   Gastrointestinal:  Negative for abdominal pain, blood in stool, constipation, diarrhea and nausea.   Endocrine: Negative for polydipsia, polyphagia and polyuria.   Genitourinary:  Negative for difficulty urinating, hematuria and urgency.   Musculoskeletal:  Positive for back pain. Negative for arthralgias and myalgias.   Skin:  Negative for rash.   Neurological:  Negative for dizziness, tremors, weakness and headaches.   Hematological:  Negative for adenopathy. Does not bruise/bleed easily.   Psychiatric/Behavioral:  Negative for dysphoric mood, sleep disturbance and suicidal ideas.            Objective     /82 (BP Location: Left arm, Patient Position: Sitting, Cuff  "Size: Standard)   Pulse 66   Temp 98.2 °F (36.8 °C) (Tympanic)   Resp 16   Ht 5' 8\" (1.727 m)   Wt 96.6 kg (213 lb)   SpO2 99%   BMI 32.39 kg/m²     Physical Exam  Constitutional:       General: He is not in acute distress.     Appearance: He is well-developed.   HENT:      Head: Normocephalic.      Right Ear: External ear normal.      Left Ear: External ear normal.   Eyes:      Extraocular Movements: EOM normal.      Pupils: Pupils are equal, round, and reactive to light.   Neck:      Thyroid: No thyromegaly.      Trachea: No tracheal deviation.   Cardiovascular:      Rate and Rhythm: Normal rate and regular rhythm.      Heart sounds: Normal heart sounds. No murmur heard.     No friction rub.   Pulmonary:      Effort: Pulmonary effort is normal. No respiratory distress.      Breath sounds: Normal breath sounds. No wheezing.   Abdominal:      General: Bowel sounds are normal. There is no distension.      Palpations: Abdomen is soft.   Musculoskeletal:         General: Tenderness present. No deformity or edema. Normal range of motion.      Cervical back: Neck supple.      Comments: Left side from the fall two days  ago   Skin:     General: Skin is warm and dry.      Findings: No erythema or rash.   Neurological:      Mental Status: He is alert and oriented to person, place, and time.      Cranial Nerves: No cranial nerve deficit.      Coordination: Coordination normal.      Deep Tendon Reflexes: Reflexes normal.   Psychiatric:         Mood and Affect: Mood and affect normal.         Behavior: Behavior normal.         "

## 2024-09-12 NOTE — ASSESSMENT & PLAN NOTE
Lab Results   Component Value Date    HGBA1C 5.8 09/12/2024       Orders:    Lipid Panel with Direct LDL reflex; Future    Amiodarone level; Future

## 2024-09-12 NOTE — ASSESSMENT & PLAN NOTE
Lab Results   Component Value Date    EGFR 43 07/22/2024    EGFR 40 05/20/2024    EGFR 48 05/09/2024    CREATININE 1.57 (H) 07/22/2024    CREATININE 1.66 (H) 05/20/2024    CREATININE 1.44 (H) 05/09/2024       Orders:    Comprehensive metabolic panel; Future    Amiodarone level; Future

## 2024-09-13 NOTE — TELEPHONE ENCOUNTER
PA for Lidocaine 5% patch DENIED    Reason:(Screenshot if applicable)        Message sent to office clinical pool Yes    Denial letter scanned into Media Yes    Appeal started No (Provider will need to decide if appeal is warranted and send clinical documentation to Prior Authorization Team for initiation.)    **Please follow up with your patient regarding denial and next steps**

## 2024-09-13 NOTE — TELEPHONE ENCOUNTER
PA for lidocaine (Lidoderm) 5 % SUBMITTED     via    []CMM-KEY:   [x]Bree-Case ID # [id number]   []Faxed to plan   []Other website   []Phone call Case ID #     Office notes sent, clinical questions answered. Awaiting determination    Turnaround time for your insurance to make a decision on your Prior Authorization can take 7-21 business days.

## 2024-12-04 NOTE — TELEPHONE ENCOUNTER
ED Handoff Info      Note: Please review patient's handover report in Epic under Summary tab under ED to IP Handoff      ED Nurse: Giovanni Rodriguez RN   Extension:  (Main)      Precautions:    None      Violent Patient Behavior BPA   No data recorded      Mental Status: alert calm & cooperative   Baseline? [x] Yes      Cardiac/Tele on arrival: Normal Sinus Rhythm   Baseline? [x] Yes      Respiratory needs on arrival: None   Baseline? [x] Not Applicable      Medications pending and/or not given in ED: See MAR for details      Pending Labs/Tests to be done on Unit: None      Additional Information:    Lowe Catheter: [x] Not Applicable   Indication: Not applicable      Preferred Language:  English      Living Arrangement: With family      COMMENTS:              Faxed to Dr. Salas

## 2024-12-08 ENCOUNTER — RA CDI HCC (OUTPATIENT)
Dept: OTHER | Facility: HOSPITAL | Age: 74
End: 2024-12-08

## 2024-12-09 ENCOUNTER — OFFICE VISIT (OUTPATIENT)
Dept: FAMILY MEDICINE CLINIC | Facility: CLINIC | Age: 74
End: 2024-12-09
Payer: COMMERCIAL

## 2024-12-09 VITALS
SYSTOLIC BLOOD PRESSURE: 120 MMHG | HEART RATE: 74 BPM | RESPIRATION RATE: 15 BRPM | HEIGHT: 68 IN | OXYGEN SATURATION: 98 % | BODY MASS INDEX: 31.98 KG/M2 | DIASTOLIC BLOOD PRESSURE: 74 MMHG | WEIGHT: 211 LBS | TEMPERATURE: 98.4 F

## 2024-12-09 DIAGNOSIS — E11.69 HYPERLIPIDEMIA ASSOCIATED WITH TYPE 2 DIABETES MELLITUS  (HCC): ICD-10-CM

## 2024-12-09 DIAGNOSIS — I70.213 ATHEROSCLEROSIS OF NATIVE ARTERY OF BOTH LOWER EXTREMITIES WITH INTERMITTENT CLAUDICATION (HCC): ICD-10-CM

## 2024-12-09 DIAGNOSIS — D68.9 COAGULOPATHY (HCC): ICD-10-CM

## 2024-12-09 DIAGNOSIS — I50.22 CHRONIC SYSTOLIC (CONGESTIVE) HEART FAILURE (HCC): ICD-10-CM

## 2024-12-09 DIAGNOSIS — M70.31 BURSITIS OF OTHER BURSA OF RIGHT ELBOW: Primary | ICD-10-CM

## 2024-12-09 DIAGNOSIS — M25.521 ELBOW PAIN, RIGHT: ICD-10-CM

## 2024-12-09 DIAGNOSIS — I48.0 PAROXYSMAL ATRIAL FIBRILLATION (HCC): ICD-10-CM

## 2024-12-09 DIAGNOSIS — E78.5 HYPERLIPIDEMIA ASSOCIATED WITH TYPE 2 DIABETES MELLITUS  (HCC): ICD-10-CM

## 2024-12-09 DIAGNOSIS — N18.32 STAGE 3B CHRONIC KIDNEY DISEASE (HCC): ICD-10-CM

## 2024-12-09 PROCEDURE — 99214 OFFICE O/P EST MOD 30 MIN: CPT | Performed by: INTERNAL MEDICINE

## 2024-12-09 PROCEDURE — 96372 THER/PROPH/DIAG INJ SC/IM: CPT | Performed by: INTERNAL MEDICINE

## 2024-12-09 RX ORDER — KETOROLAC TROMETHAMINE 30 MG/ML
60 INJECTION, SOLUTION INTRAMUSCULAR; INTRAVENOUS ONCE
Status: COMPLETED | OUTPATIENT
Start: 2024-12-09 | End: 2024-12-09

## 2024-12-09 RX ADMIN — KETOROLAC TROMETHAMINE 60 MG: 30 INJECTION, SOLUTION INTRAMUSCULAR; INTRAVENOUS at 10:33

## 2024-12-09 NOTE — ASSESSMENT & PLAN NOTE
Lab Results   Component Value Date    HGBA1C 5.8 09/12/2024   Stable  Continue same  RT in 2-3 weeks w Blood work

## 2024-12-09 NOTE — PROGRESS NOTES
Name: Silvio Drew      : 1950      MRN: [id number]  Encounter Provider: Mac Stephens MD  Encounter Date: 2024   Encounter department: [facility]  :  Assessment & Plan  Elbow pain, right  STAT;   Orders:    XR elbow 3+ vw right; Future    Uric acid; Future    Sedimentation rate, automated; Future    CBC and differential; Future    ketorolac (TORADOL) 60 mg/2 mL IM injection 60 mg    diclofenac sodium (VOLTAREN) 50 mg EC tablet; Take 1 tablet (50 mg total) by mouth 2 (two) times a day with meals    amoxicillin-clavulanate (AUGMENTIN) 875-125 mg per tablet; Take 1 tablet by mouth 2 (two) times a day with meals for 7 days  RTC in 1-2 weeks    Bursitis of other bursa of right elbow    Orders:    XR elbow 3+ vw right; Future    Uric acid; Future    Sedimentation rate, automated; Future    CBC and differential; Future    ketorolac (TORADOL) 60 mg/2 mL IM injection 60 mg    diclofenac sodium (VOLTAREN) 50 mg EC tablet; Take 1 tablet (50 mg total) by mouth 2 (two) times a day with meals    amoxicillin-clavulanate (AUGMENTIN) 875-125 mg per tablet; Take 1 tablet by mouth 2 (two) times a day with meals for 7 days    Chronic systolic CHF (HCC)  Wt Readings from Last 3 Encounters:   24 95.7 kg (211 lb)   24 96.6 kg (213 lb)   24 96.2 kg (212 lb)       Stable  Continue same  FU w cardiology             Paroxysmal atrial fibrillation (HCC)  Stable  Continue same  FU w cardiology         Atherosclerosis of native artery of both lower extremities with intermittent claudication (HCC)  Stable  Life style mod  Continue same         Hyperlipidemia associated with type 2 diabetes mellitus  (HCC)    Lab Results   Component Value Date    HGBA1C 5.8 2024   Stable  Continue same  RT in 2-3 weeks w Blood work         Coagulopathy (HCC)  FU w hematology         Stage 3b chronic kidney disease (HCC)  Lab Results   Component Value Date    EGFR 43 2024    EGFR 40  "05/20/2024    EGFR 48 05/09/2024    CREATININE 1.57 (H) 07/22/2024    CREATININE 1.66 (H) 05/20/2024    CREATININE 1.44 (H) 05/09/2024   Stable  FU w Nephrology                History of Present Illness     74 Y O man is here for Regular check up, and increased pain,swelling,warmness,right elbow since 2 days ago ???      Review of Systems   Constitutional:  Negative for chills, fatigue and fever.   HENT:  Negative for congestion, facial swelling, sore throat, trouble swallowing and voice change.    Eyes:  Negative for pain, discharge and visual disturbance.   Respiratory:  Negative for cough, shortness of breath and wheezing.    Cardiovascular:  Negative for chest pain, palpitations and leg swelling.   Gastrointestinal:  Negative for abdominal pain, blood in stool, constipation, diarrhea and nausea.   Endocrine: Negative for polydipsia, polyphagia and polyuria.   Genitourinary:  Negative for difficulty urinating, hematuria and urgency.   Musculoskeletal:  Positive for arthralgias and joint swelling. Negative for myalgias.   Skin:  Negative for rash.   Neurological:  Negative for dizziness, tremors, weakness and headaches.   Hematological:  Negative for adenopathy. Does not bruise/bleed easily.   Psychiatric/Behavioral:  Negative for dysphoric mood, sleep disturbance and suicidal ideas.           Objective   /74 (BP Location: Left arm, Patient Position: Sitting, Cuff Size: Standard)   Pulse 74   Temp 98.4 °F (36.9 °C) (Tympanic Core)   Resp 15   Ht 5' 8\" (1.727 m)   Wt 95.7 kg (211 lb)   SpO2 98%   BMI 32.08 kg/m²      Physical Exam  Constitutional:       General: He is not in acute distress.     Appearance: He is well-developed.   HENT:      Head: Normocephalic.      Right Ear: External ear normal.      Left Ear: External ear normal.   Eyes:      Pupils: Pupils are equal, round, and reactive to light.   Neck:      Thyroid: No thyromegaly.      Trachea: No tracheal deviation.   Cardiovascular:      Rate " and Rhythm: Normal rate and regular rhythm.      Heart sounds: Normal heart sounds. No murmur heard.     No friction rub.   Pulmonary:      Effort: Pulmonary effort is normal. No respiratory distress.      Breath sounds: Normal breath sounds. No wheezing.   Abdominal:      General: Bowel sounds are normal. There is no distension.      Palpations: Abdomen is soft.   Musculoskeletal:         General: Swelling and tenderness present. No deformity. Normal range of motion.      Cervical back: Neck supple.      Comments: Right elbow, since 2 days ago. No injury ?   Skin:     General: Skin is warm and dry.      Findings: No erythema or rash.   Neurological:      Mental Status: He is alert and oriented to person, place, and time.      Cranial Nerves: No cranial nerve deficit.      Coordination: Coordination normal.      Deep Tendon Reflexes: Reflexes normal.   Psychiatric:         Behavior: Behavior normal.

## 2024-12-09 NOTE — ASSESSMENT & PLAN NOTE
Lab Results   Component Value Date    EGFR 43 07/22/2024    EGFR 40 05/20/2024    EGFR 48 05/09/2024    CREATININE 1.57 (H) 07/22/2024    CREATININE 1.66 (H) 05/20/2024    CREATININE 1.44 (H) 05/09/2024   Stable  FU w Nephrology

## 2024-12-09 NOTE — ASSESSMENT & PLAN NOTE
Wt Readings from Last 3 Encounters:   12/09/24 95.7 kg (211 lb)   09/12/24 96.6 kg (213 lb)   08/19/24 96.2 kg (212 lb)       Stable  Continue same  FU w cardiology

## 2024-12-11 ENCOUNTER — APPOINTMENT (OUTPATIENT)
Dept: LAB | Facility: HOSPITAL | Age: 74
End: 2024-12-11
Payer: COMMERCIAL

## 2024-12-11 DIAGNOSIS — M25.521 ELBOW PAIN, RIGHT: ICD-10-CM

## 2024-12-11 DIAGNOSIS — M70.31 BURSITIS OF OTHER BURSA OF RIGHT ELBOW: ICD-10-CM

## 2024-12-11 LAB
BASOPHILS # BLD AUTO: 0.03 THOUSANDS/ÂΜL (ref 0–0.1)
BASOPHILS NFR BLD AUTO: 0 % (ref 0–1)
EOSINOPHIL # BLD AUTO: 0.2 THOUSAND/ÂΜL (ref 0–0.61)
EOSINOPHIL NFR BLD AUTO: 2 % (ref 0–6)
ERYTHROCYTE [DISTWIDTH] IN BLOOD BY AUTOMATED COUNT: 14.9 % (ref 11.6–15.1)
ERYTHROCYTE [SEDIMENTATION RATE] IN BLOOD: 69 MM/HOUR (ref 0–19)
HCT VFR BLD AUTO: 38.2 % (ref 36.5–49.3)
HGB BLD-MCNC: 11.7 G/DL (ref 12–17)
IMM GRANULOCYTES # BLD AUTO: 0.04 THOUSAND/UL (ref 0–0.2)
IMM GRANULOCYTES NFR BLD AUTO: 0 % (ref 0–2)
LYMPHOCYTES # BLD AUTO: 2.15 THOUSANDS/ÂΜL (ref 0.6–4.47)
LYMPHOCYTES NFR BLD AUTO: 22 % (ref 14–44)
MCH RBC QN AUTO: 28.5 PG (ref 26.8–34.3)
MCHC RBC AUTO-ENTMCNC: 30.6 G/DL (ref 31.4–37.4)
MCV RBC AUTO: 93 FL (ref 82–98)
MONOCYTES # BLD AUTO: 0.98 THOUSAND/ÂΜL (ref 0.17–1.22)
MONOCYTES NFR BLD AUTO: 10 % (ref 4–12)
NEUTROPHILS # BLD AUTO: 6.56 THOUSANDS/ÂΜL (ref 1.85–7.62)
NEUTS SEG NFR BLD AUTO: 66 % (ref 43–75)
NRBC BLD AUTO-RTO: 0 /100 WBCS
PLATELET # BLD AUTO: 148 THOUSANDS/UL (ref 149–390)
PMV BLD AUTO: 11.2 FL (ref 8.9–12.7)
RBC # BLD AUTO: 4.1 MILLION/UL (ref 3.88–5.62)
URATE SERPL-MCNC: 7 MG/DL (ref 3.5–8.5)
WBC # BLD AUTO: 9.96 THOUSAND/UL (ref 4.31–10.16)

## 2024-12-11 PROCEDURE — 85025 COMPLETE CBC W/AUTO DIFF WBC: CPT

## 2024-12-11 PROCEDURE — 36415 COLL VENOUS BLD VENIPUNCTURE: CPT

## 2024-12-11 PROCEDURE — 85652 RBC SED RATE AUTOMATED: CPT

## 2024-12-11 PROCEDURE — 84550 ASSAY OF BLOOD/URIC ACID: CPT

## 2024-12-17 ENCOUNTER — OFFICE VISIT (OUTPATIENT)
Dept: FAMILY MEDICINE CLINIC | Facility: CLINIC | Age: 74
End: 2024-12-17
Payer: COMMERCIAL

## 2024-12-17 VITALS
OXYGEN SATURATION: 98 % | WEIGHT: 210 LBS | HEIGHT: 68 IN | TEMPERATURE: 98.2 F | SYSTOLIC BLOOD PRESSURE: 126 MMHG | BODY MASS INDEX: 31.83 KG/M2 | DIASTOLIC BLOOD PRESSURE: 80 MMHG | RESPIRATION RATE: 16 BRPM | HEART RATE: 76 BPM

## 2024-12-17 DIAGNOSIS — I10 ESSENTIAL HYPERTENSION, BENIGN: ICD-10-CM

## 2024-12-17 DIAGNOSIS — E78.5 HYPERLIPIDEMIA ASSOCIATED WITH TYPE 2 DIABETES MELLITUS  (HCC): ICD-10-CM

## 2024-12-17 DIAGNOSIS — E55.9 AVITAMINOSIS D: ICD-10-CM

## 2024-12-17 DIAGNOSIS — M1A.9XX0 CHRONIC GOUT WITHOUT TOPHUS, UNSPECIFIED CAUSE, UNSPECIFIED SITE: ICD-10-CM

## 2024-12-17 DIAGNOSIS — N18.32 STAGE 3B CHRONIC KIDNEY DISEASE (HCC): ICD-10-CM

## 2024-12-17 DIAGNOSIS — I48.0 PAROXYSMAL ATRIAL FIBRILLATION (HCC): ICD-10-CM

## 2024-12-17 DIAGNOSIS — K21.9 GASTROESOPHAGEAL REFLUX DISEASE WITHOUT ESOPHAGITIS: ICD-10-CM

## 2024-12-17 DIAGNOSIS — N40.0 ENLARGED PROSTATE: ICD-10-CM

## 2024-12-17 DIAGNOSIS — I70.213 ATHEROSCLEROSIS OF NATIVE ARTERY OF BOTH LOWER EXTREMITIES WITH INTERMITTENT CLAUDICATION (HCC): ICD-10-CM

## 2024-12-17 DIAGNOSIS — Z00.01 ENCOUNTER FOR GENERAL ADULT MEDICAL EXAMINATION WITH ABNORMAL FINDINGS: Primary | ICD-10-CM

## 2024-12-17 DIAGNOSIS — E11.69 HYPERLIPIDEMIA ASSOCIATED WITH TYPE 2 DIABETES MELLITUS  (HCC): ICD-10-CM

## 2024-12-17 DIAGNOSIS — I50.22 CHRONIC SYSTOLIC (CONGESTIVE) HEART FAILURE (HCC): ICD-10-CM

## 2024-12-17 DIAGNOSIS — M81.8 IDIOPATHIC OSTEOPOROSIS: ICD-10-CM

## 2024-12-17 PROBLEM — D68.9 COAGULOPATHY (HCC): Status: RESOLVED | Noted: 2024-04-06 | Resolved: 2024-12-17

## 2024-12-17 LAB — SL AMB POCT HEMOGLOBIN AIC: 5.7 (ref ?–6.5)

## 2024-12-17 PROCEDURE — 83036 HEMOGLOBIN GLYCOSYLATED A1C: CPT | Performed by: INTERNAL MEDICINE

## 2024-12-17 PROCEDURE — 99214 OFFICE O/P EST MOD 30 MIN: CPT | Performed by: INTERNAL MEDICINE

## 2024-12-17 PROCEDURE — G0439 PPPS, SUBSEQ VISIT: HCPCS | Performed by: INTERNAL MEDICINE

## 2024-12-17 RX ORDER — ALLOPURINOL 100 MG/1
100 TABLET ORAL DAILY
Qty: 90 TABLET | Refills: 3 | Status: SHIPPED | OUTPATIENT
Start: 2024-12-17

## 2024-12-17 RX ORDER — ATORVASTATIN CALCIUM 20 MG/1
20 TABLET, FILM COATED ORAL DAILY
Qty: 90 TABLET | Refills: 3 | Status: SHIPPED | OUTPATIENT
Start: 2024-12-17

## 2024-12-17 RX ORDER — FUROSEMIDE 20 MG/1
20 TABLET ORAL DAILY
Qty: 90 TABLET | Refills: 3 | Status: SHIPPED | OUTPATIENT
Start: 2024-12-17

## 2024-12-17 RX ORDER — PANTOPRAZOLE SODIUM 20 MG/1
20 TABLET, DELAYED RELEASE ORAL DAILY
Qty: 90 TABLET | Refills: 3 | Status: SHIPPED | OUTPATIENT
Start: 2024-12-17

## 2024-12-17 RX ORDER — ERGOCALCIFEROL 1.25 MG/1
50000 CAPSULE, LIQUID FILLED ORAL WEEKLY
Qty: 12 CAPSULE | Refills: 3 | Status: SHIPPED | OUTPATIENT
Start: 2024-12-17

## 2024-12-17 RX ORDER — LOSARTAN POTASSIUM 50 MG/1
50 TABLET ORAL DAILY
Qty: 90 TABLET | Refills: 3 | Status: SHIPPED | OUTPATIENT
Start: 2024-12-17

## 2024-12-17 RX ORDER — METOPROLOL SUCCINATE 25 MG/1
25 TABLET, EXTENDED RELEASE ORAL DAILY
Qty: 90 TABLET | Refills: 3 | Status: SHIPPED | OUTPATIENT
Start: 2024-12-17

## 2024-12-17 RX ORDER — AMIODARONE HYDROCHLORIDE 200 MG/1
200 TABLET ORAL DAILY
Qty: 90 TABLET | Refills: 3 | Status: SHIPPED | OUTPATIENT
Start: 2024-12-17

## 2024-12-17 NOTE — ASSESSMENT & PLAN NOTE
Lab Results   Component Value Date    HGBA1C 5.7 12/17/2024   Stable  Continue same  RTC in 3 mos w Blood work    Orders:    POCT hemoglobin A1c    Comprehensive metabolic panel; Future    CBC and differential; Future    Lipid Panel with Direct LDL reflex; Future    TSH, 3rd generation with Free T4 reflex; Future    UA (URINE) with reflex to Scope; Future    PSA Total, Diagnostic; Future    Uric acid; Future    Amiodarone level; Future    Vitamin B12; Future    Vitamin D 25 hydroxy; Future

## 2024-12-17 NOTE — PROGRESS NOTES
Name: Silvio Drew      : 1950      MRN: [id number]  Encounter Provider: Mac Stephens MD  Encounter Date: 2024   Encounter department: [facility]  :  Assessment & Plan  Chronic gout without tophus, unspecified cause, unspecified site    Orders:    allopurinol (ZYLOPRIM) 100 mg tablet; Take 1 tablet (100 mg total) by mouth daily    UA (URINE) with reflex to Scope; Future    PSA Total, Diagnostic; Future    Uric acid; Future    Amiodarone level; Future    Vitamin B12; Future    Vitamin D 25 hydroxy; Future    Essential hypertension, benign    Orders:    amiodarone 200 mg tablet; Take 1 tablet (200 mg total) by mouth daily    furosemide (Lasix) 20 mg tablet; Take 1 tablet (20 mg total) by mouth daily    losartan (COZAAR) 50 mg tablet; Take 1 tablet (50 mg total) by mouth daily    BMI 31.0-31.9,adult    Orders:    atorvastatin (LIPITOR) 20 mg tablet; Take 1 tablet (20 mg total) by mouth daily Please STOP Pravastatin..    Avitaminosis D    Orders:    ergocalciferol (VITAMIN D2) 50,000 units; Take 1 capsule (50,000 Units total) by mouth once a week    BMI 30.0-30.9,adult    Orders:    metoprolol succinate (Toprol XL) 25 mg 24 hr tablet; Take 1 tablet (25 mg total) by mouth daily    Gastroesophageal reflux disease without esophagitis    Orders:    pantoprazole (PROTONIX) 20 mg tablet; Take 1 tablet (20 mg total) by mouth daily    Encounter for general adult medical examination with abnormal findings  Done in detail  Life style Mod  RTC in 3 mos w :  Orders:    UA (URINE) with reflex to Scope; Future    PSA Total, Diagnostic; Future    Uric acid; Future    Amiodarone level; Future    Vitamin B12; Future    Vitamin D 25 hydroxy; Future    Hyperlipidemia associated with type 2 diabetes mellitus  (HCC)    Lab Results   Component Value Date    HGBA1C 5.7 2024   Stable  Continue same  RTC in 3 mos w Blood work    Orders:    POCT hemoglobin A1c    Comprehensive metabolic  panel; Future    CBC and differential; Future    Lipid Panel with Direct LDL reflex; Future    TSH, 3rd generation with Free T4 reflex; Future    UA (URINE) with reflex to Scope; Future    PSA Total, Diagnostic; Future    Uric acid; Future    Amiodarone level; Future    Vitamin B12; Future    Vitamin D 25 hydroxy; Future    Idiopathic osteoporosis    Orders:    UA (URINE) with reflex to Scope; Future    PSA Total, Diagnostic; Future    Uric acid; Future    Amiodarone level; Future    Vitamin B12; Future    Vitamin D 25 hydroxy; Future    Enlarged prostate  Stable  Continue same  FU w urology  RTC in 3 mos w :  Orders:    UA (URINE) with reflex to Scope; Future    PSA Total, Diagnostic; Future    Uric acid; Future    Amiodarone level; Future    Vitamin B12; Future    Vitamin D 25 hydroxy; Future    Atherosclerosis of native artery of both lower extremities with intermittent claudication (HCC)  Stable  Continue same  FU w cardiology         Paroxysmal atrial fibrillation (HCC)  Stable  FU w cardiology         Chronic systolic CHF (HCC)  Wt Readings from Last 3 Encounters:   12/17/24 95.3 kg (210 lb)   12/09/24 95.7 kg (211 lb)   09/12/24 96.6 kg (213 lb)   Stable  Continue same  FU w cardiology                 Stage 3b chronic kidney disease (HCC)  Lab Results   Component Value Date    EGFR 43 07/22/2024    EGFR 40 05/20/2024    EGFR 48 05/09/2024    CREATININE 1.57 (H) 07/22/2024    CREATININE 1.66 (H) 05/20/2024    CREATININE 1.44 (H) 05/09/2024   Improving slowly  FU w Nephrology                History of Present Illness     74 y o man is here for AWV And regular check up, he feels a Lot better than last visit, med list and Recent Blood work Reviewed,...      Review of Systems   Constitutional:  Negative for chills, fatigue and fever.   HENT:  Positive for postnasal drip. Negative for congestion, facial swelling, sore throat, trouble swallowing and voice change.    Eyes:  Negative for pain, discharge and visual  "disturbance.   Respiratory:  Negative for cough, shortness of breath and wheezing.    Cardiovascular:  Negative for chest pain, palpitations and leg swelling.   Gastrointestinal:  Negative for abdominal pain, blood in stool, constipation, diarrhea and nausea.   Endocrine: Negative for polydipsia, polyphagia and polyuria.   Genitourinary:  Negative for difficulty urinating, hematuria and urgency.   Musculoskeletal:  Negative for arthralgias and myalgias.   Skin:  Negative for rash.   Neurological:  Negative for dizziness, tremors, weakness and headaches.   Hematological:  Negative for adenopathy. Does not bruise/bleed easily.   Psychiatric/Behavioral:  Negative for dysphoric mood, sleep disturbance and suicidal ideas.        Objective   /80 (BP Location: Left arm, Patient Position: Sitting, Cuff Size: Standard)   Pulse 76   Temp 98.2 °F (36.8 °C) (Tympanic)   Resp 16   Ht 5' 8\" (1.727 m)   Wt 95.3 kg (210 lb)   SpO2 98%   BMI 31.93 kg/m²      Physical Exam  Constitutional:       General: He is not in acute distress.     Appearance: He is well-developed.   HENT:      Head: Normocephalic.      Right Ear: External ear normal.      Left Ear: External ear normal.   Eyes:      Pupils: Pupils are equal, round, and reactive to light.   Neck:      Thyroid: No thyromegaly.      Trachea: No tracheal deviation.   Cardiovascular:      Rate and Rhythm: Normal rate and regular rhythm.      Heart sounds: Murmur heard.      No friction rub.   Pulmonary:      Effort: Pulmonary effort is normal. No respiratory distress.      Breath sounds: Normal breath sounds. No wheezing.   Abdominal:      General: Bowel sounds are normal. There is no distension.      Palpations: Abdomen is soft.   Musculoskeletal:         General: No deformity. Normal range of motion.      Cervical back: Neck supple.   Skin:     General: Skin is warm and dry.      Findings: No erythema or rash.   Neurological:      Mental Status: He is alert and " oriented to person, place, and time.      Cranial Nerves: No cranial nerve deficit.      Coordination: Coordination normal.      Deep Tendon Reflexes: Reflexes normal.   Psychiatric:         Behavior: Behavior normal.

## 2024-12-17 NOTE — ASSESSMENT & PLAN NOTE
Lab Results   Component Value Date    EGFR 43 07/22/2024    EGFR 40 05/20/2024    EGFR 48 05/09/2024    CREATININE 1.57 (H) 07/22/2024    CREATININE 1.66 (H) 05/20/2024    CREATININE 1.44 (H) 05/09/2024   Improving slowly  FU w Nephrology

## 2024-12-17 NOTE — ASSESSMENT & PLAN NOTE
Wt Readings from Last 3 Encounters:   12/17/24 95.3 kg (210 lb)   12/09/24 95.7 kg (211 lb)   09/12/24 96.6 kg (213 lb)   Stable  Continue same  FU w cardiology

## 2024-12-17 NOTE — PATIENT INSTRUCTIONS
Medicare Preventive Visit Patient Instructions  Thank you for completing your Welcome to Medicare Visit or Medicare Annual Wellness Visit today. Your next wellness visit will be due in one year (12/18/2025).  The screening/preventive services that you may require over the next 5-10 years are detailed below. Some tests may not apply to you based off risk factors and/or age. Screening tests ordered at today's visit but not completed yet may show as past due. Also, please note that scanned in results may not display below.  Preventive Screenings:  Service Recommendations Previous Testing/Comments   Colorectal Cancer Screening  Colonoscopy    Fecal Occult Blood Test (FOBT)/Fecal Immunochemical Test (FIT)  Fecal DNA/Cologuard Test  Flexible Sigmoidoscopy Age: 45-75 years old   Colonoscopy: every 10 years (May be performed more frequently if at higher risk)  OR  FOBT/FIT: every 1 year  OR  Cologuard: every 3 years  OR  Sigmoidoscopy: every 5 years  Screening may be recommended earlier than age 45 if at higher risk for colorectal cancer. Also, an individualized decision between you and your healthcare provider will decide whether screening between the ages of 76-85 would be appropriate. Colonoscopy: Not on file  FOBT/FIT: Not on file  Cologuard: Not on file  Sigmoidoscopy: Not on file    Risks and Benefits Discussed     Prostate Cancer Screening Individualized decision between patient and health care provider in men between ages of 55-69   Medicare will cover every 12 months beginning on the day after your 50th birthday PSA: 1.6 ng/mL     Screening Current  Risks and Benefits Discussed     Hepatitis C Screening Once for adults born between 1945 and 1965  More frequently in patients at high risk for Hepatitis C Hep C Antibody: 02/28/2020    Screening Current   Diabetes Screening 1-2 times per year if you're at risk for diabetes or have pre-diabetes Fasting glucose: 98 mg/dL (7/22/2024)  A1C: 5.7 (12/17/2024)  Screening Not  Indicated  History Diabetes   Cholesterol Screening Once every 5 years if you don't have a lipid disorder. May order more often based on risk factors. Lipid panel: 07/22/2024  Screening Not Indicated  History Lipid Disorder      Other Preventive Screenings Covered by Medicare:  Abdominal Aortic Aneurysm (AAA) Screening: covered once if your at risk. You're considered to be at risk if you have a family history of AAA or a male between the age of 65-75 who smoking at least 100 cigarettes in your lifetime.  Lung Cancer Screening: covers low dose CT scan once per year if you meet all of the following conditions: (1) Age 55-77; (2) No signs or symptoms of lung cancer; (3) Current smoker or have quit smoking within the last 15 years; (4) You have a tobacco smoking history of at least 20 pack years (packs per day x number of years you smoked); (5) You get a written order from a healthcare provider.  Glaucoma Screening: covered annually if you're considered high risk: (1) You have diabetes OR (2) Family history of glaucoma OR (3)  aged 50 and older OR (4)  American aged 65 and older  Osteoporosis Screening: covered every 2 years if you meet one of the following conditions: (1) Have a vertebral abnormality; (2) On glucocorticoid therapy for more than 3 months; (3) Have primary hyperparathyroidism; (4) On osteoporosis medications and need to assess response to drug therapy.  HIV Screening: covered annually if you're between the age of 15-65. Also covered annually if you are younger than 15 and older than 65 with risk factors for HIV infection. For pregnant patients, it is covered up to 3 times per pregnancy.    Immunizations:  Immunization Recommendations   Influenza Vaccine Annual influenza vaccination during flu season is recommended for all persons aged >= 6 months who do not have contraindications   Pneumococcal Vaccine   * Pneumococcal conjugate vaccine = PCV13 (Prevnar 13), PCV15 (Vaxneuvance),  PCV20 (Prevnar 20)  * Pneumococcal polysaccharide vaccine = PPSV23 (Pneumovax) Adults 19-63 yo with certain risk factors or if 65+ yo  If never received any pneumonia vaccine: recommend Prevnar 20 (PCV20)  Give PCV20 if previously received 1 dose of PCV13 or PPSV23   Hepatitis B Vaccine 3 dose series if at intermediate or high risk (ex: diabetes, end stage renal disease, liver disease)   Respiratory syncytial virus (RSV) Vaccine - COVERED BY MEDICARE PART D  * RSVPreF3 (Arexvy) CDC recommends that adults 60 years of age and older may receive a single dose of RSV vaccine using shared clinical decision-making (SCDM)   Tetanus (Td) Vaccine - COST NOT COVERED BY MEDICARE PART B Following completion of primary series, a booster dose should be given every 10 years to maintain immunity against tetanus. Td may also be given as tetanus wound prophylaxis.   Tdap Vaccine - COST NOT COVERED BY MEDICARE PART B Recommended at least once for all adults. For pregnant patients, recommended with each pregnancy.   Shingles Vaccine (Shingrix) - COST NOT COVERED BY MEDICARE PART B  2 shot series recommended in those 19 years and older who have or will have weakened immune systems or those 50 years and older     Health Maintenance Due:      Topic Date Due   • Colorectal Cancer Screening  Never done   • Hepatitis C Screening  Completed     Immunizations Due:      Topic Date Due   • Influenza Vaccine (1) 09/01/2024   • COVID-19 Vaccine (3 - 2024-25 season) 09/01/2024     Advance Directives   What are advance directives?  Advance directives are legal documents that state your wishes and plans for medical care. These plans are made ahead of time in case you lose your ability to make decisions for yourself. Advance directives can apply to any medical decision, such as the treatments you want, and if you want to donate organs.   What are the types of advance directives?  There are many types of advance directives, and each state has rules  about how to use them. You may choose a combination of any of the following:  Living will:  This is a written record of the treatment you want. You can also choose which treatments you do not want, which to limit, and which to stop at a certain time. This includes surgery, medicine, IV fluid, and tube feedings.   Durable power of  for healthcare (DPAHC):  This is a written record that states who you want to make healthcare choices for you when you are unable to make them for yourself. This person, called a proxy, is usually a family member or a friend. You may choose more than 1 proxy.  Do not resuscitate (DNR) order:  A DNR order is used in case your heart stops beating or you stop breathing. It is a request not to have certain forms of treatment, such as CPR. A DNR order may be included in other types of advance directives.  Medical directive:  This covers the care that you want if you are in a coma, near death, or unable to make decisions for yourself. You can list the treatments you want for each condition. Treatment may include pain medicine, surgery, blood transfusions, dialysis, IV or tube feedings, and a ventilator (breathing machine).  Values history:  This document has questions about your views, beliefs, and how you feel and think about life. This information can help others choose the care that you would choose.  Why are advance directives important?  An advance directive helps you control your care. Although spoken wishes may be used, it is better to have your wishes written down. Spoken wishes can be misunderstood, or not followed. Treatments may be given even if you do not want them. An advance directive may make it easier for your family to make difficult choices about your care.   Weight Management   Why it is important to manage your weight:  Being overweight increases your risk of health conditions such as heart disease, high blood pressure, type 2 diabetes, and certain types of cancer. It  can also increase your risk for osteoarthritis, sleep apnea, and other respiratory problems. Aim for a slow, steady weight loss. Even a small amount of weight loss can lower your risk of health problems.  How to lose weight safely:  A safe and healthy way to lose weight is to eat fewer calories and get regular exercise. You can lose up about 1 pound a week by decreasing the number of calories you eat by 500 calories each day.   Healthy meal plan for weight management:  A healthy meal plan includes a variety of foods, contains fewer calories, and helps you stay healthy. A healthy meal plan includes the following:  Eat whole-grain foods more often.  A healthy meal plan should contain fiber. Fiber is the part of grains, fruits, and vegetables that is not broken down by your body. Whole-grain foods are healthy and provide extra fiber in your diet. Some examples of whole-grain foods are whole-wheat breads and pastas, oatmeal, brown rice, and bulgur.  Eat a variety of vegetables every day.  Include dark, leafy greens such as spinach, kale, carlos greens, and mustard greens. Eat yellow and orange vegetables such as carrots, sweet potatoes, and winter squash.   Eat a variety of fruits every day.  Choose fresh or canned fruit (canned in its own juice or light syrup) instead of juice. Fruit juice has very little or no fiber.  Eat low-fat dairy foods.  Drink fat-free (skim) milk or 1% milk. Eat fat-free yogurt and low-fat cottage cheese. Try low-fat cheeses such as mozzarella and other reduced-fat cheeses.  Choose meat and other protein foods that are low in fat.  Choose beans or other legumes such as split peas or lentils. Choose fish, skinless poultry (chicken or turkey), or lean cuts of red meat (beef or pork). Before you cook meat or poultry, cut off any visible fat.   Use less fat and oil.  Try baking foods instead of frying them. Add less fat, such as margarine, sour cream, regular salad dressing and mayonnaise to  foods. Eat fewer high-fat foods. Some examples of high-fat foods include french fries, doughnuts, ice cream, and cakes.  Eat fewer sweets.  Limit foods and drinks that are high in sugar. This includes candy, cookies, regular soda, and sweetened drinks.  Exercise:  Exercise at least 30 minutes per day on most days of the week. Some examples of exercise include walking, biking, dancing, and swimming. You can also fit in more physical activity by taking the stairs instead of the elevator or parking farther away from stores. Ask your healthcare provider about the best exercise plan for you.      © Copyright [employer] 2018 Information is for End User's use only and may not be sold, redistributed or otherwise used for commercial purposes. All illustrations and images included in CareNotes® are the copyrighted property of [employer] or [employer]    Osteoporosis Education   Osteoporosis  is a long-term medical condition that causes your bones to become weak, brittle, and more likely to fracture. Osteoporosis occurs when your body absorbs more bone than it makes. It is also caused by a lack of calcium and estrogen (female hormone).  Common symptoms include the following:  You may not have any signs or symptoms. You may break a bone after a muscle strain, bump, or fall. A break usually occurs in the hip, spine, or wrist. A collapsed vertebra (bone in your spine) may cause severe back pain or loss of height from bent posture.  Call your doctor if:    You have severe pain.   You have increasing pain after a fall.   You have pain when you do your daily activities.   You have questions or concerns about your condition or care.  Diagnosis of osteoporosis:   Blood and urine tests  measure your calcium, vitamin D, and estrogen levels.    An x-ray or CT may show thinned bones or a fracture. You may be given contrast liquid to help the bones show up better in the pictures. Tell the healthcare provider if you have  ever had an allergic reaction to contrast liquid. Do not enter the MRI room with anything metal. Metal can cause serious injury. Tell the healthcare provider if you have any metal in or on your body.    A bone density test  compares your bone thickness with what is expected for someone of your age, gender, and ethnicity.  Treatment for osteoporosis may include medicines to prevent bone loss, build new bone, and increase estrogen. These medicines help prevent fractures and may be given as a pill or injection. Ask your healthcare provider for more information on these medicines.  Prevent bone loss:  Eat healthy foods that are high in calcium.  This helps keep your bones strong. Good sources of calcium are milk, cheese, broccoli, tofu, almonds, and canned salmon and sardines. Recommended to get at least 1200mg daily of calcium.  Increase your vitamin D intake.  Vitamin D is in fish oils, some vegetables, and fortified milk, cereal, and bread. Vitamin D is also formed in the skin when it is exposed to the sun. Ask your healthcare provider how much sunlight is safe for you. You will require at least 800 units of vitamin D daily taken as a supplement.  Drink liquids as directed.  Ask your healthcare provider how much liquid to drink each day and which liquids are best for you. Do not have alcohol or caffeine. They decrease bone mineral density, which can weaken your bones.  Exercise regularly.  Ask your healthcare provider about the best exercise plan for you. Weight bearing exercise for 30 minutes, 3 times a week can help build and strengthen bone.  Do not smoke.  Nicotine and other chemicals in cigarettes and cigars can cause lung damage. Ask your healthcare provider for information if you currently smoke and need help to quit. E-cigarettes or smokeless tobacco still contain nicotine. Talk to your healthcare provider before you use these products.  Go to physical therapy as directed.  A physical therapist teaches you  "exercises to help improve movement and muscle strength.  Alcohol. It is recommended to avoid heavy alcohol use as increased consumption of alcohol is known to cause bone loss  © Copyright [employer] 2021 Information is for End User's use only and may not be sold, redistributed or otherwise used for commercial purposes. All illustrations and images included in CareNotes® are the copyrighted property of [employer] or [employer]    Calcium and vitamin D for bone health (Beyond the Basics)    CALCIUM AND VITAMIN D OVERVIEW  Osteoporosis is a common bone disorder that causes a progressive loss in bone density and mass. As a result, bones become thin, weakened, and easily fractured. It is estimated that more than 1.3 million osteoporosis-associated (or \"osteoporotic\") fractures occur every year in the [place], primarily of bone within the spine (the vertebrae), the hip, and the forearm near the wrist. =    A number of treatments can help to prevent loss of bone and treat low bone mass. However, the first step in preventing or treating osteoporosis is to consume foods and drinks that provide calcium, a mineral essential for bone strength, and vitamin D, which aids in calcium breakdown and absorption. =    CALCIUM AND VITAMIN D BENEFITS  Good nutrition is important at all ages to keep the bones healthy.    Taking calcium reduces bone loss and decreases the risk of fracturing the vertebrae (the bones that surround the spinal cord).  Consuming calcium during childhood (eg, in milk) can lead to higher bone mass in adulthood. This increase in bone density can reduce the risk of fractures later in life.  Calcium may also have benefits in other body systems by reducing blood pressure and cholesterol levels.  Calcium and vitamin D supplements may help prevent tooth loss in older adults.    RECOMMENDATIONS FOR CALCIUM    General recommendations -- Premenopausal women and men should consume at least 1000 mg of " calcium, while postmenopausal women should consume 1200 mg (total diet plus supplement). You should not consume more than 2000 mg of calcium per day (total diet plus supplement) due to the risk of side effects.    Calcium in the diet -- The primary sources of calcium in the diet include milk and other dairy products, such as hard cheese, cottage cheese, or yogurt, as well as green vegetables, such as kale and broccoli (table 1). Some cereals, soy products, and fruit juices are fortified with calcium.    Calcium supplements -- The body is able to absorb calcium contained in supplements as well as from dietary sources. If it is not possible to get enough calcium from dietary sources, consult a health care provider to determine the best type, dose, and timing of calcium supplements.     Calcium carbonate is effective and is the least expensive form of calcium. It is best absorbed with a low-iron meal (such as breakfast). Calcium carbonate may not be absorbed well in people who also take a specific medication for gastroesophageal reflux (called a proton pump inhibitor or H2 blocker), which blocks stomach acid.  Many natural calcium carbonate preparations such as oyster shells or bone meal contain some lead.  Calcium citrate is well absorbed in the fasting state as well as with a meal.  Calcium doses above 500 mg are not absorbed as well as smaller doses, so large doses of supplements should be taken in divided doses (eg, in the morning and evening).  Calcium supplements do not replace other osteoporosis treatments such as hormone replacement, bisphosphonates (eg, risedronate [sample brand name: Actonel] and alendronate [brand name: Fosamax]), and raloxifene (brand name: Evista).    Calcium and vitamin D supplements alone are usually insufficient to prevent age-related bone loss, although they may be beneficial in some subgroups (older adults, those with very low intake). In most patients with or at risk for  osteoporosis, the addition of medication or hormonal therapy is necessary in order to slow the breakdown and removal of bone (ie, resorption).     Underlying gastrointestinal diseases -- Patients who do not adequately absorb nutrients from the gastrointestinal tract (due to malabsorption) may require more than 1000 to 1200 mg of calcium per day. In such cases, a health care provider can help to determine the optimal dose of calcium.    Medications -- All medications should be discussed with a health care provider to ensure that possible interactions with calcium are identified. Certain medications change the amount of calcium that is absorbed and/or excreted. As an example, loop diuretics (eg, furosemide [sample brand name: Lasix]) increase the amount of calcium excreted in the urine.    On the other hand, thiazide diuretics (eg, hydrochlorothiazide) can reduce levels of calcium in the urine, potentially reducing the risk of bone loss and kidney stones.    Side effects of calcium -- Calcium is usually easily tolerated when it is taken in divided doses several times per day. Some people experience side effects related to calcium, including constipation and indigestion. Calcium supplements interfere with the absorption of iron and thyroid hormone, and therefore, these medications should be taken at different times.    Kidney stones -- There is no evidence that consuming large amounts of calcium (from foods and drinks) increases the risk of kidney stones, or that avoiding dietary calcium decreases the risk. In fact, avoiding dairy products is likely to increase the risk of kidney stones.    However, use of calcium supplements may increase the risk of kidney stones in susceptible individuals by raising the level of calcium in the urine. This is particularly true if the supplement is taken between meals or at bedtime, and this is one of the reasons we prefer for patients to get as much of their calcium requirement  through dietary means.     IMPORTANCE OF VITAMIN D  Vitamin D decreases bone loss and lowers the risk of fracture, especially in older men and women. Along with calcium, vitamin D also helps to prevent and treat osteoporosis. To absorb calcium efficiently, an adequate amount of vitamin D must be present.    Vitamin D is normally made in the skin after exposure to sunlight.    Recommendations for vitamin D -- The current recommendation is that men over 70 years and postmenopausal women consume at least 800 international units (20 micrograms) of vitamin D per day. Lower levels of vitamin D are not as effective, while high doses can be toxic, especially if taken for long periods of time. Although the optimal intake has not been clearly established in premenopausal women or in younger men with osteoporosis, 600 international units (15 micrograms) of vitamin D daily is generally suggested.    Vitamin D is available as an individual supplement and is included in most multivitamins and some calcium supplements (table 2). Milk is a relatively good dietary source of vitamin D, with approximately 100 international units (2.5 micrograms) per cup (240 mL), and salmon has 800 to 1000 international units (20 to 25 micrograms) of vitamin D per serving.    Most healthy people don't need to check with their health care provider before taking standard doses of vitamin D and don't need monitoring of vitamin D levels if they are not being treated for vitamin D deficiency. However, recommendations for vitamin D supplementation may be different in people with certain underlying medical conditions, such as sarcoidosis or lymphoma. In these situations, a provider will determine if supplements are needed; if so, the person's vitamin D and calcium levels should be monitored with regular tests.    ©2021 [employer] All rights reserved.

## 2024-12-17 NOTE — ASSESSMENT & PLAN NOTE
Orders:    atorvastatin (LIPITOR) 20 mg tablet; Take 1 tablet (20 mg total) by mouth daily Please STOP Pravastatin..

## 2024-12-17 NOTE — PROGRESS NOTES
Name: Silvio Drew      : 1950      MRN: [id number]  Encounter Provider: Mac Stephens MD  Encounter Date: 2024   Encounter department: [place] [facility]    Assessment & Plan       Preventive health issues were discussed with patient, and age appropriate screening tests were ordered as noted in patient's After Visit Summary. Personalized health advice and appropriate referrals for health education or preventive services given if needed, as noted in patient's After Visit Summary.    History of Present Illness     HPI   Patient Care Team:  Mac Stephens MD as PCP - General (Internal Medicine)  Guanakito Grover MD (Nephrology)  Adebayo Pa MD as Medical Oncologist (Hematology and Oncology)    Review of Systems  Medical History Reviewed by provider this encounter:       Annual Wellness Visit Questionnaire   Silvio is here for his Subsequent Wellness visit. Last Medicare Wellness visit information reviewed, patient interviewed and updates made to the record today.      Health Risk Assessment:   Patient rates overall health as good. Patient feels that their physical health rating is same. Patient is very satisfied with their life. Eyesight was rated as same. Hearing was rated as same. Patient feels that their emotional and mental health rating is same. Patients states they are never, rarely angry. Patient states they are never, rarely unusually tired/fatigued. Pain experienced in the last 7 days has been none. Patient states that he has experienced no weight loss or gain in last 6 months.     Depression Screening:   PHQ-2 Score: 0      Fall Risk Screening:   In the past year, patient has experienced: no history of falling in past year      Home Safety:  Patient does not have trouble with stairs inside or outside of their home. Patient has working smoke alarms and has working carbon monoxide detector. Home safety hazards include: none.     Medications:   Patient is currently taking  over-the-counter supplements. OTC medications include: see medication list. Patient is able to manage medications.     Activities of Daily Living (ADLs)/Instrumental Activities of Daily Living (IADLs):   Walk and transfer into and out of bed and chair?: Yes  Dress and groom yourself?: Yes    Bathe or shower yourself?: Yes    Feed yourself? Yes  Do your laundry/housekeeping?: Yes  Manage your money, pay your bills and track your expenses?: Yes  Make your own meals?: Yes    Do your own shopping?: Yes    Previous Hospitalizations:   Any hospitalizations or ED visits within the last 12 months?: No      PREVENTIVE SCREENINGS      Cardiovascular Screening:    General: Screening Not Indicated, History Lipid Disorder and Risks and Benefits Discussed      Diabetes Screening:     General: Screening Not Indicated, History Diabetes, Risks and Benefits Discussed and Screening Current      Colorectal Cancer Screening:     General: Risks and Benefits Discussed      Prostate Cancer Screening:    General: Screening Current and Risks and Benefits Discussed      Osteoporosis Screening:    General: Screening Not Indicated, History Osteoporosis and Risks and Benefits Discussed      Abdominal Aortic Aneurysm (AAA) Screening:    Risk factors include: age between 65-76 yo        General: Risks and Benefits Discussed      Lung Cancer Screening:     General: Screening Not Indicated and Risks and Benefits Discussed      Hepatitis C Screening:    General: Screening Current and Risks and Benefits Discussed    Screening, Brief Intervention, and Referral to Treatment (SBIRT)    Screening      Single Item Drug Screening:  How often have you used an illegal drug (including marijuana) or a prescription medication for non-medical reasons in the past year? never    Single Item Drug Screen Score: 0  Interpretation: Negative screen for possible drug use disorder    Other Counseling Topics:   Car/seat belt/driving safety, skin self-exam, sunscreen and  "calcium and vitamin D intake and regular weightbearing exercise.     Social Drivers of Health     Financial Resource Strain: Low Risk  (9/18/2023)    Overall Financial Resource Strain (CARDIA)     Difficulty of Paying Living Expenses: Not hard at all   Food Insecurity: No Food Insecurity (4/11/2024)    Nursing - Inadequate Food Risk Classification     Worried About Running Out of Food in the Last Year: Never true     Ran Out of Food in the Last Year: Never true   Transportation Needs: No Transportation Needs (4/26/2024)    OASIS : Transportation     Lack of Transportation (Medical): No     Lack of Transportation (Non-Medical): No     Patient Unable or Declines to Respond: No   Housing Stability: High Risk (4/11/2024)    Housing Stability Vital Sign     Unable to Pay for Housing in the Last Year: Yes     Number of Times Moved in the Last Year: 1     Homeless in the Last Year: No   Utilities: At Risk (4/11/2024)    [facility] Utilities     Threatened with loss of utilities: Yes     No results found.    Objective   /80 (BP Location: Left arm, Patient Position: Sitting, Cuff Size: Standard)   Pulse 76   Temp 98.2 °F (36.8 °C) (Tympanic)   Resp 16   Ht 5' 8\" (1.727 m)   Wt 95.3 kg (210 lb)   SpO2 98%   BMI 31.93 kg/m²     Physical Exam    "

## 2025-01-06 ENCOUNTER — TELEPHONE (OUTPATIENT)
Dept: HEMATOLOGY ONCOLOGY | Facility: CLINIC | Age: 75
End: 2025-01-06

## 2025-01-06 NOTE — TELEPHONE ENCOUNTER
Let message to let patient know he missed his appointment with dr becker.provided hopeline to call and reschedule his appointment.

## 2025-01-29 ENCOUNTER — APPOINTMENT (OUTPATIENT)
Dept: LAB | Age: 75
End: 2025-01-29
Payer: COMMERCIAL

## 2025-01-29 DIAGNOSIS — M1A.9XX0 CHRONIC GOUT WITHOUT TOPHUS, UNSPECIFIED CAUSE, UNSPECIFIED SITE: ICD-10-CM

## 2025-01-29 DIAGNOSIS — Z00.01 ENCOUNTER FOR GENERAL ADULT MEDICAL EXAMINATION WITH ABNORMAL FINDINGS: ICD-10-CM

## 2025-01-29 DIAGNOSIS — E78.5 HYPERLIPIDEMIA ASSOCIATED WITH TYPE 2 DIABETES MELLITUS  (HCC): ICD-10-CM

## 2025-01-29 DIAGNOSIS — N40.0 ENLARGED PROSTATE: ICD-10-CM

## 2025-01-29 DIAGNOSIS — M81.8 IDIOPATHIC OSTEOPOROSIS: ICD-10-CM

## 2025-01-29 DIAGNOSIS — E11.69 HYPERLIPIDEMIA ASSOCIATED WITH TYPE 2 DIABETES MELLITUS  (HCC): ICD-10-CM

## 2025-01-29 LAB
ALBUMIN SERPL BCG-MCNC: 4 G/DL (ref 3.5–5)
ALP SERPL-CCNC: 87 U/L (ref 34–104)
ALT SERPL W P-5'-P-CCNC: 28 U/L (ref 7–52)
ANION GAP SERPL CALCULATED.3IONS-SCNC: 12 MMOL/L (ref 4–13)
AST SERPL W P-5'-P-CCNC: 31 U/L (ref 13–39)
BACTERIA UR QL AUTO: ABNORMAL /HPF
BASOPHILS # BLD AUTO: 0.06 THOUSANDS/ΜL (ref 0–0.1)
BASOPHILS NFR BLD AUTO: 1 % (ref 0–1)
BILIRUB SERPL-MCNC: 0.58 MG/DL (ref 0.2–1)
BILIRUB UR QL STRIP: NEGATIVE
BUN SERPL-MCNC: 23 MG/DL (ref 5–25)
CALCIUM SERPL-MCNC: 9.4 MG/DL (ref 8.4–10.2)
CHLORIDE SERPL-SCNC: 110 MMOL/L (ref 96–108)
CHOLEST SERPL-MCNC: 131 MG/DL (ref ?–200)
CLARITY UR: ABNORMAL
CO2 SERPL-SCNC: 23 MMOL/L (ref 21–32)
COLOR UR: YELLOW
CREAT SERPL-MCNC: 1.63 MG/DL (ref 0.6–1.3)
EOSINOPHIL # BLD AUTO: 0.2 THOUSAND/ΜL (ref 0–0.61)
EOSINOPHIL NFR BLD AUTO: 3 % (ref 0–6)
ERYTHROCYTE [DISTWIDTH] IN BLOOD BY AUTOMATED COUNT: 14.5 % (ref 11.6–15.1)
GFR SERPL CREATININE-BSD FRML MDRD: 41 ML/MIN/1.73SQ M
GLUCOSE P FAST SERPL-MCNC: 87 MG/DL (ref 65–99)
GLUCOSE UR STRIP-MCNC: NEGATIVE MG/DL
HCT VFR BLD AUTO: 40.5 % (ref 36.5–49.3)
HDLC SERPL-MCNC: 43 MG/DL
HGB BLD-MCNC: 12.4 G/DL (ref 12–17)
HGB UR QL STRIP.AUTO: NEGATIVE
HYALINE CASTS #/AREA URNS LPF: ABNORMAL /LPF
IMM GRANULOCYTES # BLD AUTO: 0.04 THOUSAND/UL (ref 0–0.2)
IMM GRANULOCYTES NFR BLD AUTO: 1 % (ref 0–2)
KETONES UR STRIP-MCNC: NEGATIVE MG/DL
LDLC SERPL CALC-MCNC: 58 MG/DL (ref 0–100)
LEUKOCYTE ESTERASE UR QL STRIP: ABNORMAL
LYMPHOCYTES # BLD AUTO: 1.84 THOUSANDS/ΜL (ref 0.6–4.47)
LYMPHOCYTES NFR BLD AUTO: 27 % (ref 14–44)
MCH RBC QN AUTO: 28.4 PG (ref 26.8–34.3)
MCHC RBC AUTO-ENTMCNC: 30.6 G/DL (ref 31.4–37.4)
MCV RBC AUTO: 93 FL (ref 82–98)
MONOCYTES # BLD AUTO: 0.46 THOUSAND/ΜL (ref 0.17–1.22)
MONOCYTES NFR BLD AUTO: 7 % (ref 4–12)
MUCOUS THREADS UR QL AUTO: ABNORMAL
NEUTROPHILS # BLD AUTO: 4.35 THOUSANDS/ΜL (ref 1.85–7.62)
NEUTS SEG NFR BLD AUTO: 61 % (ref 43–75)
NITRITE UR QL STRIP: NEGATIVE
NON-SQ EPI CELLS URNS QL MICRO: ABNORMAL /HPF
NRBC BLD AUTO-RTO: 0 /100 WBCS
PH UR STRIP.AUTO: 5.5 [PH]
PLATELET # BLD AUTO: 185 THOUSANDS/UL (ref 149–390)
PMV BLD AUTO: 11.9 FL (ref 8.9–12.7)
POTASSIUM SERPL-SCNC: 4.9 MMOL/L (ref 3.5–5.3)
PROT SERPL-MCNC: 7.2 G/DL (ref 6.4–8.4)
PROT UR STRIP-MCNC: ABNORMAL MG/DL
PSA SERPL-MCNC: 1.28 NG/ML (ref 0–4)
RBC # BLD AUTO: 4.37 MILLION/UL (ref 3.88–5.62)
RBC #/AREA URNS AUTO: ABNORMAL /HPF
SODIUM SERPL-SCNC: 145 MMOL/L (ref 135–147)
SP GR UR STRIP.AUTO: 1.02 (ref 1–1.03)
TRIGL SERPL-MCNC: 148 MG/DL (ref ?–150)
TSH SERPL DL<=0.05 MIU/L-ACNC: 2.81 UIU/ML (ref 0.45–4.5)
URATE SERPL-MCNC: 5.9 MG/DL (ref 3.5–8.5)
UROBILINOGEN UR STRIP-ACNC: <2 MG/DL
VIT B12 SERPL-MCNC: 810 PG/ML (ref 180–914)
WBC # BLD AUTO: 6.95 THOUSAND/UL (ref 4.31–10.16)
WBC #/AREA URNS AUTO: ABNORMAL /HPF

## 2025-01-29 PROCEDURE — 80151 DRUG ASSAY AMIODARONE: CPT

## 2025-01-29 PROCEDURE — 84443 ASSAY THYROID STIM HORMONE: CPT

## 2025-01-29 PROCEDURE — 85025 COMPLETE CBC W/AUTO DIFF WBC: CPT

## 2025-01-29 PROCEDURE — 80061 LIPID PANEL: CPT

## 2025-01-29 PROCEDURE — 81001 URINALYSIS AUTO W/SCOPE: CPT

## 2025-01-29 PROCEDURE — 84550 ASSAY OF BLOOD/URIC ACID: CPT

## 2025-01-29 PROCEDURE — 80053 COMPREHEN METABOLIC PANEL: CPT

## 2025-01-29 PROCEDURE — 84153 ASSAY OF PSA TOTAL: CPT

## 2025-01-29 PROCEDURE — 82607 VITAMIN B-12: CPT

## 2025-01-29 PROCEDURE — 36415 COLL VENOUS BLD VENIPUNCTURE: CPT

## 2025-02-03 LAB
AMIODARONE SERPL-MCNC: 1038 NG/ML (ref 1000–2500)
DESETHYLAMIODARONE SERPL-MCNC: 1060 NG/ML

## 2025-02-12 ENCOUNTER — TELEPHONE (OUTPATIENT)
Dept: FAMILY MEDICINE CLINIC | Facility: CLINIC | Age: 75
End: 2025-02-12

## 2025-02-12 NOTE — TELEPHONE ENCOUNTER
Patients sister called and wanted to renew a medication, it was not on the current medication list.  She will call back - Patient needs a new Medical Consent Form filled out.

## 2025-02-13 DIAGNOSIS — K58.1 IRRITABLE BOWEL SYNDROME WITH CONSTIPATION: ICD-10-CM

## 2025-03-10 ENCOUNTER — TELEPHONE (OUTPATIENT)
Age: 75
End: 2025-03-10

## 2025-03-10 NOTE — TELEPHONE ENCOUNTER
Basim a pharmacist with [id number] called in regards to patients Diclofenac 50 mg. Basim stated that patients last renal function was 41 and medication side effects are GI bleeds and hypertension. Basim stated that provider should consider medication a short term or discontinuing pain reliever or an alternative medication as patient has Chronic kidney disease. [facility] call back number is [phone].

## 2025-03-11 NOTE — TELEPHONE ENCOUNTER
Spoke to the pharmacist Basim.  Informed her that the Diclofenac is to be used as needed.  She verbalized understanding.

## 2025-03-25 ENCOUNTER — OFFICE VISIT (OUTPATIENT)
Dept: FAMILY MEDICINE CLINIC | Facility: CLINIC | Age: 75
End: 2025-03-25
Payer: COMMERCIAL

## 2025-03-25 VITALS
OXYGEN SATURATION: 99 % | SYSTOLIC BLOOD PRESSURE: 120 MMHG | RESPIRATION RATE: 16 BRPM | HEIGHT: 68 IN | TEMPERATURE: 98.2 F | WEIGHT: 213 LBS | DIASTOLIC BLOOD PRESSURE: 82 MMHG | HEART RATE: 66 BPM | BODY MASS INDEX: 32.28 KG/M2

## 2025-03-25 DIAGNOSIS — N18.32 STAGE 3B CHRONIC KIDNEY DISEASE (HCC): ICD-10-CM

## 2025-03-25 DIAGNOSIS — E11.69 HYPERLIPIDEMIA ASSOCIATED WITH TYPE 2 DIABETES MELLITUS  (HCC): ICD-10-CM

## 2025-03-25 DIAGNOSIS — E55.9 VITAMIN D DEFICIENCY: ICD-10-CM

## 2025-03-25 DIAGNOSIS — E78.5 HYPERLIPIDEMIA ASSOCIATED WITH TYPE 2 DIABETES MELLITUS  (HCC): ICD-10-CM

## 2025-03-25 DIAGNOSIS — M81.8 IDIOPATHIC OSTEOPOROSIS: ICD-10-CM

## 2025-03-25 DIAGNOSIS — M1A.9XX0 CHRONIC GOUT WITHOUT TOPHUS, UNSPECIFIED CAUSE, UNSPECIFIED SITE: ICD-10-CM

## 2025-03-25 DIAGNOSIS — I10 PRIMARY HYPERTENSION: Primary | ICD-10-CM

## 2025-03-25 PROBLEM — I50.22 CHRONIC SYSTOLIC (CONGESTIVE) HEART FAILURE (HCC): Status: RESOLVED | Noted: 2024-04-08 | Resolved: 2025-03-25

## 2025-03-25 LAB — SL AMB POCT HEMOGLOBIN AIC: 5.6 (ref ?–6.5)

## 2025-03-25 PROCEDURE — G2211 COMPLEX E/M VISIT ADD ON: HCPCS | Performed by: INTERNAL MEDICINE

## 2025-03-25 PROCEDURE — 83036 HEMOGLOBIN GLYCOSYLATED A1C: CPT | Performed by: INTERNAL MEDICINE

## 2025-03-25 PROCEDURE — 99214 OFFICE O/P EST MOD 30 MIN: CPT | Performed by: INTERNAL MEDICINE

## 2025-03-25 RX ORDER — ATORVASTATIN CALCIUM 20 MG/1
20 TABLET, FILM COATED ORAL DAILY
Qty: 90 TABLET | Refills: 3 | Status: SHIPPED | OUTPATIENT
Start: 2025-03-25

## 2025-03-25 RX ORDER — ERGOCALCIFEROL 1.25 MG/1
50000 CAPSULE, LIQUID FILLED ORAL WEEKLY
Qty: 12 CAPSULE | Refills: 3 | Status: SHIPPED | OUTPATIENT
Start: 2025-03-25

## 2025-03-25 RX ORDER — ALLOPURINOL 100 MG/1
100 TABLET ORAL DAILY
Qty: 90 TABLET | Refills: 3 | Status: SHIPPED | OUTPATIENT
Start: 2025-03-25

## 2025-03-25 RX ORDER — METOPROLOL SUCCINATE 25 MG/1
25 TABLET, EXTENDED RELEASE ORAL DAILY
Qty: 90 TABLET | Refills: 3 | Status: SHIPPED | OUTPATIENT
Start: 2025-03-25

## 2025-03-25 RX ORDER — LOSARTAN POTASSIUM 50 MG/1
50 TABLET ORAL DAILY
Qty: 90 TABLET | Refills: 3 | Status: SHIPPED | OUTPATIENT
Start: 2025-03-25

## 2025-03-25 NOTE — PATIENT INSTRUCTIONS

## 2025-03-25 NOTE — ASSESSMENT & PLAN NOTE
Lab Results   Component Value Date    HGBA1C 5.6 03/25/2025       Orders:    Ambulatory Referral to Ophthalmology; Future    POCT hemoglobin A1c    Comprehensive metabolic panel; Future    CBC and differential; Future    TSH, 3rd generation with Free T4 reflex; Future    DXA bone density spine hip and pelvis; Future    UA (URINE) with reflex to Scope; Future    Magnesium; Future

## 2025-03-25 NOTE — ASSESSMENT & PLAN NOTE
Lab Results   Component Value Date    EGFR 41 01/29/2025    EGFR 43 07/22/2024    EGFR 40 05/20/2024    CREATININE 1.63 (H) 01/29/2025    CREATININE 1.57 (H) 07/22/2024    CREATININE 1.66 (H) 05/20/2024       Orders:    POCT hemoglobin A1c    Comprehensive metabolic panel; Future    CBC and differential; Future    TSH, 3rd generation with Free T4 reflex; Future

## 2025-03-25 NOTE — PROGRESS NOTES
Name: Silvio Drew      : 1950      MRN: [id number]  Encounter Provider: Mac Stephens MD  Encounter Date: 3/25/2025   Encounter department: [facility]  :  Assessment & Plan  Hyperlipidemia associated with type 2 diabetes mellitus  (HCC)    Lab Results   Component Value Date    HGBA1C 5.6 2025       Orders:    Ambulatory Referral to Ophthalmology; Future    POCT hemoglobin A1c    Comprehensive metabolic panel; Future    CBC and differential; Future    TSH, 3rd generation with Free T4 reflex; Future    DXA bone density spine hip and pelvis; Future    UA (URINE) with reflex to Scope; Future    Magnesium; Future    Chronic gout without tophus, unspecified cause, unspecified site    Orders:    allopurinol (ZYLOPRIM) 100 mg tablet; Take 1 tablet (100 mg total) by mouth daily    BMI 31.0-31.9,adult    Orders:    atorvastatin (LIPITOR) 20 mg tablet; Take 1 tablet (20 mg total) by mouth daily Please STOP Pravastatin..    Vitamin D deficiency    Orders:    ergocalciferol (VITAMIN D2) 50,000 units; Take 1 capsule (50,000 Units total) by mouth once a week    Primary hypertension    Orders:    losartan (COZAAR) 50 mg tablet; Take 1 tablet (50 mg total) by mouth daily    BMI 32.0-32.9,adult    Orders:    metoprolol succinate (Toprol XL) 25 mg 24 hr tablet; Take 1 tablet (25 mg total) by mouth daily    Stage 3b chronic kidney disease (HCC)  Lab Results   Component Value Date    EGFR 41 2025    EGFR 43 2024    EGFR 40 2024    CREATININE 1.63 (H) 2025    CREATININE 1.57 (H) 2024    CREATININE 1.66 (H) 2024       Orders:    POCT hemoglobin A1c    Comprehensive metabolic panel; Future    CBC and differential; Future    TSH, 3rd generation with Free T4 reflex; Future    Idiopathic osteoporosis  Caltrate Plus D daily  RTC in 3 mos w :  Orders:    DXA bone density spine hip and pelvis; Future    UA (URINE) with reflex to Scope; Future    Magnesium;  "Future    Life style Mod  Continue same  FU w cardiology  RTC in 3 mos w  Blood work       History of Present Illness   74 Y O Man is here for Regular check up, he feels OK, recent blood work and med  list reviewed,...      Review of Systems   Constitutional:  Negative for chills, fatigue and fever.   HENT:  Negative for congestion, facial swelling, sore throat, trouble swallowing and voice change.    Eyes:  Negative for pain, discharge and visual disturbance.   Respiratory:  Negative for cough, shortness of breath and wheezing.    Cardiovascular:  Negative for chest pain, palpitations and leg swelling.   Gastrointestinal:  Negative for abdominal pain, blood in stool, constipation, diarrhea and nausea.   Endocrine: Negative for polydipsia, polyphagia and polyuria.   Genitourinary:  Negative for difficulty urinating, hematuria and urgency.   Musculoskeletal:  Negative for arthralgias and myalgias.   Skin:  Negative for rash.   Neurological:  Negative for dizziness, tremors, weakness and headaches.   Hematological:  Negative for adenopathy. Does not bruise/bleed easily.   Psychiatric/Behavioral:  Negative for dysphoric mood, sleep disturbance and suicidal ideas.        Objective   /82 (BP Location: Left arm, Patient Position: Sitting, Cuff Size: Standard)   Pulse 66   Temp 98.2 °F (36.8 °C) (Tympanic)   Resp 16   Ht 5' 8\" (1.727 m)   Wt 96.6 kg (213 lb)   SpO2 99%   BMI 32.39 kg/m²      Physical Exam  Vitals and nursing note reviewed.   Constitutional:       General: He is not in acute distress.     Appearance: He is well-developed.   HENT:      Head: Normocephalic and atraumatic.      Right Ear: External ear normal.      Left Ear: External ear normal.   Eyes:      Conjunctiva/sclera: Conjunctivae normal.      Pupils: Pupils are equal, round, and reactive to light.   Neck:      Thyroid: No thyromegaly.      Trachea: No tracheal deviation.   Cardiovascular:      Rate and Rhythm: Normal rate and regular " rhythm.      Heart sounds: Murmur heard.      No friction rub.   Pulmonary:      Effort: Pulmonary effort is normal. No respiratory distress.      Breath sounds: Normal breath sounds. No wheezing.   Abdominal:      General: Bowel sounds are normal. There is no distension.      Palpations: Abdomen is soft.      Tenderness: There is no abdominal tenderness.   Musculoskeletal:         General: No swelling or deformity. Normal range of motion.      Cervical back: Neck supple.   Skin:     General: Skin is warm and dry.      Capillary Refill: Capillary refill takes less than 2 seconds.      Findings: No erythema or rash.   Neurological:      Mental Status: He is alert and oriented to person, place, and time.      Cranial Nerves: No cranial nerve deficit.      Coordination: Coordination normal.      Deep Tendon Reflexes: Reflexes normal.   Psychiatric:         Mood and Affect: Mood normal.         Behavior: Behavior normal.

## 2025-04-24 DIAGNOSIS — M25.521 ELBOW PAIN, RIGHT: ICD-10-CM

## 2025-04-24 DIAGNOSIS — M70.31 BURSITIS OF OTHER BURSA OF RIGHT ELBOW: ICD-10-CM

## 2025-05-19 ENCOUNTER — TELEPHONE (OUTPATIENT)
Dept: FAMILY MEDICINE CLINIC | Facility: CLINIC | Age: 75
End: 2025-05-19

## 2025-05-19 DIAGNOSIS — J06.9 ACUTE URI: Primary | ICD-10-CM

## 2025-05-19 RX ORDER — GUAIFENESIN/DEXTROMETHORPHAN 100-10MG/5
5 SYRUP ORAL 3 TIMES DAILY PRN
Qty: 118 ML | Refills: 1 | Status: SHIPPED | OUTPATIENT
Start: 2025-05-19

## 2025-05-19 RX ORDER — AMOXICILLIN 500 MG/1
500 CAPSULE ORAL EVERY 8 HOURS SCHEDULED
Qty: 30 CAPSULE | Refills: 0 | Status: SHIPPED | OUTPATIENT
Start: 2025-05-19 | End: 2025-05-29

## 2025-05-19 NOTE — TELEPHONE ENCOUNTER
Patient c/o fever, cough, x 2 days.      Please advise.    Patient states he also needs pantoprazole, but not listed on chart.

## 2025-05-26 ENCOUNTER — HOSPITAL ENCOUNTER (INPATIENT)
Facility: HOSPITAL | Age: 75
LOS: 4 days | Discharge: HOME | DRG: 194 | End: 2025-05-30
Attending: EMERGENCY MEDICINE | Admitting: HOSPITALIST
Payer: COMMERCIAL

## 2025-05-26 ENCOUNTER — APPOINTMENT (EMERGENCY)
Dept: RADIOLOGY | Facility: HOSPITAL | Age: 75
DRG: 194 | End: 2025-05-26
Payer: COMMERCIAL

## 2025-05-26 ENCOUNTER — APPOINTMENT (EMERGENCY)
Dept: CT IMAGING | Facility: HOSPITAL | Age: 75
DRG: 194 | End: 2025-05-26
Payer: COMMERCIAL

## 2025-05-26 DIAGNOSIS — E87.6 HYPOKALEMIA: Primary | ICD-10-CM

## 2025-05-26 DIAGNOSIS — D64.9 ANEMIA: ICD-10-CM

## 2025-05-26 DIAGNOSIS — K59.00 CONSTIPATION: ICD-10-CM

## 2025-05-26 DIAGNOSIS — R79.89 ELEVATED TROPONIN: ICD-10-CM

## 2025-05-26 DIAGNOSIS — J18.9 PNEUMONIA OF RIGHT LUNG DUE TO INFECTIOUS ORGANISM: ICD-10-CM

## 2025-05-26 DIAGNOSIS — R79.89 ELEVATED LFTS: ICD-10-CM

## 2025-05-26 DIAGNOSIS — M54.16 LUMBAR RADICULOPATHY: ICD-10-CM

## 2025-05-26 DIAGNOSIS — K92.1 BLOODY STOOL: ICD-10-CM

## 2025-05-26 DIAGNOSIS — R09.02 HYPOXIA: ICD-10-CM

## 2025-05-26 PROBLEM — J96.01 ACUTE HYPOXIC RESPIRATORY FAILURE (HCC): Status: ACTIVE | Noted: 2025-05-26

## 2025-05-26 PROBLEM — R94.31 PROLONGED QT INTERVAL: Status: ACTIVE | Noted: 2025-05-26

## 2025-05-26 PROBLEM — E80.6 HYPERBILIRUBINEMIA: Status: ACTIVE | Noted: 2025-05-26

## 2025-05-26 LAB
2HR DELTA HS TROPONIN: -2 NG/L
4HR DELTA HS TROPONIN: 0 NG/L
ALBUMIN SERPL BCG-MCNC: 3.2 G/DL (ref 3.5–5)
ALP SERPL-CCNC: 209 U/L (ref 34–104)
ALT SERPL W P-5'-P-CCNC: 63 U/L (ref 7–52)
ANION GAP SERPL CALCULATED.3IONS-SCNC: 9 MMOL/L (ref 4–13)
APTT PPP: 43 SECONDS (ref 23–34)
AST SERPL W P-5'-P-CCNC: 121 U/L (ref 13–39)
ATRIAL RATE: 101 BPM
BACTERIA UR QL AUTO: ABNORMAL /HPF
BASOPHILS # BLD AUTO: 0.01 THOUSANDS/ÂΜL (ref 0–0.1)
BASOPHILS NFR BLD AUTO: 0 % (ref 0–1)
BILIRUB SERPL-MCNC: 1.56 MG/DL (ref 0.2–1)
BILIRUB UR QL STRIP: NEGATIVE
BNP SERPL-MCNC: 556 PG/ML (ref 0–100)
BUN SERPL-MCNC: 30 MG/DL (ref 5–25)
CALCIUM ALBUM COR SERPL-MCNC: 9.1 MG/DL (ref 8.3–10.1)
CALCIUM SERPL-MCNC: 8.5 MG/DL (ref 8.4–10.2)
CARDIAC TROPONIN I PNL SERPL HS: 77 NG/L (ref ?–50)
CARDIAC TROPONIN I PNL SERPL HS: 79 NG/L (ref ?–50)
CARDIAC TROPONIN I PNL SERPL HS: 79 NG/L (ref ?–50)
CHLORIDE SERPL-SCNC: 105 MMOL/L (ref 96–108)
CLARITY UR: CLEAR
CO2 SERPL-SCNC: 23 MMOL/L (ref 21–32)
COLOR UR: YELLOW
CREAT SERPL-MCNC: 1.72 MG/DL (ref 0.6–1.3)
D DIMER PPP FEU-MCNC: 3.62 UG/ML FEU
EOSINOPHIL # BLD AUTO: 0.04 THOUSAND/ÂΜL (ref 0–0.61)
EOSINOPHIL NFR BLD AUTO: 0 % (ref 0–6)
ERYTHROCYTE [DISTWIDTH] IN BLOOD BY AUTOMATED COUNT: 14.9 % (ref 11.6–15.1)
FLUAV RNA RESP QL NAA+PROBE: NEGATIVE
FLUBV RNA RESP QL NAA+PROBE: NEGATIVE
GFR SERPL CREATININE-BSD FRML MDRD: 38 ML/MIN/1.73SQ M
GLUCOSE SERPL-MCNC: 115 MG/DL (ref 65–140)
GLUCOSE UR STRIP-MCNC: NEGATIVE MG/DL
HCT VFR BLD AUTO: 34.3 % (ref 36.5–49.3)
HGB BLD-MCNC: 10.6 G/DL (ref 12–17)
HGB UR QL STRIP.AUTO: ABNORMAL
IMM GRANULOCYTES # BLD AUTO: 0.09 THOUSAND/UL (ref 0–0.2)
IMM GRANULOCYTES NFR BLD AUTO: 1 % (ref 0–2)
INR PPP: 1.69 (ref 0.85–1.19)
KETONES UR STRIP-MCNC: NEGATIVE MG/DL
LACTATE SERPL-SCNC: 1 MMOL/L (ref 0.5–2)
LEUKOCYTE ESTERASE UR QL STRIP: ABNORMAL
LIPASE SERPL-CCNC: 43 U/L (ref 11–82)
LYMPHOCYTES # BLD AUTO: 0.87 THOUSANDS/ÂΜL (ref 0.6–4.47)
LYMPHOCYTES NFR BLD AUTO: 8 % (ref 14–44)
MAGNESIUM SERPL-MCNC: 2.3 MG/DL (ref 1.9–2.7)
MCH RBC QN AUTO: 28.2 PG (ref 26.8–34.3)
MCHC RBC AUTO-ENTMCNC: 30.9 G/DL (ref 31.4–37.4)
MCV RBC AUTO: 91 FL (ref 82–98)
MONOCYTES # BLD AUTO: 0.79 THOUSAND/ÂΜL (ref 0.17–1.22)
MONOCYTES NFR BLD AUTO: 8 % (ref 4–12)
MUCOUS THREADS UR QL AUTO: ABNORMAL
NEUTROPHILS # BLD AUTO: 8.54 THOUSANDS/ÂΜL (ref 1.85–7.62)
NEUTS SEG NFR BLD AUTO: 83 % (ref 43–75)
NITRITE UR QL STRIP: NEGATIVE
NON-SQ EPI CELLS URNS QL MICRO: ABNORMAL /HPF
NRBC BLD AUTO-RTO: 0 /100 WBCS
PH UR STRIP.AUTO: 8.5 [PH]
PLATELET # BLD AUTO: 234 THOUSANDS/UL (ref 149–390)
PMV BLD AUTO: 11.3 FL (ref 8.9–12.7)
POTASSIUM SERPL-SCNC: 2.9 MMOL/L (ref 3.5–5.3)
PROCALCITONIN SERPL-MCNC: 0.53 NG/ML
PROT SERPL-MCNC: 6.7 G/DL (ref 6.4–8.4)
PROT UR STRIP-MCNC: ABNORMAL MG/DL
PROTHROMBIN TIME: 19.8 SECONDS (ref 12.3–15)
QRS AXIS: -53 DEGREES
QRSD INTERVAL: 146 MS
QT INTERVAL: 422 MS
QTC INTERVAL: 521 MS
RBC # BLD AUTO: 3.76 MILLION/UL (ref 3.88–5.62)
RBC #/AREA URNS AUTO: ABNORMAL /HPF
RSV RNA RESP QL NAA+PROBE: NEGATIVE
SARS-COV-2 RNA RESP QL NAA+PROBE: NEGATIVE
SODIUM SERPL-SCNC: 137 MMOL/L (ref 135–147)
SP GR UR STRIP.AUTO: >=1.05 (ref 1–1.03)
T WAVE AXIS: -4 DEGREES
TRI-PHOS CRY URNS QL MICRO: ABNORMAL /HPF
UROBILINOGEN UR STRIP-ACNC: <2 MG/DL
VENTRICULAR RATE: 92 BPM
WBC # BLD AUTO: 10.34 THOUSAND/UL (ref 4.31–10.16)
WBC #/AREA URNS AUTO: ABNORMAL /HPF

## 2025-05-26 PROCEDURE — 87086 URINE CULTURE/COLONY COUNT: CPT

## 2025-05-26 PROCEDURE — 85379 FIBRIN DEGRADATION QUANT: CPT | Performed by: EMERGENCY MEDICINE

## 2025-05-26 PROCEDURE — 83690 ASSAY OF LIPASE: CPT | Performed by: EMERGENCY MEDICINE

## 2025-05-26 PROCEDURE — 83605 ASSAY OF LACTIC ACID: CPT | Performed by: EMERGENCY MEDICINE

## 2025-05-26 PROCEDURE — 83880 ASSAY OF NATRIURETIC PEPTIDE: CPT | Performed by: EMERGENCY MEDICINE

## 2025-05-26 PROCEDURE — 80053 COMPREHEN METABOLIC PANEL: CPT | Performed by: EMERGENCY MEDICINE

## 2025-05-26 PROCEDURE — 93010 ELECTROCARDIOGRAM REPORT: CPT | Performed by: INTERNAL MEDICINE

## 2025-05-26 PROCEDURE — 71046 X-RAY EXAM CHEST 2 VIEWS: CPT

## 2025-05-26 PROCEDURE — 99285 EMERGENCY DEPT VISIT HI MDM: CPT

## 2025-05-26 PROCEDURE — 74177 CT ABD & PELVIS W/CONTRAST: CPT

## 2025-05-26 PROCEDURE — 96365 THER/PROPH/DIAG IV INF INIT: CPT

## 2025-05-26 PROCEDURE — 85610 PROTHROMBIN TIME: CPT | Performed by: EMERGENCY MEDICINE

## 2025-05-26 PROCEDURE — 81001 URINALYSIS AUTO W/SCOPE: CPT | Performed by: EMERGENCY MEDICINE

## 2025-05-26 PROCEDURE — 84145 PROCALCITONIN (PCT): CPT | Performed by: EMERGENCY MEDICINE

## 2025-05-26 PROCEDURE — 87205 SMEAR GRAM STAIN: CPT | Performed by: HOSPITALIST

## 2025-05-26 PROCEDURE — 87040 BLOOD CULTURE FOR BACTERIA: CPT | Performed by: EMERGENCY MEDICINE

## 2025-05-26 PROCEDURE — 87449 NOS EACH ORGANISM AG IA: CPT | Performed by: HOSPITALIST

## 2025-05-26 PROCEDURE — 94762 N-INVAS EAR/PLS OXIMTRY CONT: CPT

## 2025-05-26 PROCEDURE — 93005 ELECTROCARDIOGRAM TRACING: CPT

## 2025-05-26 PROCEDURE — 0241U HB NFCT DS VIR RESP RNA 4 TRGT: CPT | Performed by: EMERGENCY MEDICINE

## 2025-05-26 PROCEDURE — 71275 CT ANGIOGRAPHY CHEST: CPT

## 2025-05-26 PROCEDURE — 84484 ASSAY OF TROPONIN QUANT: CPT | Performed by: EMERGENCY MEDICINE

## 2025-05-26 PROCEDURE — 99223 1ST HOSP IP/OBS HIGH 75: CPT | Performed by: HOSPITALIST

## 2025-05-26 PROCEDURE — 36415 COLL VENOUS BLD VENIPUNCTURE: CPT | Performed by: EMERGENCY MEDICINE

## 2025-05-26 PROCEDURE — 85730 THROMBOPLASTIN TIME PARTIAL: CPT | Performed by: EMERGENCY MEDICINE

## 2025-05-26 PROCEDURE — 99291 CRITICAL CARE FIRST HOUR: CPT | Performed by: EMERGENCY MEDICINE

## 2025-05-26 PROCEDURE — 83735 ASSAY OF MAGNESIUM: CPT | Performed by: HOSPITALIST

## 2025-05-26 PROCEDURE — 85025 COMPLETE CBC W/AUTO DIFF WBC: CPT | Performed by: EMERGENCY MEDICINE

## 2025-05-26 RX ORDER — METOPROLOL SUCCINATE 25 MG/1
25 TABLET, EXTENDED RELEASE ORAL DAILY
Status: DISCONTINUED | OUTPATIENT
Start: 2025-05-27 | End: 2025-05-30 | Stop reason: HOSPADM

## 2025-05-26 RX ORDER — GUAIFENESIN 600 MG/1
600 TABLET, EXTENDED RELEASE ORAL 2 TIMES DAILY
Status: DISCONTINUED | OUTPATIENT
Start: 2025-05-26 | End: 2025-05-30 | Stop reason: HOSPADM

## 2025-05-26 RX ORDER — POTASSIUM CHLORIDE 1500 MG/1
40 TABLET, EXTENDED RELEASE ORAL EVERY 4 HOURS
Status: COMPLETED | OUTPATIENT
Start: 2025-05-26 | End: 2025-05-27

## 2025-05-26 RX ORDER — ACETAMINOPHEN 325 MG/1
650 TABLET ORAL EVERY 6 HOURS PRN
Status: DISCONTINUED | OUTPATIENT
Start: 2025-05-26 | End: 2025-05-28

## 2025-05-26 RX ORDER — FUROSEMIDE 20 MG/1
20 TABLET ORAL DAILY
Status: DISCONTINUED | OUTPATIENT
Start: 2025-05-27 | End: 2025-05-29

## 2025-05-26 RX ORDER — WARFARIN SODIUM 2 MG/1
2 TABLET ORAL
Status: DISCONTINUED | OUTPATIENT
Start: 2025-05-27 | End: 2025-05-30 | Stop reason: HOSPADM

## 2025-05-26 RX ORDER — WARFARIN SODIUM 3 MG/1
3 TABLET ORAL
Status: COMPLETED | OUTPATIENT
Start: 2025-05-26 | End: 2025-05-26

## 2025-05-26 RX ORDER — POTASSIUM CHLORIDE 1500 MG/1
40 TABLET, EXTENDED RELEASE ORAL ONCE
Status: COMPLETED | OUTPATIENT
Start: 2025-05-26 | End: 2025-05-26

## 2025-05-26 RX ORDER — ALLOPURINOL 100 MG/1
100 TABLET ORAL DAILY
Status: DISCONTINUED | OUTPATIENT
Start: 2025-05-27 | End: 2025-05-30 | Stop reason: HOSPADM

## 2025-05-26 RX ORDER — WARFARIN SODIUM 5 MG/1
5 TABLET ORAL DAILY
Status: DISCONTINUED | OUTPATIENT
Start: 2025-05-26 | End: 2025-05-26

## 2025-05-26 RX ORDER — ATORVASTATIN CALCIUM 20 MG/1
20 TABLET, FILM COATED ORAL
Status: DISCONTINUED | OUTPATIENT
Start: 2025-05-27 | End: 2025-05-30 | Stop reason: HOSPADM

## 2025-05-26 RX ADMIN — GUAIFENESIN 600 MG: 600 TABLET ORAL at 22:08

## 2025-05-26 RX ADMIN — CEFTRIAXONE 1000 MG: 10 INJECTION, POWDER, FOR SOLUTION INTRAVENOUS at 18:25

## 2025-05-26 RX ADMIN — POTASSIUM CHLORIDE 40 MEQ: 1500 TABLET, EXTENDED RELEASE ORAL at 22:08

## 2025-05-26 RX ADMIN — POTASSIUM CHLORIDE 40 MEQ: 1500 TABLET, EXTENDED RELEASE ORAL at 17:20

## 2025-05-26 RX ADMIN — WARFARIN SODIUM 3 MG: 3 TABLET ORAL at 22:24

## 2025-05-26 RX ADMIN — DOXYCYCLINE 100 MG: 100 INJECTION, POWDER, LYOPHILIZED, FOR SOLUTION INTRAVENOUS at 22:08

## 2025-05-26 RX ADMIN — IOHEXOL 100 ML: 350 INJECTION, SOLUTION INTRAVENOUS at 17:56

## 2025-05-26 NOTE — ED PROVIDER NOTES
Time reflects when diagnosis was documented in both MDM as applicable and the Disposition within this note       Time User Action Codes Description Comment    5/26/2025  5:12 PM Bertha Knott Add [E87.6] Hypokalemia     5/26/2025  5:12 PM Bertha Knott Add [R79.89] Elevated LFTs     5/26/2025  5:45 PM Bertha Knott Add [R79.89] Elevated troponin     5/26/2025  6:32 PM NikosBertha Add [R09.02] Hypoxia     5/26/2025  6:32 PM Bertha Knott Add [D64.9] Anemia           ED Disposition       ED Disposition   Admit    Condition   Stable    Date/Time   Mon May 26, 2025  6:32 PM    Comment   Case was discussed with WARD and the patient's admission status was agreed to be Admission Status: inpatient status to the service of Dr. Kaur .               Assessment & Plan       Medical Decision Making  73 yo M with fever, cough, back pain, abdominal pain- EKG to r/o STEMI/dysrhythmia/abn intervals/ischemic changes, troponin to eval for ischemia, CBC to assess for leukocytosis/anemia, CMP to assess for elevated LFTs/VARGHESE/electrolyte derangements, CXR to r/o PTX/PNA/effusion/CHF, lipase to r/o pancreatitis, UA to r/o UTI, COVID/Flu/RSV testing, ddimer to r/o PE, CT A/P to r/o intra-abdominal pathology, dispo pending workup    Hypoxic in ER- required 2L NC    Critical Care Time Statement: Upon my evaluation, this patient had a high probability of imminent or life-threatening deterioration due to hypoxia, which required my direct attention, intervention, and personal management.  I spent a total of 31 minutes directly providing critical care services, including interpretation of complex medical databases, evaluating for the presence of life-threatening injuries or illnesses, management of organ system failure(s) , complex medical decision making (to support/prevent further life-threatening deterioration)., and interpretation of hemodynamic data, supplemental O2, frequent reassessments. This time is exclusive of procedures, teaching,  treating other patients, family meetings, and any prior time recorded by providers other than myself.           Problems Addressed:  Anemia: chronic illness or injury  Elevated LFTs: acute illness or injury  Elevated troponin: acute illness or injury  Hypokalemia: acute illness or injury  Hypoxia: acute illness or injury    Amount and/or Complexity of Data Reviewed  External Data Reviewed: labs, radiology, ECG and notes.  Labs: ordered. Decision-making details documented in ED Course.  Radiology: ordered and independent interpretation performed. Decision-making details documented in ED Course.  ECG/medicine tests: ordered and independent interpretation performed. Decision-making details documented in ED Course.    Risk  Prescription drug management.  Decision regarding hospitalization.        ED Course as of 05/26/25 1843   Mon May 26, 2025   1645 EKG independently interpreted by myself:  Afib with LBBB, LAD, qtc 521, nonspecific st changes   1656 Hemoglobin(!): 10.6  12.4 3 months ago   1656 WBC(!): 10.34   1705 POCT INR(!): 1.69   1709 Potassium(!): 2.9   1709 Creatinine(!): 1.72  1.57- 1.66 prior   1710 AST(!): 121   1710 ALT(!): 63   1710 ALK PHOS(!): 209   1712 BNP(!): 556  Exact same as prior 1 year ago   1713 hs TnI 0hr(!): 79   1742 CXR independently interpreted by myself: R lung diffuse patchy infiltrates, multifocal pna vs. Edema, going for CT now to better evalute   1819 SpO2(!)(S): 88 %   1820 Pt updated regarding results so far and need for admission, he is agreeable       Medications   ceftriaxone (ROCEPHIN) 1 g/50 mL in dextrose IVPB (1,000 mg Intravenous New Bag 5/26/25 1825)   potassium chloride (Klor-Con M20) CR tablet 40 mEq (40 mEq Oral Given 5/26/25 1720)   iohexol (OMNIPAQUE) 350 MG/ML injection (MULTI-DOSE) 100 mL (100 mL Intravenous Given 5/26/25 1756)       ED Risk Strat Scores   HEART Risk Score      Flowsheet Row Most Recent Value   Heart Score Risk Calculator    History 0 Filed at:  05/26/2025 1736   ECG 1 Filed at: 05/26/2025 1736   Age 2 Filed at: 05/26/2025 1736   Risk Factors 2 Filed at: 05/26/2025 1736   Troponin 2 Filed at: 05/26/2025 1736   HEART Score 7 Filed at: 05/26/2025 1736          HEART Risk Score      Flowsheet Row Most Recent Value   Heart Score Risk Calculator    History 0 Filed at: 05/26/2025 1736   ECG 1 Filed at: 05/26/2025 1736   Age 2 Filed at: 05/26/2025 1736   Risk Factors 2 Filed at: 05/26/2025 1736   Troponin 2 Filed at: 05/26/2025 1736   HEART Score 7 Filed at: 05/26/2025 1736                      (ISAR) Identification of Seniors at Risk  Before the illness or injury that brought you to the Emergency, did you need someone to help you on a regular basis?: 0  In the last 24 hours, have you needed more help than usual?: 0  Have you been hospitalized for one or more nights during the past 6 months?: 0  In general, do you see well?: 1  In general, do you have serious problems with your memory?: 0  Do you take more than three different medications every day?: 0  ISAR Score: 1            SBIRT 22yo+      Flowsheet Row Most Recent Value   Initial Alcohol Screen: US AUDIT-C     1. How often do you have a drink containing alcohol? 0 Filed at: 05/26/2025 1620   2. How many drinks containing alcohol do you have on a typical day you are drinking?  0 Filed at: 05/26/2025 1620   3a. Male UNDER 65: How often do you have five or more drinks on one occasion? 0 Filed at: 05/26/2025 1620   3b. FEMALE Any Age, or MALE 65+: How often do you have 4 or more drinks on one occassion? 0 Filed at: 05/26/2025 1620   Audit-C Score 0 Filed at: 05/26/2025 1620   DANIEL: How many times in the past year have you...    Used an illegal drug or used a prescription medication for non-medical reasons? Never Filed at: 05/26/2025 1620                            History of Present Illness       Chief Complaint   Patient presents with    Cough     Pt c/o increasing cough that causes chest pain >2 weeks.  Prescribed amoxicillin by PCP 5/19--still taking.     Loss of Appetite     Loss of appetite sine initiation of antibiotics        Past Medical History[1]   Past Surgical History[2]   Family History[3]   Social History[4]   E-Cigarette/Vaping    E-Cigarette Use Never User       E-Cigarette/Vaping Substances    Nicotine No     THC No     CBD No     Flavoring No     Other No     Unknown No       I have reviewed and agree with the history as documented.     73 yo M h/o HTN, CKD, Afib; presenting for evaluation of several sx.     used to obtain history.   Reports he has been sick for 2 weeks. Cough, occasionally productive of mucus. Lower back pain,   Fevers at home. Decreased appetite and has not eaten in 2 weeks, also states did not take his Warfarin for 2 weeks until yesterday. Occasional abdominal pain.     Denies CP, changes in stool, urinary sx         Per independent review of external records:   - 5/19/25 PCP- diagnosed pt with URI- Rx for Amox 500 q8h, cough meds    Wt Readings from Last 3 Encounters:   05/26/25 96.6 kg (213 lb)   03/25/25 96.6 kg (213 lb)   12/17/24 95.3 kg (210 lb)      4/14/24 ECHO: EF 35%    Review of Systems        Objective       ED Triage Vitals [05/26/25 1618]   Temperature Pulse Blood Pressure Respirations SpO2 Patient Position - Orthostatic VS   98.3 °F (36.8 °C) 71 139/67 22 95 % Lying      Temp Source Heart Rate Source BP Location FiO2 (%) Pain Score    Oral Monitor Right arm -- 5      Vitals      Date and Time Temp Pulse SpO2 Resp BP Pain Score FACES Pain Rating User   05/26/25 1803 -- 93 95 % 22 131/64 -- -- KO   05/26/25 1800 -- 92  88 % Pt SPO2 88-89% on room air w/ rest. 2L NC intiated 24 -- -- -- KO   05/26/25 1630 -- 87 95 % 24 133/64 -- -- KO   05/26/25 1618 98.3 °F (36.8 °C) 71 95 % 22 139/67 5 -- KO            Physical Exam  Vitals and nursing note reviewed.   Constitutional:       General: He is not in acute distress.     Appearance: Normal appearance. He  is well-developed.   HENT:      Head: Normocephalic and atraumatic.      Nose: Nose normal.     Eyes:      Extraocular Movements: Extraocular movements intact.      Conjunctiva/sclera: Conjunctivae normal.       Cardiovascular:      Rate and Rhythm: Normal rate. Rhythm irregular.      Heart sounds: Normal heart sounds.   Pulmonary:      Effort: Pulmonary effort is normal. No respiratory distress.      Breath sounds: Normal breath sounds. No stridor. No wheezing or rales.   Chest:      Chest wall: No tenderness.   Abdominal:      General: There is no distension.      Palpations: Abdomen is soft.      Tenderness: There is abdominal tenderness. There is no guarding or rebound.     Musculoskeletal:         General: No swelling, tenderness or deformity.      Cervical back: Normal range of motion and neck supple.     Skin:     General: Skin is warm and dry.      Findings: No rash.     Neurological:      General: No focal deficit present.      Mental Status: He is alert and oriented to person, place, and time.      Motor: No abnormal muscle tone.      Coordination: Coordination normal.     Psychiatric:         Thought Content: Thought content normal.         Judgment: Judgment normal.         Results Reviewed       Procedure Component Value Units Date/Time    HS Troponin I 4hr [[id number]]     Lab Status: No result Specimen: Blood     HS Troponin I 2hr [[id number]] Collected: 05/26/25 1842    Lab Status: No result Specimen: Blood from Arm, Left     FLU/RSV/COVID - if FLU/RSV clinically relevant (2hr TAT) [[id number]]  (Normal) Collected: 05/26/25 1643    Lab Status: Final result Specimen: Nares from Nose Updated: 05/26/25 5272     SARS-CoV-2 Negative     INFLUENZA A PCR Negative     INFLUENZA B PCR Negative     RSV PCR Negative    Narrative:      This test has been performed using the CoV-2/Flu/RSV plus assay on the [employer] GeneXpert platform. This test has been validated by the  and verified by the performing  laboratory.     This test is designed to amplify and detect the following: nucleocapsid (N), envelope (E), and RNA-dependent RNA polymerase (RdRP) genes of the SARS-CoV-2 genome; matrix (M), basic polymerase (PB2), and acidic protein (PA) segments of the influenza A genome; matrix (M) and non-structural protein (NS) segments of the influenza B genome, and the nucleocapsid genes of RSV A and RSV B.     Positive results are indicative of the presence of Flu A, Flu B, RSV, and/or SARS-CoV-2 RNA. Positive results for SARS-CoV-2 or suspected novel influenza should be reported to state, local, or federal health departments according to local reporting requirements.      All results should be assessed in conjunction with clinical presentation and other laboratory markers for clinical management.     FOR PEDIATRIC PATIENTS - copy/paste COVID Guidelines URL to browser: [email or web address]       D-dimer, quantitative [[id number]]  (Abnormal) Collected: 05/26/25 1650    Lab Status: Final result Specimen: Blood from Arm, Left Updated: 05/26/25 1723     D-Dimer, Quant 3.62 ug/ml FEU     Narrative:      In the evaluation for possible pulmonary embolism, in the appropriate (Well's Score of 4 or less) patient, the age adjusted d-dimer cutoff for this patient can be calculated as:    Age x 0.01 (in ug/mL) for Age-adjusted D-dimer exclusion threshold for a patient over 50 years.    Procalcitonin [[id number]]  (Abnormal) Collected: 05/26/25 1643    Lab Status: Final result Specimen: Blood from Arm, Left Updated: 05/26/25 1716     Procalcitonin 0.53 ng/ml     HS Troponin 0hr (reflex protocol) [[id number]]  (Abnormal) Collected: 05/26/25 1643    Lab Status: Final result Specimen: Blood from Arm, Left Updated: 05/26/25 1713     hs TnI 0hr 79 ng/L     B-Type Natriuretic Peptide(BNP) [[id number]]  (Abnormal) Collected: 05/26/25 1643    Lab Status: Final result Specimen: Blood from Arm,  Left Updated: 05/26/25 1712      pg/mL     Lactic acid [[id number]]  (Normal) Collected: 05/26/25 1643    Lab Status: Final result Specimen: Blood from Arm, Left Updated: 05/26/25 1707     LACTIC ACID 1.0 mmol/L     Narrative:      Result may be elevated if tourniquet was used during collection.    Comprehensive metabolic panel [[id number]]  (Abnormal) Collected: 05/26/25 1643    Lab Status: Final result Specimen: Blood from Arm, Left Updated: 05/26/25 1706     Sodium 137 mmol/L      Potassium 2.9 mmol/L      Chloride 105 mmol/L      CO2 23 mmol/L      ANION GAP 9 mmol/L      BUN 30 mg/dL      Creatinine 1.72 mg/dL      Glucose 115 mg/dL      Calcium 8.5 mg/dL      Corrected Calcium 9.1 mg/dL       U/L      ALT 63 U/L      Alkaline Phosphatase 209 U/L      Total Protein 6.7 g/dL      Albumin 3.2 g/dL      Total Bilirubin 1.56 mg/dL      eGFR 38 ml/min/1.73sq m     Narrative:      National Kid[employer] guidelines for Chronic Kidney Disease (CKD):     Stage 1 with normal or high GFR (GFR > 90 mL/min/1.73 square meters)    Stage 2 Mild CKD (GFR = 60-89 mL/min/1.73 square meters)    Stage 3A Moderate CKD (GFR = 45-59 mL/min/1.73 square meters)    Stage 3B Moderate CKD (GFR = 30-44 mL/min/1.73 square meters)    Stage 4 Severe CKD (GFR = 15-29 mL/min/1.73 square meters)    Stage 5 End Stage CKD (GFR <15 mL/min/1.73 square meters)  Note: GFR calculation is accurate only with a steady state creatinine    Lipase [[id number]]  (Normal) Collected: 05/26/25 1643    Lab Status: Final result Specimen: Blood from Arm, Left Updated: 05/26/25 1706     Lipase 43 u/L     Protime-INR [[id number]]  (Abnormal) Collected: 05/26/25 1643    Lab Status: Final result Specimen: Blood from Arm, Left Updated: 05/26/25 1703     Protime 19.8 seconds      INR 1.69    Narrative:      INR Therapeutic Range    Indication                                             INR Range      Atrial Fibrillation                                                2.0-3.0  Hypercoagulable State                                    2.0.2.3  Left Ventricular Asist Device                            2.0-3.0  Mechanical Heart Valve                                  -    Aortic(with afib, MI, embolism, HF, LA enlargement,    and/or coagulopathy)                                     2.0-3.0 (2.5-3.5)     Mitral                                                             2.5-3.5  Prosthetic/Bioprosthetic Heart Valve               2.0-3.0  Venous thromboembolism (VTE: VT, PE        2.0-3.0    APTT [[id number]]  (Abnormal) Collected: 05/26/25 1643    Lab Status: Final result Specimen: Blood from Arm, Left Updated: 05/26/25 1703     PTT 43 seconds     Blood culture #1 [[id number]] Collected: 05/26/25 1643    Lab Status: In process Specimen: Blood from Arm, Right Updated: 05/26/25 1653    CBC and differential [[id number]]  (Abnormal) Collected: 05/26/25 1643    Lab Status: Final result Specimen: Blood from Arm, Left Updated: 05/26/25 1651     WBC 10.34 Thousand/uL      RBC 3.76 Million/uL      Hemoglobin 10.6 g/dL      Hematocrit 34.3 %      MCV 91 fL      MCH 28.2 pg      MCHC 30.9 g/dL      RDW 14.9 %      MPV 11.3 fL      Platelets 234 Thousands/uL      nRBC 0 /100 WBCs      Segmented % 83 %      Immature Grans % 1 %      Lymphocytes % 8 %      Monocytes % 8 %      Eosinophils Relative 0 %      Basophils Relative 0 %      Absolute Neutrophils 8.54 Thousands/µL      Absolute Immature Grans 0.09 Thousand/uL      Absolute Lymphocytes 0.87 Thousands/µL      Absolute Monocytes 0.79 Thousand/µL      Eosinophils Absolute 0.04 Thousand/µL      Basophils Absolute 0.01 Thousands/µL     Blood culture #2 [[id number]] Collected: 05/26/25 1643    Lab Status: In process Specimen: Blood from Arm, Left Updated: 05/26/25 1648    UA w Reflex to Microscopic w Reflex to Culture [[id number]]     Lab Status: No result Specimen: Urine             XR chest 2 views    (Results Pending)   CT pe study w  abdomen pelvis w contrast    (Results Pending)       Procedures    ED Medication and Procedure Management   Prior to Admission Medications   Prescriptions Last Dose Informant Patient Reported? Taking?   Diclofenac Sodium (VOLTAREN) 1 %   No No   Sig: Apply 2 g topically 4 (four) times a day   Sodium Chloride Flush (Normal Saline Flush) 0.9 % SOLN  Self Yes No   Sig: Inject 10 mL into a catheter in a vein daily. Flush before and after antibiotic administration  Indications: PICC flush   allopurinol (ZYLOPRIM) 100 mg tablet   No No   Sig: Take 1 tablet (100 mg total) by mouth daily   amiodarone 200 mg tablet   No No   Sig: Take 1 tablet (200 mg total) by mouth daily   amoxicillin (AMOXIL) 500 mg capsule   No No   Sig: Take 1 capsule (500 mg total) by mouth every 8 (eight) hours for 10 days   atorvastatin (LIPITOR) 20 mg tablet   No No   Sig: Take 1 tablet (20 mg total) by mouth daily Please STOP Pravastatin..   dextromethorphan-guaiFENesin (ROBITUSSIN DM)  mg/5 mL syrup   No No   Sig: Take 5 mL by mouth 3 (three) times a day as needed for congestion or cough   diclofenac sodium (VOLTAREN) 50 mg EC tablet   No No   Sig: TAKE 1 TABLET(50 MG) BY MOUTH TWICE DAILY WITH MEALS   ergocalciferol (VITAMIN D2) 50,000 units   No No   Sig: Take 1 capsule (50,000 Units total) by mouth once a week   furosemide (Lasix) 20 mg tablet   No No   Sig: Take 1 tablet (20 mg total) by mouth daily   ketoconazole (NIZORAL) 2 % shampoo  Self No No   Sig: Apply 1 application topically 3 (three) times a week   lidocaine (Lidoderm) 5 %   No No   Sig: Apply 1 patch topically over 12 hours daily for 15 days Remove & Discard patch within 12 hours or as directed by MD   linaCLOtide 290 MCG CAPS   No No   Sig: Take 1 capsule by mouth in the morning   losartan (COZAAR) 50 mg tablet   No No   Sig: Take 1 tablet (50 mg total) by mouth daily   metoprolol succinate (Toprol XL) 25 mg 24 hr tablet   No No   Sig: Take 1 tablet (25 mg total) by mouth  daily   warfarin (COUMADIN) 5 mg tablet  Self No No   Sig: Take 1 tablet (5 mg total) by mouth daily      Facility-Administered Medications: None     Patient's Medications   Discharge Prescriptions    No medications on file     No discharge procedures on file.  ED SEPSIS DOCUMENTATION   Time reflects when diagnosis was documented in both MDM as applicable and the Disposition within this note       Time User Action Codes Description Comment    5/26/2025  5:12 PM Bertha Knott Add [E87.6] Hypokalemia     5/26/2025  5:12 PM Bertha Knott Add [R79.89] Elevated LFTs     5/26/2025  5:45 PM Bertha Knott Add [R79.89] Elevated troponin     5/26/2025  6:32 PM Bertha Knott Add [R09.02] Hypoxia     5/26/2025  6:32 PM Bertha Knott Add [D64.9] Anemia            Initial Sepsis Screening       Row Name 05/26/25 1833                Is the patient's history suggestive of a new or worsening infection? Yes (Proceed)  -GISELLE        Suspected source of infection pneumonia  -KH        Indicate SIRS criteria Tachycardia > 90 bpm;Tachypnea > 20 resp per min  -KH        Are two or more of the above signs & symptoms of infection both present and new to the patient? Yes (Proceed)  -KH        Assess for evidence of organ dysfunction: Are any of the below criteria present within 6 hours of suspected infection and SIRS criteria that are NOT considered to be chronic conditions? --                  User Key  (r) = Recorded By, (t) = Taken By, (c) = Cosigned By      Initials Name Provider Type     Bertha Knott DO Physician                           [1]   Past Medical History:  Diagnosis Date    Anemia 3/12/2013    Atrial fibrillation (HCC)     Coagulopathy (HCC) 4/6/2024    Coronary artery disease     GERD (gastroesophageal reflux disease)     Hyperlipidemia associated with type 2 diabetes mellitus  (HCC) 5/4/2021    Hypertension     Obesity     Thyroid nodule 4/6/2024   [2]   Past Surgical History:  Procedure Laterality Date    CARDIAC PACEMAKER  PLACEMENT  12/2023    replacement    CORONARY ARTERY BYPASS GRAFT      MULTIPLE TOOTH EXTRACTIONS Bilateral 4/12/2024    Procedure: EXTRACTION TOOTH #8, 17, 18 SURGICAL;  Surgeon: Hong Maki DMD;  Location: [facility];  Service: Maxillofacial   [3]   Family History  Problem Relation Name Age of Onset    Hypertension Mother      Diabetes Mother      No Known Problems Father     [4]   Social History  Tobacco Use    Smoking status: Never    Smokeless tobacco: Never    Tobacco comments:     No passive smoke exposure   Vaping Use    Vaping status: Never Used   Substance Use Topics    Alcohol use: Not Currently    Drug use: No        Bertha Knott DO  05/26/25 [id number]

## 2025-05-26 NOTE — H&P
H&P - Hospitalist   Name: Silvio Drew 74 y.o. male I MRN: [id number]  Unit/Bed#: ED-27 I Date of Admission: 5/26/2025   Date of Service: 5/26/2025 I Hospital Day: 0     Assessment & Plan  Hyperlipidemia  Restart statin  Stage 3b chronic kidney disease (HCC)  Lab Results   Component Value Date    EGFR 38 05/26/2025    EGFR 41 01/29/2025    EGFR 43 07/22/2024    CREATININE 1.72 (H) 05/26/2025    CREATININE 1.63 (H) 01/29/2025    CREATININE 1.57 (H) 07/22/2024     Hypokalemia  Replace and recheck in a.m.  Check Mg  Elevated LFTs  ? If secondary to congestion  Repeat labs in a.m.  Consider RUQ US if not improving  Lasix ordered per home regimen  Hyperbilirubinemia  ? If secondary to congestion  Repeat labs in a.m.  Consider RUQ US if not improving  Lasix ordered per home regimen  Anemia  Mildly decreased from baseline, suspect within lab variability, repeat in a.m.    Prolonged QT interval  Avoid QT prolonging medications  A-fib (HCC)  Restart toprol xl per cardiology prior recommendations  He hasn't been taking his home meds and last amiodarone was from 12/24---asked to have family bring his meds in  Cont coumadin with INR goal 2-3  Primary hypertension  Restart Metoprolol XL for HTN and rate control per prior cardiology recommendation--last script 3/25/25  Atherosclerosis of native artery of both lower extremities with intermittent claudication (HCC)  Cont statin and coumadin--no added antiplatelets assumed due to prior bleeding episodes with hx of anemia, GI bleed and epistaxis    Pneumonia of right lung due to infectious organism  Sputum cx, urinary antigens  Rocephin and Doxycycline  Continuous pulse ox  IS      VTE Pharmacologic Prophylaxis:   High Risk (Score >/= 5) - Pharmacological DVT Prophylaxis Contraindicated. Sequential Compression Devices Ordered.  Code Status:   Discussion with family: Updated  (sister) via phone.    Anticipated Length of Stay: Patient will be admitted on an inpatient basis  with an anticipated length of stay of greater than 2 midnights secondary to pneumonia.    History of Present Illness   Chief Complaint: cough, sob and chest pain    Silvio Drew is a 74 y.o. male with a PMH of Afib on coumadin, anemia, CAD, GERD, DM2, HTN, hyperlipidemia, obesity,  who presents with cough, SOB, chest pain.    Patient has felt ill for at least 5 days. He has cough, fever, chest pain. His PCP called in amoxil for him and he felt a little better for 2 days and then the cough worsened. He has back pain and chest pain. He says that it comes on when he coughs. He gets a coughing fit and gets SOB and then has tightness in the chest from the lower neck on the right side all the way down to his stomach. He has been getting sharp ain in his lower back after taking about 2 steps and when he coughs or urinates. He developed pain with urination 2 days ago and has noted decreased urine output.    Review of Systems   Constitutional:  Positive for activity change, appetite change, chills, fatigue and fever. Negative for diaphoresis and unexpected weight change.   HENT:  Negative for congestion, ear pain, nosebleeds, rhinorrhea, sore throat and trouble swallowing.    Eyes:  Positive for visual disturbance.        Poor vision in left eye evaluated by eye doctor previously   Respiratory:  Positive for cough, chest tightness, shortness of breath and wheezing.    Cardiovascular:  Positive for chest pain and leg swelling. Negative for palpitations.   Gastrointestinal:  Negative for abdominal pain, blood in stool, constipation, diarrhea, nausea and vomiting.   Endocrine: Negative for cold intolerance, heat intolerance, polydipsia, polyphagia and polyuria.   Genitourinary:  Positive for decreased urine volume, difficulty urinating and dysuria. Negative for frequency and hematuria.   Musculoskeletal:  Positive for back pain. Negative for myalgias and neck pain.   Skin: Negative.    Allergic/Immunologic: Negative for  environmental allergies and food allergies.   Neurological:  Positive for weakness, light-headedness, numbness and headaches. Negative for dizziness, tremors, seizures, syncope, facial asymmetry and speech difficulty.   Hematological:  Negative for adenopathy. Bruises/bleeds easily.   Psychiatric/Behavioral:  Positive for confusion. Negative for dysphoric mood. The patient is not nervous/anxious.        Historical Information   Past Medical History[1]  Past Surgical History[2]  Social History[3]  E-Cigarette/Vaping    E-Cigarette Use Never User      E-Cigarette/Vaping Substances    Nicotine No     THC No     CBD No     Flavoring No     Other No     Unknown No      Family History[4]  Social History:  Marital Status: Single   Patient Pre-hospital Living Situation: Home  Patient Pre-hospital Level of Mobility: walks with walker or cane      Meds/Allergies   Patient has only been taking his coumadin, no other meds for several days and doesn't know his meds.   Prior to Admission medications    Medication Sig Start Date End Date Taking? Authorizing Provider   allopurinol (ZYLOPRIM) 100 mg tablet Take 1 tablet (100 mg total) by mouth daily 3/25/25   Mac Stephens MD   amiodarone 200 mg tablet Take 1 tablet (200 mg total) by mouth daily 12/17/24   Mac Stephens MD   amoxicillin (AMOXIL) 500 mg capsule Take 1 capsule (500 mg total) by mouth every 8 (eight) hours for 10 days 5/19/25 5/29/25  Mac Stephens MD   atorvastatin (LIPITOR) 20 mg tablet Take 1 tablet (20 mg total) by mouth daily Please STOP Pravastatin.. 3/25/25   Mac Stephens MD   dextromethorphan-guaiFENesin (ROBITUSSIN DM)  mg/5 mL syrup Take 5 mL by mouth 3 (three) times a day as needed for congestion or cough 5/19/25   Mac Stephens MD   Diclofenac Sodium (VOLTAREN) 1 % Apply 2 g topically 4 (four) times a day 9/12/24   Mac Stephens MD   diclofenac sodium (VOLTAREN) 50 mg EC tablet TAKE 1 TABLET(50 MG) BY MOUTH TWICE DAILY WITH MEALS 4/25/25   Mac Stephens MD  "  ergocalciferol (VITAMIN D2) 50,000 units Take 1 capsule (50,000 Units total) by mouth once a week 3/25/25   Mac Stephens MD   furosemide (Lasix) 20 mg tablet Take 1 tablet (20 mg total) by mouth daily 12/17/24   Mac Stephens MD   ketoconazole (NIZORAL) 2 % shampoo Apply 1 application topically 3 (three) times a week 8/2/19   Mac Stephens MD   lidocaine (Lidoderm) 5 % Apply 1 patch topically over 12 hours daily for 15 days Remove & Discard patch within 12 hours or as directed by MD 9/12/24 9/27/24  Mac Stephens MD   linaCLOtide 290 MCG CAPS Take 1 capsule by mouth in the morning 2/13/25   Mac Stephens MD   losartan (COZAAR) 50 mg tablet Take 1 tablet (50 mg total) by mouth daily 3/25/25   Mac Stephens MD   metoprolol succinate (Toprol XL) 25 mg 24 hr tablet Take 1 tablet (25 mg total) by mouth daily 3/25/25   Mac Stephens MD   Sodium Chloride Flush (Normal Saline Flush) 0.9 % SOLN Inject 10 mL into a catheter in a vein daily. Flush before and after antibiotic administration  Indications: PICC flush    Historical Provider, MD   warfarin (COUMADIN) 5 mg tablet Take 1 tablet (5 mg total) by mouth daily 3/6/23   Mac Stephens MD     Allergies   Allergen Reactions    Metoprolol Shortness Of Breath     \"50mg\" via [place] ; states \"When I take the 25mg I'm normal.\"       Objective :  Temp:  [98.3 °F (36.8 °C)] 98.3 °F (36.8 °C)  HR:  [71-93] 93  BP: (131-139)/(64-67) 131/64  Resp:  [22-24] 22  SpO2:  [88 %-95 %] 95 %  O2 Device: Nasal cannula    Physical Exam  Constitutional:       General: He is not in acute distress.     Appearance: Normal appearance. He is not ill-appearing or diaphoretic.   HENT:      Head: Normocephalic and atraumatic.      Mouth/Throat:      Mouth: Mucous membranes are moist.      Pharynx: No oropharyngeal exudate or posterior oropharyngeal erythema.     Eyes:      General: No scleral icterus.     Extraocular Movements: Extraocular movements intact.      Conjunctiva/sclera: Conjunctivae " normal.      Pupils: Pupils are equal, round, and reactive to light.     Neck:      Vascular: No carotid bruit.     Cardiovascular:      Rate and Rhythm: Normal rate and regular rhythm.      Pulses: Normal pulses.      Heart sounds: Normal heart sounds. No murmur heard.     No friction rub. No gallop.   Pulmonary:      Effort: Pulmonary effort is normal. No respiratory distress.      Breath sounds: Rales present. No wheezing.   Abdominal:      General: Abdomen is flat. There is no distension.      Palpations: Abdomen is soft.      Tenderness: There is no abdominal tenderness. There is no guarding or rebound.     Musculoskeletal:         General: No swelling, tenderness, deformity or signs of injury. Normal range of motion.      Cervical back: Neck supple. No rigidity or tenderness.   Lymphadenopathy:      Cervical: No cervical adenopathy.     Skin:     General: Skin is warm and dry.      Coloration: Skin is not jaundiced or pale.      Findings: No bruising, erythema or rash (Full Code).     Neurological:      General: No focal deficit present.      Mental Status: He is alert and oriented to person, place, and time.      Cranial Nerves: No cranial nerve deficit.      Sensory: No sensory deficit.      Motor: Weakness present.      Coordination: Coordination normal.     Psychiatric:         Mood and Affect: Mood normal.         Behavior: Behavior normal.         Thought Content: Thought content normal.         Judgment: Judgment normal.                 Lab Results: I have reviewed the following results:  Results from last 7 days   Lab Units 05/26/25  1643   WBC Thousand/uL 10.34*   HEMOGLOBIN g/dL 10.6*   HEMATOCRIT % 34.3*   PLATELETS Thousands/uL 234   SEGS PCT % 83*   LYMPHO PCT % 8*   MONO PCT % 8   EOS PCT % 0     Results from last 7 days   Lab Units 05/26/25  1643   SODIUM mmol/L 137   POTASSIUM mmol/L 2.9*   CHLORIDE mmol/L 105   CO2 mmol/L 23   BUN mg/dL 30*   CREATININE mg/dL 1.72*   ANION GAP mmol/L 9    CALCIUM mg/dL 8.5   ALBUMIN g/dL 3.2*   TOTAL BILIRUBIN mg/dL 1.56*   ALK PHOS U/L 209*   ALT U/L 63*   AST U/L 121*   GLUCOSE RANDOM mg/dL 115     Results from last 7 days   Lab Units 05/26/25  1643   INR  1.69*         Lab Results   Component Value Date    HGBA1C 5.6 03/25/2025    HGBA1C 5.7 12/17/2024    HGBA1C 5.8 09/12/2024     Results from last 7 days   Lab Units 05/26/25  1643   LACTIC ACID mmol/L 1.0   PROCALCITONIN ng/ml 0.53*       Imaging Results Review: I personally reviewed the following image studies in PACS and associated radiology reports: chest xray and CT chest. My interpretation of the radiology images/reports is: diffuse patchy infiltrates on right side.    CT PULMONARY ANGIOGRAM OF THE CHEST AND CT ABDOMEN AND PELVIS WITH INTRAVENOUS CONTRAST     INDICATION: SOB, cough, elevated ddimer, abdominal pain/elevated lfts.     COMPARISON: CT chest abdomen pelvis dated 4/6/2024     TECHNIQUE: CT examination of the chest, abdomen and pelvis was performed. Thin section CT angiographic technique was used in the chest in order to evaluate for pulmonary embolus and coronal 3D MIP postprocessing was performed on the acquisition scanner.   Multiplanar 2D reformatted images were created from the source data.     This examination, like all CT scans performed in the [facility], was performed utilizing techniques to minimize radiation dose exposure, including the use of iterative reconstruction and automated exposure control. Radiation dose length   product (DLP) for this visit: 1647 mGy-cm.     IV Contrast: 100 mL of iohexol (OMNIPAQUE)  Enteric Contrast: Not administered.     FINDINGS:     CHEST     PULMONARY ARTERIAL TREE: No pulmonary embolus.  Enlargement of central pulmonary arterial tree which can be associated with pulmonary hypertension.     LUNGS: There are patchy areas of consolidation involving the right upper, middle and lower lobes suspicious for an infectious or inflammatory  process. There is trace right pleural fluid. The left lung is clear. No pneumothorax. There is mild biapical   scarring. No tracheal or endobronchial lesion.     PLEURA: Unremarkable.     HEART/AORTA: There is mild to moderate cardiomegaly with no pericardial effusion. Status post CABG and mitral valve replacement. No thoracic aortic aneurysm.     MEDIASTINUM AND EDWARD: Unremarkable.     CHEST WALL AND LOWER NECK: A left anterior chest wall cardiac pacer is noted. Status post median sternotomy     ABDOMEN     LIVER/BILIARY TREE: There is a stable 15 mm hypodense lesion noted at the right hepatic lobe, series 2, image 303, described on a prior MRI abdomen dated 9/3/2024. There is mild nodularity of the liver surface contour suggestive of chronic cirrhotic   changes.     GALLBLADDER: Post cholecystectomy.     SPLEEN: Unremarkable.     PANCREAS: Unremarkable.     ADRENAL GLANDS: Unremarkable.     KIDNEYS/URETERS: Bilateral simple renal cysts the largest in the left lower pole measuring 6.8 cm. No hydronephrosis on either side. Bilateral renal cortical scarring is again noted.     STOMACH AND BOWEL: Colonic diverticulosis without findings of acute diverticulitis.     APPENDIX: Normal appendix.     ABDOMINOPELVIC CAVITY: No ascites. No pneumoperitoneum. No lymphadenopathy.     VESSELS: Unremarkable for patient's age.     PELVIS     REPRODUCTIVE ORGANS: Unremarkable for patient's age.     URINARY BLADDER: Multiple small urinary bladder diverticula are noted posteriorly. No evidence of urinary bladder calculi or suspicious lesion.     ABDOMINAL WALL/INGUINAL REGIONS: Unremarkable.     BONES: No acute fracture or suspicious osseous lesion. Spinal degenerative changes. There are severe right hip osteoarthritic degenerative changes.     IMPRESSION:     No intraluminal filling defect to suggest an acute pulmonary embolus.     Multifocal patchy areas of consolidation and groundglass density involving the right upper, middle and  lower lobes suspicious for an infectious or inflammatory process. Correlation with the patient's symptoms is recommended.     No acute intra-abdominal abnormality. No free air or free fluid.     Scattered colonic diverticulosis with no inflammatory changes present to suggest acute diverticulitis.     Stable 15 mm hypodense right hepatic lobe lesion, described on a prior MRI abdomen dated 9/3/2024.     Other Study Results Review: EKG was reviewed.  Afib rate 92, Chronic LBBB, no acute ST-T changes.    Administrative Statements   I have spent a total time of 55 minutes in caring for this patient on the day of the visit/encounter including Diagnostic results, Prognosis, Risks and benefits of tx options, Instructions for management, Patient and family education, Importance of tx compliance, Risk factor reductions, Impressions, Counseling / Coordination of care, Documenting in the medical record, Reviewing/placing orders in the medical record (including tests, medications, and/or procedures), Obtaining or reviewing history  , and Communicating with other healthcare professionals .    ** Please Note: This note has been constructed using a voice recognition system. **         [1]   Past Medical History:  Diagnosis Date    Anemia 3/12/2013    Atrial fibrillation (HCC)     Coagulopathy (HCC) 4/6/2024    Coronary artery disease     GERD (gastroesophageal reflux disease)     Hyperlipidemia associated with type 2 diabetes mellitus  (HCC) 5/4/2021    Hypertension     Obesity     Thyroid nodule 4/6/2024   [2]   Past Surgical History:  Procedure Laterality Date    CARDIAC PACEMAKER PLACEMENT  12/2023    replacement    CORONARY ARTERY BYPASS GRAFT      MULTIPLE TOOTH EXTRACTIONS Bilateral 4/12/2024    Procedure: EXTRACTION TOOTH #8, 17, 18 SURGICAL;  Surgeon: Hong Maki DMD;  Location: [facility];  Service: Maxillofacial   [3]   Social History  Tobacco Use    Smoking status: Never    Smokeless tobacco: Never    Tobacco comments:      No passive smoke exposure   Vaping Use    Vaping status: Never Used   Substance and Sexual Activity    Alcohol use: Not Currently    Drug use: No    Sexual activity: Not Currently   [4]   Family History  Problem Relation Name Age of Onset    Hypertension Mother      Diabetes Mother      No Known Problems Father

## 2025-05-27 ENCOUNTER — APPOINTMENT (INPATIENT)
Dept: NON INVASIVE DIAGNOSTICS | Facility: HOSPITAL | Age: 75
DRG: 194 | End: 2025-05-27
Payer: COMMERCIAL

## 2025-05-27 ENCOUNTER — APPOINTMENT (INPATIENT)
Dept: ULTRASOUND IMAGING | Facility: HOSPITAL | Age: 75
DRG: 194 | End: 2025-05-27
Payer: COMMERCIAL

## 2025-05-27 PROBLEM — I42.8 NONISCHEMIC CARDIOMYOPATHY (HCC): Status: ACTIVE | Noted: 2020-03-02

## 2025-05-27 PROBLEM — M54.16 LUMBAR RADICULOPATHY: Status: ACTIVE | Noted: 2025-05-27

## 2025-05-27 PROBLEM — Z98.890 H/O MITRAL VALVE REPAIR: Status: ACTIVE | Noted: 2025-05-27

## 2025-05-27 PROBLEM — K59.00 CONSTIPATION: Status: ACTIVE | Noted: 2025-05-27

## 2025-05-27 PROBLEM — K21.9 GERD (GASTROESOPHAGEAL REFLUX DISEASE): Status: ACTIVE | Noted: 2025-05-27

## 2025-05-27 PROBLEM — R79.89 ELEVATED TROPONIN: Status: ACTIVE | Noted: 2025-05-27

## 2025-05-27 LAB
ALBUMIN SERPL BCG-MCNC: 3 G/DL (ref 3.5–5)
ALP SERPL-CCNC: 206 U/L (ref 34–104)
ALT SERPL W P-5'-P-CCNC: 72 U/L (ref 7–52)
ANION GAP SERPL CALCULATED.3IONS-SCNC: 9 MMOL/L (ref 4–13)
AORTIC ROOT: 3.6 CM
AORTIC VALVE MEAN VELOCITY: 7.1 M/S
APAP SERPL-MCNC: <2 UG/ML (ref 10–20)
ASCENDING AORTA: 3.9 CM
AST SERPL W P-5'-P-CCNC: 161 U/L (ref 13–39)
AV AREA BY CONTINUOUS VTI: 3.6 CM2
AV AREA PEAK VELOCITY: 4 CM2
AV LVOT MEAN GRADIENT: 1 MMHG
AV LVOT PEAK GRADIENT: 2 MMHG
AV MEAN PRESS GRAD SYS DOP V1V2: 2 MMHG
AV ORIFICE AREA US: 3.61 CM2
AV PEAK GRADIENT: 4 MMHG
AV REGURGITATION PRESSURE HALF TIME: 708 MS
AV VELOCITY RATIO: 0.59
AV VMAX SYS DOP: 0.98 M/S
BACTERIA SPT RESP CULT: ABNORMAL
BASOPHILS # BLD AUTO: 0.02 THOUSANDS/ÂΜL (ref 0–0.1)
BASOPHILS NFR BLD AUTO: 0 % (ref 0–1)
BILIRUB SERPL-MCNC: 1.48 MG/DL (ref 0.2–1)
BSA FOR ECHO PROCEDURE: 2.1 M2
BUN SERPL-MCNC: 29 MG/DL (ref 5–25)
CALCIUM ALBUM COR SERPL-MCNC: 9 MG/DL (ref 8.3–10.1)
CALCIUM SERPL-MCNC: 8.2 MG/DL (ref 8.4–10.2)
CHLORIDE SERPL-SCNC: 108 MMOL/L (ref 96–108)
CO2 SERPL-SCNC: 20 MMOL/L (ref 21–32)
CREAT SERPL-MCNC: 1.56 MG/DL (ref 0.6–1.3)
DOP CALC AO VTI: 23.96 CM
DOP CALC LVOT AREA: 6.15 CM2
DOP CALC LVOT CARDIAC INDEX: 2.47 L/MIN/M2
DOP CALC LVOT CARDIAC OUTPUT: 5.18 L/MIN
DOP CALC LVOT DIAMETER: 2.8 CM
DOP CALC LVOT PEAK VEL VTI: 14.05 CM
DOP CALC LVOT PEAK VEL: 0.63 M/S
DOP CALC LVOT STROKE INDEX: 40 ML/M2
DOP CALC LVOT STROKE VOLUME: 86.47
DOP CALC MV VTI: 45.95 CM
EOSINOPHIL # BLD AUTO: 0.08 THOUSAND/ÂΜL (ref 0–0.61)
EOSINOPHIL NFR BLD AUTO: 1 % (ref 0–6)
ERYTHROCYTE [DISTWIDTH] IN BLOOD BY AUTOMATED COUNT: 14.9 % (ref 11.6–15.1)
FERRITIN SERPL-MCNC: 409 NG/ML (ref 30–336)
FRACTIONAL SHORTENING: 15 (ref 28–44)
GFR SERPL CREATININE-BSD FRML MDRD: 43 ML/MIN/1.73SQ M
GLUCOSE SERPL-MCNC: 100 MG/DL (ref 65–140)
GRAM STN SPEC: ABNORMAL
HCT VFR BLD AUTO: 30.9 % (ref 36.5–49.3)
HGB BLD-MCNC: 9.7 G/DL (ref 12–17)
IMM GRANULOCYTES # BLD AUTO: 0.12 THOUSAND/UL (ref 0–0.2)
IMM GRANULOCYTES NFR BLD AUTO: 1 % (ref 0–2)
INR PPP: 1.94 (ref 0.85–1.19)
INTERVENTRICULAR SEPTUM IN DIASTOLE (PARASTERNAL SHORT AXIS VIEW): 1.2 CM
INTERVENTRICULAR SEPTUM: 1.2 CM (ref 0.6–1.1)
IRON SATN MFR SERPL: 6 % (ref 15–50)
IRON SERPL-MCNC: 11 UG/DL (ref 50–212)
L PNEUMO1 AG UR QL IA.RAPID: NEGATIVE
LAAS-AP2: 40.1 CM2
LAAS-AP4: 38.3 CM2
LEFT ATRIUM SIZE: 6.4 CM
LEFT ATRIUM VOLUME (MOD BIPLANE): 166 ML
LEFT ATRIUM VOLUME INDEX (MOD BIPLANE): 79 ML/M2
LEFT INTERNAL DIMENSION IN SYSTOLE: 5.8 CM (ref 2.1–4)
LEFT VENTRICLE DIASTOLIC VOLUME (MOD BIPLANE): 173 ML
LEFT VENTRICLE DIASTOLIC VOLUME INDEX (MOD BIPLANE): 82.4 ML/M2
LEFT VENTRICLE SYSTOLIC VOLUME (MOD BIPLANE): 113 ML
LEFT VENTRICLE SYSTOLIC VOLUME INDEX (MOD BIPLANE): 53.8 ML/M2
LEFT VENTRICULAR INTERNAL DIMENSION IN DIASTOLE: 6.8 CM (ref 3.5–6)
LEFT VENTRICULAR POSTERIOR WALL IN END DIASTOLE: 1.3 CM
LEFT VENTRICULAR STROKE VOLUME: 68 ML
LV EF BIPLANE MOD: 35 %
LV EF US.2D.A4C+ESTIMATED: 36 %
LVSV (TEICH): 68 ML
LYMPHOCYTES # BLD AUTO: 0.78 THOUSANDS/ÂΜL (ref 0.6–4.47)
LYMPHOCYTES NFR BLD AUTO: 8 % (ref 14–44)
MAGNESIUM SERPL-MCNC: 2.4 MG/DL (ref 1.9–2.7)
MCH RBC QN AUTO: 28.8 PG (ref 26.8–34.3)
MCHC RBC AUTO-ENTMCNC: 31.4 G/DL (ref 31.4–37.4)
MCV RBC AUTO: 92 FL (ref 82–98)
MONOCYTES # BLD AUTO: 0.77 THOUSAND/ÂΜL (ref 0.17–1.22)
MONOCYTES NFR BLD AUTO: 8 % (ref 4–12)
MV E'TISSUE VEL-SEP: 7 CM/S
MV MEAN GRADIENT: 4 MMHG
MV PEAK GRADIENT: 12 MMHG
MV VALVE AREA BY CONTINUITY EQUATION: 1.88 CM2
NEUTROPHILS # BLD AUTO: 7.96 THOUSANDS/ÂΜL (ref 1.85–7.62)
NEUTS SEG NFR BLD AUTO: 82 % (ref 43–75)
NRBC BLD AUTO-RTO: 0 /100 WBCS
PHOSPHATE SERPL-MCNC: 2.8 MG/DL (ref 2.3–4.1)
PLATELET # BLD AUTO: 211 THOUSANDS/UL (ref 149–390)
PMV BLD AUTO: 11.5 FL (ref 8.9–12.7)
POTASSIUM SERPL-SCNC: 4.2 MMOL/L (ref 3.5–5.3)
PROT SERPL-MCNC: 6.4 G/DL (ref 6.4–8.4)
PROTHROMBIN TIME: 22 SECONDS (ref 12.3–15)
QRS AXIS: -13 DEGREES
QRSD INTERVAL: 148 MS
QT INTERVAL: 474 MS
QTC INTERVAL: 512 MS
RBC # BLD AUTO: 3.37 MILLION/UL (ref 3.88–5.62)
RIGHT ATRIUM AREA SYSTOLE A4C: 25.5 CM2
RIGHT VENTRICLE ID DIMENSION: 5.1 CM
S PNEUM AG UR QL: NEGATIVE
SINOTUBULAR JUNCTION: 3.2 CM
SL CV AV DECELERATION TIME RETROGRADE: 2443 MS
SL CV AV PEAK GRADIENT RETROGRADE: 68 MMHG
SL CV LEFT ATRIUM LENGTH A2C: 7.5 CM
SL CV PED ECHO LEFT VENTRICLE DIASTOLIC VOLUME (MOD BIPLANE) 2D: 237 ML
SL CV PED ECHO LEFT VENTRICLE SYSTOLIC VOLUME (MOD BIPLANE) 2D: 169 ML
SL CV SINUS OF VALSALVA 2D: 3.9 CM
SODIUM SERPL-SCNC: 137 MMOL/L (ref 135–147)
STJ: 3.2 CM
T WAVE AXIS: -49 DEGREES
TIBC SERPL-MCNC: 177.8 UG/DL (ref 250–450)
TR MAX PG: 15 MMHG
TR PEAK VELOCITY: 2 M/S
TRANSFERRIN SERPL-MCNC: 127 MG/DL (ref 203–362)
TRICUSPID ANNULAR PLANE SYSTOLIC EXCURSION: 1.5 CM
TRICUSPID VALVE PEAK REGURGITATION VELOCITY: 1.96 M/S
UIBC SERPL-MCNC: 167 UG/DL (ref 155–355)
VENTRICULAR RATE: 70 BPM
VIT B12 SERPL-MCNC: 2000 PG/ML (ref 180–914)
WBC # BLD AUTO: 9.73 THOUSAND/UL (ref 4.31–10.16)

## 2025-05-27 PROCEDURE — 85025 COMPLETE CBC W/AUTO DIFF WBC: CPT | Performed by: HOSPITALIST

## 2025-05-27 PROCEDURE — 83540 ASSAY OF IRON: CPT

## 2025-05-27 PROCEDURE — 82607 VITAMIN B-12: CPT

## 2025-05-27 PROCEDURE — 85610 PROTHROMBIN TIME: CPT | Performed by: HOSPITALIST

## 2025-05-27 PROCEDURE — 80143 DRUG ASSAY ACETAMINOPHEN: CPT

## 2025-05-27 PROCEDURE — 82728 ASSAY OF FERRITIN: CPT

## 2025-05-27 PROCEDURE — 93306 TTE W/DOPPLER COMPLETE: CPT

## 2025-05-27 PROCEDURE — 93306 TTE W/DOPPLER COMPLETE: CPT | Performed by: INTERNAL MEDICINE

## 2025-05-27 PROCEDURE — 84100 ASSAY OF PHOSPHORUS: CPT | Performed by: HOSPITALIST

## 2025-05-27 PROCEDURE — 93005 ELECTROCARDIOGRAM TRACING: CPT

## 2025-05-27 PROCEDURE — 99232 SBSQ HOSP IP/OBS MODERATE 35: CPT

## 2025-05-27 PROCEDURE — 83550 IRON BINDING TEST: CPT

## 2025-05-27 PROCEDURE — 83735 ASSAY OF MAGNESIUM: CPT | Performed by: HOSPITALIST

## 2025-05-27 PROCEDURE — 76705 ECHO EXAM OF ABDOMEN: CPT

## 2025-05-27 PROCEDURE — 80053 COMPREHEN METABOLIC PANEL: CPT | Performed by: HOSPITALIST

## 2025-05-27 PROCEDURE — 93010 ELECTROCARDIOGRAM REPORT: CPT | Performed by: INTERNAL MEDICINE

## 2025-05-27 RX ORDER — GABAPENTIN 100 MG/1
100 CAPSULE ORAL
Status: DISCONTINUED | OUTPATIENT
Start: 2025-05-27 | End: 2025-05-28

## 2025-05-27 RX ORDER — LIDOCAINE 50 MG/G
1 PATCH TOPICAL DAILY PRN
Status: DISCONTINUED | OUTPATIENT
Start: 2025-05-27 | End: 2025-05-30 | Stop reason: HOSPADM

## 2025-05-27 RX ORDER — PANTOPRAZOLE SODIUM 20 MG/1
20 TABLET, DELAYED RELEASE ORAL
Status: DISCONTINUED | OUTPATIENT
Start: 2025-05-27 | End: 2025-05-30 | Stop reason: HOSPADM

## 2025-05-27 RX ORDER — AMIODARONE HYDROCHLORIDE 200 MG/1
200 TABLET ORAL DAILY
Status: DISCONTINUED | OUTPATIENT
Start: 2025-05-27 | End: 2025-05-30 | Stop reason: HOSPADM

## 2025-05-27 RX ORDER — BENZONATATE 100 MG/1
100 CAPSULE ORAL 3 TIMES DAILY PRN
Status: DISCONTINUED | OUTPATIENT
Start: 2025-05-27 | End: 2025-05-30 | Stop reason: HOSPADM

## 2025-05-27 RX ADMIN — GUAIFENESIN 600 MG: 600 TABLET ORAL at 21:21

## 2025-05-27 RX ADMIN — DOXYCYCLINE 100 MG: 100 INJECTION, POWDER, LYOPHILIZED, FOR SOLUTION INTRAVENOUS at 09:57

## 2025-05-27 RX ADMIN — GABAPENTIN 100 MG: 100 CAPSULE ORAL at 21:21

## 2025-05-27 RX ADMIN — PANTOPRAZOLE SODIUM 20 MG: 20 TABLET, DELAYED RELEASE ORAL at 13:04

## 2025-05-27 RX ADMIN — ALLOPURINOL 100 MG: 100 TABLET ORAL at 08:27

## 2025-05-27 RX ADMIN — CEFTRIAXONE 1000 MG: 10 INJECTION, POWDER, FOR SOLUTION INTRAVENOUS at 17:28

## 2025-05-27 RX ADMIN — FUROSEMIDE 20 MG: 20 TABLET ORAL at 08:27

## 2025-05-27 RX ADMIN — DOXYCYCLINE 100 MG: 100 INJECTION, POWDER, LYOPHILIZED, FOR SOLUTION INTRAVENOUS at 21:21

## 2025-05-27 RX ADMIN — WARFARIN SODIUM 2 MG: 2 TABLET ORAL at 17:27

## 2025-05-27 RX ADMIN — METOPROLOL SUCCINATE 25 MG: 25 TABLET, EXTENDED RELEASE ORAL at 08:27

## 2025-05-27 RX ADMIN — GUAIFENESIN 600 MG: 600 TABLET ORAL at 08:27

## 2025-05-27 RX ADMIN — POTASSIUM CHLORIDE 40 MEQ: 1500 TABLET, EXTENDED RELEASE ORAL at 05:48

## 2025-05-27 RX ADMIN — AMIODARONE HYDROCHLORIDE 200 MG: 200 TABLET ORAL at 13:04

## 2025-05-27 RX ADMIN — POTASSIUM CHLORIDE 40 MEQ: 1500 TABLET, EXTENDED RELEASE ORAL at 02:14

## 2025-05-27 NOTE — ASSESSMENT & PLAN NOTE
Chart history, per nursing patient has been ambulating around the room without assistance  He is on gabapentin 100 mg daily, will continue -can consider increasing this as he does admit to chronic and longstanding neuropathy   Recent A1c normal   Will check a B12  Patient denies any recent fall or trauma to his back  PT grace

## 2025-05-27 NOTE — ASSESSMENT & PLAN NOTE
? If secondary to congestion  Repeat labs in a.m.  Consider RUQ US if not improving  Lasix ordered per home regimen

## 2025-05-27 NOTE — ASSESSMENT & PLAN NOTE
Without current chest pain  Chest pain appears pleuritic and related to pna as stated above  Reviewed EKG with cardiology, patient has known LBBB that is chronic  Will update echocardiogram to ensure there is no new wall motion abnormality

## 2025-05-27 NOTE — PLAN OF CARE
Problem: Potential for Falls  Goal: Patient will remain free of falls  Description: INTERVENTIONS:  - Educate patient/family on patient safety including physical limitations  - Instruct patient to call for assistance with activity   - Consider consulting OT/PT to assist with strengthening/mobility based on AM PAC & JH-HLM score  - Consult OT/PT to assist with strengthening/mobility   - Keep Call bell within reach  - Keep bed low and locked with side rails adjusted as appropriate  - Keep care items and personal belongings within reach  - Initiate and maintain comfort rounds  - Make Fall Risk Sign visible to staff  - Offer Toileting every 2 Hours, in advance of need  - Initiate/Maintain bed alarm  - Obtain necessary fall risk management equipment:  socks, yellow bracelet, call bell   - Apply yellow socks and bracelet for high fall risk patients  - Consider moving patient to room near nurses station  Outcome: Progressing     Problem: PAIN - ADULT  Goal: Verbalizes/displays adequate comfort level or baseline comfort level  Description: Interventions:  - Encourage patient to monitor pain and request assistance  - Assess pain using appropriate pain scale  - Administer analgesics as ordered based on type and severity of pain and evaluate response  - Implement non-pharmacological measures as appropriate and evaluate response  - Consider cultural and social influences on pain and pain management  - Notify physician/advanced practitioner if interventions unsuccessful or patient reports new pain  - Educate patient/family on pain management process including their role and importance of  reporting pain   - Provide non-pharmacologic/complimentary pain relief interventions  Outcome: Progressing     Problem: INFECTION - ADULT  Goal: Absence or prevention of progression during hospitalization  Description: INTERVENTIONS:  - Assess and monitor for signs and symptoms of infection  - Monitor lab/diagnostic results  - Monitor all  insertion sites, i.e. indwelling lines, tubes, and drains  - Monitor endotracheal if appropriate and nasal secretions for changes in amount and color  - [place] appropriate cooling/warming therapies per order  - Administer medications as ordered  - Instruct and encourage patient and family to use good hand hygiene technique  - Identify and instruct in appropriate isolation precautions for identified infection/condition  Outcome: Progressing     Problem: DISCHARGE PLANNING  Goal: Discharge to home or other facility with appropriate resources  Description: INTERVENTIONS:  - Identify barriers to discharge w/patient and caregiver  - Arrange for needed discharge resources and transportation as appropriate  - Identify discharge learning needs (meds, wound care, etc.)  - Arrange for interpretive services to assist at discharge as needed  - Refer to Case Management Department for coordinating discharge planning if the patient needs post-hospital services based on physician/advanced practitioner order or complex needs related to functional status, cognitive ability, or social support system  Outcome: Progressing     Problem: Knowledge Deficit  Goal: Patient/family/caregiver demonstrates understanding of disease process, treatment plan, medications, and discharge instructions  Description: Complete learning assessment and assess knowledge base.  Interventions:  - Provide teaching at level of understanding  - Provide teaching via preferred learning methods  Outcome: Progressing     Problem: CARDIOVASCULAR - ADULT  Goal: Maintains optimal cardiac output and hemodynamic stability  Description: INTERVENTIONS:  - Monitor I/O, vital signs and rhythm  - Monitor for S/S and trends of decreased cardiac output  - Administer and titrate ordered vasoactive medications to optimize hemodynamic stability  - Assess quality of pulses, skin color and temperature  - Assess for signs of decreased coronary artery perfusion  - Instruct patient  to report change in severity of symptoms  Outcome: Progressing  Goal: Absence of cardiac dysrhythmias or at baseline rhythm  Description: INTERVENTIONS:  - Continuous cardiac monitoring, vital signs, obtain 12 lead EKG if ordered  - Administer antiarrhythmic and heart rate control medications as ordered  - Monitor electrolytes and administer replacement therapy as ordered  Outcome: Progressing     Problem: RESPIRATORY - ADULT  Goal: Achieves optimal ventilation and oxygenation  Description: INTERVENTIONS:  - Assess for changes in respiratory status  - Assess for changes in mentation and behavior  - Position to facilitate oxygenation and minimize respiratory effort  - Oxygen administered by appropriate delivery if ordered  - Initiate smoking cessation education as indicated  - Encourage broncho-pulmonary hygiene including cough, deep breathe, Incentive Spirometry  - Assess the need for suctioning and aspirate as needed  - Assess and instruct to report SOB or any respiratory difficulty  - Respiratory Therapy support as indicated  Outcome: Progressing

## 2025-05-27 NOTE — ASSESSMENT & PLAN NOTE
He had MR with sp MV repair with PFO closure 1998,CVA 2008, Cath 2011 normal coronary arteriesDCMP sp SC Medtronic ICD by me in 8/2013, atypical atrial fluter sp DC to [facility] 11/2015, PaAFib dx in 12/2015.  He had PaAfib with RVR and ICD shocks in 04/2016 and 08/2016. Referred to see me for afib management  He denied CP. Had mild PLATA, LE edema

## 2025-05-27 NOTE — ASSESSMENT & PLAN NOTE
Cont statin and coumadin--no added antiplatelets assumed due to prior bleeding episodes with hx of anemia, GI bleed and epistaxis

## 2025-05-27 NOTE — ASSESSMENT & PLAN NOTE
Since burning 2 days ago and states it is resolving  UA with 4-10 WBC and moderate bacteria  CT shows small urinary bladder diverticula  Discussed with lab, they are adding on to urine culture  Patient has an IV antibiotics as stated above

## 2025-05-27 NOTE — ASSESSMENT & PLAN NOTE
With paroxysmal atrial fibrillation and flutter, diagnosed October 2015  PTA on amiodarone 200 mg daily  Resumed on Toprol-XL 25 mg on admission as per prior cardiology recommendations  On Coumadin 2 mg daily for AC, goal INR 2-3   Of note his INR has been almost entirely subtherapeutic back even through April  INR trending up to 1.94, will continue Coumadin 2 mg dosing for time being and monitor anemia

## 2025-05-27 NOTE — ASSESSMENT & PLAN NOTE
Blood pressure is stable.  Continue Toprol, home losartan held on admission, patient previously on 50 mg although this is on his personal med list

## 2025-05-27 NOTE — ASSESSMENT & PLAN NOTE
Restart toprol xl per cardiology prior recommendations  He hasn't been taking his home meds and last amiodarone was from 12/24---asked to have family bring his meds in  Cont coumadin with INR goal 2-3

## 2025-05-27 NOTE — PROGRESS NOTES
Progress Note - Hospitalist   Name: Silvio Drew 74 y.o. male I MRN: [id number]  Unit/Bed#: E2 -01 I Date of Admission: 5/26/2025   Date of Service: 5/27/2025 I Hospital Day: 1    Assessment & Plan  Pneumonia of right lung due to infectious organism  Patient presented with progressive cough, right-sided chest pain and shortness of breath on exertion  Reports has progressed over the last 13 days  Was prescribed amoxicillin by PCP but once he started to take it he got a fever and that the medication was poisoning him so he stopped it  On admission showed multifocal patchy areas of consolidation and groundglass density involving the right upper and middle and lower lobes  Started on IV ceftriaxone doxycycline, continue  Follow-up urine antigens, sputum culture, blood cultures  Continue to monitor on pulse oximetry  Encourage incentive spirometry  Monitor hemodynamics, leukocytosis downtrending.  Procalcitonin mildly elevated  Hyperlipidemia  Outpatient PCP notes patient is on Lipitor 20 mg  Holding statin in setting of transaminitis  Stage 3b chronic kidney disease (HCC)  Lab Results   Component Value Date    EGFR 43 05/27/2025    EGFR 38 05/26/2025    EGFR 41 01/29/2025    CREATININE 1.56 (H) 05/27/2025    CREATININE 1.72 (H) 05/26/2025    CREATININE 1.63 (H) 01/29/2025     Baseline creatinine 1.4-1.6  Patient is at risk of contrast-induced kidney injury due to CT on admission  Reports poor urine output over the last 2 days, urine displays high specific gravity-symptoms has improved  Hold Lasix for time being, patient tolerating p.o. intake  CT without hydronephrosis  Monitor intake and output  Hypokalemia  Potassium 2.9 admission, suspect in setting of poor p.o. intake with infection and ongoing Lasix use  Repleted and normalized at 4.2  Mag normal  Elevated LFTs  With transaminitis on admission, previous liver enzymes in January 2025 for normal  Tylenol level checked normal  Holding home statin  Also with  hyperbilirubinemia  CT showing stable liver lesion which has been previously seen on MRI and mild nodular liver surface.  Has seen oncology outpatient, had an MRI that was ordered - hemangioma  No abdominal pain, tolerating diet without nausea or vomiting  Will further evaluate with right upper quadrant ultrasound  Of note patient is on amiodarone, pending trend and RUQ may need to discuss with GI  Hyperbilirubinemia  See above, trend CMP  Anemia  Noted Dr. Pa with iron deficiency anemia  Currently not on iron supplementation  Hemoglobin 10.6 on admission, stable at 9.7 today  Monitor for bleeding -none noted per nursing or patient  Check iron panel  Nonischemic cardiomyopathy (HCC)  With history of has MDT single-chamber ICD, placed August 2013  Last EF during ZULY April 2024 35%  Patient noted abnormal coronary arteries on cardiac cath 2011  CT scan displays moderate cardiomegaly, status post CABG  Hold lasix 20 mg daily at this time patient is clinically dry to euvolemic received contrast, high sp gravity  A-fib (HCC)  With paroxysmal atrial fibrillation and flutter, diagnosed October 2015  PTA on amiodarone 200 mg daily  Resumed on Toprol-XL 25 mg on admission as per prior cardiology recommendations  On Coumadin 2 mg daily for AC, goal INR 2-3   Of note his INR has been almost entirely subtherapeutic back even through April  INR trending up to 1.94, will continue Coumadin 2 mg dosing for time being and monitor anemia  Prolonged QT interval  Noted, monitoring electrolytes  Primary hypertension  Blood pressure is stable.  Continue Toprol, home losartan held on admission, patient previously on 50 mg although this is on his personal med list  Atherosclerosis of native artery of both lower extremities with intermittent claudication (HCC)  Patient is on statin and Coumadin, not on antiplatelets due to prior bleeding episodes with epistaxis  H/O mitral valve repair  History of mitral valve repair with PFO closure  1998 per outpatient cardiology notes  Elevated troponin  Without current chest pain  Chest pain appears pleuritic and related to pna as stated above  Reviewed EKG with cardiology, patient has known LBBB that is chronic  Will update echocardiogram to ensure there is no new wall motion abnormality  GERD (gastroesophageal reflux disease)  Seen by Dr. Lombardi in January 2020 for  Continue pantoprazole 20 mg daily  No recent EGD noted  Suspected UTI  Since burning 2 days ago and states it is resolving  UA with 4-10 WBC and moderate bacteria  CT shows small urinary bladder diverticula  Discussed with lab, they are adding on to urine culture  Patient has an IV antibiotics as stated above  Constipation  PTA on Linzess, patient notes he had a bowel movement this morning  Lumbar radiculopathy  Chart history, per nursing patient has been ambulating around the room without assistance  He is on gabapentin 100 mg daily, will continue -can consider increasing this as he does admit to chronic and longstanding neuropathy   Recent A1c normal   Will check a B12  Patient denies any recent fall or trauma to his back  PT eval    VTE Pharmacologic Prophylaxis:   coumadin    Mobility:   Basic Mobility Inpatient Raw Score: 24  JH-HLM Goal: 8: Walk 250 feet or more  JH-HLM Achieved: 7: Walk 25 feet or more  JH-HLM Goal achieved. Continue to encourage appropriate mobility.    Patient Centered Rounds: I performed bedside rounds with nursing staff today.   Discussions with Specialists or Other Care Team Provider: Cardiology, CM    Education and Discussions with Family / Patient: Updated  (son) via phone.    Current Length of Stay: 1 day(s)  Current Patient Status: Inpatient   Certification Statement: The patient will continue to require additional inpatient hospital stay due to pneumonia treatment, transaminitis evaluation, discharge planning, pending cultures  Discharge Plan: Anticipate discharge in 48-72 hrs to home.    Code  Status: Level 1 - Full Code    Subjective   Patient seen and examined.  Patient's son Ronnell was on the telephone as patient is primarily Hungarian speaking to help with translation.  Per nursing he has been up and ambulating around without the use of oxygen but then placed it back on and he sat down.  He ate and drink okay today.  Patient reports he started 13 days ago with cough and right-sided chest pain when coughing, also worsening shortness of breath on exertion.  Denies any sick contacts.  States he only went to [facility] and saw his sister there but besides that lives alone and did not have any URI symptoms prior.  Amoxicillin and Tylenol and stopped taking it when he had a fever at home because someone told him it may be poisoning him.  He currently denies any chest pain or shortness of breath at rest.  Denies any abdominal pain, nausea or vomiting.  States about 2 days ago he had some burning with urination and decreased urine output but since being here and has increased.  Had a bowel movement this morning, no notes of it being dark or bloody.  We reviewed his medication list at home.    Objective :  Temp:  [98 °F (36.7 °C)-99.5 °F (37.5 °C)] 98 °F (36.7 °C)  HR:  [61-93] 61  BP: (113-140)/(63-72) 113/66  Resp:  [18-24] 18  SpO2:  [88 %-98 %] 94 %  O2 Device: Nasal cannula  Nasal Cannula O2 Flow Rate (L/min):  [2 L/min-2.5 L/min] 2 L/min    Body mass index is 32.39 kg/m².     Input and Output Summary (last 24 hours):     Intake/Output Summary (Last 24 hours) at 5/27/2025 1048  Last data filed at 5/27/2025 0253  Gross per 24 hour   Intake 50 ml   Output 200 ml   Net -150 ml       Physical Exam  Vitals and nursing note reviewed.   Constitutional:       General: He is not in acute distress.     Appearance: He is obese. He is not toxic-appearing.     Cardiovascular:      Rate and Rhythm: Normal rate. Rhythm irregularly irregular.      Heart sounds: Murmur heard.   Pulmonary:      Effort: Pulmonary effort is  normal. No respiratory distress.      Breath sounds: Rhonchi present. No rales.   Abdominal:      General: Bowel sounds are normal.      Palpations: Abdomen is soft.      Tenderness: There is no abdominal tenderness. There is no right CVA tenderness, left CVA tenderness, guarding or rebound.     Musculoskeletal:      Right lower leg: No edema.      Left lower leg: No edema.     Skin:     General: Skin is warm and dry.      Coloration: Skin is pale. Skin is not jaundiced.     Neurological:      Mental Status: He is alert and oriented to person, place, and time. Mental status is at baseline.       Telemetry:  Telemetry Orders (From admission, onward)               24 Hour Telemetry Monitoring  Continuous x 24 Hours (Telem)        Expiring   Question:  Reason for 24 Hour Telemetry  Answer:  Decompensated CHF- and any one of the following: continuous diuretic infusion or total diuretic dose >200 mg daily, associated electrolyte derangement (I.e. K < 3.0), inotropic drip (continuous infusion), hx of ventricular arrhythmia, or new EF < 35%                     Telemetry Reviewed: Atrial fibrillation. HR averaging 70's  Indication for Continued Telemetry Use: No indication for continued use. Will discontinue.       Lab Results: I have reviewed the following results:   Results from last 7 days   Lab Units 05/27/25  0447   WBC Thousand/uL 9.73   HEMOGLOBIN g/dL 9.7*   HEMATOCRIT % 30.9*   PLATELETS Thousands/uL 211   SEGS PCT % 82*   LYMPHO PCT % 8*   MONO PCT % 8   EOS PCT % 1     Results from last 7 days   Lab Units 05/27/25  0447   SODIUM mmol/L 137   POTASSIUM mmol/L 4.2   CHLORIDE mmol/L 108   CO2 mmol/L 20*   BUN mg/dL 29*   CREATININE mg/dL 1.56*   ANION GAP mmol/L 9   CALCIUM mg/dL 8.2*   ALBUMIN g/dL 3.0*   TOTAL BILIRUBIN mg/dL 1.48*   ALK PHOS U/L 206*   ALT U/L 72*   AST U/L 161*   GLUCOSE RANDOM mg/dL 100     Results from last 7 days   Lab Units 05/27/25  0513   INR  1.94*     Results from last 7 days   Lab Units  05/26/25  1643   LACTIC ACID mmol/L 1.0   PROCALCITONIN ng/ml 0.53*       Recent Cultures (last 7 days):   Results from last 7 days   Lab Units 05/26/25  1643   BLOOD CULTURE  Received in Microbiology Lab. Culture in Progress.  Received in Microbiology Lab. Culture in Progress.     Last 24 Hours Medication List:     Current Facility-Administered Medications:     acetaminophen (TYLENOL) tablet 650 mg, Q6H PRN    allopurinol (ZYLOPRIM) tablet 100 mg, Daily    [Held by provider] atorvastatin (LIPITOR) tablet 20 mg, Daily With Dinner    cefTRIAXone (ROCEPHIN) 1,000 mg in dextrose 5 % 50 mL IVPB, Q24H    doxycycline (VIBRAMYCIN) 100 mg in sodium chloride 0.9 % 100 mL IVPB, Q12H, Last Rate: 100 mg (05/27/25 0957)    furosemide (LASIX) tablet 20 mg, Daily    guaiFENesin (MUCINEX) 12 hr tablet 600 mg, BID    metoprolol succinate (TOPROL-XL) 24 hr tablet 25 mg, Daily    warfarin (COUMADIN) tablet 2 mg, Daily (warfarin)    Administrative Statements   Today, Patient Was Seen By: Jessica Thakkar PA-C    **Please Note: This note may have been constructed using a voice recognition system.**

## 2025-05-27 NOTE — ASSESSMENT & PLAN NOTE
Without current chest pain  Chest pain appears pleuritic and related to pna as stated above  Reviewed EKG with cardiology, patient has known LBBB that is chronic  Updated echo with no changes

## 2025-05-27 NOTE — ASSESSMENT & PLAN NOTE
Restart Metoprolol XL for HTN and rate control per prior cardiology recommendation--last script 3/25/25

## 2025-05-27 NOTE — ASSESSMENT & PLAN NOTE
Patient is on statin and Coumadin, not on antiplatelets due to prior bleeding episodes with epistaxis

## 2025-05-27 NOTE — ASSESSMENT & PLAN NOTE
Chart history, per nursing patient has been ambulating around the room without assistance  He is on gabapentin 100 mg daily, will continue -can consider increasing this as he does admit to chronic and longstanding neuropathy   Recent A1c normal   B12 normal  Patient denies any recent fall or trauma to his back  PT grace

## 2025-05-27 NOTE — ASSESSMENT & PLAN NOTE
Patient presented with progressive cough, right-sided chest pain and shortness of breath on exertion  Reports has progressed over the last 13 days  Was prescribed amoxicillin by PCP but once he started to take it he got a fever and that the medication was poisoning him so he stopped it  On admission showed multifocal patchy areas of consolidation and groundglass density involving the right upper and middle and lower lobes  Started on IV ceftriaxone doxycycline, continue  Follow-up urine antigens, sputum culture, blood cultures  Continue to monitor on pulse oximetry  Encourage incentive spirometry  Monitor hemodynamics, leukocytosis downtrending.  Procalcitonin mildly elevated

## 2025-05-27 NOTE — ASSESSMENT & PLAN NOTE
With history of has MDT single-chamber ICD, placed August 2013  Last EF during ZULY April 2024 35%  Patient noted abnormal coronary arteries on cardiac cath 2011  CT scan displays moderate cardiomegaly, status post CABG  Hold lasix 20 mg daily at this time patient is clinically dry to euvolemic received contrast, high sp gravity

## 2025-05-27 NOTE — ASSESSMENT & PLAN NOTE
Lab Results   Component Value Date    EGFR 43 05/27/2025    EGFR 38 05/26/2025    EGFR 41 01/29/2025    CREATININE 1.56 (H) 05/27/2025    CREATININE 1.72 (H) 05/26/2025    CREATININE 1.63 (H) 01/29/2025     Baseline creatinine 1.4-1.6  Patient is at risk of contrast-induced kidney injury due to CT on admission  Reports poor urine output over the last 2 days, urine displays high specific gravity-symptoms has improved  Hold Lasix for time being, patient tolerating p.o. intake  CT without hydronephrosis  Monitor intake and output

## 2025-05-27 NOTE — ASSESSMENT & PLAN NOTE
Noted Dr. Pa with iron deficiency anemia  Currently not on iron supplementation  Hemoglobin 10.6 on admission, stable at 9.7 today  Monitor for bleeding -none noted per nursing or patient  Check iron panel

## 2025-05-27 NOTE — APP STUDENT NOTE
IMELDA STUDENT  Inpatient Progress Note for TRAINING ONLY  Not Part of Legal Medical Record       H&P Exam - Silvio Drew 74 y.o. male MRN: [id number]    Unit/Bed#: E2 -01 Encounter: [id number]    Assessment & Plan:    Pneumonia of right lung due to infectious organism   73 y/o male with PMH of HTN, CKD, Afib, and atherosclerotic disease presented to the ED on 5/26 with a CC of fever, cough, back pain, and abdominal pain.   EKG showed Afib with LBBB, LAD, Qtc 521, nonspecific ST changes  , unchanged from 1 year ago   Leukocytosis of 10.34, SpO2 88%  Viral panel negative  CXR: Multifocal right pneumonia.   CTPA: No evidence to suggest PE. Multifocal patchy areas of consolidation and groundglass density involving the right upper, middle and lower lobes suspicious for an infectious or inflammatory process.   Started on IV Rocephin and Doxycycline, continue  Continue with Mucinex   Awaiting urine antigen, blood cultures, and sputum culture/gram stain results  Currently stable on 2L NC with O2 sat at 97%.    Monitor pulse ox. Encourage IS.     Hypokalemia   Lab Results   Component Value Date/Time    K 4.2 05/27/2025 04:47 AM    K 2.9 (L) 05/26/2025 04:43 PM    K 4.3 12/20/2019 09:57 AM    K 4.7 05/29/2019 09:58 AM    Corrected. Most likely secondary to poor PO intake.    A-fib (HCC)  Restart toprol xl per PTA cardiology recommendations   Has not been taking his home meds due to medication  Continue coumadin with INR goal of 2-3   Also prescribed Amiodarone by cardiologist. Unclear when last dose was.      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)  Continue with coumadin  Statin currently held due to transaminitis   No antiplatelets assumed due to history of bleeding episodes with history of anemia, GI bleed, and epistaxis      Stage 3b chronic kidney disease (HCC)  Lab Results   Component Value Date/Time    BUN 29 (H) 05/27/2025 04:47 AM    BUN 30 (H) 05/26/2025 04:43 PM    BUN 24 12/20/2019 09:57 AM    BUN 25 05/29/2019 09:58 AM    CREATININE 1.56 (H) 05/27/2025 04:47 AM    CREATININE 1.72 (H) 05/26/2025 04:43 PM    CREATININE 1.32 (H) 12/20/2019 09:57 AM    CREATININE 1.45 (H) 05/29/2019 09:58 AM   Baseline creatinine 1.4-1.6  Patient is at risk of contrast-induced kidney injury due to CT on admission  Lasix held on admission    Anemia  Lab Results   Component Value Date/Time    HGB 9.7 (L) 05/27/2025 04:47 AM    HGB 10.6 (L) 05/26/2025 04:43 PM    HGB 12.4 01/29/2025 09:57 AM   Patient asymptomatic and has hx of anemia and bleeding episodes  Monitor closely - recheck q8 hours     Elevated LFTs  Lab Results   Component Value Date/Time    ALT 72 (H) 05/27/2025 04:47 AM    ALT 63 (H) 05/26/2025 04:43 PM    ALT 90 (H) 12/20/2019 09:57 AM    ALT 76 (H) 02/25/2019 11:33 AM     (H) 05/27/2025 04:47 AM     (H) 05/26/2025 04:43 PM    AST 97 (H) 12/20/2019 09:57 AM    AST 55 (H) 02/25/2019 11:33 AM   RUQ ultrasound ordered  CT a/p: Stable 15 mm hypodense right hepatic lobe lesion, described on a prior MRI abdomen dated 9/3/2024. There is mild nodularity of the liver surface contour suggestive of chronic cirrhotic changes.    Hyperbilirubinemia   Lab Results   Component Value Date/Time    TBILI 1.48 (H) 05/27/2025 04:47 AM    TBILI 1.56 (H) 05/26/2025 04:43 PM    TBILI 0.6 12/20/2019 09:57 AM    TBILI 0.4 11/14/2018 10:23 AM    RUQ ultrasound ordered    Non-ischemic cardiomyopathy  With history of has MDT single-chamber  ICD, placed August 2013  Last EF during [facility] April 2024 35%  Patient noted abnormal coronary arteries on cardiac cath 2011  Hold lasix clinically dry to euvolemic received contrast, high sp gravity    Possible UTI  Since burning 2 days ago and states it is resolving  UA with 4-10 WBC and moderate bacteria  CT shows small urinary bladder diverticula  Discussed with lab, they are adding on to urine culture  Patient has an IV antibiotics as stated above    GERD  Seen by Dr. Lombardi in January 2020 for  Continue pantoprazole 20 mg daily  No recent EGD noted    Lumbar radiculopathy  Chart history, per nursing patient has been ambulating around the room without assistance  He is on gabapentin 100 mg daily, will continue -can consider increasing this as he does admit to chronic and longstanding neuropathy  Recent A1c normal  Will check a B12  Patient denies any recent fall or trauma to his back  PT eval    Prolonged QT interval  Avoid QT prolonging medications      History of Present Illness   73 y/o male with PMH of HTN, CKD, Afib, and atherosclerotic disease presented to the ED on 5/26 with a CC of fever, cough, back pain, and abdominal pain. Patient states he was sick for 2 weeks with cough, fevers, occasional post-tussive emesis, and decreased appetite. Says he was prescribed amoxicillin by his PCP 5/19 but did not finish them. Claims he has not eaten in 2 weeks. Also states he stopped taking his medications for his heart due to the illness. Says cough and SOB worsens when walking. Requires O2 via NC on admission but not at baseline. Also has pain/burning with urination x 2 days that has been improving.     ROS: History obtained from the patient  General ROS: positive for  - chills, fatigue, and fever  negative for - night sweats, weight gain, or weight loss  Psychological ROS: negative for - behavioral disorder, hallucinations, irritability, or sleep disturbances  ENT ROS: positive for - headaches  negative for -  "epistaxis, nasal congestion, nasal discharge, or sore throat  Hematological and Lymphatic ROS: positive for - bruising and fatigue  negative for - jaundice, pallor, swollen lymph nodes, or weight loss  Endocrine ROS: negative for - hot flashes, polydipsia/polyuria, temperature intolerance, or unexpected weight changes  Respiratory ROS: positive for - cough, shortness of breath, and wheezing  negative for - orthopnea or sputum changes  Cardiovascular ROS: positive for - chest pain, dyspnea on exertion, and shortness of breath  negative for - loss of consciousness or palpitations  Gastrointestinal ROS: positive for - abdominal pain, appetite loss, and nausea/vomiting  negative for - blood in stools or change in stools  Genito-Urinary ROS: positive for - dysuria and difficulty urinating.   negative for - hematuria or urinary frequency/urgency  Musculoskeletal ROS: positive for - pain in back - bilateral  negative for - muscle pain, or pain in neck - generalized  Neurological ROS: positive for - headaches and weakness  negative for - behavioral changes, impaired coordination/balance, seizures, speech problems, or tremors  Dermatological ROS: Negative for new rashes.     Historical Information   Past Medical History[1]  Past Surgical History[2]  Social History   Social History     Substance and Sexual Activity   Alcohol Use Not Currently     Social History     Substance and Sexual Activity   Drug Use No     Tobacco Use History[3]  Family History: {SL IP FAM HISTORY SMARTLIST:[id number]}    Meds/Allergies   all medications and allergies reviewed  Allergies[4]    Objective   First Vitals:   Blood Pressure: 139/67 (05/26/25 1618)  Pulse: 71 (05/26/25 1618)  Temperature: 98.3 °F (36.8 °C) (05/26/25 1618)  Temp Source: Oral (05/26/25 1618)  Respirations: 22 (05/26/25 1618)  Height: 5' 8\" (172.7 cm) (05/26/25 2032)  Weight - Scale: 96.6 kg (213 lb) (05/26/25 1618)  SpO2: 95 % (05/26/25 1618)    Current Vitals:   Blood Pressure: " "113/66 (05/27/25 0731)  Pulse: 61 (05/27/25 0731)  Temperature: 98 °F (36.7 °C) (05/27/25 0731)  Temp Source: Temporal (05/27/25 0731)  Respirations: 18 (05/27/25 0731)  Height: 5' 8\" (172.7 cm) (05/26/25 2032)  Weight - Scale: 96.6 kg (213 lb) (05/26/25 1618)  SpO2: 94 % (05/27/25 0731)      Intake/Output Summary (Last 24 hours) at 5/27/2025 1046  Last data filed at 5/27/2025 0253  Gross per 24 hour   Intake 50 ml   Output 200 ml   Net -150 ml       Invasive Devices       Peripheral Intravenous Line  Duration             Peripheral IV 05/26/25 Left;Proximal;Ventral (anterior) Forearm <1 day                    Physical Exam: Physical Exam  Constitutional:       General: He is not in acute distress.  HENT:      Head: Normocephalic and atraumatic.     Eyes:      Extraocular Movements: Extraocular movements intact.      Pupils: Pupils are equal, round, and reactive to light.       Cardiovascular:      Rate and Rhythm: Rhythm irregularly irregular.      Pulses: Normal pulses.      Heart sounds: No murmur heard.     No friction rub. No gallop.   Pulmonary:      Effort: Pulmonary effort is normal.      Breath sounds: Rhonchi present. No rales.   Abdominal:      General: Abdomen is flat.      Palpations: Abdomen is soft.      Tenderness: There is abdominal tenderness.      Comments: Some tenderness in right abdomen.      Musculoskeletal:      Cervical back: Normal range of motion and neck supple.     Skin:     General: Skin is warm and dry.     Neurological:      General: No focal deficit present.      Mental Status: He is alert and oriented to person, place, and time.     Psychiatric:         Mood and Affect: Mood normal.         Behavior: Behavior normal.          Lab Results:  I have reviewed the following results:  Results from last 7 days   Lab Units 05/27/25  0447   WBC Thousand/uL 9.73   RBC Thousand/uL 3.37*   HEMOGLOBIN g/dL 9.7*   HEMATOCRIT % 30.9*   PLATELETS Thousands/uL 211   SEGS PCT % 82*   LYMPHO PCT % 8* "   MONO PCT % 8   EOS PCT % 0           Results from last 7 days   Lab Units 05/27/25  0447   SODIUM mmol/L 137   POTASSIUM mmol/L 4.2   CHLORIDE mmol/L 108   CO2 mmol/L 20*   BUN mg/dL 29*   CREATININE mg/dL 1.56*   ANION GAP mmol/L 9   CALCIUM mg/dL 8.2*   ALBUMIN g/dL 3.0*   TOTAL BILIRUBIN mg/dL 1.48*   ALK PHOS U/L 206*   ALT U/L 72*   AST U/L 161*   GLUCOSE RANDOM mg/dL 100           Results from last 7 days   Lab Units 05/27/25  0513   INR   1.94*      Lab Results   Component Value Date     HGBA1C 5.6 03/25/2025     HGBA1C 5.7 12/17/2024     HGBA1C 5.8 09/12/2024      Results from last 7 days   Lab Units 05/26/25  1643   LACTIC ACID mmol/L 1.0   PROCALCITONIN ng/ml 0.53*     Results from last 7 days  Lab 5/27/2025  0447   Phosphorus 2.8 mg/dL   Magnesium 2.4 mg/dL       Imaging:   Narrative & Impression   CT PULMONARY ANGIOGRAM OF THE CHEST AND CT ABDOMEN AND PELVIS WITH INTRAVENOUS CONTRAST     INDICATION: SOB, cough, elevated ddimer, abdominal pain/elevated lfts.     COMPARISON: CT chest abdomen pelvis dated 4/6/2024     TECHNIQUE: CT examination of the chest, abdomen and pelvis was performed. Thin section CT angiographic technique was used in the chest in order to evaluate for pulmonary embolus and coronal 3D MIP postprocessing was performed on the acquisition scanner.   Multiplanar 2D reformatted images were created from the source data.     This examination, like all CT scans performed in the [facility], was performed utilizing techniques to minimize radiation dose exposure, including the use of iterative reconstruction and automated exposure control. Radiation dose length   product (DLP) for this visit: 1647 mGy-cm.     IV Contrast: 100 mL of iohexol (OMNIPAQUE)  Enteric Contrast: Not administered.     FINDINGS:     CHEST     PULMONARY ARTERIAL TREE: No pulmonary embolus.  Enlargement of central pulmonary arterial tree which can be associated with pulmonary hypertension.     LUNGS: There  are patchy areas of consolidation involving the right upper, middle and lower lobes suspicious for an infectious or inflammatory process. There is trace right pleural fluid. The left lung is clear. No pneumothorax. There is mild biapical   scarring. No tracheal or endobronchial lesion.     PLEURA: Unremarkable.     HEART/AORTA: There is mild to moderate cardiomegaly with no pericardial effusion. Status post CABG and mitral valve replacement. No thoracic aortic aneurysm.     MEDIASTINUM AND EDWARD: Unremarkable.     CHEST WALL AND LOWER NECK: A left anterior chest wall cardiac pacer is noted. Status post median sternotomy     ABDOMEN     LIVER/BILIARY TREE: There is a stable 15 mm hypodense lesion noted at the right hepatic lobe, series 2, image 303, described on a prior MRI abdomen dated 9/3/2024. There is mild nodularity of the liver surface contour suggestive of chronic cirrhotic   changes.     GALLBLADDER: Post cholecystectomy.     SPLEEN: Unremarkable.     PANCREAS: Unremarkable.     ADRENAL GLANDS: Unremarkable.     KIDNEYS/URETERS: Bilateral simple renal cysts the largest in the left lower pole measuring 6.8 cm. No hydronephrosis on either side. Bilateral renal cortical scarring is again noted.     STOMACH AND BOWEL: Colonic diverticulosis without findings of acute diverticulitis.     APPENDIX: Normal appendix.     ABDOMINOPELVIC CAVITY: No ascites. No pneumoperitoneum. No lymphadenopathy.     VESSELS: Unremarkable for patient's age.     PELVIS     REPRODUCTIVE ORGANS: Unremarkable for patient's age.     URINARY BLADDER: Multiple small urinary bladder diverticula are noted posteriorly. No evidence of urinary bladder calculi or suspicious lesion.     ABDOMINAL WALL/INGUINAL REGIONS: Unremarkable.     BONES: No acute fracture or suspicious osseous lesion. Spinal degenerative changes. There are severe right hip osteoarthritic degenerative changes.     IMPRESSION:     No intraluminal filling defect to suggest an  acute pulmonary embolus.     Multifocal patchy areas of consolidation and groundglass density involving the right upper, middle and lower lobes suspicious for an infectious or inflammatory process. Correlation with the patient's symptoms is recommended.     No acute intra-abdominal abnormality. No free air or free fluid.     Scattered colonic diverticulosis with no inflammatory changes present to suggest acute diverticulitis.     Stable 15 mm hypodense right hepatic lobe lesion, described on a prior MRI abdomen dated 9/3/2024.       EKG, Pathology, and Other Studies:           Component  Ref Range & Units (hover) 5/27/25 1051 5/26/25 1641 4/7/24 1420 4/7/24 0540 4/6/24 1803   Ventricular Rate 70 92 79 75 74   Atrial Rate  101 79 75 74   WA Interval   795 828    QRSD Interval 148 146 146 146 144   QT Interval 474 422 404 408 446   QTC Interval 512 521 464 456 495   P Axis        QRS Axis -13 -53 36 -10 24   T Wave Axis -49 -4 -12 -2 -17        Narrative & Impression  Atrial fibrillation with premature ventricular or aberrantly conducted complexes  Non-specific intra-ventricular conduction block  Abnormal ECG  When compared with ECG of 26-May-2025 16:41,  QRS axis Shifted right         Code Status: Level 1 - Full Code  Advance Directive and Living Will:      Power of :    POLST:      Counseling / Coordination of Care:   Total floor / unit time spent today 15 minutes.           [1]   Past Medical History:  Diagnosis Date    Anemia 3/12/2013    Atrial fibrillation (HCC)     Coagulopathy (HCC) 4/6/2024    Coronary artery disease     GERD (gastroesophageal reflux disease)     Hyperlipidemia associated with type 2 diabetes mellitus  (HCC) 5/4/2021    Hypertension     Obesity     Pneumonia of right lung due to infectious organism 5/26/2025    Thyroid nodule 4/6/2024   [2]   Past Surgical History:  Procedure Laterality Date    CARDIAC PACEMAKER PLACEMENT  12/2023    replacement    CORONARY ARTERY BYPASS GRAFT       "MITRAL VALVE ANNULOPLASTY      MULTIPLE TOOTH EXTRACTIONS Bilateral 04/12/2024    Procedure: EXTRACTION TOOTH #8, 17, 18 SURGICAL;  Surgeon: Hong Maki DMD;  Location: [facility];  Service: Maxillofacial   [3]   Social History  Tobacco Use   Smoking Status Never   Smokeless Tobacco Never   Tobacco Comments    No passive smoke exposure   [4]   Allergies  Allergen Reactions    Metoprolol Shortness Of Breath     \"50mg\" via Setswana ; states \"When I take the 25mg I'm normal.\"     "

## 2025-05-27 NOTE — ASSESSMENT & PLAN NOTE
Lab Results   Component Value Date    EGFR 38 05/26/2025    EGFR 41 01/29/2025    EGFR 43 07/22/2024    CREATININE 1.72 (H) 05/26/2025    CREATININE 1.63 (H) 01/29/2025    CREATININE 1.57 (H) 07/22/2024

## 2025-05-27 NOTE — UTILIZATION REVIEW
Initial Clinical Review    Admission: Date/Time/Statement:   Admission Orders (From admission, onward)       Ordered        05/26/25 1945  INPATIENT ADMISSION  Once                          Orders Placed This Encounter   Procedures    INPATIENT ADMISSION     Standing Status:   Standing     Number of Occurrences:   1     Level of Care:   Med Surg [16]     Estimated length of stay:   More than 2 Midnights     Certification:   I certify that inpatient services are medically necessary for this patient for a duration of greater than two midnights. See H&P and MD Progress Notes for additional information about the patient's course of treatment.     ED Arrival Information       Expected   -    Arrival   5/26/2025 16:14    Acuity   Urgent              Means of arrival   Ambulance    Escorted by   [facility]    Service   Hospitalist    Admission type   Emergency              Arrival complaint   cough             Chief Complaint   Patient presents with    Cough     Pt c/o increasing cough that causes chest pain >2 weeks. Prescribed amoxicillin by PCP 5/19--still taking.     Loss of Appetite     Loss of appetite sine initiation of antibiotics        Initial Presentation: 74 y.o. male presents to ED via  EMS from home with cough, SOB and chest, feels  ill for past 5 days.  Started on  po antibiotics by PCP, improved after 2 days, then cough worsened.  Has back and chest pain with cough.  . He gets a coughing fit and gets SOB and then has tightness in the chest from the lower neck on the right side all the way down to his stomach. Has sharp pain in lower back after about  2 steps, with cough and urination.  Has  noticed decreased urine output for past 2 days.  PMH is  Afib  on coumadin, anemia, CAD,  CKD, GERD, DM2, HTN and HLD.   Found with pneumonia on imaging.  Admit  Ip with Pneumonia Right lung,  Hyperbilirubinemia, Elevated  LFT's and hypokalemia and plan is   monitor labs, ZINA,  sputum c/s  and continue home  meds.        Anticipated Length of Stay/Certification Statement: Patient will be admitted on an inpatient basis with an anticipated length of stay of greater than 2 midnights secondary to pneumonia.     Date:   5/27   Day 2:   Remains on  ZINA.  Needs PT/OT.     Replace electrolytes as needed.   Creatinine today  1.56.  F/U blood/urine cultures.    Date:   5/28   Day 3: Has surpassed a 2nd midnight with active treatments and services.  Remains on  ZINA.  Hemoglobin stable.      Patient reports  bloody  BM  5/27 and  this am.   Not seen by nurse.   Continue to monitor labs.  O2  sats  95  % RA.  Coarse rhonchorous breath sounds right middle  and right lower lobe.  Tolerating diet.    Gi consult pending.       ED Treatment-Medication Administration from 05/26/2025 1614 to 05/26/2025 2023         Date/Time Order Dose Route Action     05/26/2025 1720 potassium chloride (Klor-Con M20) CR tablet 40 mEq 40 mEq Oral Given     05/26/2025 1756 iohexol (OMNIPAQUE) 350 MG/ML injection (MULTI-DOSE) 100 mL 100 mL Intravenous Given     05/26/2025 1825 ceftriaxone (ROCEPHIN) 1 g/50 mL in dextrose IVPB 1,000 mg Intravenous New Bag            Scheduled Medications:  allopurinol, 100 mg, Oral, Daily  [Held by provider] atorvastatin, 20 mg, Oral, Daily With Dinner  cefTRIAXone, 1,000 mg, Intravenous, Q24H  doxycycline, 100 mg, Intravenous, Q12H  furosemide, 20 mg, Oral, Daily  guaiFENesin, 600 mg, Oral, BID  metoprolol succinate, 25 mg, Oral, Daily  warfarin, 2 mg, Oral, Daily (warfarin)      Continuous IV Infusions:     PRN Meds:  acetaminophen, 650 mg, Oral, Q6H PRN      ED Triage Vitals [05/26/25 1618]   Temperature Pulse Respirations Blood Pressure SpO2 Pain Score   98.3 °F (36.8 °C) 71 22 139/67 95 % 5     Weight (last 2 days)       Date/Time Weight    05/26/25 1618 96.6 (213)            Vital Signs (last 3 days)       Date/Time Temp Pulse Resp BP MAP (mmHg) SpO2 Calculated FIO2 (%) - Nasal Cannula Nasal Cannula O2 Flow Rate (L/min)  O2 Device Patient Position - Orthostatic VS [place] Coma Scale Score Pain    05/27/25 0801 -- -- -- -- -- -- -- -- -- -- -- No Pain    05/27/25 0731 98 °F (36.7 °C) 61 18 113/66 71 94 % 28 2 L/min Nasal cannula Lying -- --    05/27/25 0253 99.4 °F (37.4 °C) 69 20 128/63 85 96 % -- -- Nasal cannula Lying -- --    05/26/25 2300 99.5 °F (37.5 °C) 75 20 134/72 97 93 % -- -- None (Room air) Lying -- --    05/26/25 2114 -- -- -- -- -- 96 % -- -- Nasal cannula -- -- --    05/26/25 2035 -- -- -- -- -- -- -- -- -- -- 15 No Pain    05/26/25 2032 98.3 °F (36.8 °C) 78 20 140/63 90 95 % -- -- None (Room air) Lying -- --    05/26/25 2003 -- 90 22 138/65 -- 98 % 30 2.5 L/min Nasal cannula Lying -- --    05/26/25 1803 -- 93 22 131/64 92 95 % -- -- Nasal cannula  Lying -- --    05/26/25 1800 -- 92 24 -- -- 88 %  -- -- None (Room air)  -- -- --    05/26/25 1654 -- -- -- -- -- -- -- -- -- -- 15 --    05/26/25 1630 -- 87 24 133/64 90 95 % -- -- None (Room air) Lying -- --    05/26/25 1618 98.3 °F (36.8 °C) 71 22 139/67 96 95 % -- -- None (Room air) Lying -- 5              Pertinent Labs/Diagnostic Test Results:   Radiology:  CT pe study w abdomen pelvis w contrast   Final Interpretation by Dwaine Graham MD (05/26 1920)      No intraluminal filling defect to suggest an acute pulmonary embolus.      Multifocal patchy areas of consolidation and groundglass density involving the right upper, middle and lower lobes suspicious for an infectious or inflammatory process. Correlation with the patient's symptoms is recommended.      No acute intra-abdominal abnormality. No free air or free fluid.      Scattered colonic diverticulosis with no inflammatory changes present to suggest acute diverticulitis.      Stable 15 mm hypodense right hepatic lobe lesion, described on a prior MRI abdomen dated 9/3/2024.                     Workstation performed: [id number]         XR chest 2 views   Final Interpretation by Indu Matthews MD (05/27  0620)      Multifocal right pneumonia. See subsequent chest CT.            Workstation performed: [id number]          right upper quadrant    (Results Pending)     Cardiology:  ECG 12 lead   Final Result by Wm Mccormack DO (05/26 2246)   Atrial fibrillation   Left axis deviation   Left bundle branch block   Abnormal ECG   When compared with ECG of 07-Apr-2024 14:20,   Atrial fibrillation has replaced Sinus rhythm   QRS axis Shifted left   QT has lengthened   Confirmed by Wm Mccormack ([id number]) on 5/26/2025 10:46:05 PM        GI:  Results from last 7 days   Lab Units 05/26/25  1643   SARS-COV-2  Negative     Results from last 7 days   Lab Units 05/27/25 0447 05/26/25  1643   WBC Thousand/uL 9.73 10.34*   HEMOGLOBIN g/dL 9.7* 10.6*   HEMATOCRIT % 30.9* 34.3*   PLATELETS Thousands/uL 211 234   TOTAL NEUT ABS Thousands/µL 7.96* 8.54*         Results from last 7 days   Lab Units 05/27/25 0447 05/26/25 2110 05/26/25  1643   SODIUM mmol/L 137  --  137   POTASSIUM mmol/L 4.2  --  2.9*   CHLORIDE mmol/L 108  --  105   CO2 mmol/L 20*  --  23   ANION GAP mmol/L 9  --  9   BUN mg/dL 29*  --  30*   CREATININE mg/dL 1.56*  --  1.72*   EGFR ml/min/1.73sq m 43  --  38   CALCIUM mg/dL 8.2*  --  8.5   MAGNESIUM mg/dL 2.4 2.3  --    PHOSPHORUS mg/dL 2.8  --   --      Results from last 7 days   Lab Units 05/27/25 0447 05/26/25  1643   AST U/L 161* 121*   ALT U/L 72* 63*   ALK PHOS U/L 206* 209*   TOTAL PROTEIN g/dL 6.4 6.7   ALBUMIN g/dL 3.0* 3.2*   TOTAL BILIRUBIN mg/dL 1.48* 1.56*         Results from last 7 days   Lab Units 05/27/25 0447 05/26/25  1643   GLUCOSE RANDOM mg/dL 100 115               Results from last 7 days   Lab Units 05/26/25 2110 05/26/25  1842 05/26/25  1643   HS TNI 0HR ng/L  --   --  79*   HS TNI 2HR ng/L  --  77*  --    HSTNI D2 ng/L  --  -2  --    HS TNI 4HR ng/L 79*  --   --    HSTNI D4 ng/L 0  --   --      Results from last 7 days   Lab Units 05/26/25  1650   D-DIMER QUANTITATIVE ug/ml FEU 3.62*      Results from last 7 days   Lab Units 05/27/25  0513 05/26/25  1643   PROTIME seconds 22.0* 19.8*   INR  1.94* 1.69*   PTT seconds  --  43*         Results from last 7 days   Lab Units 05/26/25  1643   PROCALCITONIN ng/ml 0.53*     Results from last 7 days   Lab Units 05/26/25  1643   LACTIC ACID mmol/L 1.0             Results from last 7 days   Lab Units 05/26/25  1643   BNP pg/mL 556*                     Results from last 7 days   Lab Units 05/26/25  1643   LIPASE u/L 43                 Results from last 7 days   Lab Units 05/26/25 2002   CLARITY UA  Clear   COLOR UA  Yellow   SPEC GRAV UA  >=1.050*   PH UA  8.5*   GLUCOSE UA mg/dl Negative   KETONES UA mg/dl Negative   BLOOD UA  Trace*   PROTEIN UA mg/dl 50 (1+)*   NITRITE UA  Negative   BILIRUBIN UA  Negative   UROBILINOGEN UA (BE) mg/dl <2.0   LEUKOCYTES UA  Large*   WBC UA /hpf 4-10*   RBC UA /hpf 4-10*   BACTERIA UA /hpf Moderate*   EPITHELIAL CELLS WET PREP /hpf Occasional   MUCUS THREADS  Innumerable*     Results from last 7 days   Lab Units 05/26/25  1643   INFLUENZA A PCR  Negative   INFLUENZA B PCR  Negative   RSV PCR  Negative                             Results from last 7 days   Lab Units 05/26/25  1643   BLOOD CULTURE  Received in Microbiology Lab. Culture in Progress.  Received in Microbiology Lab. Culture in Progress.               Admitting Diagnosis: Hypokalemia [E87.6]  Anemia [D64.9]  Pain [R52]  Hypoxia [R09.02]  Elevated troponin [R79.89]  Elevated LFTs [R79.89]  Age/Sex: 74 y.o. male    Network Utilization Review Department  ATTENTION: Please call with any questions or concerns to [phone] and carefully listen to the prompts so that you are directed to the right person. All voicemails are confidential.   For Discharge needs, contact Care Management DC Support Team at [phone] opt. 2  Send all requests for admission clinical reviews, approved or denied determinations and any other requests to dedicated fax number below belonging  to the campus where the patient is receiving treatment. List of dedicated fax numbers for the Facilities:  FACILITY NAME UR FAX NUMBER   ADMISSION DENIALS (Administrative/Medical Necessity) [phone]   DISCHARGE SUPPORT TEAM (NETWORK) [phone]   PARENT CHILD HEALTH (Maternity/NICU/Pediatrics) [phone]   [facility] [phone]   [facility] [phone]   [facility] [phone]   [facility] [phone]   [facility] [phone]   [facility] [phone]   [facility] [phone]   [facility] [phone]   [facility] [phone]   Critical access hospital [phone]   [facility] [phone]   [facility] [phone]

## 2025-05-27 NOTE — OCCUPATIONAL THERAPY NOTE
Occupational Therapy Screen       05/27/25 1013   OT Last Visit   OT Visit Date 05/27/25   Note Type   Note type Screen   Additional Comments OT referral received. Completed OT screen. Spoke to pt, pt is currently functioning at baseline level with no acute OT needs at this time. Pt education on benefits of acute OT. Will d/c pt at this time and reassess if medically needed.     Helga Bajwa, OT      Patient Name: Silvio Drew  Today's Date: 5/27/2025

## 2025-05-27 NOTE — ASSESSMENT & PLAN NOTE
With transaminitis on admission, previous liver enzymes in January 2025 for normal  Tylenol level checked normal  Holding home statin  Also with hyperbilirubinemia  CT showing stable liver lesion which has been previously seen on MRI and mild nodular liver surface.  Has seen oncology outpatient, had an MRI that was ordered - hemangioma  No abdominal pain, tolerating diet without nausea or vomiting  Will further evaluate with right upper quadrant ultrasound  Of note patient is on amiodarone, pending trend and RUQ may need to discuss with GI

## 2025-05-27 NOTE — ASSESSMENT & PLAN NOTE
Potassium 2.9 admission, suspect in setting of poor p.o. intake with infection and ongoing Lasix use  Repleted and normalized at 4.2  Mag normal

## 2025-05-27 NOTE — ASSESSMENT & PLAN NOTE
Since burning 2 days ago and states it is resolving  UA with 4-10 WBC and moderate bacteria  CT shows small urinary bladder diverticula  Patient has an IV antibiotics as stated above  No more dysuria, follow up urine cultures

## 2025-05-28 ENCOUNTER — APPOINTMENT (INPATIENT)
Dept: RADIOLOGY | Facility: HOSPITAL | Age: 75
DRG: 194 | End: 2025-05-28
Payer: COMMERCIAL

## 2025-05-28 PROBLEM — K62.5 BRIGHT RED BLOOD PER RECTUM: Status: ACTIVE | Noted: 2025-05-28

## 2025-05-28 LAB
ALBUMIN SERPL BCG-MCNC: 3.1 G/DL (ref 3.5–5)
ALP SERPL-CCNC: 213 U/L (ref 34–104)
ALT SERPL W P-5'-P-CCNC: 88 U/L (ref 7–52)
ANION GAP SERPL CALCULATED.3IONS-SCNC: 7 MMOL/L (ref 4–13)
AST SERPL W P-5'-P-CCNC: 184 U/L (ref 13–39)
BACTERIA UR CULT: NORMAL
BASOPHILS # BLD AUTO: 0.03 THOUSANDS/ÂΜL (ref 0–0.1)
BASOPHILS NFR BLD AUTO: 0 % (ref 0–1)
BILIRUB SERPL-MCNC: 0.94 MG/DL (ref 0.2–1)
BUN SERPL-MCNC: 37 MG/DL (ref 5–25)
CALCIUM ALBUM COR SERPL-MCNC: 9.2 MG/DL (ref 8.3–10.1)
CALCIUM SERPL-MCNC: 8.5 MG/DL (ref 8.4–10.2)
CHLORIDE SERPL-SCNC: 111 MMOL/L (ref 96–108)
CO2 SERPL-SCNC: 20 MMOL/L (ref 21–32)
CREAT SERPL-MCNC: 1.7 MG/DL (ref 0.6–1.3)
EOSINOPHIL # BLD AUTO: 0.24 THOUSAND/ÂΜL (ref 0–0.61)
EOSINOPHIL NFR BLD AUTO: 3 % (ref 0–6)
ERYTHROCYTE [DISTWIDTH] IN BLOOD BY AUTOMATED COUNT: 15.1 % (ref 11.6–15.1)
GFR SERPL CREATININE-BSD FRML MDRD: 39 ML/MIN/1.73SQ M
GLUCOSE SERPL-MCNC: 101 MG/DL (ref 65–140)
HAV IGM SER QL: NORMAL
HBV CORE IGM SER QL: NORMAL
HBV SURFACE AG SER QL: NORMAL
HCT VFR BLD AUTO: 32.4 % (ref 36.5–49.3)
HCV AB SER QL: NORMAL
HGB BLD-MCNC: 10.2 G/DL (ref 12–17)
IMM GRANULOCYTES # BLD AUTO: 0.15 THOUSAND/UL (ref 0–0.2)
IMM GRANULOCYTES NFR BLD AUTO: 2 % (ref 0–2)
INR PPP: 2.04 (ref 0.85–1.19)
LYMPHOCYTES # BLD AUTO: 1.14 THOUSANDS/ÂΜL (ref 0.6–4.47)
LYMPHOCYTES NFR BLD AUTO: 15 % (ref 14–44)
MAGNESIUM SERPL-MCNC: 2.4 MG/DL (ref 1.9–2.7)
MCH RBC QN AUTO: 28.7 PG (ref 26.8–34.3)
MCHC RBC AUTO-ENTMCNC: 31.5 G/DL (ref 31.4–37.4)
MCV RBC AUTO: 91 FL (ref 82–98)
MONOCYTES # BLD AUTO: 0.63 THOUSAND/ÂΜL (ref 0.17–1.22)
MONOCYTES NFR BLD AUTO: 8 % (ref 4–12)
NEUTROPHILS # BLD AUTO: 5.58 THOUSANDS/ÂΜL (ref 1.85–7.62)
NEUTS SEG NFR BLD AUTO: 72 % (ref 43–75)
NRBC BLD AUTO-RTO: 0 /100 WBCS
PLATELET # BLD AUTO: 269 THOUSANDS/UL (ref 149–390)
PMV BLD AUTO: 11.5 FL (ref 8.9–12.7)
POTASSIUM SERPL-SCNC: 4.1 MMOL/L (ref 3.5–5.3)
PROT SERPL-MCNC: 6.5 G/DL (ref 6.4–8.4)
PROTHROMBIN TIME: 22.9 SECONDS (ref 12.3–15)
RBC # BLD AUTO: 3.56 MILLION/UL (ref 3.88–5.62)
SODIUM SERPL-SCNC: 138 MMOL/L (ref 135–147)
WBC # BLD AUTO: 7.77 THOUSAND/UL (ref 4.31–10.16)

## 2025-05-28 PROCEDURE — 87205 SMEAR GRAM STAIN: CPT

## 2025-05-28 PROCEDURE — 87077 CULTURE AEROBIC IDENTIFY: CPT

## 2025-05-28 PROCEDURE — 97162 PT EVAL MOD COMPLEX 30 MIN: CPT

## 2025-05-28 PROCEDURE — 72110 X-RAY EXAM L-2 SPINE 4/>VWS: CPT

## 2025-05-28 PROCEDURE — 99222 1ST HOSP IP/OBS MODERATE 55: CPT | Performed by: INTERNAL MEDICINE

## 2025-05-28 PROCEDURE — 80053 COMPREHEN METABOLIC PANEL: CPT

## 2025-05-28 PROCEDURE — 99232 SBSQ HOSP IP/OBS MODERATE 35: CPT

## 2025-05-28 PROCEDURE — 80074 ACUTE HEPATITIS PANEL: CPT

## 2025-05-28 PROCEDURE — 87186 SC STD MICRODIL/AGAR DIL: CPT

## 2025-05-28 PROCEDURE — 83735 ASSAY OF MAGNESIUM: CPT

## 2025-05-28 PROCEDURE — 87070 CULTURE OTHR SPECIMN AEROBIC: CPT

## 2025-05-28 PROCEDURE — 85610 PROTHROMBIN TIME: CPT | Performed by: HOSPITALIST

## 2025-05-28 PROCEDURE — 85025 COMPLETE CBC W/AUTO DIFF WBC: CPT

## 2025-05-28 RX ORDER — METHOCARBAMOL 500 MG/1
500 TABLET, FILM COATED ORAL EVERY 6 HOURS PRN
Status: DISCONTINUED | OUTPATIENT
Start: 2025-05-28 | End: 2025-05-30 | Stop reason: HOSPADM

## 2025-05-28 RX ORDER — ACETAMINOPHEN 325 MG/1
650 TABLET ORAL EVERY 6 HOURS PRN
Status: DISCONTINUED | OUTPATIENT
Start: 2025-05-28 | End: 2025-05-30 | Stop reason: HOSPADM

## 2025-05-28 RX ORDER — OXYCODONE HYDROCHLORIDE 5 MG/1
5 TABLET ORAL EVERY 6 HOURS PRN
Refills: 0 | Status: DISCONTINUED | OUTPATIENT
Start: 2025-05-28 | End: 2025-05-30 | Stop reason: HOSPADM

## 2025-05-28 RX ORDER — LUBIPROSTONE 8 UG/1
8 CAPSULE ORAL 2 TIMES DAILY
Status: DISCONTINUED | OUTPATIENT
Start: 2025-05-28 | End: 2025-05-28

## 2025-05-28 RX ORDER — POLYETHYLENE GLYCOL 3350 17 G/17G
17 POWDER, FOR SOLUTION ORAL 2 TIMES DAILY
Status: DISCONTINUED | OUTPATIENT
Start: 2025-05-28 | End: 2025-05-30 | Stop reason: HOSPADM

## 2025-05-28 RX ORDER — DOXYCYCLINE 100 MG/1
100 CAPSULE ORAL EVERY 12 HOURS SCHEDULED
Status: DISCONTINUED | OUTPATIENT
Start: 2025-05-28 | End: 2025-05-30 | Stop reason: HOSPADM

## 2025-05-28 RX ORDER — DOXYCYCLINE 100 MG/1
100 CAPSULE ORAL EVERY 12 HOURS SCHEDULED
Status: DISCONTINUED | OUTPATIENT
Start: 2025-05-28 | End: 2025-05-28 | Stop reason: SDUPTHER

## 2025-05-28 RX ORDER — GABAPENTIN 300 MG/1
300 CAPSULE ORAL
Status: DISCONTINUED | OUTPATIENT
Start: 2025-05-28 | End: 2025-05-30 | Stop reason: HOSPADM

## 2025-05-28 RX ORDER — LUBIPROSTONE 24 UG/1
24 CAPSULE ORAL 2 TIMES DAILY
Status: DISCONTINUED | OUTPATIENT
Start: 2025-05-28 | End: 2025-05-30 | Stop reason: HOSPADM

## 2025-05-28 RX ADMIN — LUBIPROSTONE 8 MCG: 8 CAPSULE, GELATIN COATED ORAL at 09:07

## 2025-05-28 RX ADMIN — METHOCARBAMOL 500 MG: 500 TABLET ORAL at 21:37

## 2025-05-28 RX ADMIN — GUAIFENESIN 600 MG: 600 TABLET ORAL at 08:24

## 2025-05-28 RX ADMIN — PANTOPRAZOLE SODIUM 20 MG: 20 TABLET, DELAYED RELEASE ORAL at 05:51

## 2025-05-28 RX ADMIN — DOXYCYCLINE HYCLATE 100 MG: 100 CAPSULE ORAL at 21:34

## 2025-05-28 RX ADMIN — LUBIPROSTONE 24 MCG: 24 CAPSULE, GELATIN COATED ORAL at 21:35

## 2025-05-28 RX ADMIN — GUAIFENESIN 600 MG: 600 TABLET ORAL at 21:34

## 2025-05-28 RX ADMIN — ALLOPURINOL 100 MG: 100 TABLET ORAL at 08:24

## 2025-05-28 RX ADMIN — BENZONATATE 100 MG: 100 CAPSULE ORAL at 21:34

## 2025-05-28 RX ADMIN — ACETAMINOPHEN 650 MG: 325 TABLET, FILM COATED ORAL at 18:10

## 2025-05-28 RX ADMIN — POLYETHYLENE GLYCOL 3350 17 G: 17 POWDER, FOR SOLUTION ORAL at 17:06

## 2025-05-28 RX ADMIN — AMIODARONE HYDROCHLORIDE 200 MG: 200 TABLET ORAL at 08:24

## 2025-05-28 RX ADMIN — DOXYCYCLINE HYCLATE 100 MG: 100 CAPSULE ORAL at 11:40

## 2025-05-28 RX ADMIN — CEFTRIAXONE 1000 MG: 10 INJECTION, POWDER, FOR SOLUTION INTRAVENOUS at 17:06

## 2025-05-28 RX ADMIN — WARFARIN SODIUM 2 MG: 2 TABLET ORAL at 17:07

## 2025-05-28 RX ADMIN — GABAPENTIN 300 MG: 300 CAPSULE ORAL at 21:37

## 2025-05-28 NOTE — ASSESSMENT & PLAN NOTE
With transaminitis on admission, previous liver enzymes in January 2025 for normal  Tylenol level checked normal  Holding home statin  Also with hyperbilirubinemia which is now resolved  CT showing stable liver lesion which has been previously seen on MRI and mild nodular liver surface.  Has seen oncology outpatient, had an MRI that was ordered - hemangioma  No abdominal pain, tolerating diet without nausea or vomiting  Right upper quadrant ultrasound with no acute abnormalities  Of note patient is on amiodarone  Questionable hepatic congestion although patient does not clinically appear volume overloaded  GI consulted for evaluation and recommendations appreciated

## 2025-05-28 NOTE — ASSESSMENT & PLAN NOTE
Outpatient PCP notes patient is on Lipitor 20 mg  Continue holding statin in setting of transaminitis

## 2025-05-28 NOTE — ASSESSMENT & PLAN NOTE
Lab Results   Component Value Date    EGFR 39 05/28/2025    EGFR 43 05/27/2025    EGFR 38 05/26/2025    CREATININE 1.70 (H) 05/28/2025    CREATININE 1.56 (H) 05/27/2025    CREATININE 1.72 (H) 05/26/2025     Baseline creatinine ~1.4 to 1.7  Patient is at risk of contrast-induced kidney injury due to CT on admission  Reported poor oral intake and urine output prior to admission, urine displays high specific gravity-symptoms has improved  Continue holding Lasix for time being, patient tolerating p.o. intake  CT without hydronephrosis  Monitor intake and output

## 2025-05-28 NOTE — ASSESSMENT & PLAN NOTE
History of mitral valve repair with PFO closure 1998 per outpatient cardiology notes  Echo notes adequate leaflet mobility although is approaching severe MR (this has also been noted in OP cardiology notes at [facility])

## 2025-05-28 NOTE — ASSESSMENT & PLAN NOTE
Potassium 2.9 admission, suspect in setting of poor p.o. intake with infection and ongoing Lasix use  Repleted and normalized at 4.2  Remains normal -monitor once Lasix is resumed  Mag normal

## 2025-05-28 NOTE — PHYSICAL THERAPY NOTE
PHYSICAL THERAPY EVALUATION          Patient Name: Silvio Drew  Today's Date: 5/28/2025 05/28/25 1036   PT Last Visit   PT Visit Date 05/28/25   Note Type   Note type Evaluation   Pain Assessment   Pain Assessment Tool 0-10   Pain Score No Pain   Restrictions/Precautions   Other Precautions Chair Alarm;Bed Alarm;Multiple lines;Fall Risk   Home Living   Type of Home House   Home Layout Multi-level;Bed/bath upstairs   Home Equipment Walker;Cane   Prior Function   Level of [place] Independent with ADLs;Independent with functional mobility;Independent with IADLS   Lives With Alone   Falls in the last 6 months 1 to 4   Comments indep w use of cane   General   Additional Pertinent History pt admitted 5/26/25 for pna of R lung. use of  #[id number]. PMHx significant for afib, cardiomyopathy, T2DM   Cognition   Overall Cognitive Status WFL   Arousal/Participation Cooperative   Orientation Level Oriented to person;Oriented to place;Oriented to time   Following Commands Follows one step commands without difficulty   Bed Mobility   Supine to Sit 6  Modified independent   Additional items HOB elevated   Sit to Supine 6  Modified independent   Additional items HOB elevated;Increased time required   Transfers   Sit to Stand 6  Modified independent   Stand to Sit 6  Modified independent   Ambulation/Elevation   Gait pattern Forward Flexion   Gait Assistance 5  Supervision   Additional items Assist x 1   Assistive Device Rolling walker;None   Distance 100' (5' w no AD)   Balance   Static Standing Fair   Dynamic Standing Fair -   Ambulatory Fair -   Endurance Deficit   Endurance Deficit No  (vitals 105, 94% on RA at rest and 107, 93% on RA post amb)   Activity Tolerance   Activity Tolerance Patient tolerated treatment well   Medical Staff Made Aware medical team in rounds   Nurse Made Aware yes   Assessment   Prognosis Good   Assessment Silvio Drew is a 74 y.o. male admitted to [facility] on 5/26/2025 for Pneumonia  of right lung due to infectious organism. PT was consulted and pt was seen on 5/28/2025 for mobility assessment and d/c planning. Pt presents w high fall risk, multiple lines. At baseline is indep w use of cane. Pt is currently functioning at a mod I bed mobility and transfers, S for ambulation. Pt demonstrated ability to ambulate unlimited household distances. Requesting to use RW during session but able to transition to no AD without LOB. No obvious barriers to dc home. Did make this therapist known of hx of chronic back pain (x2 years) that impacts his ability to go up and down stairs easily. Discussed OP PT services for chronic back pain and to optimize mobility. He would like to see a specialist; defer to medical team for referral. Pt will benefit from continued mobilization via nsg/restorative.   Barriers to Discharge None   Plan   PT Frequency   (d/c PT: maintain on restorative)   Discharge Recommendation   Rehab Resource Intensity Level, PT III (Minimum Resource Intensity)  (reports chronic back pain- can consider OPPT for back pain prn)   AM-PAC Basic Mobility Inpatient   Turning in Flat Bed Without Bedrails 4   Lying on Back to Sitting on Edge of Flat Bed Without Bedrails 4   Moving Bed to Chair 3   Standing Up From Chair Using Arms 4   Walk in Room 3   Climb 3-5 Stairs With Railing 3   Basic Mobility Inpatient Raw Score 21   Basic Mobility Standardized Score 45.55   [facility] Highest Level Of Mobility   -HLM Goal 6: Walk 10 steps or more   -HLM Achieved 7: Walk 25 feet or more   End of Consult   Patient Position at End of Consult Supine;All needs within reach;Other (comment)  (no bed alarm on hospital bed)     History: co - morbidities including age, use of assistive device, current experience including fall risk, multiple lines  Exam: impairments in systems including multiple body structures involved; neuromuscular (balance, gait, transfers), cardiopulmonary (vitals), cognition; activity  limitations  (difficulties executing an action); participation restrictions (problems associated w involvement in life situations), AM-PAC  Clinical: stable/unpredictable  Complexity: moderate    Estela Robbins, PT

## 2025-05-28 NOTE — PROGRESS NOTES
Progress Note - Hospitalist   Name: Silvio Drew 74 y.o. male I MRN: [id number]  Unit/Bed#: E2 -01 I Date of Admission: 5/26/2025   Date of Service: 5/28/2025 I Hospital Day: 2    Assessment & Plan  Pneumonia of right lung due to infectious organism  Patient presented with progressive cough, right-sided chest pain, decreased oral intake, and shortness of breath on exertion  Reports has progressed over the last 13 days  Was prescribed amoxicillin by PCP but once he started to take it he got a fever and that the medication was poisoning him so he stopped it  On admission showed multifocal patchy areas of consolidation and groundglass density involving the right upper and middle and lower lobes  Started on IV ceftriaxone doxycycline, continue D3  Blood cultures negative at 24 hours, continue to follow-up sputum culture  Not requiring oxygen  Encourage incentive spirometry  Leukocytosis is downtrending, remains afebrile  Presenting symptoms resolving  Hyperlipidemia  Outpatient PCP notes patient is on Lipitor 20 mg  Continue holding statin in setting of transaminitis  Stage 3b chronic kidney disease (HCC)  Lab Results   Component Value Date    EGFR 39 05/28/2025    EGFR 43 05/27/2025    EGFR 38 05/26/2025    CREATININE 1.70 (H) 05/28/2025    CREATININE 1.56 (H) 05/27/2025    CREATININE 1.72 (H) 05/26/2025     Baseline creatinine ~1.4 to 1.7  Patient is at risk of contrast-induced kidney injury due to CT on admission  Reported poor oral intake and urine output prior to admission, urine displays high specific gravity-symptoms has improved  Continue holding Lasix for time being, patient tolerating p.o. intake  CT without hydronephrosis  Monitor intake and output  Hypokalemia  Potassium 2.9 admission, suspect in setting of poor p.o. intake with infection and ongoing Lasix use  Repleted and normalized at 4.2  Remains normal -monitor once Lasix is resumed  Mag normal  Elevated LFTs  With transaminitis on admission,  previous liver enzymes in January 2025 for normal  Tylenol level checked normal  Holding home statin  Also with hyperbilirubinemia which is now resolved  CT showing stable liver lesion which has been previously seen on MRI and mild nodular liver surface.  Has seen oncology outpatient, had an MRI that was ordered - hemangioma  No abdominal pain, tolerating diet without nausea or vomiting  Right upper quadrant ultrasound with no acute abnormalities  Of note patient is on amiodarone  Questionable hepatic congestion although patient does not clinically appear volume overloaded  GI consulted for evaluation and recommendations appreciated  Hyperbilirubinemia  This resolved, see above  Anemia  Noted Dr. Pa with iron deficiency anemia  Currently not on iron supplementation  Hemoglobin 10.6 on admission, stable at 10.2 today  Monitor for bleeding -Per yesterday evening and this morning patient notes bright red blood in the toilet.  Does have a history of constipation and is on Linzess   GI to evaluate bedside   Resume home med  Nonischemic cardiomyopathy (HCC)  With history of has MDT single-chamber ICD, placed August 2013    Last device check through heart care group noted with normal function due 2/25  Last EF during ZULY April 2024 35%  Patient noted abnormal coronary arteries on cardiac cath 2011  CT scan displays moderate cardiomegaly, status post CABG  Hold lasix 20 mg daily at this time patient is clinically dry to euvolemic received contrast, high sp gravity -anticipate resumption tomorrow if creatinine stable  Obtain echocardiogram with EF around 32%, overall no significant change, IVC normal  A-fib (HCC)  With paroxysmal atrial fibrillation and flutter, diagnosed October 2015  PTA on amiodarone 200 mg daily  Resumed on Toprol-XL 25 mg on admission as per prior cardiology recommendations  On Coumadin 2 mg daily for AC, goal INR 2-3   Of note his INR has been almost entirely subtherapeutic back even through  April  INR therapeutic at 2.04 today  Will discuss with GI about holding further doses given reports of rectal bleeding  Prolonged QT interval  Noted, electrolytes normal  Avoid QT prolonging medications   Primary hypertension  Blood pressure is stable.  Continue Toprol, home losartan held on admission, patient previously on 50 mg although this is on his personal med list  Atherosclerosis of native artery of both lower extremities with intermittent claudication (HCC)  Patient is on statin and Coumadin, not on antiplatelets due to prior bleeding episodes with epistaxis  H/O mitral valve repair  History of mitral valve repair with PFO closure 1998 per outpatient cardiology notes  Echo notes adequate leaflet mobility although is approaching severe MR (this has also been noted in OP cardiology notes at [facility])  Elevated troponin  Without current chest pain  Chest pain appears pleuritic and related to pna as stated above  Reviewed EKG with cardiology, patient has known LBBB that is chronic  Updated echo with no changes  GERD (gastroesophageal reflux disease)  Seen by Dr. Lombardi in January 2020 for  Continue pantoprazole 20 mg daily  No recent EGD noted  Suspected UTI  Since burning 2 days ago and states it is resolving  UA with 4-10 WBC and moderate bacteria  CT shows small urinary bladder diverticula  Patient has an IV antibiotics as stated above  No more dysuria, follow up urine cultures  Constipation  PTA on Linzess, resume inpatient Amitzia  Lumbar radiculopathy  Chart history, per nursing patient has been ambulating around the room without assistance  He is on gabapentin 100 mg daily, will continue -can consider increasing this as he does admit to chronic and longstanding neuropathy   Recent A1c normal   B12 normal  Patient denies any recent fall or trauma to his back  PT eval    VTE Pharmacologic Prophylaxis:   coumadin    Mobility:   Basic Mobility Inpatient Raw Score: 23  [facility] Goal: 7: Walk 25 feet or more  -M  Achieved: 6: Walk 10 steps or more  -HLM Goal achieved. Continue to encourage appropriate mobility.    Patient Centered Rounds: I performed bedside rounds with nursing staff today.   Discussions with Specialists or Other Care Team Provider: GI    Education and Discussions with Family / Patient: Updated  (son) at bedside.    Current Length of Stay: 2 day(s)  Current Patient Status: Inpatient   Certification Statement: The patient will continue to require additional inpatient hospital stay due to pneumonia treatment, pending urine and sputum culture, GI evaluation, monitoring anemia, monitor transaminitis  Discharge Plan: ~48-72 hours    Code Status: Level 1 - Full Code    Subjective   Patient seen examined lying in bed.  Per nursing he was noted to have a bloody bowel movement yesterday and stated he had 1 today but she did not see it.  Upon evaluation patient states it was a light pink in the water.  Denies any blood from his urine.  He is requesting his home medication to help him move his bowels.  He is eating and drinking more and notes he is urinating without any burning.  Denies any fevers, chills, chest pain or shortness of breath.  States he still has some lingering cough.  Denies any abdominal pain, nausea or vomiting    Objective :  Temp:  [97.5 °F (36.4 °C)-98 °F (36.7 °C)] 98 °F (36.7 °C)  HR:  [] 108  BP: (108-118)/(68-77) 108/68  Resp:  [18] 18  SpO2:  [94 %-97 %] 96 %  O2 Device: None (Room air)  Nasal Cannula O2 Flow Rate (L/min):  [2 L/min] 2 L/min    Body mass index is 32.39 kg/m².     Input and Output Summary (last 24 hours):     Intake/Output Summary (Last 24 hours) at 5/28/2025 1120  Last data filed at 5/28/2025 0740  Gross per 24 hour   Intake 120 ml   Output 500 ml   Net -380 ml       Physical Exam  Vitals and nursing note reviewed.   Constitutional:       Appearance: Normal appearance. He is obese. He is not ill-appearing or diaphoretic.     Cardiovascular:      Rate and  Rhythm: Normal rate. Rhythm irregularly irregular.      Heart sounds: Murmur heard.   Pulmonary:      Effort: Pulmonary effort is normal. No respiratory distress.      Breath sounds: Rhonchi present. No rales.      Comments: Coarse rhonchorous breath sounds right middle and right lower lobe, on room air 95%  Abdominal:      General: Bowel sounds are normal.      Palpations: Abdomen is soft.      Tenderness: There is no abdominal tenderness. There is no guarding or rebound.     Musculoskeletal:      Right lower leg: No edema.      Left lower leg: No edema.     Skin:     General: Skin is warm and dry.      Coloration: Skin is pale. Skin is not jaundiced.     Neurological:      Mental Status: He is alert and oriented to person, place, and time. Mental status is at baseline.         Lab Results: I have reviewed the following results:   Results from last 7 days   Lab Units 05/28/25  0550   WBC Thousand/uL 7.77   HEMOGLOBIN g/dL 10.2*   HEMATOCRIT % 32.4*   PLATELETS Thousands/uL 269   SEGS PCT % 72   LYMPHO PCT % 15   MONO PCT % 8   EOS PCT % 3     Results from last 7 days   Lab Units 05/28/25  0550   SODIUM mmol/L 138   POTASSIUM mmol/L 4.1   CHLORIDE mmol/L 111*   CO2 mmol/L 20*   BUN mg/dL 37*   CREATININE mg/dL 1.70*   ANION GAP mmol/L 7   CALCIUM mg/dL 8.5   ALBUMIN g/dL 3.1*   TOTAL BILIRUBIN mg/dL 0.94   ALK PHOS U/L 213*   ALT U/L 88*   AST U/L 184*   GLUCOSE RANDOM mg/dL 101     Results from last 7 days   Lab Units 05/28/25  0550   INR  2.04*     Results from last 7 days   Lab Units 05/26/25  1643   LACTIC ACID mmol/L 1.0   PROCALCITONIN ng/ml 0.53*     Recent Cultures (last 7 days):   Results from last 7 days   Lab Units 05/26/25  2221 05/26/25  1957 05/26/25  1643   BLOOD CULTURE   --   --  No Growth at 24 hrs.  No Growth at 24 hrs.   SPUTUM CULTURE  Test not performed. Suggest repeat specimen.  --   --    GRAM STAIN RESULT  >10 squamous epithelial cells/lpf, indicating orophayngeal contamination.*  3+ Gram  negative rods*  Rare Gram positive rods*  1+ Gram positive cocci in pairs*  No polys seen*  --   --    LEGIONELLA URINARY ANTIGEN   --  Negative  --      Last 24 Hours Medication List:     Current Facility-Administered Medications:     acetaminophen (TYLENOL) tablet 650 mg, Q6H PRN    allopurinol (ZYLOPRIM) tablet 100 mg, Daily    amiodarone tablet 200 mg, Daily    [Held by provider] atorvastatin (LIPITOR) tablet 20 mg, Daily With Dinner    benzonatate (TESSALON PERLES) capsule 100 mg, TID PRN    cefTRIAXone (ROCEPHIN) 1,000 mg in dextrose 5 % 50 mL IVPB, Q24H, Last Rate: 1,000 mg (05/27/25 1728)    doxycycline hyclate (VIBRAMYCIN) capsule 100 mg, Q12H ROB    [Held by provider] furosemide (LASIX) tablet 20 mg, Daily    gabapentin (NEURONTIN) capsule 100 mg, HS    guaiFENesin (MUCINEX) 12 hr tablet 600 mg, BID    lidocaine (LIDODERM) 5 % patch 1 patch, Daily PRN    lubiprostone (AMITIZA) capsule 8 mcg, BID    metoprolol succinate (TOPROL-XL) 24 hr tablet 25 mg, Daily    pantoprazole (PROTONIX) EC tablet 20 mg, Early Morning    warfarin (COUMADIN) tablet 2 mg, Daily (warfarin)    Administrative Statements   Today, Patient Was Seen By: Jessica Thakkar PA-C    **Please Note: This note may have been constructed using a voice recognition system.**

## 2025-05-28 NOTE — PLAN OF CARE
Problem: Potential for Falls  Goal: Patient will remain free of falls  Description: INTERVENTIONS:  - Educate patient/family on patient safety including physical limitations  - Instruct patient to call for assistance with activity   - Consider consulting OT/PT to assist with strengthening/mobility based on AM PAC & JH-HLM score  - Consult OT/PT to assist with strengthening/mobility   - Keep Call bell within reach  - Keep bed low and locked with side rails adjusted as appropriate  - Keep care items and personal belongings within reach  - Initiate and maintain comfort rounds  - Make Fall Risk Sign visible to staff  - Offer Toileting every 2 Hours, in advance of need  - Initiate/Maintain bed alarm  - Obtain necessary fall risk management equipment:  socks, yellow bracelet, call bell   - Apply yellow socks and bracelet for high fall risk patients  - Consider moving patient to room near nurses station  Outcome: Progressing     Problem: PAIN - ADULT  Goal: Verbalizes/displays adequate comfort level or baseline comfort level  Description: Interventions:  - Encourage patient to monitor pain and request assistance  - Assess pain using appropriate pain scale  - Administer analgesics as ordered based on type and severity of pain and evaluate response  - Implement non-pharmacological measures as appropriate and evaluate response  - Consider cultural and social influences on pain and pain management  - Notify physician/advanced practitioner if interventions unsuccessful or patient reports new pain  - Educate patient/family on pain management process including their role and importance of  reporting pain   - Provide non-pharmacologic/complimentary pain relief interventions  Outcome: Progressing     Problem: RESPIRATORY - ADULT  Goal: Achieves optimal ventilation and oxygenation  Description: INTERVENTIONS:  - Assess for changes in respiratory status  - Assess for changes in mentation and behavior  - Position to facilitate  oxygenation and minimize respiratory effort  - Oxygen administered by appropriate delivery if ordered  - Initiate smoking cessation education as indicated  - Encourage broncho-pulmonary hygiene including cough, deep breathe, Incentive Spirometry  - Assess the need for suctioning and aspirate as needed  - Assess and instruct to report SOB or any respiratory difficulty  - Respiratory Therapy support as indicated  Outcome: Progressing

## 2025-05-28 NOTE — ASSESSMENT & PLAN NOTE
Lab Results   Component Value Date    EGFR 39 05/28/2025    EGFR 43 05/27/2025    EGFR 38 05/26/2025    CREATININE 1.70 (H) 05/28/2025    CREATININE 1.56 (H) 05/27/2025    CREATININE 1.72 (H) 05/26/2025

## 2025-05-28 NOTE — ASSESSMENT & PLAN NOTE
With history of has MDT single-chamber ICD, placed August 2013    Last device check through heart care group noted with normal function due 2/25  Last EF during ZULY April 2024 35%  Patient noted abnormal coronary arteries on cardiac cath 2011  CT scan displays moderate cardiomegaly, status post CABG  Hold lasix 20 mg daily at this time patient is clinically dry to euvolemic received contrast, high sp gravity -anticipate resumption tomorrow if creatinine stable  Obtain echocardiogram with EF around 32%, overall no significant change, IVC normal

## 2025-05-28 NOTE — CONSULTS
Consultation - Gastroenterology   Name: Silvio Drew 74 y.o. male I MRN: [id number]  Unit/Bed#: E2 -01 I Date of Admission: 5/26/2025   Date of Service: 5/28/2025 I Hospital Day: 2       Inpatient consult to gastroenterology     Date/Time  5/28/2025 8:41 AM     Performed by  Roxana Nuñez DO   Authorized by  Jessica Thakkar PA-C           Physician Requesting Evaluation: Otto Holliday DO   Reason for Evaluation / Principal Problem: Bright red blood per rectum    Assessment & Plan  Bright red blood per rectum  74-year-old male with a history of atrial fibrillation on Coumadin, CAD, GERD, diabetes mellitus type 2, hypertension, hyperlipidemia, chronic anemia, and chronic constipation who presented initially with shortness of breath/cough and was found to have pneumonia.  Hospital course now complicated by bright red blood per rectum for which gastroenterology is now consulted.  Patient admits to having small amounts of blood in the toilet after he was straining to have a bowel movement.  Does have a history of this in the past that occurs whenever he is constipated.  Fortunately, his constipation has been well-controlled as of recently on Linzess.  Abdominal exam benign at this time.  Hemoglobin also currently stable at 10.6, consistent with baseline.  At this time, do suspect that bright red blood was a result of outlet bleeding from hemorrhoids.  Additional differentials would include diverticular bleed, polypoid bleed, AVM, and unable to rule out malignancy.  Patient is overdue for colonoscopy which was previously ordered 1/2024.  Do ultimately recommend that this be completed for further evaluation and to confirm suspicion.  Would recommend that this be completed on an outpatient basis after patient recovers from his pneumonia.  If patient were to develop any further signs of overt bleeding or hemoglobin drop, could consider inpatient endoscopic evaluation at that time.  Otherwise, recommend ongoing  management of constipation in order to prevent straining and hemorrhoidal bleeding.    Plan:  No plan for inpatient endoscopic intervention; recommend outpatient EGD/colonoscopy  Management as constipation as below  Okay to continue on AP/AC; avoid use of all NSAIDs  Trend H&H; transfuse for goal of >7.0   Monitor hemodynamics; avoid episodes of hypotension   Monitor bowel movements; alert GI team of signs of overt GIB  Additional pain and symptom management per primary team   Constipation  Patient with a longstanding history of constipation.  Current home regimen includes Linzess 290 mcg daily along with MiraLAX.  Suspect that patient has rectal bleeding as a result of hemorrhoids in setting of constipation.  Will need to focus on optimizing bowel regimen at this time in order to prevent excessive straining.  Okay to continue on high-dose Amitiza while inpatient but would look to resume home Linzess on discharge.    Plan:  Would increase Amitiza to 24 mcg twice daily, resume home Linzess on discharge  Begin on MiraLAX 17 g twice daily  Strongly recommend limited use of opioids and anticholinergics   Encourage to increase oral intake of water; can consider IVF for hydration  Monitor and replete electrolytes; maintain Mg >2 and K >4   Out of bed and encourage ambulation as patient is able   Remainder of pain and symptom management per primary team   Elevated LFTs  Patient with new onset LFT elevations this admission - AST//63, alkaline phosphatase 219, and total bilirubin 1.56.  LFTs had been previously normal prior to admission.  Right upper quadrant ultrasound completed and with no acute findings.  CT imaging did reveal possible nodular appearing liver but no other imaging studies revealed similar findings.  Platelet count also normal.  Possible that patient does have some degree of hepatic steatosis as well as fibrosis given his comorbid conditions.  However, suspect that acute LFT elevations may be a  result of acute illness as well as DILI.  Patient recently prescribed amoxicillin which certainly can lead to LFT elevations.  Will therefore hold off on full serologic liver workup unless LFTs fail to improve.    Plan:  Follow-up results of acute hepatitis panel  Continue to monitor and trend LFTs to ensure improvement  Hold off on serologic liver workup, consider if LFTs continue to rise/fail to normalize  Continue to avoid hepatotoxic agents  Can consider outpatient ultrasound elastography to further stratify  A-fib (HCC)  Patient with a history of atrial fibrillation.  Currently on Coumadin.  Okay to continue from GI standpoint.  GERD (gastroesophageal reflux disease)  Patient with a history of chronic reflux.  Symptoms currently well-controlled at this time.  Okay to continue on pantoprazole 20 mg daily.  Recommend outpatient EGD at time of colonoscopy for Skinner's esophagus screening due to longstanding history of GERD.    Please contact the SecureChat role for the Gastroenterology service with any questions/concerns.    History of Present Illness   HPI:  Silvio Drew is a 74 y.o. male with a past medical history of atrial fibrillation on Coumadin, CAD, GERD, diabetes mellitus type 2, hypertension, hyperlipidemia, chronic anemia and history of chronic constipation on Linzess who initially presented to [facility] ED on 5/26/2025 of shortness of breath, cough, and chest pain.  Symptoms have been ongoing for the last 5 days prior to admission he also had been started on an outpatient setting 7/Tylenol..  Patient hemodynamically stable on admission but with new oxygen requirement up to 2.5 L.  Labs demonstrating a WBC count of 10.34 with hemoglobin of 10.6 (baseline 10-11).  INR 1.69.  Serum creatinine 1.72, consistent with baseline LFTs as follows: AST//63, alkaline phosphatase 219, and total bilirubin 1.56.  LFTs previously normal.  COVID/flu/RSV negative.  CT PE study completed on admission revealed no evidence  of acute pulmonary embolus blood multifocal pneumonia.  No acute intra-abdominal abnormality seen but patient with scattered colonic diverticulosis and a stable 15 mm hypodense right hepatic lobe lesion.  Patient was ultimately admitted and started on antibiotics for treatment of pneumonia.    Hospital course now complicated by bright red blood per rectum for which gastroenterology is consulted.  Patient continues to remain hemodynamically stable.  Hemoglobin also stable at 10.2 today.  Iron panel with a ferritin of 409 and iron saturation of 6.  Patient seen and evaluated at time of consultation.  Sitting in bed comfortably in no acute distress or visible discomfort.  States that he had bright red blood per rectum after he was straining to have a bowel movement.  Admits to being constipated at this time.  Denies any abdominal pain.  No associated symptoms.    Of note: Patient was previously seen by gastroenterology in the outpatient setting.  He was last seen in office on 1/11/2025 for evaluation of constipation and rectal bleeding.  Patient had been on Linzess 290 mcg at that time.  Patient also with a longstanding history of GERD which was controlled during last visit.  He was ultimately recommended EGD/colonoscopy for Skinner's esophagus screening along with evaluation of rectal bleeding.  Unfortunately it does not appear that this was completed.  Last colonoscopy appears to have been 10 years ago and was reportedly normal.  No family history of colorectal cancer.    Review of Systems  Medical History Review: I have reviewed the patient's PMH, PSH, Social History, Family History, Meds, and Allergies   Historical Information   Past Medical History[1]  Past Surgical History[2]  Social History[3]  E-Cigarette/Vaping    E-Cigarette Use Never User      E-Cigarette/Vaping Substances    Nicotine No     THC No     CBD No     Flavoring No     Other No     Unknown No      Family History[4]  Social History[5]    Current  Facility-Administered Medications:     acetaminophen (TYLENOL) tablet 650 mg, Q6H PRN    allopurinol (ZYLOPRIM) tablet 100 mg, Daily    amiodarone tablet 200 mg, Daily    [Held by provider] atorvastatin (LIPITOR) tablet 20 mg, Daily With Dinner    benzonatate (TESSALON PERLES) capsule 100 mg, TID PRN    cefTRIAXone (ROCEPHIN) 1,000 mg in dextrose 5 % 50 mL IVPB, Q24H, Last Rate: 1,000 mg (05/27/25 1728)    doxycycline hyclate (VIBRAMYCIN) capsule 100 mg, Q12H ROB    [Held by provider] furosemide (LASIX) tablet 20 mg, Daily    gabapentin (NEURONTIN) capsule 100 mg, HS    guaiFENesin (MUCINEX) 12 hr tablet 600 mg, BID    lidocaine (LIDODERM) 5 % patch 1 patch, Daily PRN    lubiprostone (AMITIZA) capsule 8 mcg, BID    metoprolol succinate (TOPROL-XL) 24 hr tablet 25 mg, Daily    pantoprazole (PROTONIX) EC tablet 20 mg, Early Morning    warfarin (COUMADIN) tablet 2 mg, Daily (warfarin)  Prior to Admission Medications   Prescriptions Last Dose Informant Patient Reported? Taking?   Diclofenac Sodium (VOLTAREN) 1 %   No No   Sig: Apply 2 g topically 4 (four) times a day   Sodium Chloride Flush (Normal Saline Flush) 0.9 % SOLN Unknown Self Yes No   Sig: Inject 10 mL into a catheter in a vein in the morning. Flush before and after antibiotic administration.   allopurinol (ZYLOPRIM) 100 mg tablet Unknown  No No   Sig: Take 1 tablet (100 mg total) by mouth daily   amiodarone 200 mg tablet Unknown  No No   Sig: Take 1 tablet (200 mg total) by mouth daily   amoxicillin (AMOXIL) 500 mg capsule Past Month  No Yes   Sig: Take 1 capsule (500 mg total) by mouth every 8 (eight) hours for 10 days   atorvastatin (LIPITOR) 20 mg tablet Past Month  No Yes   Sig: Take 1 tablet (20 mg total) by mouth daily Please STOP Pravastatin..   dextromethorphan-guaiFENesin (ROBITUSSIN DM)  mg/5 mL syrup Past Month  No Yes   Sig: Take 5 mL by mouth 3 (three) times a day as needed for congestion or cough   diclofenac sodium (VOLTAREN) 50 mg EC  tablet Past Month  No Yes   Sig: TAKE 1 TABLET(50 MG) BY MOUTH TWICE DAILY WITH MEALS   ergocalciferol (VITAMIN D2) 50,000 units Past Month  No Yes   Sig: Take 1 capsule (50,000 Units total) by mouth once a week   furosemide (Lasix) 20 mg tablet Unknown  No No   Sig: Take 1 tablet (20 mg total) by mouth daily   ketoconazole (NIZORAL) 2 % shampoo Unknown Self No No   Sig: Apply 1 application topically 3 (three) times a week   lidocaine (Lidoderm) 5 %   No No   Sig: Apply 1 patch topically over 12 hours daily for 15 days Remove & Discard patch within 12 hours or as directed by MD   linaCLOtide 290 MCG CAPS Unknown  No No   Sig: Take 1 capsule by mouth in the morning   losartan (COZAAR) 50 mg tablet Unknown  No No   Sig: Take 1 tablet (50 mg total) by mouth daily   metoprolol succinate (Toprol XL) 25 mg 24 hr tablet Unknown  No No   Sig: Take 1 tablet (25 mg total) by mouth daily   warfarin (COUMADIN) 5 mg tablet 5/25/2025 Self No Yes   Sig: Take 1 tablet (5 mg total) by mouth daily      Facility-Administered Medications: None     Metoprolol    Objective :  Temp:  [97.5 °F (36.4 °C)-98.4 °F (36.9 °C)] 98 °F (36.7 °C)  HR:  [] 108  BP: (102-118)/(68-77) 108/68  Resp:  [18] 18  SpO2:  [94 %-97 %] 96 %  O2 Device: None (Room air)  Nasal Cannula O2 Flow Rate (L/min):  [2 L/min] 2 L/min    Physical Exam  Constitutional:       General: He is not in acute distress.     Appearance: He is normal weight. He is not ill-appearing or toxic-appearing.   HENT:      Head: Normocephalic.      Nose: Nose normal.     Eyes:      General: No scleral icterus.      Cardiovascular:      Rate and Rhythm: Normal rate.      Pulses: Normal pulses.   Pulmonary:      Effort: Pulmonary effort is normal.   Abdominal:      General: Abdomen is flat.      Palpations: Abdomen is soft.      Tenderness: There is no abdominal tenderness.      Hernia: No hernia is present.     Musculoskeletal:         General: No swelling. Normal range of motion.       Cervical back: Normal range of motion.     Skin:     General: Skin is warm.      Coloration: Skin is not jaundiced or pale.     Neurological:      Mental Status: He is alert and oriented to person, place, and time.     Psychiatric:         Mood and Affect: Mood normal.         Behavior: Behavior normal.           Lab Results: I have reviewed the following results:CBC/BMP:   .     05/28/25  0550   WBC 7.77   HGB 10.2*   HCT 32.4*      SODIUM 138   K 4.1   *   CO2 20*   BUN 37*   CREATININE 1.70*   GLUC 101   MG 2.4    , Creatinine Clearance: Estimated Creatinine Clearance: 43.6 mL/min (A) (by C-G formula based on SCr of 1.7 mg/dL (H))., LFTs:   .     05/28/25  0550   *   ALT 88*   ALB 3.1*   TBILI 0.94   ALKPHOS 213*    , PTT/INR:  .     05/28/25  0550   INR 2.04*               [1]   Past Medical History:  Diagnosis Date    Anemia 3/12/2013    Atrial fibrillation (HCC)     Coagulopathy (HCC) 4/6/2024    Coronary artery disease     GERD (gastroesophageal reflux disease)     Hyperlipidemia associated with type 2 diabetes mellitus  (HCC) 5/4/2021    Hypertension     Obesity     Pneumonia of right lung due to infectious organism 5/26/2025    Thyroid nodule 4/6/2024   [2]   Past Surgical History:  Procedure Laterality Date    CARDIAC PACEMAKER PLACEMENT  12/2023    replacement    CORONARY ARTERY BYPASS GRAFT      MITRAL VALVE ANNULOPLASTY      MULTIPLE TOOTH EXTRACTIONS Bilateral 04/12/2024    Procedure: EXTRACTION TOOTH #8, 17, 18 SURGICAL;  Surgeon: Hong Maki DMD;  Location: [facility];  Service: Maxillofacial   [3]   Social History  Tobacco Use    Smoking status: Never    Smokeless tobacco: Never    Tobacco comments:     No passive smoke exposure   Vaping Use    Vaping status: Never Used   Substance and Sexual Activity    Alcohol use: Not Currently    Drug use: No    Sexual activity: Not Currently   [4]   Family History  Problem Relation Name Age of Onset    Hypertension Mother      Diabetes  Mother      No Known Problems Father     [5]   Social History  Tobacco Use    Smoking status: Never    Smokeless tobacco: Never    Tobacco comments:     No passive smoke exposure   Vaping Use    Vaping status: Never Used   Substance and Sexual Activity    Alcohol use: Not Currently    Drug use: No    Sexual activity: Not Currently

## 2025-05-28 NOTE — ASSESSMENT & PLAN NOTE
74-year-old male with a history of atrial fibrillation on Coumadin, CAD, GERD, diabetes mellitus type 2, hypertension, hyperlipidemia, chronic anemia, and chronic constipation who presented initially with shortness of breath/cough and was found to have pneumonia.  Hospital course now complicated by bright red blood per rectum for which gastroenterology is now consulted.  Patient admits to having small amounts of blood in the toilet after he was straining to have a bowel movement.  Does have a history of this in the past that occurs whenever he is constipated.  Fortunately, his constipation has been well-controlled as of recently on Linzess.  Abdominal exam benign at this time.  Hemoglobin also currently stable at 10.6, consistent with baseline.  At this time, do suspect that bright red blood was a result of outlet bleeding from hemorrhoids.  Additional differentials would include diverticular bleed, polypoid bleed, AVM, and unable to rule out malignancy.  Patient is overdue for colonoscopy which was previously ordered 1/2024.  Do ultimately recommend that this be completed for further evaluation and to confirm suspicion.  Would recommend that this be completed on an outpatient basis after patient recovers from his pneumonia.  If patient were to develop any further signs of overt bleeding or hemoglobin drop, could consider inpatient endoscopic evaluation at that time.  Otherwise, recommend ongoing management of constipation in order to prevent straining and hemorrhoidal bleeding.    Plan:  No plan for inpatient endoscopic intervention; recommend outpatient EGD/colonoscopy  Management as constipation as below  Okay to continue on AP/AC; avoid use of all NSAIDs  Trend H&H; transfuse for goal of >7.0   Monitor hemodynamics; avoid episodes of hypotension   Monitor bowel movements; alert GI team of signs of overt GIB  Additional pain and symptom management per primary team

## 2025-05-28 NOTE — ASSESSMENT & PLAN NOTE
Patient with new onset LFT elevations this admission - AST//63, alkaline phosphatase 219, and total bilirubin 1.56.  LFTs had been previously normal prior to admission.  Right upper quadrant ultrasound completed and with no acute findings.  CT imaging did reveal possible nodular appearing liver but no other imaging studies revealed similar findings.  Platelet count also normal.  Possible that patient does have some degree of hepatic steatosis as well as fibrosis given his comorbid conditions.  However, suspect that acute LFT elevations may be a result of acute illness as well as DILI.  Patient recently prescribed amoxicillin which certainly can lead to LFT elevations.  Will therefore hold off on full serologic liver workup unless LFTs fail to improve.    Plan:  Follow-up results of acute hepatitis panel  Continue to monitor and trend LFTs to ensure improvement  Hold off on serologic liver workup, consider if LFTs continue to rise/fail to normalize  Continue to avoid hepatotoxic agents  Can consider outpatient ultrasound elastography to further stratify

## 2025-05-28 NOTE — PLAN OF CARE
Problem: Potential for Falls  Goal: Patient will remain free of falls  Description: INTERVENTIONS:  - Educate patient/family on patient safety including physical limitations  - Instruct patient to call for assistance with activity   - Consider consulting OT/PT to assist with strengthening/mobility based on AM PAC & JH-HLM score  - Consult OT/PT to assist with strengthening/mobility   - Keep Call bell within reach  - Keep bed low and locked with side rails adjusted as appropriate  - Keep care items and personal belongings within reach  - Initiate and maintain comfort rounds  - Make Fall Risk Sign visible to staff  - Offer Toileting every 2 Hours, in advance of need  - Initiate/Maintain bed alarm  - Obtain necessary fall risk management equipment:  socks, yellow bracelet, call bell   - Apply yellow socks and bracelet for high fall risk patients  - Consider moving patient to room near nurses station  Outcome: Progressing     Problem: PAIN - ADULT  Goal: Verbalizes/displays adequate comfort level or baseline comfort level  Description: Interventions:  - Encourage patient to monitor pain and request assistance  - Assess pain using appropriate pain scale  - Administer analgesics as ordered based on type and severity of pain and evaluate response  - Implement non-pharmacological measures as appropriate and evaluate response  - Consider cultural and social influences on pain and pain management  - Notify physician/advanced practitioner if interventions unsuccessful or patient reports new pain  - Educate patient/family on pain management process including their role and importance of  reporting pain   - Provide non-pharmacologic/complimentary pain relief interventions  Outcome: Progressing     Problem: INFECTION - ADULT  Goal: Absence or prevention of progression during hospitalization  Description: INTERVENTIONS:  - Assess and monitor for signs and symptoms of infection  - Monitor lab/diagnostic results  - Monitor all  insertion sites, i.e. indwelling lines, tubes, and drains  - Monitor endotracheal if appropriate and nasal secretions for changes in amount and color  - [place] appropriate cooling/warming therapies per order  - Administer medications as ordered  - Instruct and encourage patient and family to use good hand hygiene technique  - Identify and instruct in appropriate isolation precautions for identified infection/condition  Outcome: Progressing     Problem: DISCHARGE PLANNING  Goal: Discharge to home or other facility with appropriate resources  Description: INTERVENTIONS:  - Identify barriers to discharge w/patient and caregiver  - Arrange for needed discharge resources and transportation as appropriate  - Identify discharge learning needs (meds, wound care, etc.)  - Arrange for interpretive services to assist at discharge as needed  - Refer to Case Management Department for coordinating discharge planning if the patient needs post-hospital services based on physician/advanced practitioner order or complex needs related to functional status, cognitive ability, or social support system  Outcome: Progressing     Problem: Knowledge Deficit  Goal: Patient/family/caregiver demonstrates understanding of disease process, treatment plan, medications, and discharge instructions  Description: Complete learning assessment and assess knowledge base.  Interventions:  - Provide teaching at level of understanding  - Provide teaching via preferred learning methods  Outcome: Progressing     Problem: CARDIOVASCULAR - ADULT  Goal: Maintains optimal cardiac output and hemodynamic stability  Description: INTERVENTIONS:  - Monitor I/O, vital signs and rhythm  - Monitor for S/S and trends of decreased cardiac output  - Administer and titrate ordered vasoactive medications to optimize hemodynamic stability  - Assess quality of pulses, skin color and temperature  - Assess for signs of decreased coronary artery perfusion  - Instruct patient  to report change in severity of symptoms  Outcome: Progressing  Goal: Absence of cardiac dysrhythmias or at baseline rhythm  Description: INTERVENTIONS:  - Continuous cardiac monitoring, vital signs, obtain 12 lead EKG if ordered  - Administer antiarrhythmic and heart rate control medications as ordered  - Monitor electrolytes and administer replacement therapy as ordered  Outcome: Progressing     Problem: RESPIRATORY - ADULT  Goal: Achieves optimal ventilation and oxygenation  Description: INTERVENTIONS:  - Assess for changes in respiratory status  - Assess for changes in mentation and behavior  - Position to facilitate oxygenation and minimize respiratory effort  - Oxygen administered by appropriate delivery if ordered  - Initiate smoking cessation education as indicated  - Encourage broncho-pulmonary hygiene including cough, deep breathe, Incentive Spirometry  - Assess the need for suctioning and aspirate as needed  - Assess and instruct to report SOB or any respiratory difficulty  - Respiratory Therapy support as indicated  Outcome: Progressing     Problem: Nutrition/Hydration-ADULT  Goal: Nutrient/Hydration intake appropriate for improving, restoring or maintaining nutritional needs  Description: Monitor and assess patient's nutrition/hydration status for malnutrition. Collaborate with interdisciplinary team and initiate plan and interventions as ordered.  Monitor patient's weight and dietary intake as ordered or per policy. Utilize nutrition screening tool and intervene as necessary. Determine patient's food preferences and provide high-protein, high-caloric foods as appropriate.     INTERVENTIONS:  - Monitor oral intake, urinary output, labs, and treatment plans  - Assess nutrition and hydration status and recommend course of action  - Evaluate amount of meals eaten  - Assist patient with eating if necessary   - Allow adequate time for meals  - Recommend/ encourage appropriate diets, oral nutritional  supplements, and vitamin/mineral supplements  - Order, calculate, and assess calorie counts as needed  - Recommend, monitor, and adjust tube feedings and TPN/PPN based on assessed needs  - Assess need for intravenous fluids  - Provide specific nutrition/hydration education as appropriate  - Include patient/family/caregiver in decisions related to nutrition  Outcome: Progressing

## 2025-05-28 NOTE — ASSESSMENT & PLAN NOTE
Patient with a history of chronic reflux.  Symptoms currently well-controlled at this time.  Okay to continue on pantoprazole 20 mg daily.  Recommend outpatient EGD at time of colonoscopy for Skinner's esophagus screening due to longstanding history of GERD.

## 2025-05-28 NOTE — ASSESSMENT & PLAN NOTE
Patient with a longstanding history of constipation.  Current home regimen includes Linzess 290 mcg daily along with MiraLAX.  Suspect that patient has rectal bleeding as a result of hemorrhoids in setting of constipation.  Will need to focus on optimizing bowel regimen at this time in order to prevent excessive straining.  Okay to continue on high-dose Amitiza while inpatient but would look to resume home Linzess on discharge.    Plan:  Would increase Amitiza to 24 mcg twice daily, resume home Linzess on discharge  Begin on MiraLAX 17 g twice daily  Strongly recommend limited use of opioids and anticholinergics   Encourage to increase oral intake of water; can consider IVF for hydration  Monitor and replete electrolytes; maintain Mg >2 and K >4   Out of bed and encourage ambulation as patient is able   Remainder of pain and symptom management per primary team

## 2025-05-28 NOTE — ASSESSMENT & PLAN NOTE
Patient presented with progressive cough, right-sided chest pain, decreased oral intake, and shortness of breath on exertion  Reports has progressed over the last 13 days  Was prescribed amoxicillin by PCP but once he started to take it he got a fever and that the medication was poisoning him so he stopped it  On admission showed multifocal patchy areas of consolidation and groundglass density involving the right upper and middle and lower lobes  Started on IV ceftriaxone doxycycline, continue D3  Blood cultures negative at 24 hours, continue to follow-up sputum culture  Not requiring oxygen  Encourage incentive spirometry  Leukocytosis is downtrending, remains afebrile  Presenting symptoms resolving

## 2025-05-28 NOTE — ASSESSMENT & PLAN NOTE
Noted Dr. Pa with iron deficiency anemia  Currently not on iron supplementation  Hemoglobin 10.6 on admission, stable at 10.2 today  Monitor for bleeding -Per yesterday evening and this morning patient notes bright red blood in the toilet.  Does have a history of constipation and is on Linzess   GI to evaluate bedside   Resume home med

## 2025-05-28 NOTE — ASSESSMENT & PLAN NOTE
Patient with a history of atrial fibrillation.  Currently on Coumadin.  Okay to continue from GI standpoint.

## 2025-05-28 NOTE — ASSESSMENT & PLAN NOTE
With paroxysmal atrial fibrillation and flutter, diagnosed October 2015  PTA on amiodarone 200 mg daily  Resumed on Toprol-XL 25 mg on admission as per prior cardiology recommendations  On Coumadin 2 mg daily for AC, goal INR 2-3   Of note his INR has been almost entirely subtherapeutic back even through April  INR therapeutic at 2.04 today  Will discuss with GI about holding further doses given reports of rectal bleeding

## 2025-05-28 NOTE — CASE MANAGEMENT
Case Management Assessment & Discharge Planning Note    Patient name Silvio Drew  Location [facility] /[facility] -* MRN [id number]  : 1950 Date 2025       Current Admission Date: 2025  Current Admission Diagnosis:Pneumonia of right lung due to infectious organism   Patient Active Problem List    Diagnosis Date Noted    Bright red blood per rectum 2025    H/O mitral valve repair 2025    Elevated troponin 2025    GERD (gastroesophageal reflux disease) 2025    Constipation 2025    Lumbar radiculopathy 2025    Pneumonia of right lung due to infectious organism 2025    Acute hypoxic respiratory failure (HCC) 2025    Hypokalemia 2025    Elevated LFTs 2025    Hyperbilirubinemia 2025    Prolonged QT interval 2025    Dental infection 04/10/2024    Anemia 04/10/2024    Visual disturbance 04/10/2024    BRBPR (bright red blood per rectum) 2024    Streptococcal bacteremia 2024    Suspected UTI 2024    Supratherapeutic INR 2024    Abnormal CT of the abdomen 2024    Thyroid nodule 2024    Stage 3b chronic kidney disease (HCC) 2024    Hyperlipidemia associated with type 2 diabetes mellitus  (HCC) 2021    Nonischemic cardiomyopathy (HCC) 2020    Atherosclerosis of native artery of both lower extremities with intermittent claudication (HCC) 2019    Bicytopenia 2013    Hyperlipidemia 2012    Primary hypertension 2012    A-fib (HCC) 2012    Coronary atherosclerosis 2012      LOS (days): 2  Geometric Mean LOS (GMLOS) (days): 2.8  Days to GMLOS:1     OBJECTIVE:    Risk of Unplanned Readmission Score: 16.37         Current admission status: Inpatient       Preferred Pharmacy:   [id number]  [place] - [address]  [address] [address]  Phone: [phone] Fax: [phone]    Primary Care Provider: Mac  MD Kat    Primary Insurance: BUCKY  REP  Secondary Insurance: [place] [employer] AND [employer] [facility]    ASSESSMENT:  Active Health Care Proxies       Ang Drew [facility] Care Representative - Brother   Primary Phone: [phone] (Home)                           Readmission Root Cause  30 Day Readmission: No    Patient Information  Admitted from:: Home  Mental Status: Alert  During Assessment patient was accompanied by: Not accompanied during assessment  Assessment information provided by:: Patient  Primary Caregiver: Self  Support Systems: Self, Son, Family members  County of Residence: [place]  What city do you live in?: [place]  Home entry access options. Select all that apply.: Stairs  Number of steps to enter home.: 4  Type of Current Residence: 2 story home  Upon entering residence, is there a bedroom on the main floor (no further steps)?: No  A bedroom is located on the following floor levels of residence (select all that apply):: 2nd Floor  Upon entering residence, is there a bathroom on the main floor (no further steps)?: No  Indicate which floors of current residence have a bathroom (select all the apply):: 2nd Floor  Number of steps to 2nd floor from main floor: One Flight  Living Arrangements: Lives Alone  Is patient a ?: No    Activities of Daily Living Prior to Admission  Functional Status: Independent  Completes ADLs independently?: Yes  Ambulates independently?: Yes (uses a cane as needed)  Does patient use assisted devices?: Yes  Assisted Devices (DME) used: Straight Cane, Walker  Does patient currently own DME?: Yes  What DME does the patient currently own?: Straight Cane, Walker  Does patient have a history of Outpatient Therapy (PT/OT)?: Yes  Does the patient have a history of Short-Term Rehab?: No  Does patient have a history of HHC?: No  Does patient currently have HHC?: No         Patient Information Continued  Income Source: SSI/SSD  Does patient have prescription  coverage?: Yes  Can the patient afford their medications and any related supplies (such as glucometers or test strips)?: Yes  Does patient receive dialysis treatments?: No  Does patient have a history of substance abuse?: No  Does patient have a history of Mental Health Diagnosis?: No         Means of Transportation  Means of Transport to [facility]:: Drives Self          DISCHARGE DETAILS:    Discharge planning discussed with:: Patient  Freedom of Choice: Yes  Comments - Freedom of Choice: Agreeable to OPPT. Scripts to be provided  CM contacted family/caregiver?: No- see comments (declined need)  Were Treatment Team discharge recommendations reviewed with patient/caregiver?: Yes  Did patient/caregiver verbalize understanding of patient care needs?: Yes            Requested Home Health Care         Is the patient interested in HHC at discharge?: No    DME Referral Provided  Referral made for DME?: No    Other Referral/Resources/Interventions Provided:  Interventions: Outpatient PT  Referral Comments: Script to be provided at discharge         Treatment Team Recommendation: Home (OPPT)  Discharge Destination Plan:: Home (OPPT)

## 2025-05-28 NOTE — PROGRESS NOTES
The doxycycline has / have been converted to Oral per [facility] IV-to-PO Auto-Conversion Protocol for Adults as approved by the Pharmacy and Therapeutics Committee. The patient met all eligible criteria:  1) Age = 18 years old   2) Received at least one dose of the IV form   3) Receiving at least one other scheduled oral/enteral medication   4) Tolerating an oral/enteral diet   and did not have any exclusions:   1) Critical care patient   2) Active GI bleed (IF assessing H2RAs or PPIs)   3) Continuous tube feeding (IF assessing cipro, doxycycline, levofloxacin, minocycline, rifampin, or voriconazole)   4) Receiving PO vancomycin (IF assessing metronidazole)   5) Persistent nausea and/or vomiting   6) Ileus or gastrointestinal obstruction   7) Isrrael/nasogastric tube set for continuous suction   8) Specific order not to automatically convert to PO (in the order's comments or if discussed in the most recent Infectious Disease or primary team's progress notes).

## 2025-05-29 PROBLEM — G89.29 CHRONIC LOW BACK PAIN: Status: ACTIVE | Noted: 2025-05-29

## 2025-05-29 PROBLEM — M54.50 CHRONIC LOW BACK PAIN: Status: ACTIVE | Noted: 2025-05-29

## 2025-05-29 PROBLEM — E80.6 HYPERBILIRUBINEMIA: Status: RESOLVED | Noted: 2025-05-26 | Resolved: 2025-05-29

## 2025-05-29 PROBLEM — R39.89 SUSPECTED UTI: Status: RESOLVED | Noted: 2024-04-08 | Resolved: 2025-05-29

## 2025-05-29 LAB
ALBUMIN SERPL BCG-MCNC: 3 G/DL (ref 3.5–5)
ALP SERPL-CCNC: 234 U/L (ref 34–104)
ALT SERPL W P-5'-P-CCNC: 97 U/L (ref 7–52)
ANION GAP SERPL CALCULATED.3IONS-SCNC: 7 MMOL/L (ref 4–13)
AST SERPL W P-5'-P-CCNC: 184 U/L (ref 13–39)
BASE EX.OXY STD BLDV CALC-SCNC: 86.5 % (ref 60–80)
BASE EXCESS BLDV CALC-SCNC: -6.1 MMOL/L
BASOPHILS # BLD AUTO: 0.03 THOUSANDS/ÂΜL (ref 0–0.1)
BASOPHILS NFR BLD AUTO: 0 % (ref 0–1)
BILIRUB SERPL-MCNC: 0.78 MG/DL (ref 0.2–1)
BUN SERPL-MCNC: 35 MG/DL (ref 5–25)
CALCIUM ALBUM COR SERPL-MCNC: 9.2 MG/DL (ref 8.3–10.1)
CALCIUM SERPL-MCNC: 8.4 MG/DL (ref 8.4–10.2)
CHLORIDE SERPL-SCNC: 112 MMOL/L (ref 96–108)
CO2 SERPL-SCNC: 20 MMOL/L (ref 21–32)
CREAT SERPL-MCNC: 1.53 MG/DL (ref 0.6–1.3)
EOSINOPHIL # BLD AUTO: 0.23 THOUSAND/ÂΜL (ref 0–0.61)
EOSINOPHIL NFR BLD AUTO: 3 % (ref 0–6)
ERYTHROCYTE [DISTWIDTH] IN BLOOD BY AUTOMATED COUNT: 15.3 % (ref 11.6–15.1)
GFR SERPL CREATININE-BSD FRML MDRD: 44 ML/MIN/1.73SQ M
GLUCOSE SERPL-MCNC: 108 MG/DL (ref 65–140)
GLUCOSE SERPL-MCNC: 96 MG/DL (ref 65–140)
HCO3 BLDV-SCNC: 18 MMOL/L (ref 24–30)
HCT VFR BLD AUTO: 33.7 % (ref 36.5–49.3)
HGB BLD-MCNC: 10.3 G/DL (ref 12–17)
IMM GRANULOCYTES # BLD AUTO: 0.13 THOUSAND/UL (ref 0–0.2)
IMM GRANULOCYTES NFR BLD AUTO: 2 % (ref 0–2)
INR PPP: 2.18 (ref 0.85–1.19)
LYMPHOCYTES # BLD AUTO: 1.03 THOUSANDS/ÂΜL (ref 0.6–4.47)
LYMPHOCYTES NFR BLD AUTO: 14 % (ref 14–44)
MCH RBC QN AUTO: 28.1 PG (ref 26.8–34.3)
MCHC RBC AUTO-ENTMCNC: 30.6 G/DL (ref 31.4–37.4)
MCV RBC AUTO: 92 FL (ref 82–98)
MONOCYTES # BLD AUTO: 0.65 THOUSAND/ÂΜL (ref 0.17–1.22)
MONOCYTES NFR BLD AUTO: 9 % (ref 4–12)
NEUTROPHILS # BLD AUTO: 5.22 THOUSANDS/ÂΜL (ref 1.85–7.62)
NEUTS SEG NFR BLD AUTO: 72 % (ref 43–75)
NRBC BLD AUTO-RTO: 0 /100 WBCS
O2 CT BLDV-SCNC: 13.6 ML/DL
PCO2 BLDV: 31.1 MM HG (ref 42–50)
PH BLDV: 7.38 [PH] (ref 7.3–7.4)
PLATELET # BLD AUTO: 271 THOUSANDS/UL (ref 149–390)
PMV BLD AUTO: 11 FL (ref 8.9–12.7)
PO2 BLDV: 56.1 MM HG (ref 35–45)
POTASSIUM SERPL-SCNC: 4 MMOL/L (ref 3.5–5.3)
PROCALCITONIN SERPL-MCNC: 0.29 NG/ML
PROT SERPL-MCNC: 6.1 G/DL (ref 6.4–8.4)
PROTHROMBIN TIME: 24.1 SECONDS (ref 12.3–15)
RBC # BLD AUTO: 3.67 MILLION/UL (ref 3.88–5.62)
SODIUM SERPL-SCNC: 139 MMOL/L (ref 135–147)
WBC # BLD AUTO: 7.29 THOUSAND/UL (ref 4.31–10.16)

## 2025-05-29 PROCEDURE — 82805 BLOOD GASES W/O2 SATURATION: CPT

## 2025-05-29 PROCEDURE — 82948 REAGENT STRIP/BLOOD GLUCOSE: CPT

## 2025-05-29 PROCEDURE — 85025 COMPLETE CBC W/AUTO DIFF WBC: CPT

## 2025-05-29 PROCEDURE — 84145 PROCALCITONIN (PCT): CPT

## 2025-05-29 PROCEDURE — 80053 COMPREHEN METABOLIC PANEL: CPT

## 2025-05-29 PROCEDURE — 85610 PROTHROMBIN TIME: CPT | Performed by: HOSPITALIST

## 2025-05-29 PROCEDURE — 99232 SBSQ HOSP IP/OBS MODERATE 35: CPT

## 2025-05-29 RX ORDER — FUROSEMIDE 20 MG/1
20 TABLET ORAL DAILY
Status: DISCONTINUED | OUTPATIENT
Start: 2025-05-29 | End: 2025-05-30 | Stop reason: HOSPADM

## 2025-05-29 RX ADMIN — AMIODARONE HYDROCHLORIDE 200 MG: 200 TABLET ORAL at 08:57

## 2025-05-29 RX ADMIN — BENZONATATE 100 MG: 100 CAPSULE ORAL at 23:54

## 2025-05-29 RX ADMIN — DOXYCYCLINE HYCLATE 100 MG: 100 CAPSULE ORAL at 08:56

## 2025-05-29 RX ADMIN — ALLOPURINOL 100 MG: 100 TABLET ORAL at 08:57

## 2025-05-29 RX ADMIN — CEFTRIAXONE 1000 MG: 10 INJECTION, POWDER, FOR SOLUTION INTRAVENOUS at 17:40

## 2025-05-29 RX ADMIN — GABAPENTIN 300 MG: 300 CAPSULE ORAL at 20:30

## 2025-05-29 RX ADMIN — POLYETHYLENE GLYCOL 3350 17 G: 17 POWDER, FOR SOLUTION ORAL at 17:39

## 2025-05-29 RX ADMIN — ACETAMINOPHEN 650 MG: 325 TABLET, FILM COATED ORAL at 04:51

## 2025-05-29 RX ADMIN — FUROSEMIDE 20 MG: 20 TABLET ORAL at 13:48

## 2025-05-29 RX ADMIN — WARFARIN SODIUM 2 MG: 2 TABLET ORAL at 17:39

## 2025-05-29 RX ADMIN — LUBIPROSTONE 24 MCG: 24 CAPSULE, GELATIN COATED ORAL at 08:57

## 2025-05-29 RX ADMIN — GUAIFENESIN 600 MG: 600 TABLET ORAL at 20:30

## 2025-05-29 RX ADMIN — GUAIFENESIN 600 MG: 600 TABLET ORAL at 08:57

## 2025-05-29 RX ADMIN — DOXYCYCLINE HYCLATE 100 MG: 100 CAPSULE ORAL at 20:30

## 2025-05-29 RX ADMIN — POLYETHYLENE GLYCOL 3350 17 G: 17 POWDER, FOR SOLUTION ORAL at 08:57

## 2025-05-29 RX ADMIN — PANTOPRAZOLE SODIUM 20 MG: 20 TABLET, DELAYED RELEASE ORAL at 05:06

## 2025-05-29 RX ADMIN — LUBIPROSTONE 24 MCG: 24 CAPSULE, GELATIN COATED ORAL at 20:30

## 2025-05-29 NOTE — PLAN OF CARE
Problem: Potential for Falls  Goal: Patient will remain free of falls  Description: INTERVENTIONS:  - Educate patient/family on patient safety including physical limitations  - Instruct patient to call for assistance with activity   - Consider consulting OT/PT to assist with strengthening/mobility based on AM PAC & JH-HLM score  - Consult OT/PT to assist with strengthening/mobility   - Keep Call bell within reach  - Keep bed low and locked with side rails adjusted as appropriate  - Keep care items and personal belongings within reach  - Initiate and maintain comfort rounds  - Make Fall Risk Sign visible to staff  - Offer Toileting every 2 Hours, in advance of need  - Initiate/Maintain bed alarm  - Obtain necessary fall risk management equipment:  socks, yellow bracelet, call bell   - Apply yellow socks and bracelet for high fall risk patients  - Consider moving patient to room near nurses station  Outcome: Progressing     Problem: PAIN - ADULT  Goal: Verbalizes/displays adequate comfort level or baseline comfort level  Description: Interventions:  - Encourage patient to monitor pain and request assistance  - Assess pain using appropriate pain scale  - Administer analgesics as ordered based on type and severity of pain and evaluate response  - Implement non-pharmacological measures as appropriate and evaluate response  - Consider cultural and social influences on pain and pain management  - Notify physician/advanced practitioner if interventions unsuccessful or patient reports new pain  - Educate patient/family on pain management process including their role and importance of  reporting pain   - Provide non-pharmacologic/complimentary pain relief interventions  Outcome: Progressing     Problem: INFECTION - ADULT  Goal: Absence or prevention of progression during hospitalization  Description: INTERVENTIONS:  - Assess and monitor for signs and symptoms of infection  - Monitor lab/diagnostic results  - Monitor all  insertion sites, i.e. indwelling lines, tubes, and drains  - Monitor endotracheal if appropriate and nasal secretions for changes in amount and color  - [place] appropriate cooling/warming therapies per order  - Administer medications as ordered  - Instruct and encourage patient and family to use good hand hygiene technique  - Identify and instruct in appropriate isolation precautions for identified infection/condition  Outcome: Progressing     Problem: DISCHARGE PLANNING  Goal: Discharge to home or other facility with appropriate resources  Description: INTERVENTIONS:  - Identify barriers to discharge w/patient and caregiver  - Arrange for needed discharge resources and transportation as appropriate  - Identify discharge learning needs (meds, wound care, etc.)  - Arrange for interpretive services to assist at discharge as needed  - Refer to Case Management Department for coordinating discharge planning if the patient needs post-hospital services based on physician/advanced practitioner order or complex needs related to functional status, cognitive ability, or social support system  Outcome: Progressing     Problem: Knowledge Deficit  Goal: Patient/family/caregiver demonstrates understanding of disease process, treatment plan, medications, and discharge instructions  Description: Complete learning assessment and assess knowledge base.  Interventions:  - Provide teaching at level of understanding  - Provide teaching via preferred learning methods  Outcome: Progressing     Problem: CARDIOVASCULAR - ADULT  Goal: Maintains optimal cardiac output and hemodynamic stability  Description: INTERVENTIONS:  - Monitor I/O, vital signs and rhythm  - Monitor for S/S and trends of decreased cardiac output  - Administer and titrate ordered vasoactive medications to optimize hemodynamic stability  - Assess quality of pulses, skin color and temperature  - Assess for signs of decreased coronary artery perfusion  - Instruct patient  to report change in severity of symptoms  Outcome: Progressing  Goal: Absence of cardiac dysrhythmias or at baseline rhythm  Description: INTERVENTIONS:  - Continuous cardiac monitoring, vital signs, obtain 12 lead EKG if ordered  - Administer antiarrhythmic and heart rate control medications as ordered  - Monitor electrolytes and administer replacement therapy as ordered  Outcome: Progressing     Problem: RESPIRATORY - ADULT  Goal: Achieves optimal ventilation and oxygenation  Description: INTERVENTIONS:  - Assess for changes in respiratory status  - Assess for changes in mentation and behavior  - Position to facilitate oxygenation and minimize respiratory effort  - Oxygen administered by appropriate delivery if ordered  - Initiate smoking cessation education as indicated  - Encourage broncho-pulmonary hygiene including cough, deep breathe, Incentive Spirometry  - Assess the need for suctioning and aspirate as needed  - Assess and instruct to report SOB or any respiratory difficulty  - Respiratory Therapy support as indicated  Outcome: Progressing     Problem: Nutrition/Hydration-ADULT  Goal: Nutrient/Hydration intake appropriate for improving, restoring or maintaining nutritional needs  Description: Monitor and assess patient's nutrition/hydration status for malnutrition. Collaborate with interdisciplinary team and initiate plan and interventions as ordered.  Monitor patient's weight and dietary intake as ordered or per policy. Utilize nutrition screening tool and intervene as necessary. Determine patient's food preferences and provide high-protein, high-caloric foods as appropriate.     INTERVENTIONS:  - Monitor oral intake, urinary output, labs, and treatment plans  - Assess nutrition and hydration status and recommend course of action  - Evaluate amount of meals eaten  - Assist patient with eating if necessary   - Allow adequate time for meals  - Recommend/ encourage appropriate diets, oral nutritional  supplements, and vitamin/mineral supplements  - Order, calculate, and assess calorie counts as needed  - Recommend, monitor, and adjust tube feedings and TPN/PPN based on assessed needs  - Assess need for intravenous fluids  - Provide specific nutrition/hydration education as appropriate  - Include patient/family/caregiver in decisions related to nutrition  Outcome: Progressing

## 2025-05-29 NOTE — ASSESSMENT & PLAN NOTE
History of mitral valve repair with PFO closure 1998 per outpatient cardiology notes  Echo notes adequate leaflet mobility although is approaching severe MR (this has also been noted in OP cardiology notes at [facility])

## 2025-05-29 NOTE — ASSESSMENT & PLAN NOTE
With reports of chronic low back pain for a few months, no trauma/fall  Intermittently complains of neuropathy of lower extremities  No spinal tenderness to palpation  Follow-up x-ray lumbar back  Trial muscle relaxers, increase gabapentin to 300 mg qhs, patient notes some improvement   Recent A1c normal   B12 normal  No acute PT OT needs, recommend outpatient PT

## 2025-05-29 NOTE — ASSESSMENT & PLAN NOTE
PTA on Linzess, resume inpatient Brijesh, appreciate GI evaluation  Patient placed on MiraLAX  Monitor output, with 1 bowel movement.  Does report bright red blood again today.  Evaluated by GI, suspect outlet bleeding source such as hemorrhoids or fissure. Per son he has hemorrhoid history   Denies any pain  Outpatient GI follow-up in 6 to 8 weeks to discuss colonoscopy

## 2025-05-29 NOTE — ASSESSMENT & PLAN NOTE
Lab Results   Component Value Date    EGFR 44 05/29/2025    EGFR 39 05/28/2025    EGFR 43 05/27/2025    CREATININE 1.53 (H) 05/29/2025    CREATININE 1.70 (H) 05/28/2025    CREATININE 1.56 (H) 05/27/2025     Baseline creatinine ~1.4 to 1.7  Lasix held due to contrast given on admission and poor oral intake with high urine specific gravity  Patient's p.o. intake has improved x 2 days, will resume Lasix and monitor  CT without hydronephrosis  VBG with normal pH

## 2025-05-29 NOTE — ASSESSMENT & PLAN NOTE
With history of has MDT single-chamber ICD, placed August 2013    Last device check through heart care group noted with normal function due 2/25  Last EF during [facility] April 2024 35%  Patient noted abnormal coronary arteries on cardiac cath 2011  CT scan displays moderate cardiomegaly, status post CABG  Resume home Lasix 20 mg daily  Obtain echocardiogram with EF around 32%, overall no significant change, IVC normal

## 2025-05-29 NOTE — ASSESSMENT & PLAN NOTE
Potassium 2.9 admission, suspect in setting of poor p.o. intake with infection and ongoing Lasix use  Repleted and normalized at 4.2  Remains normal, monitor with Lasix resumption  Mag normal

## 2025-05-29 NOTE — ASSESSMENT & PLAN NOTE
Patient presented with progressive cough, right-sided chest pain, decreased oral intake, and shortness of breath on exertion  Reports has progressed over the last 13 days  Was prescribed amoxicillin by PCP but once he started to take it he got a fever and that the medication was poisoning him so he stopped it  On admission showed multifocal patchy areas of consolidation and groundglass density involving the right upper and middle and lower lobes  Continue IV ceftriaxone and doxycycline day 4  Blood cultures negative, follow  Follow-up sputum culture  Not requiring oxygen  Encourage incentive spirometry  Leukocytosis downtrending, procalcitonin down trended    Presenting symptoms have resolved  Will need outpatient CT chest to ensure resolution

## 2025-05-29 NOTE — ASSESSMENT & PLAN NOTE
With paroxysmal atrial fibrillation and flutter, diagnosed October 2015  PTA on amiodarone 200 mg daily  Resumed on Toprol-XL 25 mg on admission as per prior cardiology recommendations  On Coumadin 2 mg daily for AC, goal INR 2-3   Of note his INR has been almost entirely subtherapeutic back even through April  INR therapeutic at 2.18 today  No indication to hold AC per GI

## 2025-05-29 NOTE — ASSESSMENT & PLAN NOTE
With transaminitis on admission, previous liver enzymes in January 2025 for normal  Tylenol level checked normal  Statin held  Also with hyperbilirubinemia which is now resolved  CT showing stable liver lesion which has been previously seen on MRI and mild nodular liver surface.  Has seen oncology outpatient, had an MRI that was ordered - hemangioma  No abdominal pain, tolerating diet without nausea or vomiting  Right upper quadrant ultrasound with no acute abnormalities  Of note patient is on amiodarone  Acute hepatitis panel negative  Appreciate GI evaluation  Suspect drug-induced liver injury vs recent illness.   Recommending outpatient LFT monitoring over the next few weeks to ensure normalization.  Can consider outpatient elastography for concern of cirrhotic appearing liver on CT

## 2025-05-29 NOTE — QUICK NOTE
GASTROENTEROLOGY QUICK NOTE:    Patient's chart reviewed.  Hemoglobin continues to remain stable along with LFTs.  Suspect that bright red blood per rectum was a result of hemorrhoidal bleeding and LFTs potentially drug versus viral induced.  Ultimately recommend outpatient GI follow-up to ensure normalization in LFTs and discussed need for any additional imaging.  He should also follow-up outpatient to discuss timing of EGD/colonoscopy once he recovers from current hospitalization and is fully treated for his pneumonia.  Will arrange outpatient GI follow-up.  GI will sign off.  Please reach out to GI team with any questions or concerns.  Thank you.    Roxana Nuñez DO, PGY-5  [facility] Gastroenterology Fellow

## 2025-05-29 NOTE — ASSESSMENT & PLAN NOTE
Noted Dr. Pa with iron deficiency anemia  Currently not on iron supplementation  Hemoglobin 10.6 on admission, stable at 10.7  Monitor with rectal bleeding

## 2025-05-29 NOTE — PROGRESS NOTES
Progress Note - Hospitalist   Name: Silvio Drew 74 y.o. male I MRN: [id number]  Unit/Bed#: E2 -01 I Date of Admission: 5/26/2025   Date of Service: 5/29/2025 I Hospital Day: 3    Assessment & Plan  Pneumonia of right lung due to infectious organism  Patient presented with progressive cough, right-sided chest pain, decreased oral intake, and shortness of breath on exertion  Reports has progressed over the last 13 days  Was prescribed amoxicillin by PCP but once he started to take it he got a fever and that the medication was poisoning him so he stopped it  On admission showed multifocal patchy areas of consolidation and groundglass density involving the right upper and middle and lower lobes  Continue IV ceftriaxone and doxycycline day 4  Blood cultures negative, follow  Follow-up sputum culture  Not requiring oxygen  Encourage incentive spirometry  Leukocytosis downtrending, procalcitonin down trended    Presenting symptoms have resolved  Will need outpatient CT chest to ensure resolution  Hyperlipidemia  Outpatient PCP notes patient is on Lipitor 20 mg  Continue holding statin in setting of transaminitis  Stage 3b chronic kidney disease (HCC)  Lab Results   Component Value Date    EGFR 44 05/29/2025    EGFR 39 05/28/2025    EGFR 43 05/27/2025    CREATININE 1.53 (H) 05/29/2025    CREATININE 1.70 (H) 05/28/2025    CREATININE 1.56 (H) 05/27/2025     Baseline creatinine ~1.4 to 1.7  Lasix held due to contrast given on admission and poor oral intake with high urine specific gravity  Patient's p.o. intake has improved x 2 days, will resume Lasix and monitor  CT without hydronephrosis  VBG with normal pH  Hypokalemia  Potassium 2.9 admission, suspect in setting of poor p.o. intake with infection and ongoing Lasix use  Repleted and normalized at 4.2  Remains normal, monitor with Lasix resumption  Mag normal  Elevated LFTs  With transaminitis on admission, previous liver enzymes in January 2025 for normal  Tylenol  level checked normal  Statin held  Also with hyperbilirubinemia which is now resolved  CT showing stable liver lesion which has been previously seen on MRI and mild nodular liver surface.  Has seen oncology outpatient, had an MRI that was ordered - hemangioma  No abdominal pain, tolerating diet without nausea or vomiting  Right upper quadrant ultrasound with no acute abnormalities  Of note patient is on amiodarone  Acute hepatitis panel negative  Appreciate GI evaluation  Suspect drug-induced liver injury vs recent illness.   Recommending outpatient LFT monitoring over the next few weeks to ensure normalization.  Can consider outpatient elastography for concern of cirrhotic appearing liver on CT  Hyperbilirubinemia (Resolved: 5/29/2025)  This resolved, see above  Anemia  Noted Dr. Pa with iron deficiency anemia  Currently not on iron supplementation  Hemoglobin 10.6 on admission, stable at 10.7  Monitor with rectal bleeding  Constipation  PTA on Linzess, resume inpatient Amitzia, appreciate GI evaluation  Patient placed on MiraLAX  Monitor output, with 1 bowel movement.  Does report bright red blood again today.  Evaluated by GI, suspect outlet bleeding source such as hemorrhoids or fissure. Per son he has hemorrhoid history   Denies any pain  Outpatient GI follow-up in 6 to 8 weeks to discuss colonoscopy  Nonischemic cardiomyopathy (HCC)  With history of has MDT single-chamber ICD, placed August 2013    Last device check through heart care group noted with normal function due 2/25  Last EF during ZULY April 2024 35%  Patient noted abnormal coronary arteries on cardiac cath 2011  CT scan displays moderate cardiomegaly, status post CABG  Resume home Lasix 20 mg daily  Obtain echocardiogram with EF around 32%, overall no significant change, IVC normal  A-fib (HCC)  With paroxysmal atrial fibrillation and flutter, diagnosed October 2015  PTA on amiodarone 200 mg daily  Resumed on Toprol-XL 25 mg on admission as per  prior cardiology recommendations  On Coumadin 2 mg daily for AC, goal INR 2-3   Of note his INR has been almost entirely subtherapeutic back even through April  INR therapeutic at 2.18 today  No indication to hold AC per GI  Prolonged QT interval  Noted, electrolytes normal  Avoid QT prolonging medications   Primary hypertension  Blood pressure is stable.  Continue Toprol  Patient is not on ARB per outpatient heart care group  Atherosclerosis of native artery of both lower extremities with intermittent claudication (HCC)  Patient is on statin and Coumadin, not on antiplatelets due to prior bleeding episodes with epistaxis  H/O mitral valve repair  History of mitral valve repair with PFO closure 1998 per outpatient cardiology notes  Echo notes adequate leaflet mobility although is approaching severe MR (this has also been noted in [facility]ology [facility] at [facility])  Elevated troponin  Without current chest pain  Chest pain appears pleuritic and related to pna as stated above  Reviewed EKG with cardiology, patient has known LBBB that is chronic  Updated echo with no changes  GERD (gastroesophageal reflux disease)  Seen by Dr. Lombardi in January 2020 for  Continue pantoprazole 20 mg daily  No recent EGD noted  Suspected UTI (Resolved: 5/29/2025)  Since burning 2 days ago and states it is resolving  UA with 4-10 WBC and moderate bacteria  CT shows small urinary bladder diverticula  Patient has an IV antibiotics as stated above  No further dysuria, urine culture without growth  Lumbar radiculopathy  With reports of chronic low back pain for a few months, no trauma/fall  Intermittently complains of neuropathy of lower extremities  No spinal tenderness to palpation  Follow-up x-ray lumbar back  Trial muscle relaxers, increase gabapentin to 300 mg qhs, patient notes some improvement   Recent A1c normal   B12 normal  No acute PT OT needs, recommend outpatient PT  Bright red blood per rectum  See plan under constipation above    VTE  Pharmacologic Prophylaxis:   warfarin    Mobility:   Basic Mobility Inpatient Raw Score: 23  JH-HLM Goal: 7: Walk 25 feet or more  JH-HLM Achieved: 7: Walk 25 feet or more  JH-HLM Goal NOT achieved. Continue with multidisciplinary rounding and encourage appropriate mobility to improve upon JH-HLM goals.    Patient Centered Rounds: I performed bedside rounds with nursing staff today.   Discussions with Specialists or Other Care Team Provider: BRIA    Education and Discussions with Family / Patient: Updated  (son) via phone.    Current Length of Stay: 3 day(s)  Current Patient Status: Inpatient   Certification Statement: The patient will continue to require additional inpatient hospital stay due to antibiotics, back pain imaging, monitoring rectal bleeding  Discharge Plan: Anticipate discharge in 24-48 hrs to home.    Code Status: Level 1 - Full Code    Subjective   Patient seen and examined laying in bed.  States his back pain is not as bad today and the numbness and tingling he deals with in his feet is better.  Had 1 bowel movement today with some bright red blood in the toilet.  No pain.  Denies any abdominal pain nausea or vomiting.  He is tolerating oral intake okay.  Still with some cough.  No further chest pain or shortness of breath.    Objective :  Temp:  [97.3 °F (36.3 °C)-97.8 °F (36.6 °C)] 97.4 °F (36.3 °C)  HR:  [] 86  BP: ()/(57-80) 105/62  Resp:  [18-20] 18  SpO2:  [88 %-97 %] 97 %  O2 Device: None (Room air)  Nasal Cannula O2 Flow Rate (L/min):  [2 L/min] 2 L/min    Body mass index is 32.39 kg/m².     Input and Output Summary (last 24 hours):     Intake/Output Summary (Last 24 hours) at 5/29/2025 1242  Last data filed at 5/29/2025 0723  Gross per 24 hour   Intake 420 ml   Output 325 ml   Net 95 ml       Physical Exam  Vitals and nursing note reviewed.   Constitutional:       General: He is not in acute distress.     Appearance: Normal appearance. He is well-developed. He is not  ill-appearing, toxic-appearing or diaphoretic.   HENT:      Mouth/Throat:      Mouth: Mucous membranes are moist.     Cardiovascular:      Rate and Rhythm: Normal rate and regular rhythm.      Heart sounds: Murmur heard.   Pulmonary:      Effort: Pulmonary effort is normal. No respiratory distress.      Breath sounds: Normal breath sounds. No rhonchi or rales.      Comments: On room air  Abdominal:      General: Bowel sounds are normal.      Palpations: Abdomen is soft.      Tenderness: There is no abdominal tenderness. There is no guarding or rebound.     Musculoskeletal:      Right lower leg: No edema.      Left lower leg: No edema.     Skin:     General: Skin is warm and dry.      Coloration: Skin is not jaundiced.     Neurological:      Mental Status: He is alert. Mental status is at baseline.     Psychiatric:         Mood and Affect: Mood normal.         Behavior: Behavior normal.           Lab Results: I have reviewed the following results:   Results from last 7 days   Lab Units 05/29/25  0444   WBC Thousand/uL 7.29   HEMOGLOBIN g/dL 10.3*   HEMATOCRIT % 33.7*   PLATELETS Thousands/uL 271   SEGS PCT % 72   LYMPHO PCT % 14   MONO PCT % 9   EOS PCT % 3     Results from last 7 days   Lab Units 05/29/25  0444   SODIUM mmol/L 139   POTASSIUM mmol/L 4.0   CHLORIDE mmol/L 112*   CO2 mmol/L 20*   BUN mg/dL 35*   CREATININE mg/dL 1.53*   ANION GAP mmol/L 7   CALCIUM mg/dL 8.4   ALBUMIN g/dL 3.0*   TOTAL BILIRUBIN mg/dL 0.78   ALK PHOS U/L 234*   ALT U/L 97*   AST U/L 184*   GLUCOSE RANDOM mg/dL 108     Results from last 7 days   Lab Units 05/29/25  0444   INR  2.18*     Results from last 7 days   Lab Units 05/29/25  1058   POC GLUCOSE mg/dl 96         Results from last 7 days   Lab Units 05/29/25  0444 05/26/25  1643   LACTIC ACID mmol/L  --  1.0   PROCALCITONIN ng/ml 0.29* 0.53*       Recent Cultures (last 7 days):   Results from last 7 days   Lab Units 05/28/25  0910 05/26/25  2221 05/26/25  1957 05/26/25  1643  05/26/25  0757   BLOOD CULTURE   --   --   --  No Growth at 48 hrs.  No Growth at 48 hrs.  --    SPUTUM CULTURE  Culture too young- will reincubate Test not performed. Suggest repeat specimen.  --   --   --    GRAM STAIN RESULT  2+ Epithelial cells per low power field*  3+ Gram negative rods*  2+ Gram positive cocci in clusters*  No polys seen* >10 squamous epithelial cells/lpf, indicating orophayngeal contamination.*  3+ Gram negative rods*  Rare Gram positive rods*  1+ Gram positive cocci in pairs*  No polys seen*  --   --   --    URINE CULTURE   --   --   --   --  No Growth <1000 cfu/mL   LEGIONELLA URINARY ANTIGEN   --   --  Negative  --   --      Last 24 Hours Medication List:     Current Facility-Administered Medications:     acetaminophen (TYLENOL) tablet 650 mg, Q6H PRN    allopurinol (ZYLOPRIM) tablet 100 mg, Daily    amiodarone tablet 200 mg, Daily    [Held by provider] atorvastatin (LIPITOR) tablet 20 mg, Daily With Dinner    benzonatate (TESSALON PERLES) capsule 100 mg, TID PRN    cefTRIAXone (ROCEPHIN) 1,000 mg in dextrose 5 % 50 mL IVPB, Q24H, Last Rate: 1,000 mg (05/28/25 1706)    doxycycline hyclate (VIBRAMYCIN) capsule 100 mg, Q12H ROB    [Held by provider] furosemide (LASIX) tablet 20 mg, Daily    gabapentin (NEURONTIN) capsule 300 mg, HS    guaiFENesin (MUCINEX) 12 hr tablet 600 mg, BID    lidocaine (LIDODERM) 5 % patch 1 patch, Daily PRN    lubiprostone (AMITIZA) capsule 24 mcg, BID    methocarbamol (ROBAXIN) tablet 500 mg, Q6H PRN    metoprolol succinate (TOPROL-XL) 24 hr tablet 25 mg, Daily    oxyCODONE (ROXICODONE) IR tablet 5 mg, Q6H PRN    oxyCODONE (ROXICODONE) split tablet 2.5 mg, Q6H PRN    pantoprazole (PROTONIX) EC tablet 20 mg, Early Morning    polyethylene glycol (MIRALAX) packet 17 g, BID    warfarin (COUMADIN) tablet 2 mg, Daily (warfarin)    Administrative Statements   Today, Patient Was Seen By: Bryanne Horn, PA-C    **Please Note: This note may have been constructed using  a voice recognition system.**

## 2025-05-29 NOTE — ASSESSMENT & PLAN NOTE
Since burning 2 days ago and states it is resolving  UA with 4-10 WBC and moderate bacteria  CT shows small urinary bladder diverticula  Patient has an IV antibiotics as stated above  No further dysuria, urine culture without growth

## 2025-05-30 VITALS
RESPIRATION RATE: 18 BRPM | DIASTOLIC BLOOD PRESSURE: 58 MMHG | WEIGHT: 213 LBS | HEIGHT: 68 IN | HEART RATE: 105 BPM | BODY MASS INDEX: 32.28 KG/M2 | SYSTOLIC BLOOD PRESSURE: 121 MMHG | TEMPERATURE: 97.2 F | OXYGEN SATURATION: 94 %

## 2025-05-30 LAB
ALBUMIN SERPL BCG-MCNC: 3 G/DL (ref 3.5–5)
ALP SERPL-CCNC: 231 U/L (ref 34–104)
ALT SERPL W P-5'-P-CCNC: 91 U/L (ref 7–52)
ANION GAP SERPL CALCULATED.3IONS-SCNC: 8 MMOL/L (ref 4–13)
AST SERPL W P-5'-P-CCNC: 141 U/L (ref 13–39)
BILIRUB SERPL-MCNC: 0.64 MG/DL (ref 0.2–1)
BUN SERPL-MCNC: 31 MG/DL (ref 5–25)
CALCIUM ALBUM COR SERPL-MCNC: 9.2 MG/DL (ref 8.3–10.1)
CALCIUM SERPL-MCNC: 8.4 MG/DL (ref 8.4–10.2)
CHLORIDE SERPL-SCNC: 111 MMOL/L (ref 96–108)
CO2 SERPL-SCNC: 20 MMOL/L (ref 21–32)
CREAT SERPL-MCNC: 1.4 MG/DL (ref 0.6–1.3)
ERYTHROCYTE [DISTWIDTH] IN BLOOD BY AUTOMATED COUNT: 15.5 % (ref 11.6–15.1)
GFR SERPL CREATININE-BSD FRML MDRD: 49 ML/MIN/1.73SQ M
GLUCOSE SERPL-MCNC: 107 MG/DL (ref 65–140)
HCT VFR BLD AUTO: 32.2 % (ref 36.5–49.3)
HGB BLD-MCNC: 10 G/DL (ref 12–17)
INR PPP: 2.68 (ref 0.85–1.19)
MCH RBC QN AUTO: 28.6 PG (ref 26.8–34.3)
MCHC RBC AUTO-ENTMCNC: 31.1 G/DL (ref 31.4–37.4)
MCV RBC AUTO: 92 FL (ref 82–98)
PLATELET # BLD AUTO: 305 THOUSANDS/UL (ref 149–390)
PMV BLD AUTO: 11.2 FL (ref 8.9–12.7)
POTASSIUM SERPL-SCNC: 4.3 MMOL/L (ref 3.5–5.3)
PROT SERPL-MCNC: 6.2 G/DL (ref 6.4–8.4)
PROTHROMBIN TIME: 28.1 SECONDS (ref 12.3–15)
RBC # BLD AUTO: 3.5 MILLION/UL (ref 3.88–5.62)
SODIUM SERPL-SCNC: 139 MMOL/L (ref 135–147)
WBC # BLD AUTO: 8.58 THOUSAND/UL (ref 4.31–10.16)

## 2025-05-30 PROCEDURE — 85027 COMPLETE CBC AUTOMATED: CPT

## 2025-05-30 PROCEDURE — 85610 PROTHROMBIN TIME: CPT | Performed by: HOSPITALIST

## 2025-05-30 PROCEDURE — 99239 HOSP IP/OBS DSCHRG MGMT >30: CPT | Performed by: PHYSICIAN ASSISTANT

## 2025-05-30 PROCEDURE — 80053 COMPREHEN METABOLIC PANEL: CPT

## 2025-05-30 RX ORDER — POLYETHYLENE GLYCOL 3350 17 G/17G
17 POWDER, FOR SOLUTION ORAL DAILY PRN
Qty: 10 EACH | Refills: 0 | Status: SHIPPED | OUTPATIENT
Start: 2025-05-30

## 2025-05-30 RX ORDER — CEFDINIR 300 MG/1
300 CAPSULE ORAL EVERY 12 HOURS SCHEDULED
Qty: 6 CAPSULE | Refills: 0 | Status: SHIPPED | OUTPATIENT
Start: 2025-05-30 | End: 2025-06-02

## 2025-05-30 RX ORDER — DOXYCYCLINE 100 MG/1
100 CAPSULE ORAL EVERY 12 HOURS SCHEDULED
Qty: 6 CAPSULE | Refills: 0 | Status: SHIPPED | OUTPATIENT
Start: 2025-05-30 | End: 2025-06-02

## 2025-05-30 RX ADMIN — ALLOPURINOL 100 MG: 100 TABLET ORAL at 08:56

## 2025-05-30 RX ADMIN — METOPROLOL SUCCINATE 25 MG: 25 TABLET, EXTENDED RELEASE ORAL at 08:56

## 2025-05-30 RX ADMIN — LUBIPROSTONE 24 MCG: 24 CAPSULE, GELATIN COATED ORAL at 08:55

## 2025-05-30 RX ADMIN — DOXYCYCLINE HYCLATE 100 MG: 100 CAPSULE ORAL at 08:56

## 2025-05-30 RX ADMIN — GUAIFENESIN 600 MG: 600 TABLET ORAL at 08:56

## 2025-05-30 RX ADMIN — AMIODARONE HYDROCHLORIDE 200 MG: 200 TABLET ORAL at 08:56

## 2025-05-30 RX ADMIN — FUROSEMIDE 20 MG: 20 TABLET ORAL at 08:56

## 2025-05-30 RX ADMIN — PANTOPRAZOLE SODIUM 20 MG: 20 TABLET, DELAYED RELEASE ORAL at 05:09

## 2025-05-30 NOTE — ASSESSMENT & PLAN NOTE
Outpatient PCP notes patient is on Lipitor 20 mg  Continue holding statin in setting of mild transaminitis  Defer to PCP on when to resume statin

## 2025-05-30 NOTE — DISCHARGE SUMMARY
Discharge Summary - Hospitalist   Name: Silvio Drew 74 y.o. male I MRN: [id number]  Unit/Bed#: E2 -01 I Date of Admission: 5/26/2025   Date of Service: 5/30/2025 I Hospital Day: 4     Assessment & Plan  Pneumonia of right lung due to infectious organism  Patient presented with progressive cough, right-sided chest pain, decreased oral intake, and shortness of breath on exertion  Reports has progressed over the last 2 weeks  Was prescribed amoxicillin by PCP but after starting it, he developed a fever  -thought the medication was poisoning him so he stopped it  CT chest showed multifocal patchy areas of consolidation and groundglass density involving the right upper and middle and lower lobes  Started on IV ceftriaxone and doxycycline for atypical coverage  Received a total of 4 days as-prescribed an additional 3 days to complete a 7-day antibiotic course  Symptoms have improved  Recommend outpatient follow-up imaging with PCP to ensure resolution  Hyperlipidemia  Outpatient PCP notes patient is on Lipitor 20 mg  Continue holding statin in setting of mild transaminitis  Defer to PCP on when to resume statin  Stage 3b chronic kidney disease (HCC)  Lab Results   Component Value Date    EGFR 49 05/30/2025    EGFR 44 05/29/2025    EGFR 39 05/28/2025    CREATININE 1.40 (H) 05/30/2025    CREATININE 1.53 (H) 05/29/2025    CREATININE 1.70 (H) 05/28/2025   Baseline creatinine about 1.4 to 1.7  Lasix held due to contrast given on admission and poor oral intake   Patient's p.o. intake has improved and Lasix was resumed  Hypokalemia  Potassium 2.9 admission, suspect in setting of poor p.o. intake with infection and ongoing Lasix use  Repleted and normalized   Elevated LFTs  With transaminitis on admission, previous liver enzymes in January 2025 for normal  Tylenol level checked -normal  Statin held  Also with hyperbilirubinemia which is now resolved  CT showing stable liver lesion which has been previously seen on MRI and  mild nodular liver surface.  Has seen oncology outpatient, had an MRI that was ordered - showing hemangioma  No abdominal pain, tolerating diet without nausea or vomiting  Right upper quadrant ultrasound with no acute abnormalities  Of note patient is on amiodarone  Acute hepatitis panel negative  Appreciate GI evaluation  Suspect drug-induced liver injury vs recent illness.   Recommending outpatient LFT monitoring over the next few weeks to ensure normalization.    Can consider outpatient elastography for concern of cirrhotic appearing liver on CT but will defer to GI  Anemia  Noted Dr. Pa with iron deficiency anemia  Currently not on iron supplementation  Hemoglobin 10.6 on admission, stable at 10  Constipation  With complaint of constipation and blood per rectum  Seen by GI in consultation  PTA on Linzess, resumed inpatient Amitzia and increased dose  Patient placed on MiraLAX  GI suspecting bleeding source such as hemorrhoids or fissure. Per son she has hemorrhoid history   Outpatient GI follow-up in 6 to 8 weeks to discuss colonoscopy  Nonischemic cardiomyopathy (HCC)  With history of has MDT single-chamber ICD, placed August 2013    Last device check through heart care group noted with normal function 2/25  Last EF during ZULY April 2024 35%  Patient noted abnormal coronary arteries on cardiac cath 2011  CT scan displays moderate cardiomegaly, status post CABG  Resume home Lasix 20 mg daily  Obtain echocardiogram with EF around 32%, overall no significant change, IVC normal  A-fib (HCC)  With paroxysmal atrial fibrillation and flutter, diagnosed October 2015  PTA on amiodarone 200 mg daily  Resumed on Toprol-XL 25 mg on admission as per prior cardiology recommendations  On Coumadin 2 mg daily for AC, goal INR 2-3   Of note his INR has been almost entirely subtherapeutic back even through April  INR therapeutic at 2.68  Prolonged QT interval  Noted, electrolytes normal  Avoid QT prolonging medications    Primary hypertension  Blood pressure is stable.    Continue home Toprol  Patient is not on ARB per outpatient heart care group  Atherosclerosis of native artery of both lower extremities with intermittent claudication (HCC)  Patient is on statin and Coumadin, not on antiplatelets due to prior bleeding episodes with epistaxis  H/O mitral valve repair  History of mitral valve repair with PFO closure 1998 per outpatient cardiology notes  Echo notes adequate leaflet mobility although is approaching severe MR (this has also been noted in OP cardiology notes at [facility])  Elevated troponin  Without current chest pain  Chest pain appears pleuritic and related to pna as stated above  Reviewed EKG with cardiology, patient has known LBBB that is chronic  Updated echo with no changes  GERD (gastroesophageal reflux disease)  Seen by Dr. Lombardi in January 2020 for  Continue pantoprazole 20 mg daily  No recent EGD noted  Lumbar radiculopathy  With reports of chronic low back pain for a few months, no trauma/fall  Intermittently complains of neuropathy of lower extremities  No spinal tenderness to palpation  Follow-up x-ray lumbar back  Trial muscle relaxers, increase gabapentin to 300 mg qhs, patient notes some improvement  No acute PT OT needs, recommend outpatient PT  Bright red blood per rectum  See plan under constipation above      Consultations During Hospital Stay:  Gastroenterology     Procedures Performed:   XR spine lumbar minimum 4 views non injury  Result Date: 5/29/2025  Impression: No acute osseous abnormality. Computerized Assisted Algorithm (CAA) may have been used to analyze all applicable images. Workstation performed: [id number]     US right upper quadrant  Result Date: 5/28/2025  Impression: No acute abnormality. Workstation performed: [id number]     XR chest 2 views  Result Date: 5/27/2025  Impression: Multifocal right pneumonia. See subsequent chest CT. Workstation performed: [id number]     CT pe study w abdomen  pelvis w contrast  Result Date: 5/26/2025  Impression: No intraluminal filling defect to suggest an acute pulmonary embolus. Multifocal patchy areas of consolidation and groundglass density involving the right upper, middle and lower lobes suspicious for an infectious or inflammatory process. Correlation with the patient's symptoms is recommended. No acute intra-abdominal abnormality. No free air or free fluid. Scattered colonic diverticulosis with no inflammatory changes present to suggest acute diverticulitis. Stable 15 mm hypodense right hepatic lobe lesion, described on a prior MRI abdomen dated 9/3/2024. Workstation performed: [id number]     Significant Findings / Test Results:   Pneumonia  Transaminitis  Constipation  Hypokalemia  Lumbar radiculopathy  Bright red blood per rectum    Test Results Pending at Discharge (will require follow up):   None     Outpatient follow-up requested:  PCP-1 week  Gastroenterology 6 to 8 weeks for colonoscopy    Complications:  none    Hospital Course:     Silvio Drew is a 74 y.o. male patient who originally presented to the hospital on 5/26/2025 due to cough, loss of appetite for the past 2 weeks.  In the ED, patient was found to be hyper toxic on room air, tachypneic, with elevated Pro-Aaron of 0.53, elevated D-dimer, COVID/flu/RSV negative, potassium 2.9, creatinine 1.72, troponin 79, mild transaminitis, INR 1.69.  Imaging revealed no evidence of acute PE but did show multi focal patchy areas of consolidation groundglass density involving the right upper, middle, lower lobe suspicious for infection.  Patient was started on IV antibiotics with ceftriaxone and doxycycline.  Symptom slowly improved.  During the course of his stay, he was noted to have some bright red blood per rectum.  He is seen in consultation by gastroenterology who recommends outpatient follow-up given stability of hemoglobin here for colonoscopy in 6 to 8 weeks time once recovered from pneumonia.  His  "troponins were trended and did not significantly elevate.  He remained without chest pain and EKGs were without acute ischemia.  Likely secondary to acute infection.  He received some gentle IV fluids for hydration and his creatinine improved.  He was seen in consultation by PT/OT and recommended to have outpatient physical therapy. He will be treated prescribed an additional 3 days of oral antibiotics to complete a 7-day antibiotic course. Plan was discussed with son via telephone who is in agreement.  In conclusion, patient is stable for discharge at this time.    Condition at Discharge: stable     Discharge Day Visit / Exam:     Subjective:  Resting in bed.  Had multiple bowel meds yesterday without any evidence of blood.  Symptoms are improving in terms of his pneumonia.  He is eager to go home today.  Discussed with son via telephone.    Vitals: Blood Pressure: 121/58 (05/30/25 0856)  Pulse: 105 (05/30/25 0856)  Temperature: (!) 97.2 °F (36.2 °C) (05/30/25 0834)  Temp Source: Temporal (05/30/25 0834)  Respirations: 18 (05/30/25 0834)  Height: 5' 8\" (172.7 cm) (05/27/25 1553)  Weight - Scale: 96.6 kg (213 lb) (05/27/25 1553)  SpO2: 94 % (05/30/25 0834)    Exam:   Physical Exam  Vitals and nursing note reviewed.   Constitutional:       General: He is not in acute distress.     Appearance: He is normal weight. He is not ill-appearing, toxic-appearing or diaphoretic.   HENT:      Head: Normocephalic and atraumatic.     Eyes:      General: No scleral icterus.      Cardiovascular:      Rate and Rhythm: Normal rate and regular rhythm.   Pulmonary:      Effort: Pulmonary effort is normal. No respiratory distress.      Breath sounds: Normal breath sounds. No stridor. No wheezing or rhonchi.   Abdominal:      General: Abdomen is flat. There is no distension.      Palpations: Abdomen is soft. There is no mass.      Tenderness: There is no abdominal tenderness.      Hernia: No hernia is present.     Musculoskeletal:        "  General: No swelling.      Cervical back: Neck supple.     Skin:     General: Skin is warm and dry.     Neurological:      Mental Status: He is alert and oriented to person, place, and time. Mental status is at baseline.     Psychiatric:         Mood and Affect: Mood normal.         Behavior: Behavior normal.         Discussion with Family: son via telephone    Discharge instructions/Information to patient and family:   See after visit summary for information provided to patient and family.      Provisions for Follow-Up Care:  See after visit summary for information related to follow-up care and any pertinent home health orders.      Planned Readmission: no     Discharge Statement:  This time was spent on the day of discharge. I had direct contact with the patient on the day of discharge. Greater than 50% of the total time was spent examining patient, answering all patient questions, arranging and discussing plan of care with patient as well as directly providing post-discharge instructions.  Additional time then spent on discharge activities.    Discharge Medications:  See after visit summary for reconciled discharge medications provided to patient and family.      ** Please Note: This note has been constructed using a voice recognition system **

## 2025-05-30 NOTE — ASSESSMENT & PLAN NOTE
History of mitral valve repair with PFO closure 1998 per outpatient cardiology notes  Echo notes adequate leaflet mobility although is approaching severe MR (this has also been noted in OP cardiology notes at [facility])

## 2025-05-30 NOTE — ASSESSMENT & PLAN NOTE
Blood pressure is stable.    Continue home Toprol  Patient is not on ARB per outpatient heart care group

## 2025-05-30 NOTE — ASSESSMENT & PLAN NOTE
Potassium 2.9 admission, suspect in setting of poor p.o. intake with infection and ongoing Lasix use  Repleted and normalized

## 2025-05-30 NOTE — CASE MANAGEMENT
Case Management Discharge Planning Note    Patient name Silvio Drew  Location [facility] /E2 -* MRN [id number]  : 1950 Date 2025       Current Admission Date: 2025  Current Admission Diagnosis:Pneumonia of right lung due to infectious organism   Patient Active Problem List    Diagnosis Date Noted    Chronic low back pain 2025    Bright red blood per rectum 2025    H/O mitral valve repair 2025    Elevated troponin 2025    GERD (gastroesophageal reflux disease) 2025    Constipation 2025    Lumbar radiculopathy 2025    Pneumonia of right lung due to infectious organism 2025    Acute hypoxic respiratory failure (HCC) 2025    Hypokalemia 2025    Elevated LFTs 2025    Prolonged QT interval 2025    Dental infection 04/10/2024    Anemia 04/10/2024    Visual disturbance 04/10/2024    BRBPR (bright red blood per rectum) 2024    Streptococcal bacteremia 2024    Supratherapeutic INR 2024    Abnormal CT of the abdomen 2024    Thyroid nodule 2024    Stage 3b chronic kidney disease (HCC) 2024    Hyperlipidemia associated with type 2 diabetes mellitus  (HCC) 2021    Nonischemic cardiomyopathy (HCC) 2020    Atherosclerosis of native artery of both lower extremities with intermittent claudication ([facility]) 2019    Bicytopenia 2013    Hyperlipidemia 2012    Primary hypertension 2012    A-fib (HCC) 2012    Coronary atherosclerosis 2012      LOS (days): 4  Geometric Mean LOS (GMLOS) (days): 4  Days to GMLOS:0.2     OBJECTIVE:  Risk of Unplanned Readmission Score: 15.35         Current admission status: Inpatient   Preferred Pharmacy:   [id number] [address]  [address] [address]  Phone: [phone] Fax: [phone]    Primary Care Provider: Mac Stephens MD    Primary Insurance: [facility]  REP  Secondary Insurance: [place] [employer] AND [facility]    DISCHARGE DETAILS:    Discharge planning discussed with:: Patient  Freedom of Choice: Yes  Comments - Freedom of Choice: Agreeable to OPPT. Scripts provided  CM contacted family/caregiver?: Yes (called brother while CM was in the phone)  Were Treatment Team discharge recommendations reviewed with patient/caregiver?: Yes  Did patient/caregiver verbalize understanding of patient care needs?: Yes       Contacts  Patient Contacts: Ang Drew (Brother)  Relationship to Patient:: Family  Contact Method: Phone  Reason/Outcome: Discharge Planning, Emergency Contact    Requested Home Health Care         Is the patient interested in HHC at discharge?: No    DME Referral Provided  Referral made for DME?: No    Other Referral/Resources/Interventions Provided:  Interventions: Outpatient PT         Treatment Team Recommendation: Home (OPPT)  Discharge Destination Plan:: Home (OPPT)  Transport at Discharge : Ride Share           ETA of Transport (Date): 05/30/25  ETA of Transport (Time): 1600              IMM Given (Date):: 05/30/25  IMM Given to:: Patient  IMM was reviewed with the pt. Pt reports understanding of the ability to appeal discharge if feeling as though not medically stable for discharge. IMM was signed and placed in the scan bin.

## 2025-05-30 NOTE — PLAN OF CARE
Problem: Potential for Falls  Goal: Patient will remain free of falls  Description: INTERVENTIONS:  - Educate patient/family on patient safety including physical limitations  - Instruct patient to call for assistance with activity   - Consider consulting OT/PT to assist with strengthening/mobility based on AM PAC & JH-HLM score  - Consult OT/PT to assist with strengthening/mobility   - Keep Call bell within reach  - Keep bed low and locked with side rails adjusted as appropriate  - Keep care items and personal belongings within reach  - Initiate and maintain comfort rounds  - Make Fall Risk Sign visible to staff  - Offer Toileting every 2 Hours, in advance of need  - Initiate/Maintain bed alarm  - Obtain necessary fall risk management equipment:  socks, yellow bracelet, call bell   - Apply yellow socks and bracelet for high fall risk patients  - Consider moving patient to room near nurses station  Outcome: Progressing     Problem: PAIN - ADULT  Goal: Verbalizes/displays adequate comfort level or baseline comfort level  Description: Interventions:  - Encourage patient to monitor pain and request assistance  - Assess pain using appropriate pain scale  - Administer analgesics as ordered based on type and severity of pain and evaluate response  - Implement non-pharmacological measures as appropriate and evaluate response  - Consider cultural and social influences on pain and pain management  - Notify physician/advanced practitioner if interventions unsuccessful or patient reports new pain  - Educate patient/family on pain management process including their role and importance of  reporting pain   - Provide non-pharmacologic/complimentary pain relief interventions  Outcome: Progressing     Problem: INFECTION - ADULT  Goal: Absence or prevention of progression during hospitalization  Description: INTERVENTIONS:  - Assess and monitor for signs and symptoms of infection  - Monitor lab/diagnostic results  - Monitor all  insertion sites, i.e. indwelling lines, tubes, and drains  - Monitor endotracheal if appropriate and nasal secretions for changes in amount and color  - [place] appropriate cooling/warming therapies per order  - Administer medications as ordered  - Instruct and encourage patient and family to use good hand hygiene technique  - Identify and instruct in appropriate isolation precautions for identified infection/condition  Outcome: Progressing     Problem: DISCHARGE PLANNING  Goal: Discharge to home or other facility with appropriate resources  Description: INTERVENTIONS:  - Identify barriers to discharge w/patient and caregiver  - Arrange for needed discharge resources and transportation as appropriate  - Identify discharge learning needs (meds, wound care, etc.)  - Arrange for interpretive services to assist at discharge as needed  - Refer to Case Management Department for coordinating discharge planning if the patient needs post-hospital services based on physician/advanced practitioner order or complex needs related to functional status, cognitive ability, or social support system  Outcome: Progressing     Problem: Knowledge Deficit  Goal: Patient/family/caregiver demonstrates understanding of disease process, treatment plan, medications, and discharge instructions  Description: Complete learning assessment and assess knowledge base.  Interventions:  - Provide teaching at level of understanding  - Provide teaching via preferred learning methods  Outcome: Progressing     Problem: CARDIOVASCULAR - ADULT  Goal: Maintains optimal cardiac output and hemodynamic stability  Description: INTERVENTIONS:  - Monitor I/O, vital signs and rhythm  - Monitor for S/S and trends of decreased cardiac output  - Administer and titrate ordered vasoactive medications to optimize hemodynamic stability  - Assess quality of pulses, skin color and temperature  - Assess for signs of decreased coronary artery perfusion  - Instruct patient  to report change in severity of symptoms  Outcome: Progressing  Goal: Absence of cardiac dysrhythmias or at baseline rhythm  Description: INTERVENTIONS:  - Continuous cardiac monitoring, vital signs, obtain 12 lead EKG if ordered  - Administer antiarrhythmic and heart rate control medications as ordered  - Monitor electrolytes and administer replacement therapy as ordered  Outcome: Progressing     Problem: RESPIRATORY - ADULT  Goal: Achieves optimal ventilation and oxygenation  Description: INTERVENTIONS:  - Assess for changes in respiratory status  - Assess for changes in mentation and behavior  - Position to facilitate oxygenation and minimize respiratory effort  - Oxygen administered by appropriate delivery if ordered  - Initiate smoking cessation education as indicated  - Encourage broncho-pulmonary hygiene including cough, deep breathe, Incentive Spirometry  - Assess the need for suctioning and aspirate as needed  - Assess and instruct to report SOB or any respiratory difficulty  - Respiratory Therapy support as indicated  Outcome: Progressing     Problem: Nutrition/Hydration-ADULT  Goal: Nutrient/Hydration intake appropriate for improving, restoring or maintaining nutritional needs  Description: Monitor and assess patient's nutrition/hydration status for malnutrition. Collaborate with interdisciplinary team and initiate plan and interventions as ordered.  Monitor patient's weight and dietary intake as ordered or per policy. Utilize nutrition screening tool and intervene as necessary. Determine patient's food preferences and provide high-protein, high-caloric foods as appropriate.     INTERVENTIONS:  - Monitor oral intake, urinary output, labs, and treatment plans  - Assess nutrition and hydration status and recommend course of action  - Evaluate amount of meals eaten  - Assist patient with eating if necessary   - Allow adequate time for meals  - Recommend/ encourage appropriate diets, oral nutritional  supplements, and vitamin/mineral supplements  - Order, calculate, and assess calorie counts as needed  - Recommend, monitor, and adjust tube feedings and TPN/PPN based on assessed needs  - Assess need for intravenous fluids  - Provide specific nutrition/hydration education as appropriate  - Include patient/family/caregiver in decisions related to nutrition  Outcome: Progressing

## 2025-05-30 NOTE — ASSESSMENT & PLAN NOTE
Lab Results   Component Value Date    EGFR 49 05/30/2025    EGFR 44 05/29/2025    EGFR 39 05/28/2025    CREATININE 1.40 (H) 05/30/2025    CREATININE 1.53 (H) 05/29/2025    CREATININE 1.70 (H) 05/28/2025   Baseline creatinine about 1.4 to 1.7  Lasix held due to contrast given on admission and poor oral intake   Patient's p.o. intake has improved and Lasix was resumed

## 2025-05-30 NOTE — PLAN OF CARE
Problem: Potential for Falls  Goal: Patient will remain free of falls  Description: INTERVENTIONS:  - Educate patient/family on patient safety including physical limitations  - Instruct patient to call for assistance with activity   - Consider consulting OT/PT to assist with strengthening/mobility based on AM PAC & JH-HLM score  - Consult OT/PT to assist with strengthening/mobility   - Keep Call bell within reach  - Keep bed low and locked with side rails adjusted as appropriate  - Keep care items and personal belongings within reach  - Initiate and maintain comfort rounds  - Make Fall Risk Sign visible to staff  - Offer Toileting every 2 Hours, in advance of need  - Initiate/Maintain bed alarm  - Obtain necessary fall risk management equipment:  socks, yellow bracelet, call bell   - Apply yellow socks and bracelet for high fall risk patients  - Consider moving patient to room near nurses station  Outcome: Progressing     Problem: PAIN - ADULT  Goal: Verbalizes/displays adequate comfort level or baseline comfort level  Description: Interventions:  - Encourage patient to monitor pain and request assistance  - Assess pain using appropriate pain scale  - Administer analgesics as ordered based on type and severity of pain and evaluate response  - Implement non-pharmacological measures as appropriate and evaluate response  - Consider cultural and social influences on pain and pain management  - Notify physician/advanced practitioner if interventions unsuccessful or patient reports new pain  - Educate patient/family on pain management process including their role and importance of  reporting pain   - Provide non-pharmacologic/complimentary pain relief interventions  Outcome: Progressing     Problem: INFECTION - ADULT  Goal: Absence or prevention of progression during hospitalization  Description: INTERVENTIONS:  - Assess and monitor for signs and symptoms of infection  - Monitor lab/diagnostic results  - Monitor all  insertion sites, i.e. indwelling lines, tubes, and drains  - Monitor endotracheal if appropriate and nasal secretions for changes in amount and color  - [place] appropriate cooling/warming therapies per order  - Administer medications as ordered  - Instruct and encourage patient and family to use good hand hygiene technique  - Identify and instruct in appropriate isolation precautions for identified infection/condition  Outcome: Progressing     Problem: DISCHARGE PLANNING  Goal: Discharge to home or other facility with appropriate resources  Description: INTERVENTIONS:  - Identify barriers to discharge w/patient and caregiver  - Arrange for needed discharge resources and transportation as appropriate  - Identify discharge learning needs (meds, wound care, etc.)  - Arrange for interpretive services to assist at discharge as needed  - Refer to Case Management Department for coordinating discharge planning if the patient needs post-hospital services based on physician/advanced practitioner order or complex needs related to functional status, cognitive ability, or social support system  Outcome: Progressing     Problem: Knowledge Deficit  Goal: Patient/family/caregiver demonstrates understanding of disease process, treatment plan, medications, and discharge instructions  Description: Complete learning assessment and assess knowledge base.  Interventions:  - Provide teaching at level of understanding  - Provide teaching via preferred learning methods  Outcome: Progressing     Problem: CARDIOVASCULAR - ADULT  Goal: Maintains optimal cardiac output and hemodynamic stability  Description: INTERVENTIONS:  - Monitor I/O, vital signs and rhythm  - Monitor for S/S and trends of decreased cardiac output  - Administer and titrate ordered vasoactive medications to optimize hemodynamic stability  - Assess quality of pulses, skin color and temperature  - Assess for signs of decreased coronary artery perfusion  - Instruct patient  to report change in severity of symptoms  Outcome: Progressing  Goal: Absence of cardiac dysrhythmias or at baseline rhythm  Description: INTERVENTIONS:  - Continuous cardiac monitoring, vital signs, obtain 12 lead EKG if ordered  - Administer antiarrhythmic and heart rate control medications as ordered  - Monitor electrolytes and administer replacement therapy as ordered  Outcome: Progressing     Problem: RESPIRATORY - ADULT  Goal: Achieves optimal ventilation and oxygenation  Description: INTERVENTIONS:  - Assess for changes in respiratory status  - Assess for changes in mentation and behavior  - Position to facilitate oxygenation and minimize respiratory effort  - Oxygen administered by appropriate delivery if ordered  - Initiate smoking cessation education as indicated  - Encourage broncho-pulmonary hygiene including cough, deep breathe, Incentive Spirometry  - Assess the need for suctioning and aspirate as needed  - Assess and instruct to report SOB or any respiratory difficulty  - Respiratory Therapy support as indicated  Outcome: Progressing     Problem: Nutrition/Hydration-ADULT  Goal: Nutrient/Hydration intake appropriate for improving, restoring or maintaining nutritional needs  Description: Monitor and assess patient's nutrition/hydration status for malnutrition. Collaborate with interdisciplinary team and initiate plan and interventions as ordered.  Monitor patient's weight and dietary intake as ordered or per policy. Utilize nutrition screening tool and intervene as necessary. Determine patient's food preferences and provide high-protein, high-caloric foods as appropriate.     INTERVENTIONS:  - Monitor oral intake, urinary output, labs, and treatment plans  - Assess nutrition and hydration status and recommend course of action  - Evaluate amount of meals eaten  - Assist patient with eating if necessary   - Allow adequate time for meals  - Recommend/ encourage appropriate diets, oral nutritional  supplements, and vitamin/mineral supplements  - Order, calculate, and assess calorie counts as needed  - Recommend, monitor, and adjust tube feedings and TPN/PPN based on assessed needs  - Assess need for intravenous fluids  - Provide specific nutrition/hydration education as appropriate  - Include patient/family/caregiver in decisions related to nutrition  Outcome: Progressing

## 2025-05-30 NOTE — ASSESSMENT & PLAN NOTE
With history of has MDT single-chamber ICD, placed August 2013    Last device check through heart care group noted with normal function 2/25  Last EF during ZULY April 2024 35%  Patient noted abnormal coronary arteries on cardiac cath 2011  CT scan displays moderate cardiomegaly, status post CABG  Resume home Lasix 20 mg daily  Obtain echocardiogram with EF around 32%, overall no significant change, IVC normal

## 2025-05-30 NOTE — ASSESSMENT & PLAN NOTE
Patient presented with progressive cough, right-sided chest pain, decreased oral intake, and shortness of breath on exertion  Reports has progressed over the last 2 weeks  Was prescribed amoxicillin by PCP but after starting it, he developed a fever  -thought the medication was poisoning him so he stopped it  CT chest showed multifocal patchy areas of consolidation and groundglass density involving the right upper and middle and lower lobes  Started on IV ceftriaxone and doxycycline for atypical coverage  Received a total of 4 days as-prescribed an additional 3 days to complete a 7-day antibiotic course  Symptoms have improved  Recommend outpatient follow-up imaging with PCP to ensure resolution

## 2025-05-30 NOTE — ASSESSMENT & PLAN NOTE
With reports of chronic low back pain for a few months, no trauma/fall  Intermittently complains of neuropathy of lower extremities  No spinal tenderness to palpation  Follow-up x-ray lumbar back  Trial muscle relaxers, increase gabapentin to 300 mg qhs, patient notes some improvement  No acute PT OT needs, recommend outpatient PT

## 2025-05-30 NOTE — ASSESSMENT & PLAN NOTE
With transaminitis on admission, previous liver enzymes in January 2025 for normal  Tylenol level checked -normal  Statin held  Also with hyperbilirubinemia which is now resolved  CT showing stable liver lesion which has been previously seen on MRI and mild nodular liver surface.  Has seen oncology outpatient, had an MRI that was ordered - showing hemangioma  No abdominal pain, tolerating diet without nausea or vomiting  Right upper quadrant ultrasound with no acute abnormalities  Of note patient is on amiodarone  Acute hepatitis panel negative  Appreciate GI evaluation  Suspect drug-induced liver injury vs recent illness.   Recommending outpatient LFT monitoring over the next few weeks to ensure normalization.    Can consider outpatient elastography for concern of cirrhotic appearing liver on CT but will defer to GI

## 2025-05-30 NOTE — ASSESSMENT & PLAN NOTE
Noted Dr. Pa with iron deficiency anemia  Currently not on iron supplementation  Hemoglobin 10.6 on admission, stable at 10

## 2025-05-30 NOTE — ASSESSMENT & PLAN NOTE
With complaint of constipation and blood per rectum  Seen by GI in consultation  PTA on Linzess, resumed inpatient Amitzia and increased dose  Patient placed on MiraLAX  GI suspecting bleeding source such as hemorrhoids or fissure. Per son she has hemorrhoid history   Outpatient GI follow-up in 6 to 8 weeks to discuss colonoscopy

## 2025-05-30 NOTE — ASSESSMENT & PLAN NOTE
With paroxysmal atrial fibrillation and flutter, diagnosed October 2015  PTA on amiodarone 200 mg daily  Resumed on Toprol-XL 25 mg on admission as per prior cardiology recommendations  On Coumadin 2 mg daily for AC, goal INR 2-3   Of note his INR has been almost entirely subtherapeutic back even through April  INR therapeutic at 2.68

## 2025-06-01 LAB
BACTERIA BLD CULT: NORMAL
BACTERIA BLD CULT: NORMAL
BACTERIA SPT RESP CULT: ABNORMAL
GRAM STN SPEC: ABNORMAL

## 2025-06-02 ENCOUNTER — PATIENT OUTREACH (OUTPATIENT)
Dept: CASE MANAGEMENT | Facility: OTHER | Age: 75
End: 2025-06-02

## 2025-06-02 ENCOUNTER — TRANSITIONAL CARE MANAGEMENT (OUTPATIENT)
Dept: FAMILY MEDICINE CLINIC | Facility: CLINIC | Age: 75
End: 2025-06-02

## 2025-06-02 NOTE — UTILIZATION REVIEW
NOTIFICATION OF ADMISSION DISCHARGE   This is a Notification of Discharge from [facility]. Please be advised that this patient has been discharge from our facility. Below you will find the admission and discharge date and time including the patient’s disposition.   UTILIZATION REVIEW CONTACT:  Utilization Review Assistants  Network Utilization Review Department  Phone: [phone] x carefully listen to the prompts. All voicemails are confidential.  Email: [email or web address]     ADMISSION INFORMATION  PRESENTATION DATE: 5/26/2025  4:14 PM  OBERVATION ADMISSION DATE: N/A  INPATIENT ADMISSION DATE: 5/26/25  7:45 PM   DISCHARGE DATE: 5/30/2025  4:13 PM   DISPOSITION:Home/Self Care    Network Utilization Review Department  ATTENTION: Please call with any questions or concerns to [phone] and carefully listen to the prompts so that you are directed to the right person. All voicemails are confidential.   For Discharge needs, contact Care Management DC Support Team at [phone] opt. 2  Send all requests for admission clinical reviews, approved or denied determinations and any other requests to dedicated fax number below belonging to the campus where the patient is receiving treatment. List of dedicated fax numbers for the Facilities:  FACILITY NAME UR FAX NUMBER   ADMISSION DENIALS (Administrative/Medical Necessity) [phone]   DISCHARGE SUPPORT TEAM ([facility]) [phone]   PARENT CHILD HEALTH (Maternity/NICU/Pediatrics) [phone]   [facility] [phone]   General acute hospital [phone]   [facility] [phone]   [facility] [phone]   [facility] [phone]   [facility] [phone]   [facility] [phone]   [facility] [phone]   [facility]  [facility] [phone]   [facility] [phone]   [facility] [phone]   [facility] [phone]

## 2025-06-03 ENCOUNTER — PATIENT OUTREACH (OUTPATIENT)
Dept: CASE MANAGEMENT | Facility: OTHER | Age: 75
End: 2025-06-03

## 2025-06-03 NOTE — PROGRESS NOTES
Outpatient Care Management Note    HRR referral.  Covering RN CM received IB message with HRR referral.  Chart reviewed.    Patient was hospitalized at [facility] 5/26/25-5/30/25 for pneumonia right lung due to infectious organism.  Per chart, patient presented due to cough and loss of appetite x 2 weeks. Imaging completed. Was started on IV antibiotics, symptoms improved.  Also during hospital stay, patient had bright red blood per rectum. GI consulted- recommended outpatient follow up, as hemoglobin was stable.  Discharged with 3 additional days of antibiotics to complete 7 day course.    Covering RN CM will place outreach call to patient for follow up to this hospitalization and to assess for care management needs.   Utilized [employer] language line to place call,  # [phone]. Call placed, no answer received.    Covering RN CM will schedule a second call attempt and will route message and send IB message, to assigned case team RN CM, for second outreach attempt.

## 2025-06-05 ENCOUNTER — TELEPHONE (OUTPATIENT)
Dept: GASTROENTEROLOGY | Facility: MEDICAL CENTER | Age: 75
End: 2025-06-05

## 2025-06-05 NOTE — TELEPHONE ENCOUNTER
Left voicemail and requested a call back to schedule hospital f/u for 4-6 weeks. Mailed letter today.

## 2025-06-11 ENCOUNTER — OFFICE VISIT (OUTPATIENT)
Dept: FAMILY MEDICINE CLINIC | Facility: CLINIC | Age: 75
End: 2025-06-11
Payer: COMMERCIAL

## 2025-06-11 ENCOUNTER — PATIENT OUTREACH (OUTPATIENT)
Dept: CASE MANAGEMENT | Facility: OTHER | Age: 75
End: 2025-06-11

## 2025-06-11 VITALS
DIASTOLIC BLOOD PRESSURE: 86 MMHG | BODY MASS INDEX: 30.31 KG/M2 | HEIGHT: 68 IN | OXYGEN SATURATION: 99 % | HEART RATE: 78 BPM | TEMPERATURE: 98.9 F | SYSTOLIC BLOOD PRESSURE: 128 MMHG | WEIGHT: 200 LBS | RESPIRATION RATE: 16 BRPM

## 2025-06-11 DIAGNOSIS — E78.5 HYPERLIPIDEMIA ASSOCIATED WITH TYPE 2 DIABETES MELLITUS  (HCC): Primary | ICD-10-CM

## 2025-06-11 DIAGNOSIS — R06.02 SOB (SHORTNESS OF BREATH): ICD-10-CM

## 2025-06-11 DIAGNOSIS — E55.9 VITAMIN D DEFICIENCY: ICD-10-CM

## 2025-06-11 DIAGNOSIS — Z12.11 SCREENING FOR COLORECTAL CANCER: ICD-10-CM

## 2025-06-11 DIAGNOSIS — I10 ESSENTIAL HYPERTENSION, BENIGN: ICD-10-CM

## 2025-06-11 DIAGNOSIS — R26.9 GAIT ABNORMALITY: ICD-10-CM

## 2025-06-11 DIAGNOSIS — I70.213 ATHEROSCLEROSIS OF NATIVE ARTERY OF BOTH LOWER EXTREMITIES WITH INTERMITTENT CLAUDICATION (HCC): ICD-10-CM

## 2025-06-11 DIAGNOSIS — R53.83 OTHER FATIGUE: ICD-10-CM

## 2025-06-11 DIAGNOSIS — M1A.9XX0 CHRONIC GOUT WITHOUT TOPHUS, UNSPECIFIED CAUSE, UNSPECIFIED SITE: ICD-10-CM

## 2025-06-11 DIAGNOSIS — N18.32 STAGE 3B CHRONIC KIDNEY DISEASE (HCC): ICD-10-CM

## 2025-06-11 DIAGNOSIS — E53.8 VITAMIN B12 DEFICIENCY: ICD-10-CM

## 2025-06-11 DIAGNOSIS — D50.9 IRON DEFICIENCY ANEMIA, UNSPECIFIED IRON DEFICIENCY ANEMIA TYPE: ICD-10-CM

## 2025-06-11 DIAGNOSIS — E11.69 HYPERLIPIDEMIA ASSOCIATED WITH TYPE 2 DIABETES MELLITUS  (HCC): Primary | ICD-10-CM

## 2025-06-11 DIAGNOSIS — Z12.12 SCREENING FOR COLORECTAL CANCER: ICD-10-CM

## 2025-06-11 PROBLEM — J96.01 ACUTE HYPOXIC RESPIRATORY FAILURE (HCC): Status: RESOLVED | Noted: 2025-05-26 | Resolved: 2025-06-11

## 2025-06-11 PROBLEM — I42.8 NONISCHEMIC CARDIOMYOPATHY (HCC): Status: RESOLVED | Noted: 2020-03-02 | Resolved: 2025-06-11

## 2025-06-11 PROCEDURE — 99496 TRANSJ CARE MGMT HIGH F2F 7D: CPT | Performed by: INTERNAL MEDICINE

## 2025-06-11 PROCEDURE — 96372 THER/PROPH/DIAG INJ SC/IM: CPT | Performed by: INTERNAL MEDICINE

## 2025-06-11 RX ORDER — AMIODARONE HYDROCHLORIDE 200 MG/1
200 TABLET ORAL DAILY
Qty: 90 TABLET | Refills: 3 | Status: SHIPPED | OUTPATIENT
Start: 2025-06-11

## 2025-06-11 RX ORDER — ALLOPURINOL 100 MG/1
100 TABLET ORAL DAILY
Qty: 90 TABLET | Refills: 3 | Status: SHIPPED | OUTPATIENT
Start: 2025-06-11

## 2025-06-11 RX ORDER — METOPROLOL SUCCINATE 25 MG/1
25 TABLET, EXTENDED RELEASE ORAL DAILY
Qty: 90 TABLET | Refills: 3 | Status: SHIPPED | OUTPATIENT
Start: 2025-06-11

## 2025-06-11 RX ORDER — FUROSEMIDE 20 MG/1
20 TABLET ORAL DAILY
Qty: 90 TABLET | Refills: 3 | Status: SHIPPED | OUTPATIENT
Start: 2025-06-11

## 2025-06-11 RX ORDER — CYANOCOBALAMIN 1000 UG/ML
1000 INJECTION, SOLUTION INTRAMUSCULAR; SUBCUTANEOUS ONCE
Status: COMPLETED | OUTPATIENT
Start: 2025-06-11 | End: 2025-06-11

## 2025-06-11 RX ORDER — ERGOCALCIFEROL 1.25 MG/1
50000 CAPSULE, LIQUID FILLED ORAL WEEKLY
Qty: 12 CAPSULE | Refills: 3 | Status: SHIPPED | OUTPATIENT
Start: 2025-06-11

## 2025-06-11 RX ADMIN — CYANOCOBALAMIN 1000 MCG: 1000 INJECTION, SOLUTION INTRAMUSCULAR; SUBCUTANEOUS at 12:00

## 2025-06-11 NOTE — ASSESSMENT & PLAN NOTE
Lab Results   Component Value Date    EGFR 49 05/30/2025    EGFR 44 05/29/2025    EGFR 39 05/28/2025    CREATININE 1.40 (H) 05/30/2025    CREATININE 1.53 (H) 05/29/2025    CREATININE 1.70 (H) 05/28/2025       Orders:    Iron Panel (Includes Ferritin, Iron Sat%, Iron, and TIBC); Future    Ferritin; Future    UA (URINE) with reflex to Scope; Future    Magnesium; Future    CBC and differential; Future    Comprehensive metabolic panel; Future    Lipid panel; Future    TSH, 3rd generation; Future    T4, free; Future

## 2025-06-11 NOTE — PROGRESS NOTES
Transition of Care Visit:  Name: Silvio Drew      : 1950      MRN: [id number]  Encounter Provider: Mac Stephens MD  Encounter Date: 2025   Encounter department: [facility]    Assessment & Plan  Other fatigue    Lab Results   Component Value Date    HGBA1C 5.6 2025       Orders:    Ambulatory Referral to Ophthalmology; Future    Screening for colorectal cancer    Orders:    Ambulatory Referral to Gastroenterology; Future    BMI 30.0-30.9,adult    Orders:    metoprolol succinate (Toprol XL) 25 mg 24 hr tablet; Take 1 tablet (25 mg total) by mouth daily    Essential hypertension, benign    Orders:    furosemide (Lasix) 20 mg tablet; Take 1 tablet (20 mg total) by mouth daily    amiodarone 200 mg tablet; Take 1 tablet (200 mg total) by mouth daily    Vitamin D deficiency    Orders:    ergocalciferol (VITAMIN D2) 50,000 units; Take 1 capsule (50,000 Units total) by mouth once a week    cyanocobalamin injection 1,000 mcg    Chronic gout without tophus, unspecified cause, unspecified site    Orders:    allopurinol (ZYLOPRIM) 100 mg tablet; Take 1 tablet (100 mg total) by mouth daily    Hyperlipidemia associated with type 2 diabetes mellitus  (HCC)    Lab Results   Component Value Date    HGBA1C 5.6 2025       Orders:    Iron Panel (Includes Ferritin, Iron Sat%, Iron, and TIBC); Future    Ferritin; Future    UA (URINE) with reflex to Scope; Future    Magnesium; Future    CBC and differential; Future    Comprehensive metabolic panel; Future    Lipid panel; Future    TSH, 3rd generation; Future    T4, free; Future    SOB (shortness of breath)    Orders:    Ambulatory Referral to Cardiology; Future    Ambulatory Referral to Physical Therapy; Future    Iron Panel (Includes Ferritin, Iron Sat%, Iron, and TIBC); Future    Ferritin; Future    UA (URINE) with reflex to Scope; Future    Magnesium; Future    CBC and differential; Future    Comprehensive metabolic panel;  Future    Lipid panel; Future    TSH, 3rd generation; Future    T4, free; Future    Gait abnormality    Orders:    Ambulatory Referral to Physical Therapy; Future    Stage 3b chronic kidney disease (HCC)  Lab Results   Component Value Date    EGFR 49 05/30/2025    EGFR 44 05/29/2025    EGFR 39 05/28/2025    CREATININE 1.40 (H) 05/30/2025    CREATININE 1.53 (H) 05/29/2025    CREATININE 1.70 (H) 05/28/2025       Orders:    Iron Panel (Includes Ferritin, Iron Sat%, Iron, and TIBC); Future    Ferritin; Future    UA (URINE) with reflex to Scope; Future    Magnesium; Future    CBC and differential; Future    Comprehensive metabolic panel; Future    Lipid panel; Future    TSH, 3rd generation; Future    T4, free; Future    Iron deficiency anemia, unspecified iron deficiency anemia type        Orders:    Iron Panel (Includes Ferritin, Iron Sat%, Iron, and TIBC); Future    Ferritin; Future    UA (URINE) with reflex to Scope; Future    Magnesium; Future    CBC and differential; Future    Comprehensive metabolic panel; Future    Lipid panel; Future    TSH, 3rd generation; Future    T4, free; Future    Vitamin B12 deficiency    Orders:    cyanocobalamin injection 1,000 mcg    Atherosclerosis of native artery of both lower extremities with intermittent claudication (HCC)  Life style Mod  Continue same  FU w cardiology       Pt still in Recovery period,  Needs to increase Water and nutritions,  FU w cardiology,ASAP...  RTC in 2-3 weeks w Blood work       History of Present Illness     Transitional Care Management Review:   Silvio Drew is a 74 y.o. male here for TCM follow up.     During the TCM phone call patient stated:  TCM Call (since 5/28/2025)       Date and time call was made  6/2/2025  9:54 AM    Patient was hospitialized at  [facility]    Date of Admission  05/26/25    Date of discharge  05/30/25    Disposition  Home          TCM Call (since 5/28/2025)       Scheduled for follow up?  Yes    I have advised the  "patient to call PCP with any new or worsening symptoms  Irma Mckeon, MR          74 Y O man is here for Post Hospital/ TCM visit , he feels better, still feels tired/fatigue, D/C summary and med list reviewed,...      Review of Systems   Constitutional:  Positive for fatigue. Negative for chills and fever.   HENT:  Positive for postnasal drip. Negative for congestion, facial swelling, sore throat, trouble swallowing and voice change.    Eyes:  Negative for pain, discharge and visual disturbance.   Respiratory:  Negative for cough, shortness of breath and wheezing.    Cardiovascular:  Negative for chest pain, palpitations and leg swelling.   Gastrointestinal:  Negative for abdominal pain, blood in stool, constipation, diarrhea and nausea.   Endocrine: Negative for polydipsia, polyphagia and polyuria.   Genitourinary:  Negative for difficulty urinating, hematuria and urgency.   Musculoskeletal:  Negative for arthralgias and myalgias.   Skin:  Negative for rash.   Neurological:  Positive for dizziness. Negative for tremors, weakness and headaches.   Hematological:  Negative for adenopathy. Does not bruise/bleed easily.   Psychiatric/Behavioral:  Negative for dysphoric mood, sleep disturbance and suicidal ideas.      Objective   /86 (BP Location: Left arm, Patient Position: Sitting, Cuff Size: Standard)   Pulse 78   Temp 98.9 °F (37.2 °C) (Tympanic)   Resp 16   Ht 5' 8\" (1.727 m)   Wt 90.7 kg (200 lb)   SpO2 99%   BMI 30.41 kg/m²     Physical Exam  Vitals and nursing note reviewed.   Constitutional:       General: He is not in acute distress.     Appearance: He is well-developed.   HENT:      Head: Normocephalic and atraumatic.      Right Ear: External ear normal.      Left Ear: External ear normal.     Eyes:      Conjunctiva/sclera: Conjunctivae normal.      Pupils: Pupils are equal, round, and reactive to light.     Neck:      Thyroid: No thyromegaly.      Trachea: No tracheal deviation. "     Cardiovascular:      Rate and Rhythm: Normal rate and regular rhythm.      Heart sounds: Murmur heard.      No friction rub.   Pulmonary:      Effort: Pulmonary effort is normal. No respiratory distress.      Breath sounds: Normal breath sounds. No wheezing.   Abdominal:      General: Bowel sounds are normal. There is no distension.      Palpations: Abdomen is soft.      Tenderness: There is no abdominal tenderness.     Musculoskeletal:         General: No swelling or deformity. Normal range of motion.      Cervical back: Neck supple.     Skin:     General: Skin is warm and dry.      Capillary Refill: Capillary refill takes less than 2 seconds.      Findings: No erythema or rash.     Neurological:      Mental Status: He is alert and oriented to person, place, and time.      Cranial Nerves: No cranial nerve deficit.      Coordination: Coordination normal.      Deep Tendon Reflexes: Reflexes normal.      Comments: Pt uses a cane to support gait   Psychiatric:         Mood and Affect: Mood normal.         Behavior: Behavior normal.       Medications have been reviewed by provider in current encounter

## 2025-06-11 NOTE — LETTER
Date: 06/11/25    Dear Silvio Drew,   My name is Bety; I am a registered nurse care manager working with [facility].    I have not been able to reach you and would like to set a time that I can talk with you over the phone.  My work is to help patients that have complex medical conditions get the care they need. This includes patients who may have been in the hospital or emergency room.     Please call me at [phone] with any questions you may have. I look forward to hearing from  you.  Sincerely,  Bety Dominguez RN  Outpatient Care Manager

## 2025-06-11 NOTE — ASSESSMENT & PLAN NOTE
Orders:    Iron Panel (Includes Ferritin, Iron Sat%, Iron, and TIBC); Future    Ferritin; Future    UA (URINE) with reflex to Scope; Future    Magnesium; Future    CBC and differential; Future    Comprehensive metabolic panel; Future    Lipid panel; Future    TSH, 3rd generation; Future    T4, free; Future     Recurrent cerumen impaction, trial of debrox and advised to return to clinic for ear irrigation if fails to improve

## 2025-06-11 NOTE — PROGRESS NOTES
TCM Call (since 5/28/2025)       Date and time call was made  6/2/2025  9:54 AM    Patient was hospitialized at  [facility]    Date of Admission  05/26/25    Date of discharge  05/30/25    Disposition  Home          TCM Call (since 5/28/2025)       Scheduled for follow up?  Yes    I have advised the patient to call PCP with any new or worsening symptoms  MR Aliya         Done in Detail....

## 2025-06-11 NOTE — ASSESSMENT & PLAN NOTE
Life style Mod  Continue same  FU w cardiology       Pt still in Recovery period,  Needs to increase Water and nutritions,  FU w cardiology,ASAP...  RTC in 2-3 weeks w Blood work

## 2025-06-11 NOTE — PROGRESS NOTES
Second attempt to contact patient for f/u post hospitalization for pneumonia using [employer]  services.  Message left on vm.  Unable to reach letter sent.  Will close referral at this time.

## 2025-06-11 NOTE — ASSESSMENT & PLAN NOTE
Lab Results   Component Value Date    HGBA1C 5.6 03/25/2025       Orders:    Iron Panel (Includes Ferritin, Iron Sat%, Iron, and TIBC); Future    Ferritin; Future    UA (URINE) with reflex to Scope; Future    Magnesium; Future    CBC and differential; Future    Comprehensive metabolic panel; Future    Lipid panel; Future    TSH, 3rd generation; Future    T4, free; Future

## 2025-06-16 ENCOUNTER — TELEPHONE (OUTPATIENT)
Dept: FAMILY MEDICINE CLINIC | Facility: CLINIC | Age: 75
End: 2025-06-16

## 2025-06-16 DIAGNOSIS — K21.9 GASTROESOPHAGEAL REFLUX DISEASE WITHOUT ESOPHAGITIS: Primary | ICD-10-CM

## 2025-06-16 RX ORDER — PANTOPRAZOLE SODIUM 20 MG/1
20 TABLET, DELAYED RELEASE ORAL
Qty: 90 TABLET | Refills: 3 | Status: SHIPPED | OUTPATIENT
Start: 2025-06-16 | End: 2026-06-11

## 2025-06-19 ENCOUNTER — APPOINTMENT (OUTPATIENT)
Dept: LAB | Age: 75
End: 2025-06-19
Payer: COMMERCIAL

## 2025-06-19 DIAGNOSIS — E78.5 HYPERLIPIDEMIA ASSOCIATED WITH TYPE 2 DIABETES MELLITUS  (HCC): ICD-10-CM

## 2025-06-19 DIAGNOSIS — D50.9 IRON DEFICIENCY ANEMIA, UNSPECIFIED IRON DEFICIENCY ANEMIA TYPE: ICD-10-CM

## 2025-06-19 DIAGNOSIS — R06.02 SOB (SHORTNESS OF BREATH): ICD-10-CM

## 2025-06-19 DIAGNOSIS — N18.32 STAGE 3B CHRONIC KIDNEY DISEASE (HCC): ICD-10-CM

## 2025-06-19 DIAGNOSIS — E11.69 HYPERLIPIDEMIA ASSOCIATED WITH TYPE 2 DIABETES MELLITUS  (HCC): ICD-10-CM

## 2025-06-19 LAB
ALBUMIN SERPL BCG-MCNC: 3.9 G/DL (ref 3.5–5)
ALP SERPL-CCNC: 110 U/L (ref 34–104)
ALT SERPL W P-5'-P-CCNC: 22 U/L (ref 7–52)
ANION GAP SERPL CALCULATED.3IONS-SCNC: 9 MMOL/L (ref 4–13)
AST SERPL W P-5'-P-CCNC: 29 U/L (ref 13–39)
BACTERIA UR QL AUTO: ABNORMAL /HPF
BASOPHILS # BLD AUTO: 0.05 THOUSANDS/ÂΜL (ref 0–0.1)
BASOPHILS NFR BLD AUTO: 1 % (ref 0–1)
BILIRUB SERPL-MCNC: 0.61 MG/DL (ref 0.2–1)
BILIRUB UR QL STRIP: NEGATIVE
BUN SERPL-MCNC: 32 MG/DL (ref 5–25)
CALCIUM SERPL-MCNC: 8.9 MG/DL (ref 8.4–10.2)
CHLORIDE SERPL-SCNC: 111 MMOL/L (ref 96–108)
CHOLEST SERPL-MCNC: 133 MG/DL (ref ?–200)
CLARITY UR: ABNORMAL
CO2 SERPL-SCNC: 21 MMOL/L (ref 21–32)
COLOR UR: YELLOW
CREAT SERPL-MCNC: 1.83 MG/DL (ref 0.6–1.3)
EOSINOPHIL # BLD AUTO: 0.17 THOUSAND/ÂΜL (ref 0–0.61)
EOSINOPHIL NFR BLD AUTO: 3 % (ref 0–6)
ERYTHROCYTE [DISTWIDTH] IN BLOOD BY AUTOMATED COUNT: 15.9 % (ref 11.6–15.1)
FERRITIN SERPL-MCNC: 50 NG/ML (ref 30–336)
GFR SERPL CREATININE-BSD FRML MDRD: 35 ML/MIN/1.73SQ M
GLUCOSE P FAST SERPL-MCNC: 90 MG/DL (ref 65–99)
GLUCOSE UR STRIP-MCNC: NEGATIVE MG/DL
HCT VFR BLD AUTO: 37.6 % (ref 36.5–49.3)
HDLC SERPL-MCNC: 44 MG/DL
HGB BLD-MCNC: 11.3 G/DL (ref 12–17)
HGB UR QL STRIP.AUTO: NEGATIVE
IMM GRANULOCYTES # BLD AUTO: 0.02 THOUSAND/UL (ref 0–0.2)
IMM GRANULOCYTES NFR BLD AUTO: 0 % (ref 0–2)
IRON SATN MFR SERPL: 13 % (ref 15–50)
IRON SERPL-MCNC: 44 UG/DL (ref 50–212)
KETONES UR STRIP-MCNC: NEGATIVE MG/DL
LDLC SERPL CALC-MCNC: 48 MG/DL (ref 0–100)
LEUKOCYTE ESTERASE UR QL STRIP: ABNORMAL
LYMPHOCYTES # BLD AUTO: 2.48 THOUSANDS/ÂΜL (ref 0.6–4.47)
LYMPHOCYTES NFR BLD AUTO: 36 % (ref 14–44)
MAGNESIUM SERPL-MCNC: 2.2 MG/DL (ref 1.9–2.7)
MCH RBC QN AUTO: 29 PG (ref 26.8–34.3)
MCHC RBC AUTO-ENTMCNC: 30.1 G/DL (ref 31.4–37.4)
MCV RBC AUTO: 96 FL (ref 82–98)
MONOCYTES # BLD AUTO: 0.6 THOUSAND/ÂΜL (ref 0.17–1.22)
MONOCYTES NFR BLD AUTO: 9 % (ref 4–12)
MUCOUS THREADS UR QL AUTO: ABNORMAL
NEUTROPHILS # BLD AUTO: 3.57 THOUSANDS/ÂΜL (ref 1.85–7.62)
NEUTS SEG NFR BLD AUTO: 51 % (ref 43–75)
NITRITE UR QL STRIP: POSITIVE
NON-SQ EPI CELLS URNS QL MICRO: ABNORMAL /HPF
NONHDLC SERPL-MCNC: 89 MG/DL
NRBC BLD AUTO-RTO: 0 /100 WBCS
PH UR STRIP.AUTO: 5.5 [PH]
PLATELET # BLD AUTO: 171 THOUSANDS/UL (ref 149–390)
PMV BLD AUTO: 11.9 FL (ref 8.9–12.7)
POTASSIUM SERPL-SCNC: 4.4 MMOL/L (ref 3.5–5.3)
PROT SERPL-MCNC: 7 G/DL (ref 6.4–8.4)
PROT UR STRIP-MCNC: ABNORMAL MG/DL
RBC # BLD AUTO: 3.9 MILLION/UL (ref 3.88–5.62)
RBC #/AREA URNS AUTO: ABNORMAL /HPF
SODIUM SERPL-SCNC: 141 MMOL/L (ref 135–147)
SP GR UR STRIP.AUTO: 1.02 (ref 1–1.03)
T4 FREE SERPL-MCNC: 1.11 NG/DL (ref 0.61–1.12)
TIBC SERPL-MCNC: 326.2 UG/DL (ref 250–450)
TRANSFERRIN SERPL-MCNC: 233 MG/DL (ref 203–362)
TRIGL SERPL-MCNC: 204 MG/DL (ref ?–150)
TSH SERPL DL<=0.05 MIU/L-ACNC: 2.29 UIU/ML (ref 0.45–4.5)
UIBC SERPL-MCNC: 282 UG/DL (ref 155–355)
UROBILINOGEN UR STRIP-ACNC: <2 MG/DL
WBC # BLD AUTO: 6.89 THOUSAND/UL (ref 4.31–10.16)
WBC #/AREA URNS AUTO: ABNORMAL /HPF

## 2025-06-19 PROCEDURE — 85025 COMPLETE CBC W/AUTO DIFF WBC: CPT

## 2025-06-19 PROCEDURE — 80053 COMPREHEN METABOLIC PANEL: CPT

## 2025-06-19 PROCEDURE — 84439 ASSAY OF FREE THYROXINE: CPT

## 2025-06-19 PROCEDURE — 82728 ASSAY OF FERRITIN: CPT

## 2025-06-19 PROCEDURE — 84443 ASSAY THYROID STIM HORMONE: CPT

## 2025-06-19 PROCEDURE — 80061 LIPID PANEL: CPT

## 2025-06-19 PROCEDURE — 83735 ASSAY OF MAGNESIUM: CPT

## 2025-06-19 PROCEDURE — 83550 IRON BINDING TEST: CPT

## 2025-06-19 PROCEDURE — 36415 COLL VENOUS BLD VENIPUNCTURE: CPT

## 2025-06-19 PROCEDURE — 81001 URINALYSIS AUTO W/SCOPE: CPT

## 2025-06-19 PROCEDURE — 83540 ASSAY OF IRON: CPT

## 2025-06-20 ENCOUNTER — RESULTS FOLLOW-UP (OUTPATIENT)
Dept: FAMILY MEDICINE CLINIC | Facility: CLINIC | Age: 75
End: 2025-06-20

## 2025-06-20 DIAGNOSIS — N18.32 CHRONIC RENAL FAILURE, STAGE 3B (HCC): Primary | ICD-10-CM

## 2025-06-20 DIAGNOSIS — R31.29 OTHER MICROSCOPIC HEMATURIA: ICD-10-CM

## 2025-06-20 NOTE — TELEPHONE ENCOUNTER
Called and spoke with pts sister and let her know he needs to drink more water was instructed to go for more labs next week and scheduled nurse visit for Thursday for repeat ua

## 2025-06-20 NOTE — TELEPHONE ENCOUNTER
----- Message from Mac Stephens MD sent at 6/20/2025  7:45 AM EDT -----  Increase po WATER, WATER, and Repeat; BMP and Clean catch UA next week  ----- Message -----  From: Lab, Background User  Sent: 6/19/2025  12:27 PM EDT  To: Mac Stephens MD

## 2025-06-24 ENCOUNTER — APPOINTMENT (OUTPATIENT)
Dept: LAB | Age: 75
End: 2025-06-24
Payer: COMMERCIAL

## 2025-06-24 DIAGNOSIS — N18.32 STAGE 3B CHRONIC KIDNEY DISEASE (HCC): ICD-10-CM

## 2025-06-24 DIAGNOSIS — M81.8 IDIOPATHIC OSTEOPOROSIS: ICD-10-CM

## 2025-06-24 DIAGNOSIS — E78.5 HYPERLIPIDEMIA ASSOCIATED WITH TYPE 2 DIABETES MELLITUS  (HCC): ICD-10-CM

## 2025-06-24 DIAGNOSIS — E11.69 HYPERLIPIDEMIA ASSOCIATED WITH TYPE 2 DIABETES MELLITUS  (HCC): ICD-10-CM

## 2025-06-24 LAB
ALBUMIN SERPL BCG-MCNC: 4 G/DL (ref 3.5–5)
ALP SERPL-CCNC: 109 U/L (ref 34–104)
ALT SERPL W P-5'-P-CCNC: 18 U/L (ref 7–52)
ANION GAP SERPL CALCULATED.3IONS-SCNC: 12 MMOL/L (ref 4–13)
AST SERPL W P-5'-P-CCNC: 27 U/L (ref 13–39)
BACTERIA UR QL AUTO: ABNORMAL /HPF
BASOPHILS # BLD AUTO: 0.04 THOUSANDS/ÂΜL (ref 0–0.1)
BASOPHILS NFR BLD AUTO: 1 % (ref 0–1)
BILIRUB SERPL-MCNC: 0.61 MG/DL (ref 0.2–1)
BILIRUB UR QL STRIP: NEGATIVE
BUN SERPL-MCNC: 27 MG/DL (ref 5–25)
CALCIUM SERPL-MCNC: 8.6 MG/DL (ref 8.4–10.2)
CHLORIDE SERPL-SCNC: 108 MMOL/L (ref 96–108)
CLARITY UR: ABNORMAL
CO2 SERPL-SCNC: 16 MMOL/L (ref 21–32)
COLOR UR: YELLOW
CREAT SERPL-MCNC: 1.52 MG/DL (ref 0.6–1.3)
EOSINOPHIL # BLD AUTO: 0.14 THOUSAND/ÂΜL (ref 0–0.61)
EOSINOPHIL NFR BLD AUTO: 2 % (ref 0–6)
ERYTHROCYTE [DISTWIDTH] IN BLOOD BY AUTOMATED COUNT: 15.4 % (ref 11.6–15.1)
GFR SERPL CREATININE-BSD FRML MDRD: 44 ML/MIN/1.73SQ M
GLUCOSE P FAST SERPL-MCNC: 101 MG/DL (ref 65–99)
GLUCOSE UR STRIP-MCNC: NEGATIVE MG/DL
HCT VFR BLD AUTO: 37.3 % (ref 36.5–49.3)
HGB BLD-MCNC: 11.6 G/DL (ref 12–17)
HGB UR QL STRIP.AUTO: NEGATIVE
IMM GRANULOCYTES # BLD AUTO: 0.04 THOUSAND/UL (ref 0–0.2)
IMM GRANULOCYTES NFR BLD AUTO: 1 % (ref 0–2)
KETONES UR STRIP-MCNC: NEGATIVE MG/DL
LEUKOCYTE ESTERASE UR QL STRIP: ABNORMAL
LYMPHOCYTES # BLD AUTO: 2.13 THOUSANDS/ÂΜL (ref 0.6–4.47)
LYMPHOCYTES NFR BLD AUTO: 31 % (ref 14–44)
MAGNESIUM SERPL-MCNC: 2.1 MG/DL (ref 1.9–2.7)
MCH RBC QN AUTO: 28.8 PG (ref 26.8–34.3)
MCHC RBC AUTO-ENTMCNC: 31.1 G/DL (ref 31.4–37.4)
MCV RBC AUTO: 93 FL (ref 82–98)
MONOCYTES # BLD AUTO: 0.51 THOUSAND/ÂΜL (ref 0.17–1.22)
MONOCYTES NFR BLD AUTO: 7 % (ref 4–12)
MUCOUS THREADS UR QL AUTO: ABNORMAL
NEUTROPHILS # BLD AUTO: 4.03 THOUSANDS/ÂΜL (ref 1.85–7.62)
NEUTS SEG NFR BLD AUTO: 58 % (ref 43–75)
NITRITE UR QL STRIP: POSITIVE
NON-SQ EPI CELLS URNS QL MICRO: ABNORMAL /HPF
NRBC BLD AUTO-RTO: 0 /100 WBCS
PH UR STRIP.AUTO: 5.5 [PH]
PLATELET # BLD AUTO: 176 THOUSANDS/UL (ref 149–390)
PMV BLD AUTO: 12.1 FL (ref 8.9–12.7)
POTASSIUM SERPL-SCNC: 4 MMOL/L (ref 3.5–5.3)
PROT SERPL-MCNC: 7 G/DL (ref 6.4–8.4)
PROT UR STRIP-MCNC: ABNORMAL MG/DL
RBC # BLD AUTO: 4.03 MILLION/UL (ref 3.88–5.62)
RBC #/AREA URNS AUTO: ABNORMAL /HPF
SODIUM SERPL-SCNC: 136 MMOL/L (ref 135–147)
SP GR UR STRIP.AUTO: 1.02 (ref 1–1.03)
TRANS CELLS #/AREA URNS HPF: PRESENT /[HPF]
TSH SERPL DL<=0.05 MIU/L-ACNC: 2.93 UIU/ML (ref 0.45–4.5)
UROBILINOGEN UR STRIP-ACNC: <2 MG/DL
WBC # BLD AUTO: 6.89 THOUSAND/UL (ref 4.31–10.16)
WBC #/AREA URNS AUTO: ABNORMAL /HPF
WBC CLUMPS # UR AUTO: PRESENT /UL

## 2025-06-24 PROCEDURE — 83735 ASSAY OF MAGNESIUM: CPT

## 2025-06-24 PROCEDURE — 85025 COMPLETE CBC W/AUTO DIFF WBC: CPT

## 2025-06-24 PROCEDURE — 36415 COLL VENOUS BLD VENIPUNCTURE: CPT

## 2025-06-24 PROCEDURE — 81001 URINALYSIS AUTO W/SCOPE: CPT

## 2025-06-24 PROCEDURE — 84443 ASSAY THYROID STIM HORMONE: CPT

## 2025-06-24 PROCEDURE — 80053 COMPREHEN METABOLIC PANEL: CPT

## 2025-06-25 ENCOUNTER — RESULTS FOLLOW-UP (OUTPATIENT)
Dept: FAMILY MEDICINE CLINIC | Facility: CLINIC | Age: 75
End: 2025-06-25

## 2025-06-25 PROBLEM — J18.9 PNEUMONIA OF RIGHT LUNG DUE TO INFECTIOUS ORGANISM: Status: RESOLVED | Noted: 2025-05-26 | Resolved: 2025-06-25

## 2025-06-25 NOTE — TELEPHONE ENCOUNTER
----- Message from Mac Stephens MD sent at 6/24/2025  7:28 PM EDT -----  Clean catch UA  ----- Message -----  From: Lab, Background User  Sent: 6/24/2025   4:32 PM EDT  To: Mac Stephens MD

## 2025-06-26 ENCOUNTER — CLINICAL SUPPORT (OUTPATIENT)
Dept: FAMILY MEDICINE CLINIC | Facility: CLINIC | Age: 75
End: 2025-06-26
Payer: COMMERCIAL

## 2025-06-26 DIAGNOSIS — R80.9 PROTEINURIA, UNSPECIFIED TYPE: Primary | ICD-10-CM

## 2025-06-26 LAB
SL AMB  POCT GLUCOSE, UA: ABNORMAL
SL AMB LEUKOCYTE ESTERASE,UA: 500
SL AMB POCT BILIRUBIN,UA: ABNORMAL
SL AMB POCT BLOOD,UA: 10
SL AMB POCT CLARITY,UA: ABNORMAL
SL AMB POCT COLOR,UA: YELLOW
SL AMB POCT KETONES,UA: ABNORMAL
SL AMB POCT NITRITE,UA: ABNORMAL
SL AMB POCT PH,UA: 6
SL AMB POCT SPECIFIC GRAVITY,UA: 1
SL AMB POCT URINE PROTEIN: ABNORMAL
SL AMB POCT UROBILINOGEN: 0.2

## 2025-06-26 PROCEDURE — 81002 URINALYSIS NONAUTO W/O SCOPE: CPT

## 2025-06-26 PROCEDURE — 99211 OFF/OP EST MAY X REQ PHY/QHP: CPT

## 2025-06-26 NOTE — PROGRESS NOTES
The patient is here for a nurse visit for a repeat clean catch UA.  The results were given to the provider.

## 2025-07-02 ENCOUNTER — VBI (OUTPATIENT)
Dept: ADMINISTRATIVE | Facility: OTHER | Age: 75
End: 2025-07-02

## 2025-07-02 NOTE — TELEPHONE ENCOUNTER
07/02/25 11:50 AM     Chart reviewed for CRC: Cologuard and CRC: Colonoscopy ; nothing is submitted to the patient's insurance at this time.     Alessia Eastman PG [facility]

## 2025-07-24 ENCOUNTER — OFFICE VISIT (OUTPATIENT)
Dept: FAMILY MEDICINE CLINIC | Facility: CLINIC | Age: 75
End: 2025-07-24
Payer: COMMERCIAL

## 2025-07-24 VITALS
HEIGHT: 68 IN | SYSTOLIC BLOOD PRESSURE: 122 MMHG | BODY MASS INDEX: 30.01 KG/M2 | DIASTOLIC BLOOD PRESSURE: 74 MMHG | OXYGEN SATURATION: 98 % | TEMPERATURE: 98.2 F | RESPIRATION RATE: 15 BRPM | WEIGHT: 198 LBS | HEART RATE: 68 BPM

## 2025-07-24 DIAGNOSIS — J45.909 ACUTE ASTHMATIC BRONCHITIS: Primary | ICD-10-CM

## 2025-07-24 DIAGNOSIS — I70.213 ATHEROSCLEROSIS OF NATIVE ARTERY OF BOTH LOWER EXTREMITIES WITH INTERMITTENT CLAUDICATION (HCC): ICD-10-CM

## 2025-07-24 DIAGNOSIS — Z12.11 SCREENING FOR COLORECTAL CANCER: ICD-10-CM

## 2025-07-24 DIAGNOSIS — M17.0 PRIMARY OSTEOARTHRITIS OF BOTH KNEES: ICD-10-CM

## 2025-07-24 DIAGNOSIS — I48.0 PAROXYSMAL ATRIAL FIBRILLATION (HCC): ICD-10-CM

## 2025-07-24 DIAGNOSIS — N18.32 STAGE 3B CHRONIC KIDNEY DISEASE (HCC): ICD-10-CM

## 2025-07-24 DIAGNOSIS — N18.32 CHRONIC RENAL FAILURE, STAGE 3B (HCC): ICD-10-CM

## 2025-07-24 DIAGNOSIS — Z12.12 SCREENING FOR COLORECTAL CANCER: ICD-10-CM

## 2025-07-24 DIAGNOSIS — Z12.5 ENCOUNTER FOR SCREENING FOR MALIGNANT NEOPLASM OF PROSTATE: ICD-10-CM

## 2025-07-24 DIAGNOSIS — E11.69 HYPERLIPIDEMIA ASSOCIATED WITH TYPE 2 DIABETES MELLITUS  (HCC): ICD-10-CM

## 2025-07-24 DIAGNOSIS — E78.5 HYPERLIPIDEMIA ASSOCIATED WITH TYPE 2 DIABETES MELLITUS  (HCC): ICD-10-CM

## 2025-07-24 DIAGNOSIS — N40.0 ENLARGED PROSTATE: ICD-10-CM

## 2025-07-24 LAB — SL AMB POCT HEMOGLOBIN AIC: 5.6 (ref ?–6.5)

## 2025-07-24 PROCEDURE — 83036 HEMOGLOBIN GLYCOSYLATED A1C: CPT | Performed by: INTERNAL MEDICINE

## 2025-07-24 PROCEDURE — 99214 OFFICE O/P EST MOD 30 MIN: CPT | Performed by: INTERNAL MEDICINE

## 2025-07-24 PROCEDURE — 96372 THER/PROPH/DIAG INJ SC/IM: CPT | Performed by: INTERNAL MEDICINE

## 2025-07-24 RX ORDER — DOXYCYCLINE 100 MG/1
100 CAPSULE ORAL
Qty: 20 CAPSULE | Refills: 0 | Status: SHIPPED | OUTPATIENT
Start: 2025-07-24 | End: 2025-08-03

## 2025-07-24 RX ORDER — IPRATROPIUM BROMIDE AND ALBUTEROL SULFATE 2.5; .5 MG/3ML; MG/3ML
3 SOLUTION RESPIRATORY (INHALATION) ONCE
Status: COMPLETED | OUTPATIENT
Start: 2025-07-24 | End: 2025-07-24

## 2025-07-24 RX ORDER — BENZONATATE 100 MG/1
100 CAPSULE ORAL
Qty: 30 CAPSULE | Refills: 0 | Status: SHIPPED | OUTPATIENT
Start: 2025-07-24

## 2025-07-24 RX ORDER — TRIAMCINOLONE ACETONIDE 40 MG/ML
40 INJECTION, SUSPENSION INTRA-ARTICULAR; INTRAMUSCULAR ONCE
Status: COMPLETED | OUTPATIENT
Start: 2025-07-24 | End: 2025-07-24

## 2025-07-24 RX ORDER — PREDNISONE 10 MG/1
TABLET ORAL
Qty: 20 TABLET | Refills: 0 | Status: SHIPPED | OUTPATIENT
Start: 2025-07-24

## 2025-07-24 RX ADMIN — IPRATROPIUM BROMIDE AND ALBUTEROL SULFATE 3 ML: 2.5; .5 SOLUTION RESPIRATORY (INHALATION) at 09:42

## 2025-07-24 RX ADMIN — TRIAMCINOLONE ACETONIDE 40 MG: 40 INJECTION, SUSPENSION INTRA-ARTICULAR; INTRAMUSCULAR at 09:42

## 2025-07-24 NOTE — ASSESSMENT & PLAN NOTE
Lab Results   Component Value Date    HGBA1C 5.6 07/24/2025   Life style Mod  Continue same  RTC in 3 mos w Blood work    Orders:    POCT hemoglobin A1c    Comprehensive metabolic panel; Future    CBC and differential; Future    Lipid Panel with Direct LDL reflex; Future    TSH, 3rd generation with Free T4 reflex; Future    UA (URINE) with reflex to Scope; Future    Magnesium; Future    PSA, Total Screen; Future

## 2025-07-24 NOTE — ASSESSMENT & PLAN NOTE
Lab Results   Component Value Date    EGFR 44 06/24/2025    EGFR 35 06/19/2025    EGFR 49 05/30/2025    CREATININE 1.52 (H) 06/24/2025    CREATININE 1.83 (H) 06/19/2025    CREATININE 1.40 (H) 05/30/2025   Stable  Continue same  FU w nephrology

## 2025-07-24 NOTE — PROGRESS NOTES
Name: Silvio Drew      : 1950      MRN: [id number]  Encounter Provider: Mac Stephens MD  Encounter Date: 2025   Encounter department: [facility]  :  Assessment & Plan  Hyperlipidemia associated with type 2 diabetes mellitus  (HCC)    Lab Results   Component Value Date    HGBA1C 5.6 2025   Life style Mod  Continue same  RTC in 3 mos w Blood work    Orders:    POCT hemoglobin A1c    Comprehensive metabolic panel; Future    CBC and differential; Future    Lipid Panel with Direct LDL reflex; Future    TSH, 3rd generation with Free T4 reflex; Future    UA (URINE) with reflex to Scope; Future    Magnesium; Future    PSA, Total Screen; Future    Screening for colorectal cancer    Orders:    Ambulatory Referral to Gastroenterology    UA (URINE) with reflex to Scope; Future    Magnesium; Future    PSA, Total Screen; Future    Primary osteoarthritis of both knees    Orders:    triamcinolone acetonide (KENALOG-40) 40 mg/mL injection 40 mg    Ambulatory Referral to Orthopedic Surgery; Future    XR knee 3 vw left non injury; Future    XR knee 3 vw right non injury; Future    UA (URINE) with reflex to Scope; Future    Magnesium; Future    PSA, Total Screen; Future    Acute asthmatic bronchitis  Bed rest  Start :  Orders:    ipratropium-albuterol (DUO-NEB) 0.5-2.5 mg/3 mL inhalation solution 3 mL    triamcinolone acetonide (KENALOG-40) 40 mg/mL injection 40 mg    benzonatate (TESSALON PERLES) 100 mg capsule; Take 1 capsule (100 mg total) by mouth 3 (three) times daily after meals    predniSONE 10 mg tablet; Take 3 tabs daily with breakfast for 3 days then Take 2 tabs daily for 3 Days then take one tab daily for 3 days then stop..    doxycycline hyclate (VIBRAMYCIN) 100 mg capsule; Take 1 capsule (100 mg total) by mouth 2 (two) times daily after meals for 10 days  RTC in 1-2  weeks    Chronic renal failure, stage 3b (HCC)  Lab Results   Component Value Date    EGFR 44  06/24/2025    EGFR 35 06/19/2025    EGFR 49 05/30/2025    CREATININE 1.52 (H) 06/24/2025    CREATININE 1.83 (H) 06/19/2025    CREATININE 1.40 (H) 05/30/2025       Orders:    UA (URINE) with reflex to Scope; Future    Magnesium; Future    PSA, Total Screen; Future    Enlarged prostate    Orders:    UA (URINE) with reflex to Scope; Future    Magnesium; Future    PSA, Total Screen; Future    Encounter for screening for malignant neoplasm of prostate    Orders:    PSA, Total Screen; Future    Atherosclerosis of native artery of both lower extremities with intermittent claudication (HCC)  Stable  Continue same  FU w cardiology         Paroxysmal atrial fibrillation (HCC)  Stable  Continue same  FU w cardiology         Stage 3b chronic kidney disease (HCC)  Lab Results   Component Value Date    EGFR 44 06/24/2025    EGFR 35 06/19/2025    EGFR 49 05/30/2025    CREATININE 1.52 (H) 06/24/2025    CREATININE 1.83 (H) 06/19/2025    CREATININE 1.40 (H) 05/30/2025   Stable  Continue same  FU w nephrology                History of Present Illness   74 y o man is here for regular check up, and has increased cold symptoms for last 1-2 weeks and Increased knee swelling and pain, Bilt...      Review of Systems   Constitutional:  Negative for chills, fatigue and fever.   HENT:  Positive for congestion, postnasal drip, sinus pressure and sore throat. Negative for facial swelling, trouble swallowing and voice change.    Eyes:  Negative for pain, discharge and visual disturbance.   Respiratory:  Positive for cough and wheezing. Negative for shortness of breath.    Cardiovascular:  Negative for chest pain, palpitations and leg swelling.   Gastrointestinal:  Negative for abdominal pain, blood in stool, constipation, diarrhea and nausea.   Endocrine: Negative for polydipsia, polyphagia and polyuria.   Genitourinary:  Negative for difficulty urinating, hematuria and urgency.   Musculoskeletal:  Positive for arthralgias, gait problem and joint  "swelling. Negative for myalgias.   Skin:  Negative for rash.   Neurological:  Negative for dizziness, tremors, weakness and headaches.   Hematological:  Negative for adenopathy. Does not bruise/bleed easily.   Psychiatric/Behavioral:  Negative for dysphoric mood, sleep disturbance and suicidal ideas.        Objective   /74 (BP Location: Left arm, Patient Position: Sitting, Cuff Size: Standard)   Pulse 68   Temp 98.2 °F (36.8 °C) (Skin)   Resp 15   Ht 5' 8\" (1.727 m)   Wt 89.8 kg (198 lb)   SpO2 98%   BMI 30.11 kg/m²      Physical Exam  Vitals and nursing note reviewed.   Constitutional:       General: He is not in acute distress.     Appearance: He is well-developed.   HENT:      Head: Normocephalic and atraumatic.      Right Ear: External ear normal.      Left Ear: External ear normal.     Eyes:      Conjunctiva/sclera: Conjunctivae normal.      Pupils: Pupils are equal, round, and reactive to light.     Neck:      Thyroid: No thyromegaly.      Trachea: No tracheal deviation.     Cardiovascular:      Rate and Rhythm: Normal rate and regular rhythm.      Heart sounds: Murmur heard.      No friction rub.   Pulmonary:      Effort: Pulmonary effort is normal. No respiratory distress.      Breath sounds: Wheezing present.   Abdominal:      General: Bowel sounds are normal. There is no distension.      Palpations: Abdomen is soft.      Tenderness: There is no abdominal tenderness.     Musculoskeletal:         General: Swelling and tenderness present. No deformity. Normal range of motion.      Cervical back: Neck supple.     Skin:     General: Skin is warm and dry.      Capillary Refill: Capillary refill takes less than 2 seconds.      Findings: No erythema or rash.     Neurological:      Mental Status: He is alert and oriented to person, place, and time.      Cranial Nerves: No cranial nerve deficit.      Coordination: Coordination normal.      Deep Tendon Reflexes: Reflexes normal.     Psychiatric:         " Mood and Affect: Mood normal.         Behavior: Behavior normal.

## 2025-07-30 RX ORDER — ATORVASTATIN CALCIUM 20 MG/1
TABLET, FILM COATED ORAL
Qty: 90 TABLET | Refills: 3 | Status: SHIPPED | OUTPATIENT
Start: 2025-07-30

## 2025-08-21 DIAGNOSIS — I10 PRIMARY HYPERTENSION: ICD-10-CM

## 2025-08-21 DIAGNOSIS — K21.9 GASTROESOPHAGEAL REFLUX DISEASE WITHOUT ESOPHAGITIS: Primary | ICD-10-CM

## 2025-08-21 RX ORDER — LOSARTAN POTASSIUM 50 MG/1
50 TABLET ORAL DAILY
Qty: 90 TABLET | Refills: 3 | Status: SHIPPED | OUTPATIENT
Start: 2025-08-21

## 2025-08-21 RX ORDER — PANTOPRAZOLE SODIUM 20 MG/1
20 TABLET, DELAYED RELEASE ORAL
Qty: 90 TABLET | Refills: 3 | Status: SHIPPED | OUTPATIENT
Start: 2025-08-21 | End: 2026-08-16

## (undated) DEVICE — STERILE MANDIBLE PACK: Brand: CARDINAL HEALTH

## (undated) DEVICE — 2000CC GUARDIAN II: Brand: GUARDIAN

## (undated) DEVICE — GLOVE SRG BIOGEL ORTHOPEDIC 7.5

## (undated) DEVICE — SURGIFOAM 8.5 X 12.5

## (undated) DEVICE — SUT CHROMIC 3-0 SH 27 IN G122H